# Patient Record
Sex: FEMALE | Race: WHITE | NOT HISPANIC OR LATINO | Employment: FULL TIME
[De-identification: names, ages, dates, MRNs, and addresses within clinical notes are randomized per-mention and may not be internally consistent; named-entity substitution may affect disease eponyms.]

---

## 2022-10-03 ENCOUNTER — HEALTH MAINTENANCE LETTER (OUTPATIENT)
Age: 47
End: 2022-10-03

## 2022-11-02 ENCOUNTER — OFFICE VISIT (OUTPATIENT)
Dept: FAMILY MEDICINE | Facility: CLINIC | Age: 47
End: 2022-11-02
Payer: COMMERCIAL

## 2022-11-02 VITALS
OXYGEN SATURATION: 94 % | DIASTOLIC BLOOD PRESSURE: 69 MMHG | RESPIRATION RATE: 18 BRPM | SYSTOLIC BLOOD PRESSURE: 101 MMHG | TEMPERATURE: 98.2 F | HEART RATE: 73 BPM | WEIGHT: 217 LBS

## 2022-11-02 DIAGNOSIS — G89.4 CHRONIC PAIN SYNDROME: ICD-10-CM

## 2022-11-02 DIAGNOSIS — Z76.89 ENCOUNTER TO ESTABLISH CARE WITH NEW DOCTOR: Primary | ICD-10-CM

## 2022-11-02 DIAGNOSIS — M25.571 PAIN IN JOINT INVOLVING ANKLE AND FOOT, RIGHT: ICD-10-CM

## 2022-11-02 PROCEDURE — 99203 OFFICE O/P NEW LOW 30 MIN: CPT | Performed by: NURSE PRACTITIONER

## 2022-11-02 RX ORDER — ONDANSETRON 4 MG/1
4 TABLET, FILM COATED ORAL 3 TIMES DAILY
COMMUNITY
Start: 2021-10-04 | End: 2022-11-28

## 2022-11-02 RX ORDER — BUPROPION HYDROCHLORIDE 300 MG/1
300 TABLET ORAL DAILY
COMMUNITY
Start: 2022-07-12 | End: 2022-11-28

## 2022-11-02 RX ORDER — PROCHLORPERAZINE MALEATE 10 MG
10 TABLET ORAL 4 TIMES DAILY PRN
COMMUNITY
Start: 2021-10-04 | End: 2022-11-28

## 2022-11-02 RX ORDER — INDOMETHACIN 50 MG/1
50 CAPSULE ORAL 3 TIMES DAILY
COMMUNITY
Start: 2022-08-12 | End: 2022-11-28

## 2022-11-02 RX ORDER — OXYCODONE HYDROCHLORIDE 5 MG/1
5 TABLET ORAL 4 TIMES DAILY
COMMUNITY
Start: 2022-09-28 | End: 2022-11-03

## 2022-11-02 RX ORDER — HYDROCHLOROTHIAZIDE 12.5 MG/1
12.5 CAPSULE ORAL DAILY
COMMUNITY
Start: 2021-10-04 | End: 2022-11-28

## 2022-11-02 RX ORDER — GABAPENTIN 300 MG/1
300 CAPSULE ORAL DAILY
COMMUNITY
Start: 2021-03-15 | End: 2022-11-28

## 2022-11-02 RX ORDER — LIDOCAINE HYDROCHLORIDE 20 MG/ML
5 SOLUTION OROPHARYNGEAL
COMMUNITY
Start: 2021-12-19 | End: 2022-11-28

## 2022-11-02 RX ORDER — BACLOFEN 10 MG/1
10 TABLET ORAL DAILY
COMMUNITY
Start: 2021-11-28 | End: 2022-12-01 | Stop reason: ALTCHOICE

## 2022-11-02 RX ORDER — LEVOTHYROXINE SODIUM 125 UG/1
125 TABLET ORAL DAILY
COMMUNITY
Start: 2021-10-04 | End: 2023-05-21

## 2022-11-02 RX ORDER — METFORMIN HYDROCHLORIDE 750 MG/1
2250 TABLET, EXTENDED RELEASE ORAL DAILY
COMMUNITY
Start: 2021-10-04 | End: 2022-11-28

## 2022-11-02 RX ORDER — RIZATRIPTAN BENZOATE 10 MG/1
10 TABLET ORAL 2 TIMES DAILY
COMMUNITY
Start: 2021-07-19 | End: 2022-11-28

## 2022-11-02 RX ORDER — LAMOTRIGINE 200 MG/1
400 TABLET ORAL AT BEDTIME
COMMUNITY
Start: 2022-07-12 | End: 2022-11-28

## 2022-11-02 RX ORDER — GLIPIZIDE 10 MG/1
20 TABLET, FILM COATED, EXTENDED RELEASE ORAL DAILY
COMMUNITY
Start: 2022-03-04 | End: 2022-11-28

## 2022-11-02 RX ORDER — OMEPRAZOLE 40 MG/1
40 CAPSULE, DELAYED RELEASE ORAL DAILY
COMMUNITY
Start: 2021-11-29 | End: 2022-11-28

## 2022-11-02 RX ORDER — TOPIRAMATE 25 MG/1
75 TABLET, FILM COATED ORAL DAILY
COMMUNITY
Start: 2021-12-30 | End: 2022-11-28

## 2022-11-02 RX ORDER — HYDROXYZINE PAMOATE 50 MG/1
50 CAPSULE ORAL DAILY
COMMUNITY
Start: 2022-08-09 | End: 2022-11-28

## 2022-11-02 RX ORDER — FOLIC ACID 1 MG/1
1 TABLET ORAL DAILY
COMMUNITY
Start: 2020-11-19 | End: 2022-11-28

## 2022-11-02 RX ORDER — PRAVASTATIN SODIUM 40 MG
40 TABLET ORAL DAILY
COMMUNITY
Start: 2021-10-04 | End: 2022-11-28

## 2022-11-02 RX ORDER — ACETAMINOPHEN AND CODEINE PHOSPHATE 120; 12 MG/5ML; MG/5ML
0.35 SOLUTION ORAL DAILY
COMMUNITY
Start: 2021-12-13 | End: 2022-11-28

## 2022-11-02 RX ORDER — NYSTATIN 100000 U/G
100000 CREAM TOPICAL 3 TIMES DAILY
COMMUNITY
Start: 2021-10-04

## 2022-11-02 RX ORDER — DOXEPIN HYDROCHLORIDE 25 MG/1
25 CAPSULE ORAL DAILY
COMMUNITY
Start: 2022-08-09 | End: 2022-11-28

## 2022-11-02 ASSESSMENT — ENCOUNTER SYMPTOMS
HEMATURIA: 0
NAUSEA: 0
FREQUENCY: 0
HEMATOCHEZIA: 0
NERVOUS/ANXIOUS: 0
BREAST MASS: 0
HEADACHES: 0
ABDOMINAL PAIN: 0
MYALGIAS: 0
DIZZINESS: 0
DYSURIA: 0
SHORTNESS OF BREATH: 0
PALPITATIONS: 0
JOINT SWELLING: 0
HEARTBURN: 0
EYE PAIN: 0
CONSTIPATION: 1
PARESTHESIAS: 0
ARTHRALGIAS: 0
FEVER: 0
CHILLS: 0
WEAKNESS: 0
COUGH: 0
DIARRHEA: 0
SORE THROAT: 0

## 2022-11-02 NOTE — PROGRESS NOTES
"   SUBJECTIVE:   CC: Elicia is an 47 year old who presents for preventive health visit.         {  Healthy Habits:     Getting at least 3 servings of Calcium per day:  Yes    Bi-annual eye exam:  Yes    Dental care twice a year:  NO    Sleep apnea or symptoms of sleep apnea:  None    Diet:  Regular (no restrictions)    Frequency of exercise:  None    Taking medications regularly:  No    Barriers to taking medications:  Problems remembering to take them    Medication side effects:  Lightheadedness and Other    PHQ-2 Total Score: 0    Additional concerns today:  Yes          Today's PHQ-2 Score:   PHQ-2 ( 1999 [employer]) 11/2/2022   Q1: Little interest or pleasure in doing things 0   Q2: Feeling down, depressed or hopeless 0   PHQ-2 Score 0   Q1: Little interest or pleasure in doing things Not at all   Q2: Feeling down, depressed or hopeless Not at all   PHQ-2 Score 0         Social History     Tobacco Use     Smoking status: Not on file     Smokeless tobacco: Not on file   Substance Use Topics     Alcohol use: Not on file     If you drink alcohol do you typically have >3 drinks per day or >7 drinks per week? No    Alcohol Use 11/2/2022   Prescreen: >3 drinks/day or >7 drinks/week? No   No flowsheet data found.    Reviewed orders with patient.  Reviewed health maintenance and updated orders accordingly - { :047338::\"Yes\"}  {Chronicprobdata (optional):361081}    Breast Cancer Screening:    Breast CA Risk Assessment (FHS-7) 11/2/2022   Do you have a family history of breast, colon, or ovarian cancer? No / Unknown       {If any of the questions to the BCRA (FHS-7) are answered yes, consider ordering referral for genetic counseling (Optional) :111487::\"click delete button to remove this line now\"}  {AMB Mammogram Decision Support (Optional) :523419}  Pertinent mammograms are reviewed under the imaging tab.    History of abnormal Pap smear: { :654505}     Reviewed and updated as needed this visit by clinical staff              " "    Reviewed and updated as needed this visit by Provider                 {HISTORY OPTIONS (Optional):068565}    Review of Systems   Constitutional: Negative for chills and fever.   HENT: Negative for congestion, ear pain, hearing loss and sore throat.    Eyes: Negative for pain and visual disturbance.   Respiratory: Negative for cough and shortness of breath.    Cardiovascular: Negative for chest pain, palpitations and peripheral edema.   Gastrointestinal: Positive for constipation. Negative for abdominal pain, diarrhea, heartburn, hematochezia and nausea.   Breasts:  Negative for tenderness, breast mass and discharge.   Genitourinary: Negative for dysuria, frequency, genital sores, hematuria, pelvic pain, urgency, vaginal bleeding and vaginal discharge.   Musculoskeletal: Negative for arthralgias, joint swelling and myalgias.   Skin: Negative for rash.   Neurological: Negative for dizziness, weakness, headaches and paresthesias.   Psychiatric/Behavioral: Negative for mood changes. The patient is not nervous/anxious.      {FEMALE ROS (Optional):130291}     OBJECTIVE:   There were no vitals taken for this visit.  Physical Exam  {Exam Choices (Optional):621094}    {Diagnostic Test Results (Optional):550799::\"Diagnostic Test Results:\",\"Labs reviewed in Epic\"}    ASSESSMENT/PLAN:   {Diag Picklist:498505}    {Patient advised of split billing (Optional):733160}      COUNSELING:  {FEMALE COUNSELING MESSAGES:796611::\"Reviewed preventive health counseling, as reflected in patient instructions\"}    There is no height or weight on file to calculate BMI.    {Weight Management Plan (ACO) Complete if BMI is abnormal-  Ages 18-64  BMI >24.9.  Age 65+ with BMI <23 or >30 (Optional):815233}    She has no history on file for tobacco use.      Counseling Resources:  ATP IV Guidelines  Pooled Cohorts Equation Calculator  Breast Cancer Risk Calculator  BRCA-Related Cancer Risk Assessment: FHS-7 Tool  FRAX Risk Assessment  ICSI " Preventive Guidelines  Dietary Guidelines for Americans, 2010  USDA's MyPlate  ASA Prophylaxis  Lung CA Screening    CANDACE Youngblood CNP  [facility]

## 2022-11-02 NOTE — PROGRESS NOTES
Assessment & Plan     Encounter to establish care with new doctor  Reviewed chronic health conditions; medications, labs and pertinent health concerns today    Chronic pain syndrome  - Pain Management  Referral    Pain in joint involving ankle and foot, right  - Orthopedic  Referral      Due to time and chronic medical problems; follow up in 3 weeks to review other   Health concerns and preventive care mammogram and pap.           Return in about 3 months (around 2/2/2023) for schedule , Follow up, with me 3weeks.    CANDACE Youngblood CNP  [facility]    47 year old  year old female with PMH There is no problem list on file for this patient.    in clinic for acute office visit related to/establish care/to discuss       Subjective   Elicia is a 47 year old presenting for the following health issues:  - establish care    Patient is relocating to [place] from [place]; last PCP visit 6/7/22     Past Medical History:       Depression     Diabetes mellitus uncontrolled     Fatty liver     Former smoker     Hypothyroidism     IBS (irritable bowel syndrome)     Migraine without aura, without mention of intractable migraine without mention of status migrainosus Migraine     Obesity (BMI 30-39.9)       Pain management contract agreement    Social:    Smoking Status Former Smoker     Packs/day: 1.00   Smokeless Tobacco Never Used     Family History   Problem Relation Name Age of Onset     Diabetes Gparents     Other Gparents   Macular degeneration     Cancer - Breast Gparents   Mat great GM, possibly MGM     Cataract Maternal Grandparent     Macular Degeneration Maternal Grandparent   great aunt     Strabismus Sibling   brother     None Known Mother   Status: Alive     None Known Father   Status: Alive     MI/CVA <49yo Neg         HPI     - chronic right ankle pain: s/p 2020 ORIF; hardware removed from her right ankle on 6/3/2021; last seen by Orthopedics 5/2022 potential arthroplasty or  arthrodesis; Achy pain with standing for long periods of times; radiating up leg; + weakness; + n/t; taking gabapentin 1200 mg TD; currently taking oxycodone 5 mg q6h prn pain     Previous imaging  FINDINGS:   There is no acute malalignment. There is moderate to severe joint space loss at the ankle joint. There is abnormal osteophytosis at the ankle joint.     - back : chronic; oxycodone prn   - Migraines: topirmate and maxalt prn;   - memory loss/word finding difficulty: patient reports having memory loss, word ffinding dificutly  - DM:   - Bipolar II: was following mental health team; medication stable on gabapentin, lamictal, bupropion, rexulti,               Review of Systems      Review of Systems   Constitutional: Negative for chills and fever.   HENT: Negative for congestion, ear pain, hearing loss and sore throat.    Eyes: Negative for pain and visual disturbance.   Respiratory: Negative for cough and shortness of breath.    Cardiovascular: Negative for chest pain, palpitations and peripheral edema.   Gastrointestinal: Positive for constipation. Negative for abdominal pain, diarrhea, heartburn, hematochezia and nausea.   Breasts:  Negative for tenderness, breast mass and discharge.   Genitourinary: Negative for dysuria, frequency, genital sores, hematuria, pelvic pain, urgency, vaginal bleeding and vaginal discharge.   Musculoskeletal: Negative for arthralgias, joint swelling and myalgias.   Skin: Negative for rash.   Neurological: Negative for dizziness, weakness, headaches and paresthesias.   Psychiatric/Behavioral: Negative for mood changes. The patient is not nervous/anxious}      Objective    /69 (BP Location: Right arm, Patient Position: Sitting, Cuff Size: Adult Large)   Pulse 73   Temp 98.2  F (36.8  C) (Temporal)   Resp 18   Wt 98.4 kg (217 lb)   LMP 10/10/2022   SpO2 94%   There is no height or weight on file to calculate BMI.  Physical Exam   GENERAL: healthy, alert and no  distress  NECK: no adenopathy, no asymmetry, masses, or scars and thyroid normal to palpation  RESP: lungs clear to auscultation - no rales, rhonchi or wheezes  CV: regular rate and rhythm, normal S1 S2, no S3 or S4, no murmur, click or rub, no peripheral edema and peripheral pulses strong  ABDOMEN: soft, nontender, no hepatosplenomegaly, no masses and bowel sounds normal  MS: no gross musculoskeletal defects noted, no edema    No results found for any previous visit.     No results found for any visits on 11/02/22.  No results found for this or any previous visit (from the past 24 hour(s)).                  Answers for HPI/ROS submitted by the patient on 11/2/2022  Frequency of exercise:: None  Getting at least 3 servings of Calcium per day:: Yes  Diet:: Regular (no restrictions)  Taking medications regularly:: No  Medication side effects:: Lightheadedness, Other  Bi-annual eye exam:: Yes  Dental care twice a year:: NO  Sleep apnea or symptoms of sleep apnea:: None  abdominal pain: No  Blood in stool: No  Blood in urine: No  chest pain: No  chills: No  congestion: No  constipation: Yes  cough: No  diarrhea: No  dizziness: No  ear pain: No  eye pain: No  nervous/anxious: No  fever: No  frequency: No  genital sores: No  headaches: No  hearing loss: No  heartburn: No  arthralgias: No  joint swelling: No  peripheral edema: No  mood changes: No  myalgias: No  nausea: No  dysuria: No  palpitations: No  Skin sensation changes: No  sore throat: No  urgency: No  rash: No  shortness of breath: No  visual disturbance: No  weakness: No  pelvic pain: No  vaginal bleeding: No  vaginal discharge: No  tenderness: No  breast mass: No  breast discharge: No  Additional concerns today:: Yes  Barriers to taking medications:: Problems remembering to take them

## 2022-11-03 ENCOUNTER — TELEPHONE (OUTPATIENT)
Dept: ANESTHESIOLOGY | Facility: CLINIC | Age: 47
End: 2022-11-03

## 2022-11-03 ENCOUNTER — MYC REFILL (OUTPATIENT)
Dept: FAMILY MEDICINE | Facility: CLINIC | Age: 47
End: 2022-11-03

## 2022-11-03 DIAGNOSIS — M25.571 PAIN IN JOINT INVOLVING ANKLE AND FOOT, RIGHT: Primary | ICD-10-CM

## 2022-11-03 RX ORDER — OXYCODONE HYDROCHLORIDE 5 MG/1
5 TABLET ORAL 4 TIMES DAILY
Qty: 112 TABLET | Refills: 0 | Status: SHIPPED | OUTPATIENT
Start: 2022-11-03 | End: 2022-12-30

## 2022-11-03 NOTE — TELEPHONE ENCOUNTER
RN spoke with patient and rescheduled appointment for an earlier opening on 12/9 at 3 pm with CANDACE Hightower CNP.     Marlen Britton RNCC

## 2022-11-03 NOTE — TELEPHONE ENCOUNTER
Routing refill request to provider for review/approval because:  Drug not on the [facility] refill protocol     Last Written Prescription Date:  9/28/22  Last office visit: 11/2/2022 with prescribing provider:  11/2/22   Future Office Visit:   Next 5 appointments (look out 90 days)    Nov 28, 2022  5:30 PM  (Arrive by 5:10 PM)  Provider Visit with CANDACE Youngblood CNP  [facility] [address] [place] [address]  [phone]   Dec 21, 2022 10:30 AM  (Arrive by 10:10 AM)  Adult Preventative Visit with CANDACE Youngblood CNP  [facility] ([facility] ) [address] [place] [address]  [phone]        Patient Comment: I was looking for the refill of this medication at the pharmacy but it wasn t there. I am going to be by mid day Friday. Can you please send it in by then?        CELENA Velazquez RN  [facility]

## 2022-11-03 NOTE — TELEPHONE ENCOUNTER
Health Call Center    Phone Message    May a detailed message be left on voicemail: yes     Reason for Call: Appointment Intake    Referring Provider Name: Gil Mann APRN CNP   Diagnosis and/or Symptoms: Chronic pain syndrome     Pt expressed concern that she needs to get her RX's refilled by the end of this month and first available appt is on 12/23/22.She states that she recently transferred care to [facility] and her new PCP, temporarily taking over her pain meds, is stating that her RXs will be redused by the end of the month.    Pt very anxious about this med change.    Please review and call Pt back at [phone] to advise what actions can be taken.    Action Taken: Message routed to:  [facility] ([facility] Pain    Travel Screening: Not Applicable

## 2022-11-10 ENCOUNTER — TRANSFERRED RECORDS (OUTPATIENT)
Dept: HEALTH INFORMATION MANAGEMENT | Facility: CLINIC | Age: 47
End: 2022-11-10

## 2022-11-13 ENCOUNTER — MYC MEDICAL ADVICE (OUTPATIENT)
Dept: FAMILY MEDICINE | Facility: CLINIC | Age: 47
End: 2022-11-13

## 2022-11-28 ENCOUNTER — VIRTUAL VISIT (OUTPATIENT)
Dept: FAMILY MEDICINE | Facility: CLINIC | Age: 47
End: 2022-11-28
Payer: COMMERCIAL

## 2022-11-28 DIAGNOSIS — Z12.31 ENCOUNTER FOR SCREENING MAMMOGRAM FOR BREAST CANCER: ICD-10-CM

## 2022-11-28 DIAGNOSIS — E66.812 CLASS 2 SEVERE OBESITY DUE TO EXCESS CALORIES WITH SERIOUS COMORBIDITY AND BODY MASS INDEX (BMI) OF 37.0 TO 37.9 IN ADULT (H): ICD-10-CM

## 2022-11-28 DIAGNOSIS — K21.00 GASTROESOPHAGEAL REFLUX DISEASE WITH ESOPHAGITIS WITHOUT HEMORRHAGE: ICD-10-CM

## 2022-11-28 DIAGNOSIS — E03.9 HYPOTHYROIDISM, UNSPECIFIED TYPE: ICD-10-CM

## 2022-11-28 DIAGNOSIS — G89.29 CHRONIC LOW BACK PAIN WITHOUT SCIATICA, UNSPECIFIED BACK PAIN LATERALITY: ICD-10-CM

## 2022-11-28 DIAGNOSIS — M54.50 CHRONIC LOW BACK PAIN WITHOUT SCIATICA, UNSPECIFIED BACK PAIN LATERALITY: ICD-10-CM

## 2022-11-28 DIAGNOSIS — M25.571 PAIN IN JOINT INVOLVING ANKLE AND FOOT, RIGHT: ICD-10-CM

## 2022-11-28 DIAGNOSIS — Z30.41 ENCOUNTER FOR SURVEILLANCE OF CONTRACEPTIVE PILLS: ICD-10-CM

## 2022-11-28 DIAGNOSIS — K76.0 NAFLD (NONALCOHOLIC FATTY LIVER DISEASE): ICD-10-CM

## 2022-11-28 DIAGNOSIS — K58.9 IRRITABLE BOWEL SYNDROME, UNSPECIFIED TYPE: ICD-10-CM

## 2022-11-28 DIAGNOSIS — Z12.11 SPECIAL SCREENING FOR MALIGNANT NEOPLASMS, COLON: ICD-10-CM

## 2022-11-28 DIAGNOSIS — D13.4 HEPATIC ADENOMA: ICD-10-CM

## 2022-11-28 DIAGNOSIS — R60.0 LOCALIZED EDEMA: ICD-10-CM

## 2022-11-28 DIAGNOSIS — E66.01 CLASS 2 SEVERE OBESITY DUE TO EXCESS CALORIES WITH SERIOUS COMORBIDITY AND BODY MASS INDEX (BMI) OF 37.0 TO 37.9 IN ADULT (H): ICD-10-CM

## 2022-11-28 DIAGNOSIS — G43.709 CHRONIC MIGRAINE WITHOUT AURA WITHOUT STATUS MIGRAINOSUS, NOT INTRACTABLE: ICD-10-CM

## 2022-11-28 DIAGNOSIS — F31.60 BIPOLAR AFFECTIVE DISORDER, CURRENT EPISODE MIXED, CURRENT EPISODE SEVERITY UNSPECIFIED (H): ICD-10-CM

## 2022-11-28 DIAGNOSIS — E11.9 TYPE 2 DIABETES MELLITUS WITHOUT COMPLICATION, WITHOUT LONG-TERM CURRENT USE OF INSULIN (H): ICD-10-CM

## 2022-11-28 DIAGNOSIS — Z76.89 ENCOUNTER TO ESTABLISH CARE WITH NEW DOCTOR: Primary | ICD-10-CM

## 2022-11-28 DIAGNOSIS — G47.00 PERSISTENT INSOMNIA: ICD-10-CM

## 2022-11-28 DIAGNOSIS — F33.0 MILD EPISODE OF RECURRENT MAJOR DEPRESSIVE DISORDER (H): ICD-10-CM

## 2022-11-28 PROCEDURE — 99215 OFFICE O/P EST HI 40 MIN: CPT | Mod: GT | Performed by: NURSE PRACTITIONER

## 2022-11-28 RX ORDER — INDOMETHACIN 50 MG/1
50 CAPSULE ORAL 3 TIMES DAILY
Qty: 90 CAPSULE | Refills: 1 | Status: SHIPPED | OUTPATIENT
Start: 2022-11-28 | End: 2023-03-08

## 2022-11-28 RX ORDER — METHOCARBAMOL 750 MG/1
750 TABLET, FILM COATED ORAL 4 TIMES DAILY PRN
Qty: 120 TABLET | Refills: 0 | Status: SHIPPED | OUTPATIENT
Start: 2022-11-28 | End: 2022-12-27

## 2022-11-28 RX ORDER — PRAVASTATIN SODIUM 40 MG
40 TABLET ORAL DAILY
Qty: 90 TABLET | Refills: 3 | Status: SHIPPED | OUTPATIENT
Start: 2022-11-28 | End: 2023-07-24

## 2022-11-28 RX ORDER — RIZATRIPTAN BENZOATE 10 MG/1
10 TABLET ORAL 2 TIMES DAILY
Qty: 60 TABLET | Refills: 3 | Status: SHIPPED | OUTPATIENT
Start: 2022-11-28 | End: 2024-04-29

## 2022-11-28 RX ORDER — PROCHLORPERAZINE MALEATE 10 MG
10 TABLET ORAL 4 TIMES DAILY PRN
Qty: 120 TABLET | Refills: 1 | Status: SHIPPED | OUTPATIENT
Start: 2022-11-28 | End: 2023-07-14

## 2022-11-28 RX ORDER — LAMOTRIGINE 200 MG/1
400 TABLET ORAL AT BEDTIME
Qty: 180 TABLET | Refills: 3 | Status: SHIPPED | OUTPATIENT
Start: 2022-11-28 | End: 2024-09-18

## 2022-11-28 RX ORDER — METFORMIN HYDROCHLORIDE 750 MG/1
2250 TABLET, EXTENDED RELEASE ORAL DAILY
Qty: 270 TABLET | Refills: 3 | Status: SHIPPED | OUTPATIENT
Start: 2022-11-28 | End: 2023-06-06

## 2022-11-28 RX ORDER — BUPROPION HYDROCHLORIDE 300 MG/1
300 TABLET ORAL DAILY
Qty: 90 TABLET | Refills: 0 | Status: SHIPPED | OUTPATIENT
Start: 2022-11-28 | End: 2023-06-06

## 2022-11-28 RX ORDER — FOLIC ACID 1 MG/1
1000 TABLET ORAL DAILY
Qty: 90 TABLET | Refills: 3 | Status: SHIPPED | OUTPATIENT
Start: 2022-11-28

## 2022-11-28 RX ORDER — GABAPENTIN 300 MG/1
900 CAPSULE ORAL 3 TIMES DAILY
Qty: 270 CAPSULE | Refills: 3 | Status: SHIPPED | OUTPATIENT
Start: 2022-11-28 | End: 2022-12-27

## 2022-11-28 RX ORDER — DOXEPIN HYDROCHLORIDE 25 MG/1
25 CAPSULE ORAL AT BEDTIME
Qty: 30 CAPSULE | Refills: 3 | Status: SHIPPED | OUTPATIENT
Start: 2022-11-28 | End: 2023-03-07

## 2022-11-28 RX ORDER — ACETAMINOPHEN AND CODEINE PHOSPHATE 120; 12 MG/5ML; MG/5ML
0.35 SOLUTION ORAL DAILY
Qty: 84 TABLET | Refills: 11 | Status: SHIPPED | OUTPATIENT
Start: 2022-11-28 | End: 2023-07-24

## 2022-11-28 RX ORDER — LIDOCAINE HYDROCHLORIDE 20 MG/ML
5 SOLUTION OROPHARYNGEAL
Qty: 100 ML | Refills: 1 | Status: SHIPPED | OUTPATIENT
Start: 2022-11-28 | End: 2024-04-23

## 2022-11-28 RX ORDER — HYDROCHLOROTHIAZIDE 12.5 MG/1
12.5 CAPSULE ORAL DAILY
Qty: 90 CAPSULE | Refills: 3 | Status: SHIPPED | OUTPATIENT
Start: 2022-11-28 | End: 2023-07-24

## 2022-11-28 RX ORDER — GLIPIZIDE 10 MG/1
20 TABLET, FILM COATED, EXTENDED RELEASE ORAL DAILY
Qty: 180 TABLET | Refills: 3 | Status: SHIPPED | OUTPATIENT
Start: 2022-11-28 | End: 2023-07-24

## 2022-11-28 RX ORDER — HYDROXYZINE PAMOATE 50 MG/1
50 CAPSULE ORAL DAILY
Qty: 30 CAPSULE | Refills: 0 | Status: SHIPPED | OUTPATIENT
Start: 2022-11-28 | End: 2023-03-08

## 2022-11-28 RX ORDER — BUPROPION HYDROCHLORIDE 150 MG/1
150 TABLET ORAL EVERY MORNING
Qty: 90 TABLET | Refills: 3 | Status: SHIPPED | OUTPATIENT
Start: 2022-11-28

## 2022-11-28 RX ORDER — ONDANSETRON 4 MG/1
4 TABLET, FILM COATED ORAL 3 TIMES DAILY
Qty: 90 TABLET | Refills: 1 | Status: SHIPPED | OUTPATIENT
Start: 2022-11-28 | End: 2024-09-13

## 2022-11-28 RX ORDER — OMEPRAZOLE 40 MG/1
40 CAPSULE, DELAYED RELEASE ORAL AT BEDTIME
Qty: 90 CAPSULE | Refills: 3 | Status: SHIPPED | OUTPATIENT
Start: 2022-11-28 | End: 2023-06-06

## 2022-11-28 NOTE — PROGRESS NOTES
Elicia is a 47 year old who is being evaluated via a billable video visit.      How would you like to obtain your AVS? MyChart  If the video visit is dropped, the invitation should be resent by: Text to cell phone: [phone]  Will anyone else be joining your video visit? No          Assessment & Plan     Encounter to establish care with new doctor  Reviewed chronic health conditions; medications, labs and pertinent health concerns today    Gastroesophageal reflux disease with esophagitis without hemorrhage  Omeprazole 20 mg BID if insurance agreeable; GI EGD  - Adult GI  Referral - Procedure Only  - ondansetron (ZOFRAN) 4 MG tablet  Dispense: 90 tablet; Refill: 1  - folic acid (FOLVITE) 1 MG tablet  Dispense: 90 tablet; Refill: 3  - lidocaine, viscous, (XYLOCAINE) 2 % solution  Dispense: 100 mL; Refill: 1  - omeprazole (PRILOSEC) 40 MG DR capsule  Dispense: 90 capsule; Refill: 3  - Vitamin B12    Hypothyroidism, unspecified type  Symptomatic fatigue, weight gain; hair loss TSH low nl 0.58; check TSH goal 2-3 range  - folic acid (FOLVITE) 1 MG tablet  Dispense: 90 tablet; Refill: 3  - Vitamin B12  - TSH with free T4 reflex  - Methylmalonic Acid  - Homocysteine  - Folate    Hepatic adenoma  - Adult GI  Referral - Consult Only  - Comprehensive metabolic panel    NAFLD (nonalcoholic fatty liver disease)  Weight loss management   - Adult GI  Referral - Consult Only  - Comprehensive metabolic panel    Type 2 diabetes mellitus without complication, without long-term current use of insulin (H)  Uncontrolled; continue recent increase semaglutide 14 mg repeat A1C  - glipiZIDE (GLUCOTROL XL) 10 MG 24 hr tablet  Dispense: 180 tablet; Refill: 3  - metFORMIN (GLUCOPHAGE-XR) 750 MG 24 hr tablet  Dispense: 270 tablet; Refill: 3  - Semaglutide 14 MG TABS  Dispense: 30 tablet; Refill: 3  - pravastatin (PRAVACHOL) 40 MG tablet  Dispense: 90 tablet; Refill: 3  - folic acid (FOLVITE) 1 MG tablet  Dispense: 90  tablet; Refill: 3  - Vitamin B12  - Hemoglobin A1c  - Folate  - Comprehensive metabolic panel    Irritable bowel syndrome, unspecified type  - Adult GI  Referral - Consult Only  - ondansetron (ZOFRAN) 4 MG tablet  Dispense: 90 tablet; Refill: 1    Mild episode of recurrent major depressive disorder (H)  Bipolar affective disorder, current episode mixed, current episode severity unspecified (H)  Pending referral to ; Stable; continue current regimen; renewed medication  - buPROPion (WELLBUTRIN XL) 150 MG 24 hr tablet  Dispense: 90 tablet; Refill: 3  - buPROPion (WELLBUTRIN XL) 300 MG 24 hr tablet  Dispense: 90 tablet; Refill: 0  - lamoTRIgine (LAMICTAL) 200 MG tablet  Dispense: 180 tablet; Refill: 3  - brexpiprazole (REXULTI) 1 MG tablet  Dispense: 90 tablet; Refill: 3  - hydrOXYzine (VISTARIL) 50 MG capsule  Dispense: 30 capsule; Refill: 0  - TSH with free T4 reflex    Chronic migraine without aura without status migrainosus, not intractable  Stable; continue current regimen; renewed medication  - gabapentin (NEURONTIN) 300 MG capsule  Dispense: 270 capsule; Refill: 3  - prochlorperazine (COMPAZINE) 10 MG tablet  Dispense: 120 tablet; Refill: 1  - rizatriptan (MAXALT) 10 MG tablet  Dispense: 60 tablet; Refill: 3    Chronic low back pain without sciatica, unspecified back pain laterality  Baclofen not effective; trial robacin  - methocarbamol (ROBAXIN) 750 MG tablet  Dispense: 120 tablet; Refill: 0    Pain in joint involving ankle and foot, right  Orthopedic surgery pending  - gabapentin (NEURONTIN) 300 MG capsule  Dispense: 270 capsule; Refill: 3  - methocarbamol (ROBAXIN) 750 MG tablet  Dispense: 120 tablet; Refill: 0  - indomethacin (INDOCIN) 50 MG capsule  Dispense: 90 capsule; Refill: 1    Encounter for screening mammogram for breast cancer  - MA Screening Digital Bilateral    Special screening for malignant neoplasms, colon  - GEOFF([employer])    Persistent insomnia  Stable; continue current  regimen; renewed medication    - doxepin (SINEQUAN) 25 MG capsule  Dispense: 30 capsule; Refill: 3    Encounter for surveillance of contraceptive pills  Stable; continue current regimen; renewed medication  Progestin only due to hepatic adenomas   - norethindrone (MICRONOR) 0.35 MG tablet  Dispense: 84 tablet; Refill: 11    Localized edema  ? R/t hepatic adenoma ; Stable; continue current regimen; renewed medication  - hydrochlorothiazide (MICROZIDE) 12.5 MG capsule  Dispense: 90 capsule; Refill: 3     Class 2 severe obesity due to excess calories with serious comorbidity and body mass index (BMI) of 37.0 to 37.9 in adult (H)  - phentermine (ADIPEX-P) 15 MG capsule  - follow up 3 months       No follow-ups on file.   Follow-up Visit   Expected date:  Feb 28, 2023 (Approximate)      Follow Up Appointment Details:     Follow-up with whom?: Me    Follow-Up for what?: Chronic Disease f/u    Chronic Disease f/u: General (Other)    Additional Details: obesity management    How?: In Person or Virtual    Is this an as-needed follow-up?: No                    CANDACE Youngblood [facility]    47 year old  year old female with PMH   Patient Active Problem List   Diagnosis Code     Gastroesophageal reflux disease with esophagitis without hemorrhage K21.00     Hypothyroidism, unspecified type E03.9     Hepatic adenoma D13.4     NAFLD (nonalcoholic fatty liver disease) K76.0     Type 2 diabetes mellitus without complication, without long-term current use of insulin (H) E11.9     Irritable bowel syndrome, unspecified type K58.9     Mild episode of recurrent major depressive disorder (H) F33.0     Chronic migraine without aura without status migrainosus, not intractable G43.709     Chronic low back pain without sciatica, unspecified back pain laterality M54.50, G89.29     Bipolar affective disorder, current episode mixed, current episode severity unspecified (H) F31.60     Pain in joint involving  ankle and foot, right M25.571     Persistent insomnia G47.00     Localized edema R60.0     in clinic for acute office visit related to/establish care/to discuss     - continue establish care due to multiple chronic medical conditions    Emily Rubi is a 47 year old, presenting for the following health issues:  Video Visit and Follow Up (Medication check, patient states she cancelled appointment for physical because she is expecting to have ankle surgery)      HPI     - chronic back pain:s/p ablations; stable     - migraines: stopped topiramate due to side effects; improved grogginess; word finding difficulty; migraines remain controlled w/gabapentin     -  Ankle pain:t right trimalleolar ankle fracture dislocation on 4/4/2020; s/p open reduction and internal fixation of her fractures on 4/7/2020; continued pain and neuropathy following surgery; completed Physical Therapy; recently seen by Orthopedics has pending surgery; treated with Oxycodone prn severe pain     - DM II: A1c 8.0%; currently on semaglutide 14 mg; metformin 2250 mg, glipizide xl 10 mg     - Hypothyroidism: fatigue, hair loss, weight steady; TSH 0.58 10/2021 levothyroxine 125 mcg    - Gerd: omeprazole 40 mg q am; increase reflux a night; waking with heartburn,  last EGD scope ~ 20yrs; insurance does not approved bid dosing     - Canker sore: lidocaine viscous     - Generalized edema: Swelling hands/ feet hydrochlorothiazide 12.5 mg dailu    - NAFLD/Hepatic Adenoma: followed by GI liver [place]   MRI Abd:   IMPRESSION:   1.  Liver lesions are unchanged from prior exam, including numerous (at least 10) hepatic adenomas and two foci of focal nodular hyperplasia.   2.  Mean hepatic stiffness either represents normal liver or mild inflammation.   3.  Hepatic steatosis with fat fraction of 12%.   4.  Normal hepatic iron content.     recommendation: repeat MRI 3/2023 if stable; will change to every 2 yr follow up       - Bipolar/Depression: MH referral  pending; stable on current therapy for several yrs;         Former smoker quit 3-4 yrs ago; vaping       IBS: abd cramping w/stress;  Has increased recently with recent move to [place] and new job ocurring once or twice a week       Obesity (BMI 30-39.9): no significant weight loss with starting semaglutide po      Review of Systems   Constitutional, HEENT, cardiovascular, pulmonary, gi and gu systems are negative, except as otherwise noted.      Objective           Vitals:  No vitals were obtained today due to virtual visit.    Physical Exam   GENERAL: Healthy, alert and no distress  EYES: Eyes grossly normal to inspection.  No discharge or erythema, or obvious scleral/conjunctival abnormalities.  RESP: No audible wheeze, cough, or visible cyanosis.  No visible retractions or increased work of breathing.    SKIN: Visible skin clear. No significant rash, abnormal pigmentation or lesions.  NEURO: Cranial nerves grossly intact.  Mentation and speech appropriate for age.  PSYCH: Mentation appears normal, affect normal/bright, judgement and insight intact, normal speech and appearance well-groomed.    No results found for any visits on 11/28/22.            Video-Visit Details    Joined the call at 11/28/2022, 5:16:12 pm.  Left the call at 11/28/2022, 5:59:13 pm.  You were on the call for 43 minutes 1 second .    Video Start Time:     Type of service:  Video Visit    Video End Time:5:59 PM    Originating Location (pt. Location): Home    Distant Location (provider location):  Off-site    Platform used for Video Visit: [facility]

## 2022-11-30 RX ORDER — PHENTERMINE HYDROCHLORIDE 15 MG/1
15 CAPSULE ORAL EVERY MORNING
Qty: 30 CAPSULE | Refills: 3 | Status: SHIPPED | OUTPATIENT
Start: 2022-11-30 | End: 2023-03-01

## 2022-12-01 ENCOUNTER — OFFICE VISIT (OUTPATIENT)
Dept: FAMILY MEDICINE | Facility: CLINIC | Age: 47
End: 2022-12-01
Payer: COMMERCIAL

## 2022-12-01 VITALS
HEART RATE: 74 BPM | WEIGHT: 215.8 LBS | BODY MASS INDEX: 32.71 KG/M2 | DIASTOLIC BLOOD PRESSURE: 64 MMHG | HEIGHT: 68 IN | SYSTOLIC BLOOD PRESSURE: 112 MMHG | TEMPERATURE: 98.4 F | RESPIRATION RATE: 20 BRPM | OXYGEN SATURATION: 97 %

## 2022-12-01 DIAGNOSIS — K76.0 NAFLD (NONALCOHOLIC FATTY LIVER DISEASE): ICD-10-CM

## 2022-12-01 DIAGNOSIS — D13.4 HEPATIC ADENOMA: ICD-10-CM

## 2022-12-01 DIAGNOSIS — K21.00 GASTROESOPHAGEAL REFLUX DISEASE WITH ESOPHAGITIS WITHOUT HEMORRHAGE: ICD-10-CM

## 2022-12-01 DIAGNOSIS — F33.0 MILD EPISODE OF RECURRENT MAJOR DEPRESSIVE DISORDER (H): ICD-10-CM

## 2022-12-01 DIAGNOSIS — M25.571 PAIN IN JOINT INVOLVING ANKLE AND FOOT, RIGHT: ICD-10-CM

## 2022-12-01 DIAGNOSIS — Z11.4 SCREENING FOR HIV (HUMAN IMMUNODEFICIENCY VIRUS): ICD-10-CM

## 2022-12-01 DIAGNOSIS — Z13.220 SCREENING FOR HYPERLIPIDEMIA: ICD-10-CM

## 2022-12-01 DIAGNOSIS — Z01.818 PREOP GENERAL PHYSICAL EXAM: Primary | ICD-10-CM

## 2022-12-01 DIAGNOSIS — E03.9 HYPOTHYROIDISM, UNSPECIFIED TYPE: ICD-10-CM

## 2022-12-01 DIAGNOSIS — Z79.899 ENCOUNTER FOR LONG-TERM (CURRENT) USE OF MEDICATIONS: ICD-10-CM

## 2022-12-01 DIAGNOSIS — Z11.59 NEED FOR HEPATITIS C SCREENING TEST: ICD-10-CM

## 2022-12-01 DIAGNOSIS — E11.9 TYPE 2 DIABETES MELLITUS WITHOUT COMPLICATION, WITHOUT LONG-TERM CURRENT USE OF INSULIN (H): ICD-10-CM

## 2022-12-01 LAB
BASOPHILS # BLD AUTO: 0 10E3/UL (ref 0–0.2)
BASOPHILS NFR BLD AUTO: 1 %
CREAT UR-MCNC: 90.5 MG/DL
CREAT UR-MCNC: 91 MG/DL
EOSINOPHIL # BLD AUTO: 0.4 10E3/UL (ref 0–0.7)
EOSINOPHIL NFR BLD AUTO: 7 %
ERYTHROCYTE [DISTWIDTH] IN BLOOD BY AUTOMATED COUNT: 13.7 % (ref 10–15)
FOLATE SERPL-MCNC: >40 NG/ML (ref 4.6–34.8)
HBA1C MFR BLD: 8.6 % (ref 0–5.6)
HCT VFR BLD AUTO: 41.8 % (ref 35–47)
HGB BLD-MCNC: 13.4 G/DL (ref 11.7–15.7)
LYMPHOCYTES # BLD AUTO: 1.6 10E3/UL (ref 0.8–5.3)
LYMPHOCYTES NFR BLD AUTO: 23 %
MCH RBC QN AUTO: 28.6 PG (ref 26.5–33)
MCHC RBC AUTO-ENTMCNC: 32.1 G/DL (ref 31.5–36.5)
MCV RBC AUTO: 89 FL (ref 78–100)
MICROALBUMIN UR-MCNC: <12 MG/L
MICROALBUMIN/CREAT UR: NORMAL MG/G{CREAT}
MONOCYTES # BLD AUTO: 0.3 10E3/UL (ref 0–1.3)
MONOCYTES NFR BLD AUTO: 5 %
NEUTROPHILS # BLD AUTO: 4.4 10E3/UL (ref 1.6–8.3)
NEUTROPHILS NFR BLD AUTO: 65 %
PLATELET # BLD AUTO: 326 10E3/UL (ref 150–450)
RBC # BLD AUTO: 4.69 10E6/UL (ref 3.8–5.2)
TSH SERPL DL<=0.005 MIU/L-ACNC: 0.4 UIU/ML (ref 0.3–4.2)
WBC # BLD AUTO: 6.8 10E3/UL (ref 4–11)

## 2022-12-01 PROCEDURE — 83921 ORGANIC ACID SINGLE QUANT: CPT

## 2022-12-01 PROCEDURE — 87389 HIV-1 AG W/HIV-1&-2 AB AG IA: CPT

## 2022-12-01 PROCEDURE — 80307 DRUG TEST PRSMV CHEM ANLYZR: CPT

## 2022-12-01 PROCEDURE — 80061 LIPID PANEL: CPT

## 2022-12-01 PROCEDURE — 86803 HEPATITIS C AB TEST: CPT

## 2022-12-01 PROCEDURE — 82607 VITAMIN B-12: CPT

## 2022-12-01 PROCEDURE — 83036 HEMOGLOBIN GLYCOSYLATED A1C: CPT

## 2022-12-01 PROCEDURE — 99214 OFFICE O/P EST MOD 30 MIN: CPT

## 2022-12-01 PROCEDURE — 82043 UR ALBUMIN QUANTITATIVE: CPT

## 2022-12-01 PROCEDURE — 83090 ASSAY OF HOMOCYSTEINE: CPT

## 2022-12-01 PROCEDURE — 36415 COLL VENOUS BLD VENIPUNCTURE: CPT

## 2022-12-01 PROCEDURE — 82746 ASSAY OF FOLIC ACID SERUM: CPT

## 2022-12-01 PROCEDURE — 80050 GENERAL HEALTH PANEL: CPT

## 2022-12-01 ASSESSMENT — PAIN SCALES - GENERAL: PAINLEVEL: SEVERE PAIN (6)

## 2022-12-01 ASSESSMENT — PATIENT HEALTH QUESTIONNAIRE - PHQ9: SUM OF ALL RESPONSES TO PHQ QUESTIONS 1-9: 0

## 2022-12-01 NOTE — PROGRESS NOTES
[address] [address]  Phone: [phone]  Primary Provider: Gil Mann  Pre-op Performing Provider: CHAY SHARP      PREOPERATIVE EVALUATION:  Today's date: 12/1/2022    Elicia Calero is a 47 year old female who presents for a preoperative evaluation.    Surgical Information:  Surgery/Procedure: Right ankle  Surgery Location: [facility]  Surgeon: Dr. Kunal Cesar   Surgery Date: 12/15/22  Time of Surgery:   Where patient plans to recover: At home with family Mom (Evelyn), Dad (Royce) will be staying with her.   Fax number for surgical facility: [phone]    Type of Anesthesia Anticipated: General    Assessment & Plan     The proposed surgical procedure is considered LOW risk.    Preop general physical exam  - CBC with platelets and differential    Pain in joint involving ankle and foot, right  Upcoming surgery. Working with pain mgmt.    Type 2 diabetes mellitus without complication, without long-term current use of insulin (H)   Chronic. Has PCP. A1c 8, recheck today. Med recs below.  - metformin: HOLD day of surgery.   - sulfonylurea (e.g. glyburide, glipizide): HOLD day of surgery   - GLP-1 Injectable (exenitide, liraglutide, semaglutide, dulaglutide, etc.): HOLD day of surgery   - Lipid panel reflex to direct LDL Non-fasting  - Albumin Random Urine Quantitative with Creat Ratio    Encounter for long-term (current) use of medications  opiods for pain with ankle injury.  - [id number] - Urine Drug Confirmation Panel (Comprehensive)    Screening for hyperlipidemia  Chronic DM2.   - Lipid panel reflex to direct LDL Non-fasting    Screening for HIV (human immunodeficiency virus)  - HIV Antigen Antibody Combo    Need for hepatitis C screening test  - Hepatitis C Screen Reflex to HCV RNA Quant and Genotype      Routine labs today ordered as future by PCP. Will add additional testing that is showing as Care gaps. Will advise discussing  with PCP at annual/wellness.        Risks and Recommendations:  The patient has the following additional risks and recommendations for perioperative complications:  Diabetes:  - Patient is not on insulin therapy: regular NPO guidelines can be followed.     Medication Instructions:   - Diuretics: HOLD on the day of surgery.   - Statins: Continue taking on the day of surgery.    - metformin: HOLD day of surgery.   - sulfonylurea (e.g. glyburide, glipizide): HOLD day of surgery   - GLP-1 Injectable (exenitide, liraglutide, semaglutide, dulaglutide, etc.): HOLD day of surgery    - Opioids: Continue without modification.   - pregabalin, gabapentin: Continue without modification.   - indomethacin (Indocin): HOLD 1 day before surgery.    - Benzodiazepines: Continue without modification.   - Psychostimulants: Hold the day of surgery   - SSRIs, SNRIs, TCAs, Antipsychotics: Continue without modification.     RECOMMENDATION:  APPROVAL GIVEN to proceed with proposed procedure, without further diagnostic evaluation.                      Subjective     HPI related to upcoming procedure: Broke falling down stairs 2 years ago and had hardware placed, had ongoing pain and had hardware removed in the past. Now planning fusion to improve mobility and pain.     Preop Questions 12/1/2022   1. Have you ever had a heart attack or stroke? No   2. Have you ever had surgery on your heart or blood vessels, such as a stent placement, a coronary artery bypass, or surgery on an artery in your head, neck, heart, or legs? No   3. Do you have chest pain with activity? No   4. Do you have a history of  heart failure? No   5. Do you currently have a cold, bronchitis or symptoms of other infection? No   6. Do you have a cough, shortness of breath, or wheezing? No   7. Do you or anyone in your family have previous history of blood clots? No   8. Do you or does anyone in your family have a serious bleeding problem such as prolonged bleeding following  surgeries or cuts? No   9. Have you ever had problems with anemia or been told to take iron pills? No   10. Have you had any abnormal blood loss such as black, tarry or bloody stools, or abnormal vaginal bleeding? No   11. Have you ever had a blood transfusion? No   12. Are you willing to have a blood transfusion if it is medically needed before, during, or after your surgery? Yes   13. Have you or any of your relatives ever had problems with anesthesia? No   14. Do you have sleep apnea, excessive snoring or daytime drowsiness? No   15. Do you have any artifical heart valves or other implanted medical devices like a pacemaker, defibrillator, or continuous glucose monitor? No   16. Do you have artificial joints? No   17. Are you allergic to latex? No   18. Is there any chance that you may be pregnant? No       Health Care Directive:  Patient does not have a Health Care Directive or Living Will: Discussed advance care planning with patient; information given to patient to review.    Preoperative Review of :  Has pain management appt on 12/9.   reviewed - controlled substances reflected in medication list.      Status of Chronic Conditions:  DEPRESSION - Patient has a long history of Depression of moderate severity requiring medication for control with recent symptoms being stable..Current symptoms of depression include none.     DIABETES - Patient has a longstanding history of DiabetesType Type II . Patient is being treated with oral agents and denies significant side effects. Control has been fair. Complicating factors include but are not limited to: none.     HYPERLIPIDEMIA - Patient has a long history of significant Hyperlipidemia requiring medication for treatment with recent good control. Patient reports no problems or side effects with the medication.     HYPOTHYROIDISM - Patient has a longstanding history of chronic Hypothyroidism. Patient has been doing well, noting no tremor, insomnia, hair loss or  changes in skin texture. Continues to take medications as directed, without adverse reactions or side effects. Last TSH No results found for: TSH.            Review of Systems  CONSTITUTIONAL: NEGATIVE for fever, chills, change in weight  INTEGUMENTARY/SKIN: NEGATIVE for worrisome rashes, moles or lesions  EYES: NEGATIVE for vision changes or irritation  ENT/MOUTH: NEGATIVE for ear, mouth and throat problems  RESP: NEGATIVE for significant cough or SOB  CV: NEGATIVE for chest pain, palpitations or peripheral edema  GI: NEGATIVE for nausea, abdominal pain, heartburn, or change in bowel habits  : NEGATIVE for frequency, dysuria, or hematuria  MUSCULOSKELETAL: NEGATIVE for significant arthralgias or myalgia  NEURO: NEGATIVE for weakness, dizziness or paresthesias  ENDOCRINE: NEGATIVE for temperature intolerance, skin/hair changes  HEME: NEGATIVE for bleeding problems  PSYCHIATRIC: NEGATIVE for changes in mood or affect    Patient Active Problem List    Diagnosis Date Noted     Gastroesophageal reflux disease with esophagitis without hemorrhage 11/28/2022     Priority: Medium     Hypothyroidism, unspecified type 11/28/2022     Priority: Medium     Hepatic adenoma 11/28/2022     Priority: Medium     NAFLD (nonalcoholic fatty liver disease) 11/28/2022     Priority: Medium     Type 2 diabetes mellitus without complication, without long-term current use of insulin (H) 11/28/2022     Priority: Medium     Irritable bowel syndrome, unspecified type 11/28/2022     Priority: Medium     Mild episode of recurrent major depressive disorder (H) 11/28/2022     Priority: Medium     Chronic migraine without aura without status migrainosus, not intractable 11/28/2022     Priority: Medium     Chronic low back pain without sciatica, unspecified back pain laterality 11/28/2022     Priority: Medium     Bipolar affective disorder, current episode mixed, current episode severity unspecified (H) 11/28/2022     Priority: Medium     Pain in  joint involving ankle and foot, right 11/28/2022     Priority: Medium     Persistent insomnia 11/28/2022     Priority: Medium     Localized edema 11/28/2022     Priority: Medium      No past medical history on file.  No past surgical history on file.  Current Outpatient Medications   Medication Sig Dispense Refill     brexpiprazole (REXULTI) 1 MG tablet Take 1 tablet (1 mg) by mouth daily 90 tablet 3     buPROPion (WELLBUTRIN XL) 150 MG 24 hr tablet Take 1 tablet (150 mg) by mouth every morning 90 tablet 3     buPROPion (WELLBUTRIN XL) 300 MG 24 hr tablet Take 1 tablet (300 mg) by mouth daily for 90 days 90 tablet 0     CONTOUR NEXT TEST test strip        doxepin (SINEQUAN) 25 MG capsule Take 1 capsule (25 mg) by mouth At Bedtime 30 capsule 3     folic acid (FOLVITE) 1 MG tablet Take 1 tablet (1,000 mcg) by mouth daily 90 tablet 3     gabapentin (NEURONTIN) 300 MG capsule Take 3 capsules (900 mg) by mouth 3 times daily for 120 days 270 capsule 3     glipiZIDE (GLUCOTROL XL) 10 MG 24 hr tablet Take 2 tablets (20 mg) by mouth daily 180 tablet 3     hydrochlorothiazide (MICROZIDE) 12.5 MG capsule Take 1 capsule (12.5 mg) by mouth daily 90 capsule 3     hydrOXYzine (VISTARIL) 50 MG capsule Take 1 capsule (50 mg) by mouth daily for 30 days 30 capsule 0     indomethacin (INDOCIN) 50 MG capsule Take 1 capsule (50 mg) by mouth 3 times daily 90 capsule 1     lamoTRIgine (LAMICTAL) 200 MG tablet Take 2 tablets (400 mg) by mouth At Bedtime for 360 days 180 tablet 3     levothyroxine (SYNTHROID/LEVOTHROID) 125 MCG tablet Take 125 mcg by mouth daily       lidocaine, viscous, (XYLOCAINE) 2 % solution Swish and spit 5 mLs in mouth 5 times daily 100 mL 1     metFORMIN (GLUCOPHAGE-XR) 750 MG 24 hr tablet Take 3 tablets (2,250 mg) by mouth daily 270 tablet 3     methocarbamol (ROBAXIN) 750 MG tablet Take 1 tablet (750 mg) by mouth 4 times daily as needed for muscle spasms 120 tablet 0     norethindrone (MICRONOR) 0.35 MG tablet Take  "1 tablet (0.35 mg) by mouth daily 84 tablet 11     nystatin (MYCOSTATIN) 746651 UNIT/GM external cream Apply 100,000 g topically 3 times daily       omeprazole (PRILOSEC) 40 MG DR capsule Take 1 capsule (40 mg) by mouth At Bedtime 90 capsule 3     ondansetron (ZOFRAN) 4 MG tablet Take 1 tablet (4 mg) by mouth 3 times daily 90 tablet 1     oxyCODONE (ROXICODONE) 5 MG tablet Take 1 tablet (5 mg) by mouth 4 times daily for 28 days 112 tablet 0     phentermine (ADIPEX-P) 15 MG capsule Take 1 capsule (15 mg) by mouth every morning 30 capsule 3     pravastatin (PRAVACHOL) 40 MG tablet Take 1 tablet (40 mg) by mouth daily 90 tablet 3     prochlorperazine (COMPAZINE) 10 MG tablet Take 1 tablet (10 mg) by mouth 4 times daily as needed for nausea (with migraine) 120 tablet 1     rizatriptan (MAXALT) 10 MG tablet Take 1 tablet (10 mg) by mouth 2 times daily 60 tablet 3     Semaglutide 14 MG TABS Take 1 tablet by mouth daily 30 tablet 3       Allergies   Allergen Reactions     Lithium Other (See Comments) and Dizziness     Other reaction(s): PSYCHOSIS     Sulfa Drugs Swelling     lips  Cold sores per pt       Adhesive Tape Rash     Albuterol Nausea and Vomiting     Clarithromycin Nausea and Vomiting     Erythromycin Nausea and Vomiting     Augmentin Nausea and Vomiting     Venlafaxine Other (See Comments)     Other reaction(s): PSYCHOSIS  Psychosis  Psychosis  Per transferred records          Social History     Tobacco Use     Smoking status: Former     Packs/day: 1.00     Years: 20.00     Pack years: 20.00     Types: Cigarettes     Quit date:      Years since quittin.9     Smokeless tobacco: Never   Substance Use Topics     Alcohol use: Yes     No family history on file.  History   Drug Use Unknown         Objective     /64 (BP Location: Right arm, Patient Position: Sitting, Cuff Size: Adult Large)   Pulse 74   Temp 98.4  F (36.9  C) (Tympanic)   Resp 20   Ht 1.727 m (5' 8\")   Wt 97.9 kg (215 lb 12.8 oz)   " LMP 11/23/2022 (Exact Date)   SpO2 97%   Breastfeeding No   BMI 32.81 kg/m      Physical Exam    GENERAL APPEARANCE: healthy, alert and no distress     EYES: EOMI, PERRL     HENT: ear canals and TM's normal and nose and mouth without ulcers or lesions     NECK: no adenopathy, no asymmetry, masses, or scars and thyroid normal to palpation     RESP: lungs clear to auscultation - no rales, rhonchi or wheezes     CV: regular rates and rhythm, normal S1 S2, no S3 or S4 and no murmur, click or rub     ABDOMEN:  soft, nontender, no HSM or masses and bowel sounds normal     MS: + edema in R > L ankle (chronic mild edema). Otherwise extremities normal- no gross deformities noted, no evidence of inflammation in joints, FROM in all extremities.     SKIN: no suspicious lesions or rashes     NEURO: Normal strength and tone, sensory exam grossly normal, mentation intact and speech normal     PSYCH: mentation appears normal. and affect normal/bright     LYMPHATICS: No cervical adenopathy    No results for input(s): HGB, PLT, INR, NA, POTASSIUM, CR, A1C in the last 86254 hours.     Diagnostics:  No results found for this or any previous visit (from the past 24 hour(s)).   No EKG required, no history of coronary heart disease, significant arrhythmia, peripheral arterial disease or other structural heart disease.    Revised Cardiac Risk Index (RCRI):  The patient has the following serious cardiovascular risks for perioperative complications:   - Diabetes Mellitus (on Insulin) = 1 point     RCRI Interpretation: 1 point: Class II (low risk - 0.9% complication rate)           Signed Electronically by: CANDACE Encarnacion CNP  Copy of this evaluation report is provided to requesting physician.

## 2022-12-01 NOTE — PATIENT INSTRUCTIONS
- hydrochlorothiazide: HOLD on the day of surgery.   - Statins: Continue taking on the day of surgery.    - metformin: HOLD day of surgery.   - glipizide: HOLD day of surgery   -  semaglutide: HOLD day of surgery    - Opioids: Continue without modification.   - gabapentin: Continue without modification.   - indomethacin (Indocin): HOLD 1 day before surgery.    - Phentermine: Hold the day of surgery   - Wellbutrin, lamotrigine, hydroxyzine: Continue without modification.   - Omeprazole: continue as normal  - synthroid: continue as normal   Preparing for Your Surgery  Getting started  A nurse will call you to review your health history and instructions. They will give you an arrival time based on your scheduled surgery time. Please be ready to share:  Your doctor's clinic name and phone number  Your medical, surgical, and anesthesia history  A list of allergies and sensitivities  A list of medicines, including herbal treatments and over-the-counter drugs  Whether the patient has a legal guardian (ask how to send us the papers in advance)  Please tell us if you're pregnant--or if there's any chance you might be pregnant. Some surgeries may injure a fetus (unborn baby), so they require a pregnancy test. Surgeries that are safe for a fetus don't always need a test, and you can choose whether to have one.   If you have a child who's having surgery, please ask for a copy of Preparing for Your Child's Surgery.    Preparing for surgery  Within 10 to 30 days of surgery: Have a pre-op exam (sometimes called an H&P, or History and Physical). This can be done at a clinic or pre-operative center.  If you're having a , you may not need this exam. Talk to your care team.  At your pre-op exam, talk to your care team about all medicines you take. If you need to stop any medicines before surgery, ask when to start taking them again.  We do this for your safety. Many medicines can make you bleed too much during surgery.  Some change how well surgery (anesthesia) drugs work.  Call your insurance company to let them know you're having surgery. (If you don't have insurance, call [phone].)  Call your clinic if there's any change in your health. This includes signs of a cold or flu (sore throat, runny nose, cough, rash, fever). It also includes a scrape or scratch near the surgery site.  If you have questions on the day of surgery, call your hospital or surgery center.  COVID testing  You may need to be tested for COVID-19 before having surgery. If so, we will give you instructions (or click here).  Eating and drinking guidelines  For your safety: Unless your surgeon tells you otherwise, follow the guidelines below.  Eat and drink as usual until 8 hours before you arrive for surgery. After that, no food or milk.  Drink clear liquids until 2 hours before you arrive. These are liquids you can see through, like water, Gatorade, and Propel Water. They also include plain black coffee and tea (no cream or milk), candy, and breath mints. You can spit out gum when you arrive.  If you drink alcohol: Stop drinking it the night before surgery.  If your care team tells you to take medicine on the morning of surgery, it's okay to take it with a sip of water.  Preventing infection  Shower or bathe the night before and morning of your surgery. Follow the instructions your clinic gave you. (If no instructions, use regular soap.)  Don't shave or clip hair near your surgery site. We'll remove the hair if needed.  Don't smoke or vape the morning of surgery. You may chew nicotine gum up to 2 hours before surgery. A nicotine patch is okay.  Note: Some surgeries require you to completely quit smoking and nicotine. Check with your surgeon.  Your care team will make every effort to keep you safe from infection. We will:  Clean our hands often with soap and water (or an alcohol-based hand rub).  Clean the skin at your surgery site with a special soap that  kills germs.  Give you a special gown to keep you warm. (Cold raises the risk of infection.)  Wear special hair covers, masks, gowns and gloves during surgery.  Give antibiotic medicine, if prescribed. Not all surgeries need antibiotics.  What to bring on the day of surgery  Photo ID and insurance card  Copy of your health care directive, if you have one  Glasses and hearing aids (bring cases)  You can't wear contacts during surgery  Inhaler and eye drops, if you use them (tell us about these when you arrive)  CPAP machine or breathing device, if you use them  A few personal items, if spending the night  If you have . . .  A pacemaker, ICD (cardiac defibrillator) or other implant: Bring the ID card.  An implanted stimulator: Bring the remote control.  A legal guardian: Bring a copy of the certified (court-stamped) guardianship papers.  Please remove any jewelry, including body piercings. Leave jewelry and other valuables at home.  If you're going home the day of surgery  You must have a responsible adult drive you home. They should stay with you overnight as well.  If you don't have someone to stay with you, and you aren't safe to go home alone, we may keep you overnight. Insurance often won't pay for this.  After surgery  If it's hard to control your pain or you need more pain medicine, please call your surgeon's office.  Questions?   If you have any questions for your care team, list them here: _________________________________________________________________________________________________________________________________________________________________________ ____________________________________ ____________________________________ ____________________________________  For informational purposes only. Not to replace the advice of your health care provider. Copyright   2003, 2019 [facility]. All rights reserved. Clinically reviewed by Klaudia Yeh MD. [employer] [id number] - REV 10/22.

## 2022-12-02 ENCOUNTER — VIRTUAL VISIT (OUTPATIENT)
Dept: BEHAVIORAL HEALTH | Facility: CLINIC | Age: 47
End: 2022-12-02
Payer: COMMERCIAL

## 2022-12-02 DIAGNOSIS — F31.60 BIPOLAR AFFECTIVE DISORDER, CURRENT EPISODE MIXED, CURRENT EPISODE SEVERITY UNSPECIFIED (H): Primary | ICD-10-CM

## 2022-12-02 LAB
ALBUMIN SERPL BCG-MCNC: 4.7 G/DL (ref 3.5–5.2)
ALP SERPL-CCNC: 108 U/L (ref 35–104)
ALT SERPL W P-5'-P-CCNC: 19 U/L (ref 10–35)
ANION GAP SERPL CALCULATED.3IONS-SCNC: 20 MMOL/L (ref 7–15)
AST SERPL W P-5'-P-CCNC: 20 U/L (ref 10–35)
BILIRUB SERPL-MCNC: 0.3 MG/DL
BUN SERPL-MCNC: 14.1 MG/DL (ref 6–20)
CALCIUM SERPL-MCNC: 9.6 MG/DL (ref 8.6–10)
CHLORIDE SERPL-SCNC: 103 MMOL/L (ref 98–107)
CHOLEST SERPL-MCNC: 129 MG/DL
CREAT SERPL-MCNC: 0.85 MG/DL (ref 0.51–0.95)
DEPRECATED HCO3 PLAS-SCNC: 19 MMOL/L (ref 22–29)
GFR SERPL CREATININE-BSD FRML MDRD: 85 ML/MIN/1.73M2
GLUCOSE SERPL-MCNC: 257 MG/DL (ref 70–99)
HCV AB SERPL QL IA: NONREACTIVE
HDLC SERPL-MCNC: 46 MG/DL
HIV 1+2 AB+HIV1 P24 AG SERPL QL IA: NONREACTIVE
LDLC SERPL CALC-MCNC: 61 MG/DL
NONHDLC SERPL-MCNC: 83 MG/DL
POTASSIUM SERPL-SCNC: 4.9 MMOL/L (ref 3.4–5.3)
PROT SERPL-MCNC: 6.9 G/DL (ref 6.4–8.3)
SODIUM SERPL-SCNC: 142 MMOL/L (ref 136–145)
TRIGL SERPL-MCNC: 109 MG/DL
VIT B12 SERPL-MCNC: 487 PG/ML (ref 232–1245)

## 2022-12-02 PROCEDURE — 90837 PSYTX W PT 60 MINUTES: CPT | Mod: GT | Performed by: SOCIAL WORKER

## 2022-12-02 NOTE — PROGRESS NOTES
[facility] Primary Care: Integrated Behavioral Health  December 2, 2022      Behavioral Health Clinician Progress Note    Patient Name: Elicia Calero           Service Type:  Individual      Service Location:   Face to Face in Clinic     Session Start Time: 930am  Session End Time: 1030am      Session Length: 53 - 60      Attendees: Client     Service Modality:  In-person    Visit Activities (Refresh list every visit): NEW    Diagnostic Assessment Date: v to be completed by community therapist  Treatment Plan Date: To be completed by community therapist  PROMIS (reviewed every 90 days):     Assessments:  The following assessments were completed by patient for this visit:  PHQ9:   PHQ-9 SCORE 12/1/2022   PHQ-9 Total Score 0     GAD7: No flowsheet data found.  CAGE-AID: No flowsheet data found.  PROMIS 10-Global Health (only subscores and total score): No flowsheet data found.  [place] Suicide Severity Rating Scale (Lifetime/Recent)No flowsheet data found.  Previous PHQ-9:   PHQ-9 SCORE 12/1/2022   PHQ-9 Total Score 0     Previous MARYLIN-7: No flowsheet data found.    KAREN LEVEL:  No flowsheet data found.    DATA  Extended Session (60+ minutes): PROLONGED SERVICE IN THE OUTPATIENT SETTING REQUIRING DIRECT (FACE-TO-FACE) PATIENT CONTACT BEYOND THE USUAL SERVICE:    - Patient's presenting concerns require more intensive intervention than could be completed within the usual service  Interactive Complexity: No  Crisis: No  [facility] Patient: No    Treatment Objective(s) Addressed in This Session:    Mood Instability: will develop better understanding of triggers and coping strategies to stabilize mood    Current Stressors / Issues:    Patient is a 47-year-old single woman who is establishing care at [facility].  Patient initial appointment with her provider on November 28, 2022 and reported she is looking to establish mental health supports in [place] since moving from  "[place].  Patient reportedly had a history of bipolar 2 disorder.    Patient reports she grew up in [place] but most of her family has moved to [place].  Patient reports her mother remarried 25 years ago and moved to [place].  Patient reports the majority of her family have all moved away from [place] and she was feeling lonely.  Patient reports 6 months ago she moved to [place] to be closer to her mother.  Patient is currently living with her mother until she can find an apartment on her own.  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    Patient reports she was initially diagnosed in 2009 with bipolar 2 disorder.  Patient reports she had a \"breakdown\" while working at [employer] and cannot get out of bed and cannot function.  Patient was on medical leave for 4 months.  Patient denies significant manic or depressive episodes other than what she experienced in 2009.  Patient describes more hypomanic with a primary symptoms being racing thoughts and insomnia.  Patient ports she always feels tired as her sleep is poor.  Patient reports she of episodes lasting a week of inability to fall asleep.  Reports she is then exhausted and tired at work the following day.  However, patient reports that the past 6 months her sleep has been more stable until this past week.    Patient reports in addition to the above bipolar 2 disorder, patient reports underlying anxiety.  Patient reports she is fear of being late and will begin to panic about upcoming appointments.  Patient reports his underlying fear that people will  her poorly if she is late.  Patient denies other symptoms consistent with a diagnosis of OCD.    Patient denies a history of chemical abuse, psychosis, or suicidal thoughts.    Explore with patient what the focus of therapy would be.  She reports a big part of her life she feels lonely.  Patient reports she is " "constantly looking for different supports.  Patient reports most of her school friends in [place] have moved on.  Patient reports 2 years ago, she joined [employer] as a way to feel connected but reports it was \"hard to get in\".  Patient reports she is no longer practicing.  Patient reports he finds it hard for people to accept her.  Patient reports she is never been  and has no children.  Patient reports she like to work with social connections with a new therapist.    Plan    Patient reports she preferred to work with a woman.    [facility] will place a referral for a community therapist.    Patient reports her primary care provider will manage her psychiatric medications.          Progress on Treatment Objective(s) / Homework:  Minimal progress - PREPARATION (Decided to change - considering how); Intervened by negotiating a change plan and determining options / strategies for behavior change, identifying triggers, exploring social supports, and working towards setting a date to begin behavior change    Motivational Interviewing    MI Intervention: Expressed Empathy/Understanding, Supported Autonomy, Collaboration, Evocation and Permission to raise concern or advise     Change Talk Expressed by the Patient: Reasons to change    Provider Response to Change Talk: E - Evoked more info from patient about behavior change, A - Affirmed patient's thoughts, decisions, or attempts at behavior change, R - Reflected patient's change talk and S - Summarized patient's change talk statements    PSYCHODYMANIC PSYCHOTHERAPY: Discussed patient's emotional dynamics and issues and how they impact behaviors,Explored patient's history of relationships and how they impact present behaviors, Explored how to work with and make changes in these schemas and patterns    Care Plan review completed: No    Medication Review:  No changes to current psychiatric medication(s)    Medication Compliance:  Yes    Changes in Health " Issues:   None reported    Chemical Use Review:   Substance Use: Chemical use reviewed, no active concerns identified      Tobacco Use: No current tobacco use.      Assessment: Current Emotional / Mental Status (status of significant symptoms):  Risk status (Self / Other harm or suicidal ideation)  Patient has had a history of suicidal ideation: 2009  Patient denies current fears or concerns for personal safety.  Patient denies current or recent suicidal ideation or behaviors.  Patient denies current or recent homicidal ideation or behaviors.  Patient denies current or recent self injurious behavior or ideation.  Patient denies other safety concerns.  A safety and risk management plan has not been developed at this time, however patient was encouraged to call [place] [phone] should there be a change in any of these risk factors.  [place] Suicide Severity Rating Scale (Lifetime/Recent)No flowsheet data found.      Appearance:   Appropriate   Eye Contact:   Fair   Psychomotor Behavior: Normal   Attitude:   Cooperative   Orientation:   All  Speech   Rate / Production: Monotone  Normal    Volume:  Soft   Mood:    Sad   Affect:    Flat   Thought Content:  Clear   Thought Form:  Coherent  Logical   Insight:    Fair     Diagnoses:  1. Bipolar affective disorder, current episode mixed, current episode severity unspecified (H)        Collateral Reports Completed:  Routed note to PCP    Plan: (Homework, other):  Patient was given information about behavioral services and encouraged to schedule a follow up appointment with the clinic [facility] as needed.  She was also given dgmresources2: information about mental health symptoms and treatment options  and information about mental health symptoms and a referral was made to [facility] and/or Community Psychiatry .  CD Recommendations: No indications of CD issues.    SANDOR Louis, [facility]      __________________________________

## 2022-12-06 ENCOUNTER — MYC MEDICAL ADVICE (OUTPATIENT)
Dept: FAMILY MEDICINE | Facility: CLINIC | Age: 47
End: 2022-12-06

## 2022-12-06 DIAGNOSIS — G43.709 CHRONIC MIGRAINE WITHOUT AURA WITHOUT STATUS MIGRAINOSUS, NOT INTRACTABLE: Primary | ICD-10-CM

## 2022-12-06 LAB
GABAPENTIN UR QL CFM: PRESENT
OXYCODONE UR CFM-MCNC: 530 NG/ML
OXYCODONE/CREAT UR: 582 NG/MG {CREAT}
OXYMORPHONE UR CFM-MCNC: 260 NG/ML
OXYMORPHONE/CREAT UR: 286 NG/MG {CREAT}

## 2022-12-07 LAB
HCYS SERPL-SCNC: 7.7 UMOL/L (ref 4–12)
METHYLMALONATE SERPL-SCNC: 0.17 UMOL/L (ref 0–0.4)

## 2022-12-07 NOTE — TELEPHONE ENCOUNTER
DIAGNOSIS: Hepatic adenoma [D13.4]  NAFLD (nonalcoholic fatty liver disease) [K76.0]  Irritable bowel syndrome, unspecified    Appt Date: 03.02.2023   NOTES STATUS DETAILS   OFFICE NOTE from referring provider Internal 11.28.2022 Gil Mann APRN CNP   OFFICE NOTES from other specialists Care Everywhere 02.07.2022 Radha Hart MD  [facility]   DISCHARGE SUMMARY from hospital     MEDICATION LIST Care Everywhere / Internal    LIVER BIOSPY (IF APPLICABLE)      PATHOLOGY REPORTS      IMAGING     ENDOSCOPY (IF AVAILABLE)     COLONOSCOPY (IF AVAILABLE)     ULTRASOUND LIVER     CT OF ABDOMEN     MRI OF LIVER Care Everywhere 03.21.2022 MRI ABDOMEN W & W/ O CONTRAST &    FIBROSCAN, US ELASTOGRAPHY, FIBROSIS SCAN, MR ELASTOGRAPHY     LABS     HEPATIC PANEL (LIVER PANEL)     BASIC METABOLIC PANEL Care Everywhere 05.20.2019   COMPLETE METABOLIC PANEL Internal 12.01.2022   COMPLETE BLOOD COUNT (CBC) Internal 12.01.2022   INTERNATIONAL NORMALIZED RATIO (INR) Care Everywhere 03.04.2022   HEPATITIS C ANTIBODY Internal 12.01.2022   HEPATITIS C VIRAL LOAD/PCR     HEPATITIS C GENOTYPE     HEPATITIS B SURFACE ANTIGEN Care Everywhere 10.07.2019   HEPATITIS B SURFACE ANTIBODY Care Everywhere 10.07.2019   HEPATITIS B DNA QUANT LEVEL     HEPATITIS B CORE ANTIBODY Care Everywhere 10.07.2019     Action 12.07.2022  RM   Action Taken Called [facility] at [phone]  was TOLD TO FAX REQUEST -[phone] pending     Action 01.04.2023 RM   Action Taken Called [facility] at [phone] to verify if fax was received was told to call [phone] [facility] was told to refax request to [phone]     Action 01.09.2023 RM 12:36P   Action Taken Image received from [facility] took to 4n to be uploaded to chart.

## 2022-12-07 NOTE — TELEPHONE ENCOUNTER
"Gil-Please review and advise:    \"Hey Shontel just question I have I am having increased headaches, especially at nighttime, and was wondering what we can do about that.  I know since stopping the topiramate this may happen I was hoping not.  You said something about propranolol but I wasn t sure.  They are becoming more frequent almost daily.  If there are any suggestions that would be great!  Thanks so much, Elicia.\"    Thank you!  MARCOS KateN, RN-BC  [facility]    "

## 2022-12-08 ENCOUNTER — TRANSFERRED RECORDS (OUTPATIENT)
Dept: HEALTH INFORMATION MANAGEMENT | Facility: CLINIC | Age: 47
End: 2022-12-08

## 2022-12-08 DIAGNOSIS — G43.709 CHRONIC MIGRAINE WITHOUT AURA WITHOUT STATUS MIGRAINOSUS, NOT INTRACTABLE: ICD-10-CM

## 2022-12-08 ASSESSMENT — PAIN SCALES - PAIN ENJOYMENT GENERAL ACTIVITY SCALE (PEG)
PEG_TOTALSCORE: 7.67
AVG_PAIN_PASTWEEK: 8
INTERFERED_ENJOYMENT_LIFE: 7
INTERFERED_GENERAL_ACTIVITY: 8
INTERFERED_ENJOYMENT_LIFE: 7
AVG_PAIN_PASTWEEK: 8
INTERFERED_GENERAL_ACTIVITY: 8
PEG_TOTALSCORE: 7.67

## 2022-12-08 ASSESSMENT — ANXIETY QUESTIONNAIRES
8. IF YOU CHECKED OFF ANY PROBLEMS, HOW DIFFICULT HAVE THESE MADE IT FOR YOU TO DO YOUR WORK, TAKE CARE OF THINGS AT HOME, OR GET ALONG WITH OTHER PEOPLE?: NOT DIFFICULT AT ALL
7. FEELING AFRAID AS IF SOMETHING AWFUL MIGHT HAPPEN: NOT AT ALL
GAD7 TOTAL SCORE: 6
2. NOT BEING ABLE TO STOP OR CONTROL WORRYING: SEVERAL DAYS
7. FEELING AFRAID AS IF SOMETHING AWFUL MIGHT HAPPEN: NOT AT ALL
4. TROUBLE RELAXING: SEVERAL DAYS
IF YOU CHECKED OFF ANY PROBLEMS ON THIS QUESTIONNAIRE, HOW DIFFICULT HAVE THESE PROBLEMS MADE IT FOR YOU TO DO YOUR WORK, TAKE CARE OF THINGS AT HOME, OR GET ALONG WITH OTHER PEOPLE: NOT DIFFICULT AT ALL
6. BECOMING EASILY ANNOYED OR IRRITABLE: NOT AT ALL
GAD7 TOTAL SCORE: 6
1. FEELING NERVOUS, ANXIOUS, OR ON EDGE: SEVERAL DAYS
3. WORRYING TOO MUCH ABOUT DIFFERENT THINGS: NEARLY EVERY DAY
5. BEING SO RESTLESS THAT IT IS HARD TO SIT STILL: NOT AT ALL

## 2022-12-08 NOTE — TELEPHONE ENCOUNTER
Recommend newer migraine therapy nurtec 75 mg every other day vs propranolol since failed topiramate due to side effects. Less side effects related of hypotension and bradycardia given on hydrochlorothiazide     Sincerely  CANDACE Youngblood CNP

## 2022-12-09 ENCOUNTER — OFFICE VISIT (OUTPATIENT)
Dept: ANESTHESIOLOGY | Facility: CLINIC | Age: 47
End: 2022-12-09
Payer: COMMERCIAL

## 2022-12-09 VITALS
SYSTOLIC BLOOD PRESSURE: 112 MMHG | DIASTOLIC BLOOD PRESSURE: 77 MMHG | OXYGEN SATURATION: 96 % | BODY MASS INDEX: 32.71 KG/M2 | HEART RATE: 77 BPM | WEIGHT: 215.83 LBS | HEIGHT: 68 IN

## 2022-12-09 DIAGNOSIS — G44.86 CERVICOGENIC HEADACHE: Primary | ICD-10-CM

## 2022-12-09 DIAGNOSIS — M47.816 LUMBAR FACET ARTHROPATHY: ICD-10-CM

## 2022-12-09 DIAGNOSIS — M25.571 RIGHT ANKLE PAIN, UNSPECIFIED CHRONICITY: ICD-10-CM

## 2022-12-09 DIAGNOSIS — M79.18 MYOFASCIAL PAIN: ICD-10-CM

## 2022-12-09 DIAGNOSIS — G89.4 CHRONIC PAIN SYNDROME: ICD-10-CM

## 2022-12-09 PROCEDURE — 99205 OFFICE O/P NEW HI 60 MIN: CPT

## 2022-12-09 ASSESSMENT — COLUMBIA-SUICIDE SEVERITY RATING SCALE - C-SSRS
6. HAVE YOU EVER DONE ANYTHING, STARTED TO DO ANYTHING, OR PREPARED TO DO ANYTHING TO END YOUR LIFE?: NO
2. HAVE YOU ACTUALLY HAD ANY THOUGHTS OF KILLING YOURSELF?: NO
ATTEMPT LIFETIME: NO
TOTAL  NUMBER OF ABORTED OR SELF INTERRUPTED ATTEMPTS LIFETIME: NO
REASONS FOR IDEATION PAST MONTH: DOES NOT APPLY
1. IN THE PAST MONTH, HAVE YOU WISHED YOU WERE DEAD OR WISHED YOU COULD GO TO SLEEP AND NOT WAKE UP?: NO
1. HAVE YOU WISHED YOU WERE DEAD OR WISHED YOU COULD GO TO SLEEP AND NOT WAKE UP?: YES
REASONS FOR IDEATION LIFETIME: MOSTLY TO END OR STOP THE PAIN (YOU COULDN'T GO ON LIVING WITH THE PAIN OR HOW YOU WERE FEELING)
TOTAL  NUMBER OF INTERRUPTED ATTEMPTS LIFETIME: NO

## 2022-12-09 ASSESSMENT — PAIN SCALES - GENERAL: PAINLEVEL: EXTREME PAIN (9)

## 2022-12-09 NOTE — PATIENT INSTRUCTIONS
Medications:    Continue oxycodone as prescribed by your PCP.     Referrals: TENs unit -- Obtain from [employer]:    [facility] [place]  [address], [address]  [place] [address] [phone]          [facility] [place]  [address], [place] [id number] [phone]        [facility] [place]  [address] [place] [address] [phone]        [facility] - [place], [place] [id number] [phone]        [facility] [place] [place] [address] [phone]        [facility] - [place], [place] [id number] [phone]     Procedures:    We will defer occipital nerve blocks and lumbar ablation for now and reevaluate after surgery.    Treatment planning:    Obtain Release of Information (procedures and imaging) from former pain provider in [place], [place].    Recommended Follow up:      Follow up in 8 weeks or sooner if needed.    To speak with a nurse, schedule/reschedule/cancel a clinic appointment, or request a medication refill call: [phone].    You can also reach us by [email or web address]: [email or web address]

## 2022-12-09 NOTE — LETTER
12/9/2022       RE: Elicia Calero  [address] [place] [address]     Dear Colleague,    Thank you for referring your patient, Elicia Calero, to the [facility] [place] at [facility]. Please see a copy of my visit note below.      [facility] Pain Management     Date of visit: 12/9/2022    Assessment:  Elicia Calero is a 47 year old female with a past medical history significant for migraines, right ankle joint pain, GERD, hepatic adenoma, DM 2, depression, bipolar, low back pain who presents with complaints of widespread pain - headache, neck, low back, and right ankle.     1. Chronic pain - She has pain affecting multiple areas, including headaches, neck, low back and right ankle. Etiology is likely mixed, including but certainly not limited to, underlying degenerative changes of spine/joints, with significant overlying myofascial component.     Visit diagnoses:   1. Cervicogenic headache    2. Right ankle pain, unspecified chronicity    3. Myofascial pain    4. Lumbar facet arthropathy    5. Chronic pain syndrome        Plan:  The following recommendations were given to the patient. Diagnosis, treatment options, risks, benefits, and alternatives were discussed, and all questions were answered. The patient expressed understanding of the plan for management.     I am recommending a multidisciplinary treatment plan to help this patient better manage her pain.  This includes:     Pain Physical Therapy:  NO   We discussed pain PT referral today but will defer until after ankle surgery.     Pain Psychologist to address relaxation, behavioral change, coping style, and other factors important to improvement.  NO    Diagnostic Studies:  None     Medication Management:     Elicia is currently taking oxycodone 5 mg QID PRN, currently prescribed by PCP. This dosage - 30 daily MME - is reasonable, though I do not recommend  further increase at this point. 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 Of note, she has upcoming surgery on right ankle, and pain needs should be re-evaluated once cleared postoperatively.     Potential procedures:     Deferred - we will consider occipital nerve blocks and repeat lumbar radiofrequency ablation once recovered from surgery.     Other Orders/Referrals:     TENS unit ordered today.    Follow up with CANDACE Hightower CNP in 8 weeks or sooner if needed       Review of Electronic Chart: Today I have also reviewed available medical information in the patient's medical record at [facility] ([facility]) and Care Everywhere (if available), including relevant provider notes, laboratory work, and imaging.     Roselia Capps DNP, CANDACE, AGNP-C  [facility] Pain Management         -------------------------------------------------------------------    Subjective     Reason for consultation:    Elicia Calero is a 47 year old female who is seen in consultation today at the request of PCP for evaluation of her pain issues and recommendations for management, with specific emphasis on  Diagnoses: Chronic pain syndrome    Please see the [facility] health questionnaire which the patient completed and reviewed with me in detail (if available).     Review of [place] Prescription Monitoring Program (): No concern for abuse or misuse of controlled medications based on this report. Reviewed - two prescribers in last 12 months, phentermine and oxycodone.     Review of Electronic Chart: Today I have also reviewed available medical information in the patient's medical record at [facility] ([facility]), including relevant provider notes, laboratory work, and imaging.     Pain medications are being prescribed by PCP.     Chief Complaint:    Chief Complaint   Patient presents  "with     Consult     Consult right Ankle Pain         HPI:     Elicia Calero is a 47 year old female presents with a chief complaint of right ankle pain, low back pain, headaches.     The pain has been present for many years.    The pain is Extreme Pain (9) in severity.    The pain is described as tingling, burning in ankle, back is aching.   The pain is alleviated by meds (oxycodone), distraction.    It is exacerbated by \"everything\".    Modalities that have been utilized in the past which were helpful include lumbar RFA, TPI.    Things that were not helpful, but tried ,include different neck pillows.    The patient has never tried TENS on neck.    The patient otherwise denies bowel or bladder incontinence, parasthesias, weakness, saddle anesthesia, unintentional weight loss, or fever/chills/sweats.     Elicia Calero has been seen at a pain clinic in the past.  She saw a provider at outside clinic in [place], [place].     -She had lumbar RFA done in the past, 85-90% pain relief for many years (8-9 years).   -She is aware that LBP may be exacerbated by right ankle, altered gait.   -She has h/o headaches, fluctuated throughout the years, thinks some of this is due to stress.   -When she has less stress, headaches improve.   -She was taking topiramate and propanolol     -She is pharmacy tech.      Current Pain Treatments:    1. Medications:    Oxycodone    Indomethicin   Methocarbamol    Gabapentin   2. Other therapies:    Surgery - right ankle fusion    Lumbar RFA      Current Outpatient Medications   Medication     brexpiprazole (REXULTI) 1 MG tablet     buPROPion (WELLBUTRIN XL) 150 MG 24 hr tablet     buPROPion (WELLBUTRIN XL) 300 MG 24 hr tablet     CONTOUR NEXT TEST test strip     doxepin (SINEQUAN) 25 MG capsule     folic acid (FOLVITE) 1 MG tablet     gabapentin (NEURONTIN) 300 MG capsule     glipiZIDE (GLUCOTROL XL) 10 MG 24 hr tablet     hydrochlorothiazide (MICROZIDE) 12.5 MG capsule     hydrOXYzine (VISTARIL) 50 " MG capsule     indomethacin (INDOCIN) 50 MG capsule     lamoTRIgine (LAMICTAL) 200 MG tablet     levothyroxine (SYNTHROID/LEVOTHROID) 125 MCG tablet     lidocaine, viscous, (XYLOCAINE) 2 % solution     metFORMIN (GLUCOPHAGE-XR) 750 MG 24 hr tablet     methocarbamol (ROBAXIN) 750 MG tablet     norethindrone (MICRONOR) 0.35 MG tablet     nystatin (MYCOSTATIN) 794168 UNIT/GM external cream     omeprazole (PRILOSEC) 40 MG DR capsule     ondansetron (ZOFRAN) 4 MG tablet     phentermine (ADIPEX-P) 15 MG capsule     pravastatin (PRAVACHOL) 40 MG tablet     prochlorperazine (COMPAZINE) 10 MG tablet     rimegepant (NURTEC) 75 MG ODT tablet     rizatriptan (MAXALT) 10 MG tablet     Semaglutide 14 MG TABS     No current facility-administered medications for this visit.     Allergies   Allergen Reactions     Lithium Other (See Comments) and Dizziness     Other reaction(s): PSYCHOSIS     Sulfa Drugs Swelling     lips  Cold sores per pt       Adhesive Tape Rash     Albuterol Nausea and Vomiting     Clarithromycin Nausea and Vomiting     Erythromycin Nausea and Vomiting     Augmentin Nausea and Vomiting     Venlafaxine Other (See Comments)     Other reaction(s): PSYCHOSIS  Psychosis  Psychosis  Per transferred records        No past medical history on file.  No past surgical history on file.  No family history on file.  Social History     Socioeconomic History     Marital status: Single   Tobacco Use     Smoking status: Former     Packs/day: 1.00     Years: 20.00     Pack years: 20.00     Types: Cigarettes     Quit date: 2018     Years since quittin.9     Smokeless tobacco: Never   Vaping Use     Vaping Use: Never used   Substance and Sexual Activity     Alcohol use: Yes     Drug use: Never      ROS: 10 point ROS neg other than the symptoms noted above in the HPI.      Objective      Diagnostic Testing - Imaging/Labs:    Labs:  Reviewed CMP from 22, renal function WNL      Physical Exam  HENT:      Head: Normocephalic.    Pulmonary:      Effort: Pulmonary effort is normal.   Musculoskeletal:      Comments: Tenderness over bilateral occipital protuberance/notch, cervical laurie, traps, rhomboids.     Neurological:      General: No focal deficit present.      Mental Status: She is alert.   Psychiatric:         Mood and Affect: Mood normal.         Behavior: Behavior normal.         Thought Content: Thought content normal.         Judgment: Judgment normal.           BILLING TIME DOCUMENTATION:   The total TIME spent on this patient on the date of the encounter/appointment was 60 minutes.      TOTAL TIME includes:   Time spent preparing to see the patient (reviewing records and tests)   Time spent face to face (or over the phone) with the patient   Time spent ordering tests, medications, procedures and referrals   Time spent Referring and communicating with other healthcare professionals   Time spent documenting clinical information in Epic         Sincerely,    CANDACE Hightower CNP

## 2022-12-09 NOTE — NURSING NOTE
EMT reviewed AVS with patient. Patient to contact clinic with any questions/concerns.  Patient verbalized understanding.    Howard Mora, EMT

## 2022-12-09 NOTE — NURSING NOTE
Patient presents with:  Consult: Consult right Ankle Pain      Extreme Pain (9)         What medications are you using for pain? Oxycodone, Methocarbomal,Gabapentin Idomethacin    (New patients only) Have you been seen by another pain clinic/ provider? yes    (Return Patients only) What refills are you needing today? no    Expectation Having surgery next week hope that relieves the pain and want to know is there something else she can do for the pain until than

## 2022-12-09 NOTE — PROGRESS NOTES
[facility] Pain Management     Date of visit: 12/9/2022    Assessment:  Elicia Calero is a 47 year old female with a past medical history significant for migraines, right ankle joint pain, GERD, hepatic adenoma, DM 2, depression, bipolar, low back pain who presents with complaints of widespread pain - headache, neck, low back, and right ankle.     1. Chronic pain - She has pain affecting multiple areas, including headaches, neck, low back and right ankle. Etiology is likely mixed, including but certainly not limited to, underlying degenerative changes of spine/joints, with significant overlying myofascial component.     Visit diagnoses:   1. Cervicogenic headache    2. Right ankle pain, unspecified chronicity    3. Myofascial pain    4. Lumbar facet arthropathy    5. Chronic pain syndrome        Plan:  The following recommendations were given to the patient. Diagnosis, treatment options, risks, benefits, and alternatives were discussed, and all questions were answered. The patient expressed understanding of the plan for management.     I am recommending a multidisciplinary treatment plan to help this patient better manage her pain.  This includes:     Pain Physical Therapy:  NO   We discussed pain PT referral today but will defer until after ankle surgery.     Pain Psychologist to address relaxation, behavioral change, coping style, and other factors important to improvement.  NO    Diagnostic Studies:  None     Medication Management:     Elicia is currently taking oxycodone 5 mg QID PRN, currently prescribed by PCP. This dosage - 30 daily MME - is reasonable, though I do not recommend further increase at this point. 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 Of note, she has upcoming surgery on right ankle, and pain needs should be re-evaluated once cleared postoperatively.      Potential procedures:     Deferred - we will consider occipital nerve blocks and repeat lumbar radiofrequency ablation once recovered from surgery.     Other Orders/Referrals:     TENS unit ordered today.    Follow up with CANDACE Hightower CNP in 8 weeks or sooner if needed       Review of Electronic Chart: Today I have also reviewed available medical information in the patient's medical record at [facility] ([facility] and Care Everywhere (if available), including relevant provider notes, laboratory work, and imaging.     Roselia Capps DNP, CANDACE, AGNP-C  [facility] Pain Management         -------------------------------------------------------------------    Subjective     Reason for consultation:    Elicia Calero is a 47 year old female who is seen in consultation today at the request of PCP for evaluation of her pain issues and recommendations for management, with specific emphasis on  Diagnoses: Chronic pain syndrome    Please see the [facility] Pain Management [place] health questionnaire which the patient completed and reviewed with me in detail (if available).     Review of [place] Prescription Monitoring Program (): No concern for abuse or misuse of controlled medications based on this report. Reviewed - two prescribers in last 12 months, phentermine and oxycodone.     Review of Electronic Chart: Today I have also reviewed available medical information in the patient's medical record at [facility] ([facility] including relevant provider notes, laboratory work, and imaging.     Pain medications are being prescribed by PCP.     Chief Complaint:    Chief Complaint   Patient presents with     Consult     Consult right Ankle Pain         HPI:     Elicia Calero is a 47 year old female presents with a chief complaint of right ankle pain, low back pain, headaches.     The pain has been present for many years.    The pain is Extreme Pain (9) in severity.    The pain is described as tingling, burning in  "ankle, back is aching.   The pain is alleviated by meds (oxycodone), distraction.    It is exacerbated by \"everything\".    Modalities that have been utilized in the past which were helpful include lumbar RFA, TPI.    Things that were not helpful, but tried ,include different neck pillows.    The patient has never tried TENS on neck.    The patient otherwise denies bowel or bladder incontinence, parasthesias, weakness, saddle anesthesia, unintentional weight loss, or fever/chills/sweats.     Elicia Calero has been seen at a pain clinic in the past.  She saw a provider at outside clinic in [place], [place].     -She had lumbar RFA done in the past, 85-90% pain relief for many years (8-9 years).   -She is aware that LBP may be exacerbated by right ankle, altered gait.   -She has h/o headaches, fluctuated throughout the years, thinks some of this is due to stress.   -When she has less stress, headaches improve.   -She was taking topiramate and propanolol     -She is pharmacy tech.      Current Pain Treatments:    1. Medications:    Oxycodone    Indomethicin   Methocarbamol    Gabapentin   2. Other therapies:    Surgery - right ankle fusion    Lumbar RFA      Current Outpatient Medications   Medication     brexpiprazole (REXULTI) 1 MG tablet     buPROPion (WELLBUTRIN XL) 150 MG 24 hr tablet     buPROPion (WELLBUTRIN XL) 300 MG 24 hr tablet     CONTOUR NEXT TEST test strip     doxepin (SINEQUAN) 25 MG capsule     folic acid (FOLVITE) 1 MG tablet     gabapentin (NEURONTIN) 300 MG capsule     glipiZIDE (GLUCOTROL XL) 10 MG 24 hr tablet     hydrochlorothiazide (MICROZIDE) 12.5 MG capsule     hydrOXYzine (VISTARIL) 50 MG capsule     indomethacin (INDOCIN) 50 MG capsule     lamoTRIgine (LAMICTAL) 200 MG tablet     levothyroxine (SYNTHROID/LEVOTHROID) 125 MCG tablet     lidocaine, viscous, (XYLOCAINE) 2 % solution     metFORMIN (GLUCOPHAGE-XR) 750 MG 24 hr tablet     methocarbamol (ROBAXIN) 750 MG tablet     norethindrone (MICRONOR) " 0.35 MG tablet     nystatin (MYCOSTATIN) 419187 UNIT/GM external cream     omeprazole (PRILOSEC) 40 MG DR capsule     ondansetron (ZOFRAN) 4 MG tablet     phentermine (ADIPEX-P) 15 MG capsule     pravastatin (PRAVACHOL) 40 MG tablet     prochlorperazine (COMPAZINE) 10 MG tablet     rimegepant (NURTEC) 75 MG ODT tablet     rizatriptan (MAXALT) 10 MG tablet     Semaglutide 14 MG TABS     No current facility-administered medications for this visit.     Allergies   Allergen Reactions     Lithium Other (See Comments) and Dizziness     Other reaction(s): PSYCHOSIS     Sulfa Drugs Swelling     lips  Cold sores per pt       Adhesive Tape Rash     Albuterol Nausea and Vomiting     Clarithromycin Nausea and Vomiting     Erythromycin Nausea and Vomiting     Augmentin Nausea and Vomiting     Venlafaxine Other (See Comments)     Other reaction(s): PSYCHOSIS  Psychosis  Psychosis  Per transferred records        No past medical history on file.  No past surgical history on file.  No family history on file.  Social History     Socioeconomic History     Marital status: Single   Tobacco Use     Smoking status: Former     Packs/day: 1.00     Years: 20.00     Pack years: 20.00     Types: Cigarettes     Quit date:      Years since quittin.9     Smokeless tobacco: Never   Vaping Use     Vaping Use: Never used   Substance and Sexual Activity     Alcohol use: Yes     Drug use: Never      ROS: 10 point ROS neg other than the symptoms noted above in the HPI.      Objective      Diagnostic Testing - Imaging/Labs:    Labs:  Reviewed CMP from 22, renal function WNL      Physical Exam  HENT:      Head: Normocephalic.   Pulmonary:      Effort: Pulmonary effort is normal.   Musculoskeletal:      Comments: Tenderness over bilateral occipital protuberance/notch, cervical laurie, traps, rhomboids.     Neurological:      General: No focal deficit present.      Mental Status: She is alert.   Psychiatric:         Mood and Affect: Mood  normal.         Behavior: Behavior normal.         Thought Content: Thought content normal.         Judgment: Judgment normal.           BILLING TIME DOCUMENTATION:   The total TIME spent on this patient on the date of the encounter/appointment was 60 minutes.      TOTAL TIME includes:   Time spent preparing to see the patient (reviewing records and tests)   Time spent face to face (or over the phone) with the patient   Time spent ordering tests, medications, procedures and referrals   Time spent Referring and communicating with other healthcare professionals   Time spent documenting clinical information in Epic

## 2022-12-13 ENCOUNTER — TELEPHONE (OUTPATIENT)
Dept: FAMILY MEDICINE | Facility: CLINIC | Age: 47
End: 2022-12-13

## 2022-12-13 NOTE — TELEPHONE ENCOUNTER
Prior Authorization Retail Medication Request    Medication/Dose: rimegepant (NURTEC) 75 MG ODT tablet  ICD code (if different than what is on RX):  Previously Tried and Failed:  Rationale:    Insurance Name:    Insurance ID: [id number]    Pharmacy Information (if different than what is on RX)  Name:  Phone:    Please include previous medications tried and failed.  Please ask insurance for medications on formulary.

## 2022-12-14 NOTE — TELEPHONE ENCOUNTER
Central Prior Authorization Team  Phone: [phone]    PA Initiation    Medication: rimegepant (NURTEC) 75 MG ODT tablet, rec 12-13-22  Insurance Company: [employer] ([facility]) - Phone [phone] Fax [phone]  Pharmacy Filling the Rx: [id number] - [place], [place] - [address]  Filling Pharmacy Phone: [phone]  Filling Pharmacy Fax:    Start Date: 12/14/2022

## 2022-12-15 ENCOUNTER — TRANSFERRED RECORDS (OUTPATIENT)
Dept: HEALTH INFORMATION MANAGEMENT | Facility: CLINIC | Age: 47
End: 2022-12-15

## 2022-12-16 NOTE — TELEPHONE ENCOUNTER
Central Prior Authorization Team  Phone: [phone]    PRIOR AUTHORIZATION DENIED    Medication: rimegepant (NURTEC) 75 MG ODT tablet, rec 12-13-22    Denial Date: 12/16/2022    Denial Rational:         Appeal Information:

## 2022-12-27 ENCOUNTER — MYC REFILL (OUTPATIENT)
Dept: FAMILY MEDICINE | Facility: CLINIC | Age: 47
End: 2022-12-27

## 2022-12-27 ENCOUNTER — MYC MEDICAL ADVICE (OUTPATIENT)
Dept: FAMILY MEDICINE | Facility: CLINIC | Age: 47
End: 2022-12-27

## 2022-12-27 DIAGNOSIS — M54.50 CHRONIC LOW BACK PAIN WITHOUT SCIATICA, UNSPECIFIED BACK PAIN LATERALITY: ICD-10-CM

## 2022-12-27 DIAGNOSIS — G43.709 CHRONIC MIGRAINE WITHOUT AURA WITHOUT STATUS MIGRAINOSUS, NOT INTRACTABLE: ICD-10-CM

## 2022-12-27 DIAGNOSIS — G89.29 CHRONIC LOW BACK PAIN WITHOUT SCIATICA, UNSPECIFIED BACK PAIN LATERALITY: ICD-10-CM

## 2022-12-27 DIAGNOSIS — G89.29 CHRONIC LOW BACK PAIN WITHOUT SCIATICA, UNSPECIFIED BACK PAIN LATERALITY: Primary | ICD-10-CM

## 2022-12-27 DIAGNOSIS — M54.50 CHRONIC LOW BACK PAIN WITHOUT SCIATICA, UNSPECIFIED BACK PAIN LATERALITY: Primary | ICD-10-CM

## 2022-12-27 DIAGNOSIS — M25.571 PAIN IN JOINT INVOLVING ANKLE AND FOOT, RIGHT: ICD-10-CM

## 2022-12-27 NOTE — TELEPHONE ENCOUNTER
"Shontel-Please review and sign if agree.  Medication pended per patient's report.  Oxycodone is not active on med list.    \"I need a new prescription for my oxycodone.  I also have a correction on my Gabapentin prescription, it should be 4 caps three times daily, not 3 caps three times daily.  If you have any questions please let me know.  I m home all the time now recovering from my ankle surgery.  Thanks so much!     Elicia\"        Oxycodone last prescribed on 11/3/22 for #112, 0 refills.    Patient's last visit was: 11/28/22 with BALBINA Mann CNP.    Thank you!  MARCOS KateN, RN-[facility]    "

## 2022-12-28 RX ORDER — METHOCARBAMOL 750 MG/1
750 TABLET, FILM COATED ORAL 4 TIMES DAILY PRN
Qty: 120 TABLET | Refills: 0 | Status: SHIPPED | OUTPATIENT
Start: 2022-12-28 | End: 2023-01-25

## 2022-12-28 NOTE — TELEPHONE ENCOUNTER
Routing refill request to provider for review/approval because:  Drug not on the [facility] refill protocol     Last Written Prescription Date:  11/28/22  Last Fill Quantity: 120,  # refills: 0   Last office visit: 11/28/2022 virtual with prescribing provider:  Johnathan Mcghee Office Visit:  3/1/23 with Johnathan Velazquez RN  [facility]

## 2022-12-30 ENCOUNTER — TELEPHONE (OUTPATIENT)
Dept: FAMILY MEDICINE | Facility: CLINIC | Age: 47
End: 2022-12-30

## 2022-12-30 ENCOUNTER — TRANSFERRED RECORDS (OUTPATIENT)
Dept: HEALTH INFORMATION MANAGEMENT | Facility: CLINIC | Age: 47
End: 2022-12-30

## 2022-12-30 NOTE — TELEPHONE ENCOUNTER
Prior Authorization Retail Medication Request    Medication/Dose: rimegepant (NURTEC) 75 MG ODT tablet  ICD code (if different than what is on RX):  Previously Tried and Failed:  Rationale:    Insurance Name:    Insurance ID: [id number]    Pharmacy Information (if different than what is on RX)  Name:  Phone:    Please include previous medications tried and failed.  Please ask insurance for medications on formulary.

## 2022-12-30 NOTE — TELEPHONE ENCOUNTER
Gil --    Please take a look at these prescriptions. Thank you.     MARCOS RoseN SYLVIA  [facility]

## 2023-01-02 RX ORDER — OXYCODONE HYDROCHLORIDE 5 MG/1
5 TABLET ORAL 4 TIMES DAILY
Qty: 112 TABLET | Refills: 0 | Status: SHIPPED | OUTPATIENT
Start: 2023-01-02 | End: 2023-02-07

## 2023-01-02 RX ORDER — GABAPENTIN 300 MG/1
1200 CAPSULE ORAL 3 TIMES DAILY
Qty: 1080 CAPSULE | Refills: 3 | Status: SHIPPED | OUTPATIENT
Start: 2023-01-02 | End: 2023-05-23

## 2023-01-02 NOTE — TELEPHONE ENCOUNTER
PA for this medication was submitted and denied in encounter dated 12/13/2022. If you would like to appeal please provide a letter of medical necessity with clinical reason and route the original encounter back to the PA team. Thank you.

## 2023-01-03 NOTE — TELEPHONE ENCOUNTER
Updated Neurontin Rx; renewed oxycodone  needs to follow up r/t to back pain; referral with [facility] placed; imaging ordered and Pain clinic for ongoing opioid  renewals.     SW

## 2023-01-03 NOTE — TELEPHONE ENCOUNTER
Writer responded via [employer].    MARCOS KateN, RN-BC  [facility]

## 2023-01-04 ENCOUNTER — MYC MEDICAL ADVICE (OUTPATIENT)
Dept: FAMILY MEDICINE | Facility: CLINIC | Age: 48
End: 2023-01-04

## 2023-01-04 ENCOUNTER — TELEPHONE (OUTPATIENT)
Dept: ANESTHESIOLOGY | Facility: CLINIC | Age: 48
End: 2023-01-04

## 2023-01-04 ENCOUNTER — TELEPHONE (OUTPATIENT)
Dept: FAMILY MEDICINE | Facility: CLINIC | Age: 48
End: 2023-01-04
Payer: COMMERCIAL

## 2023-01-04 NOTE — TELEPHONE ENCOUNTER
Received disc from  [employer] [facility] [address] [facility]   Phone [phone] mail code [phone]- 1st floor sent  To 2nd floor to scan into patient chart

## 2023-01-04 NOTE — TELEPHONE ENCOUNTER
Please start prior authorization for Semaglutide 14 MG TABS.    Thank you!  MARCOS KateN, RN-[facility]

## 2023-01-04 NOTE — TELEPHONE ENCOUNTER
Pls notify of PA denial continue current therapy until next visit. Pls notify with uncontrolled HA's  Thank you,  SW

## 2023-01-04 NOTE — TELEPHONE ENCOUNTER
[employer] message sent to patient.    MYRIAM Kate, RN-BC  [facility]

## 2023-01-04 NOTE — TELEPHONE ENCOUNTER
Prior authorization for Semaglutide: see 1/4/23 telephone encounter.    Writer responded via [employer].    MYRIAM Kate, RN-BC  [facility]

## 2023-01-05 NOTE — TELEPHONE ENCOUNTER
Oxycodone was renewed on 1/2/2023 for the month; patient has scheduled appointment with Pain clinic on 2/6 and with me on 3/1; I was recommending additional refills (after January) to be managed by pain clinic. PCP is will to manage if pain clinic does not. No earlier appointments needed for either.     - pls notify if pain clinic does not manage will renew until next PCP visit. Plan was to avoid confusion in management.    SW

## 2023-01-05 NOTE — TELEPHONE ENCOUNTER
Gil - DO you want/need to see patient as well as Pain Management?  Instead of Pain management? Or not at all right now?  Jaclyn Bean RN  [facility]

## 2023-01-06 NOTE — TELEPHONE ENCOUNTER
Central Prior Authorization Team - Phone: [phone]     PA Initiation    Medication: RYBELSUS (Semaglutide )14 MG TABS- PA INITIATED  Insurance Company:    Pharmacy Filling the Rx: [facility] #[id number] - [place], [place] - [address] AT [facility] [address]  Filling Pharmacy Phone: [phone]  Filling Pharmacy Fax:    Start Date: 1/6/2023

## 2023-01-09 NOTE — TELEPHONE ENCOUNTER
Central Prior Authorization Team - Phone: [phone]     Prior Authorization Approval    Authorization Effective Date: 1/6/2023  Authorization Expiration Date: 1/6/2024  Medication: RYBELSUS (Semaglutide )14 MG TABS- PA APPROVED  Approved Dose/Quantity: 30  Reference #:     Insurance Company:    Expected CoPay:       CoPay Card Available:      Foundation Assistance Needed:    Which Pharmacy is filling the prescription (Not needed for infusion/clinic administered): [id number] - [place], [place] - [address] AT [facility] [address]  Pharmacy Notified: Yes  Patient Notified: YesComment:  pharmacy will notify when ready

## 2023-01-11 ENCOUNTER — TELEPHONE (OUTPATIENT)
Dept: ANESTHESIOLOGY | Facility: CLINIC | Age: 48
End: 2023-01-11

## 2023-01-11 NOTE — TELEPHONE ENCOUNTER
Received records from [facility]  [address] [facility] [id number]    Records scan into pt chart

## 2023-01-18 NOTE — RESULT ENCOUNTER NOTE
Carlos Rubi    We will discuss the results of recent labs at your upcoming appointment on 3/1/2023        Sincerely  CANDACE Youngblood CNP

## 2023-01-27 ENCOUNTER — TRANSFERRED RECORDS (OUTPATIENT)
Dept: HEALTH INFORMATION MANAGEMENT | Facility: CLINIC | Age: 48
End: 2023-01-27

## 2023-02-07 ENCOUNTER — MYC REFILL (OUTPATIENT)
Dept: FAMILY MEDICINE | Facility: CLINIC | Age: 48
End: 2023-02-07
Payer: COMMERCIAL

## 2023-02-07 ENCOUNTER — TELEPHONE (OUTPATIENT)
Dept: FAMILY MEDICINE | Facility: CLINIC | Age: 48
End: 2023-02-07
Payer: COMMERCIAL

## 2023-02-07 DIAGNOSIS — M25.571 PAIN IN JOINT INVOLVING ANKLE AND FOOT, RIGHT: ICD-10-CM

## 2023-02-07 DIAGNOSIS — G43.709 CHRONIC MIGRAINE WITHOUT AURA WITHOUT STATUS MIGRAINOSUS, NOT INTRACTABLE: Primary | ICD-10-CM

## 2023-02-07 NOTE — TELEPHONE ENCOUNTER
Prior Authorization Retail Medication Request    Medication/Dose: rimegepant (NURTEC) 75 MG ODT tablet  ICD code (if different than what is on RX):  Previously Tried and Failed:  Rationale:    Insurance Name:    Insurance ID: [id number]    Pharmacy Information (if different than what is on RX)  Name:  Phone:    Please include previous medications tried and failed.  Please ask insurance for medications on formulary.

## 2023-02-08 RX ORDER — OXYCODONE HYDROCHLORIDE 5 MG/1
5 TABLET ORAL 4 TIMES DAILY
Qty: 120 TABLET | Refills: 0 | Status: SHIPPED | OUTPATIENT
Start: 2023-02-08 | End: 2023-03-17

## 2023-02-08 NOTE — TELEPHONE ENCOUNTER
Patient Comment: Per Roselia @ pain mgt you are to prescribe.  She thought she put a note on my file     Routing refill request to provider for review/approval because:  Drug not on the [facility] refill protocol     Last Written Prescription Date:  1/2/23  Last Fill Quantity: 112,  # refills: 0   Last office visit: 11/28/22 virtual with Johnathan  Future Office Visit:  3/1/23 with Johnathan Velazquez RN  [facility]

## 2023-02-10 NOTE — TELEPHONE ENCOUNTER
PRIOR AUTHORIZATION DENIED    Medication: rimegepant (NURTEC) 75 MG ODT tablet, rec 2-7-23    Denial Date: 2/10/2023    Denial Rational: Patient needs to meet all of the criteria listed below:    The denial was based on our criteria for Nurtec Tab 75mg Odt.  Per your health plan's criteria, this drug is covered if you meet the following:  (1) You have fewer than 15 headache days per month.  (2) If you have four or more headache days per month, you must meet one of the following:  (A) You are currently being treated with Elavil (amitriptyline) or Effexor (venlafaxine) unless you  cannot take these drugs.  (B) You are currently being treated with Depakote/Depakote ER (divalproex sodium) or Topamax  (topiramate) unless you cannot take these drugs.  (C) You are currently being treated with a beta blocker (that is, atenolol, propranolol, nadolol, timolol,  or metoprolol) unless you cannot take these drugs.  (D) You are currently being treated with candesartan unless you cannot take this drug.  (3) The drug is prescribed or recommended by a doctor who specializes in brain and nerve disorders  (neurologist), a pain specialist, or a headache specialist.  The information provided does not show that you meet the criteria listed above.  Please speak with your doctor about your choices.  Reviewed by: Jordyn dominguez.       Appeal Information:  If provider would like to appeal please provide a letter of medical necessity and route back to PA team.

## 2023-02-10 NOTE — TELEPHONE ENCOUNTER
Central Prior Authorization Team   Phone: [phone]    PA Initiation    Medication: rimegepant (NURTEC) 75 MG ODT tablet, rec 2-7-23  Insurance Company: [employer] ([facility]) - Phone [phone] Fax [phone]  Pharmacy Filling the Rx: [facility] #[id number] - [place], [place] - [address] AT [facility] [address]  Filling Pharmacy Phone: [phone]  Filling Pharmacy Fax:    Start Date: 2/10/2023

## 2023-02-12 ENCOUNTER — HEALTH MAINTENANCE LETTER (OUTPATIENT)
Age: 48
End: 2023-02-12

## 2023-02-14 ENCOUNTER — TRANSFERRED RECORDS (OUTPATIENT)
Dept: HEALTH INFORMATION MANAGEMENT | Facility: CLINIC | Age: 48
End: 2023-02-14

## 2023-02-15 ENCOUNTER — MYC MEDICAL ADVICE (OUTPATIENT)
Dept: FAMILY MEDICINE | Facility: CLINIC | Age: 48
End: 2023-02-15
Payer: COMMERCIAL

## 2023-02-17 ENCOUNTER — OFFICE VISIT (OUTPATIENT)
Dept: ANESTHESIOLOGY | Facility: CLINIC | Age: 48
End: 2023-02-17
Payer: COMMERCIAL

## 2023-02-17 VITALS
OXYGEN SATURATION: 98 % | HEART RATE: 80 BPM | WEIGHT: 215 LBS | SYSTOLIC BLOOD PRESSURE: 103 MMHG | BODY MASS INDEX: 32.58 KG/M2 | DIASTOLIC BLOOD PRESSURE: 68 MMHG | HEIGHT: 68 IN

## 2023-02-17 DIAGNOSIS — M79.18 MYOFASCIAL PAIN: ICD-10-CM

## 2023-02-17 DIAGNOSIS — G44.86 CERVICOGENIC HEADACHE: ICD-10-CM

## 2023-02-17 DIAGNOSIS — M47.816 LUMBAR FACET ARTHROPATHY: Primary | ICD-10-CM

## 2023-02-17 DIAGNOSIS — G89.4 CHRONIC PAIN SYNDROME: ICD-10-CM

## 2023-02-17 DIAGNOSIS — G43.709 CHRONIC MIGRAINE WITHOUT AURA WITHOUT STATUS MIGRAINOSUS, NOT INTRACTABLE: ICD-10-CM

## 2023-02-17 PROCEDURE — 99215 OFFICE O/P EST HI 40 MIN: CPT

## 2023-02-17 RX ORDER — PROPRANOLOL HYDROCHLORIDE 20 MG/1
20 TABLET ORAL DAILY
Qty: 30 TABLET | Refills: 0 | Status: SHIPPED | OUTPATIENT
Start: 2023-02-17 | End: 2023-03-01

## 2023-02-17 ASSESSMENT — PAIN SCALES - GENERAL: PAINLEVEL: MODERATE PAIN (5)

## 2023-02-17 NOTE — LETTER
2/17/2023       RE: Elicia Calero  [address] [place] [address]     Dear Colleague,    Thank you for referring your patient, Elicia Calero, to the [facility]NA[facility] [place] at [facility]. Please see a copy of my visit note below.      [facility]     Date of visit: 2/17/2023      Assessment:   Elicia Calero is a 48 year old female with a past medical history significant for migraines, right ankle joint pain, GERD, hepatic adenoma, DM 2, depression, bipolar, low back pain who presents with complaints of widespread pain - headache, neck, low back, and right ankle.      1. Chronic pain - She has pain affecting multiple areas, including headaches, neck, low back and right ankle. Etiology is likely mixed, including but certainly not limited to, underlying degenerative changes of spine/joints, with significant overlying myofascial component.     Visit Diagnoses:  1. Lumbar facet arthropathy    2. Cervicogenic headache    3. Myofascial pain    4. Chronic pain syndrome        Plan:  Diagnosis reviewed, treatment option addressed, and risk/benifits discussed.  Self-care instructions given.  I am recommending a multidisciplinary treatment plan to help this patient better manage their pain.      Pain Physical Therapy:  YES - we are referring for stretching, strengthening, and HEP to target neck and low back. She is interested in pursuing repeat lumbar RFA, advised she will need to complete 4 sessions of PT prior to RFA for the PA.      Pain Psychologist to address relaxation, behavioral change, coping style, and other factors important to improvement.  NO     Diagnostic Studies:  None      Medication Management:     Elicia is currently taking oxycodone 5 mg QID PRN, currently prescribed by PCP. This dosage - 30 daily MME - is reasonable, though I do not recommend further increase at this point. 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      Potential procedures:     She had lumbar RFA in the past through clinic in [place], [place]. We were able to obtain some records since last visit, though I am not able to find details on this procedure. Also, she may have to repeat MBBs. I have asked [facility] to look into the insurance policy for medical necessity components.      Other Orders/Referrals:     TENS unit - continue use, discussed placement today in clinic.      Follow up with CANDACE Hightower CNP in 4-6 weeks after RFA, or sooner if needed.        Review of Electronic Chart: Today I have also reviewed available medical information in the patient's medical record at [facility] ([facility]) and Care Everywhere (if available), including relevant provider notes, laboratory work, and imaging.     Roselia Capps DNP, CANDACE, AGNP-C  [facility]     -------------------------------------------------    Subjective:    Chief complaint:   Chief Complaint   Patient presents with     Follow Up     Follow-up Headaches to discuss back,Ankle pain       Interval history:  Elicia Calero is a 48 year old female last seen on 12/9/22.  They are a patient of mine seen in follow up.     Recommendations/plan at the last visit included:  Pain Physical Therapy:  NO   We discussed pain PT referral today but will defer until after ankle surgery.      Pain Psychologist to address relaxation, behavioral change, coping style, and other factors important to improvement.  NO     Diagnostic Studies:  None      Medication Management:     Elicia is currently taking oxycodone 5 mg QID PRN, currently prescribed by PCP. This dosage - 30 daily MME - is reasonable, though I do not recommend further increase at this point. 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 Of note, she has upcoming surgery on right ankle, and pain needs should be re-evaluated once cleared postoperatively.      Potential procedures:     Deferred - we will consider occipital nerve blocks and repeat lumbar radiofrequency ablation once recovered from surgery.      Other Orders/Referrals:     TENS unit ordered today.     Follow up with CANDACE Hightower CNP in 8 weeks or sooner if needed     Since her last visit, Elicia Calero reports:  -her chronic pain is about the as it was at last visit.   -Ankle surgery went well, she just started PT>   -She has noticed some increased pain with PT.   -She does not return to work until mid March.   -She has a dull headache sometimes when she wakes up in the morning.   -She finds nurtec very helpful for pain.   -She was on triptans in the past but developed adverse effects.     HPI from initial visit with me on 12/9/22:  Elicia Calero is a 47 year old female presents with a chief complaint of right ankle pain, low back pain, headaches.      The pain has been present for many years.    The pain is Extreme Pain (9) in severity.    The pain is described as tingling, burning in ankle, back is aching.   The pain is alleviated by meds (oxycodone), distraction.    It is exacerbated by \"everything\".    Modalities that have been utilized in the past which were helpful include lumbar RFA, TPI.    Things that were not helpful, but tried ,include different neck pillows.    The patient has never tried TENS on neck.     The patient otherwise denies bowel or bladder incontinence, parasthesias, weakness, saddle anesthesia, unintentional weight loss, or fever/chills/sweats.      Elicia Calero has been seen at a pain clinic in the past.  She saw a provider at outside clinic in [place], [place].      -She had lumbar RFA done in the past, 85-90% pain relief for many years (8-9 years).   -She is aware that LBP may be exacerbated by right " ankle, altered gait.   -She has h/o headaches, fluctuated throughout the years, thinks some of this is due to stress.   -When she has less stress, headaches improve.   -She was taking topiramate and propanolol      -She is pharmacy tech.    Pain Information:   Pain rating: averages 5/10 on a 0-10 scale.      Current Pain Treatments:       1. Medications:               Oxycodone               Indomethicin              Methocarbamol               Gabapentin   2. Other therapies:               Lumbar RFA - may have to repeat MBBs, [facility] will look into policy.       Current MME: 30 (PCP)    Review of [place] Prescription Monitoring Program (): No concern for abuse or misuse of controlled medications based on this report.     Past pain treatments:  Surgery - right ankle fusion       Medications:  Current Outpatient Medications   Medication Sig Dispense Refill     brexpiprazole (REXULTI) 1 MG tablet Take 1 tablet (1 mg) by mouth daily 90 tablet 3     buPROPion (WELLBUTRIN XL) 150 MG 24 hr tablet Take 1 tablet (150 mg) by mouth every morning 90 tablet 3     buPROPion (WELLBUTRIN XL) 300 MG 24 hr tablet Take 1 tablet (300 mg) by mouth daily for 90 days 90 tablet 0     CONTOUR NEXT TEST test strip        doxepin (SINEQUAN) 25 MG capsule Take 1 capsule (25 mg) by mouth At Bedtime 30 capsule 3     folic acid (FOLVITE) 1 MG tablet Take 1 tablet (1,000 mcg) by mouth daily 90 tablet 3     gabapentin (NEURONTIN) 300 MG capsule Take 4 capsules (1,200 mg) by mouth 3 times daily 1080 capsule 3     glipiZIDE (GLUCOTROL XL) 10 MG 24 hr tablet Take 2 tablets (20 mg) by mouth daily 180 tablet 3     hydrochlorothiazide (MICROZIDE) 12.5 MG capsule Take 1 capsule (12.5 mg) by mouth daily 90 capsule 3     indomethacin (INDOCIN) 50 MG capsule Take 1 capsule (50 mg) by mouth 3 times daily 90 capsule 1     lamoTRIgine (LAMICTAL) 200 MG tablet Take 2 tablets (400 mg) by mouth At Bedtime for 360 days 180 tablet 3     levothyroxine  "(SYNTHROID/LEVOTHROID) 125 MCG tablet Take 125 mcg by mouth daily       lidocaine, viscous, (XYLOCAINE) 2 % solution Swish and spit 5 mLs in mouth 5 times daily 100 mL 1     metFORMIN (GLUCOPHAGE-XR) 750 MG 24 hr tablet Take 3 tablets (2,250 mg) by mouth daily 270 tablet 3     methocarbamol (ROBAXIN) 750 MG tablet TAKE 1 TABLET(750 MG) BY MOUTH FOUR TIMES DAILY AS NEEDED FOR MUSCLE SPASMS 120 tablet 3     norethindrone (MICRONOR) 0.35 MG tablet Take 1 tablet (0.35 mg) by mouth daily 84 tablet 11     nystatin (MYCOSTATIN) 291974 UNIT/GM external cream Apply 100,000 g topically 3 times daily       omeprazole (PRILOSEC) 40 MG DR capsule Take 1 capsule (40 mg) by mouth At Bedtime 90 capsule 3     ondansetron (ZOFRAN) 4 MG tablet Take 1 tablet (4 mg) by mouth 3 times daily 90 tablet 1     oxyCODONE (ROXICODONE) 5 MG tablet Take 1 tablet (5 mg) by mouth 4 times daily for 30 days 120 tablet 0     phentermine (ADIPEX-P) 15 MG capsule Take 1 capsule (15 mg) by mouth every morning 30 capsule 3     pravastatin (PRAVACHOL) 40 MG tablet Take 1 tablet (40 mg) by mouth daily 90 tablet 3     prochlorperazine (COMPAZINE) 10 MG tablet Take 1 tablet (10 mg) by mouth 4 times daily as needed for nausea (with migraine) 120 tablet 1     rimegepant (NURTEC) 75 MG ODT tablet Place 1 tablet (75 mg) under the tongue every other day Maximum of 1 tablet every 24 hours. 8 tablet 1     rizatriptan (MAXALT) 10 MG tablet Take 1 tablet (10 mg) by mouth 2 times daily 60 tablet 3     Semaglutide 14 MG TABS Take 1 tablet by mouth daily 30 tablet 3       Medical History: any changes in medical history since they were last seen? Yes - Right ankle fusion       Objective:    Physical Exam:  Blood pressure 103/68, pulse 80, height 1.727 m (5' 8\"), weight 97.5 kg (215 lb), SpO2 98 %, not currently breastfeeding.  Constitutional: Well developed, well nourished, appears stated age.  Gait: Up with cane  HEENT: Head atraumatic, normocephalic. Eyes without " conjunctival injection or jaundice. Oropharynx clear. Neck supple. No obvious neck masses.  Skin: No rash, lesions, or petechiae of exposed skin.   Psychiatric/mental status: Alert, without lethargy or stupor. Speech fluent. Appropriate affect. Mood normal. Able to follow commands without difficulty.     Imaging:  None - may need updated lumbar imaging prior to RFA.     BILLING TIME DOCUMENTATION:   The total TIME spent on this patient on the date of the encounter/appointment was 42 minutes.      TOTAL TIME includes:   Time spent preparing to see the patient (reviewing records and tests)   Time spent face to face (or over the phone) with the patient   Time spent ordering tests, medications, procedures and referrals   Time spent Referring and communicating with other healthcare professionals   Time spent documenting clinical information in Epic         Sincerely,    CANDACE Hightower CNP

## 2023-02-17 NOTE — PATIENT INSTRUCTIONS
Referrals:    Physical Therapy Referral placed-   If you have not heard from the scheduling office within 2 business days, please call [phone] for all locations       Procedures:    Call to schedule your procedure: [phone] option #2  Repeat radiofrequency ablation    Your pre-procedure instructions are below, please call our clinic if you have any questions.        Recommended Follow up:      Follow up in 4-6 weeks after radiofrequency ablation      To speak with a nurse, schedule/reschedule/cancel a clinic appointment, or request a medication refill call: [phone].    You can also reach us by [email or web address]: [email or web address]        Procedure Information related to COVID-19     Please call [phone] option #2 to schedule, reschedule, or cancel your procedure appointment.   Phones are answered Monday - Friday from 08:00 - 4:30pm.  Leave a voicemail with your name, birth date, and phone number if no one is available to take your call.        You no longer need to test for COVID- 19 prior to your procedure/surgery, unless your physician specifically requests that you test. If you experience COVID symptoms or have tested positive for COVID-19 within 14 days of your scheduled surgery or procedure, please update our office right away and your procedure may have to be postponed.       The procedure center staff will call you several days before the procedure to review important information that you will need to know for the day of the procedure.     Please contact the clinic if you have further questions about this information [phone].        Information related to Scheduling and Pre-Procedure Instructions:    If you must reschedule your procedure more than two times, you must follow up in clinic before rescheduling again.      Preparing for your procedure    CAUTION - FAILURE TO FOLLOW THESE PRE-PROCEDURE INSTRUCTIONS WILL RESULT IN YOUR PROCEDURE BEING RESCHEDULED.    Your Procedure:  radiofrequency ablation      Pregnancy  If you are pregnant, or think you may be pregnant, please notify our staff. This may or may not affect the ability to perform the procedure.       You must have a  take you home after your procedure. Transportation by taxi or para-transit is okay as long as you have a responsible adult accompany you. You must provide your 's full name and contact number at time of check in.     Fasting Protocol Please have nothing to eat or drink 1 hour prior to arrival.     Medications If you take any medications, DO NOT STOP. Take your medications as usual the day of your procedure with a sip of water AT LEAST 2 HOURS PRIOR TO ARRIVAL.    Antibiotics If you are currently taking antibiotics, you must complete the entire dose 7 days prior to your scheduled procedure. You must be clear of any signs or symptoms of infection. If you begin antibiotics, please contact our clinic for instructions.     Fever, Chills, or Rash If you experience a fever of higher than 100 degrees, chills, rash, or open wounds during the one week before your procedure, please call the clinic to see if you may proceed with your procedure.      Medication Hold List  **Patients under Cardiology/Neurology care should consult their provider prior to the pain procedure to verify pre-procedure medication instructions. The information below contains general guidelines.**        Blood Thinners If you are taking daily ASPIRIN, PLAVIX, OR OTHER BLOOD THINNERS SUCH AS COUMADIN/WARFARIN, we will need your prescribing doctor to sign a release permitting you to stop these medications. Once approved by your prescribing doctor - STOP ALL BLOOD THINNERS BASED ON THE TIME TABLE BELOW PRIOR TO YOUR PROCEDURE. If you have been instructed to stop WARFARIN(COUMADIN), you must have an INR lab drawn the day before your procedure. . Your INR must be within normal limits before we can perform your injection. MEDICATIONS CAN BE  RESTARTED AFTER YOUR PROCEDURE.    14 DAY HOLD  Ticlid (ticlopidine)    10 DAY HOLD  Effient (Prasugel)    3 DAY HOLD  Xarelto (rivaroxaban) 7 DAY HOLD  Anacin, Bufferin, Ecotrin, Excedrin, Aggrenox (Aspirin)  Brilinta (ticagrelor)  Coumadin (Warfarin)  Pradexa (Dabigatran)  Elmiron (Pentosan)  Plavix (Clopidogrel Bisulfate)  Pletal (Cilostazol)    24 HOUR HOLD  Lovenox (enoxaparin)  Agrylin (Anagrelide)        Non-steroidal Anti-inflammatories (NSAIDs) DO NOT TAKE any non-steroidal anti-inflammatory medications (NSAIDs) listed on the table below. MEDICATIONS CAN BE RESTARTED AFTER YOUR PROCEDURE. Celebrex is OK to take and does not need to be discontinued.     Medications to stop:  3 DAY HOLD  Advil, Motrin (Ibuprofen)  Arthrotec (diciofenac sodium/misoprostol)  Clinoril (Sulindac)  Indocin (Indomethacin)  Lodine (Etodolac)  Toradol (Ketorolac)  Vicoprofen (Hydrocodone and Ibuprofen)  Voltaren (Diclofenac)    14 DAY HOLD  Daypro (Oxaprozin)  Feldene (Piroxicam)   7 DAY HOLD  Aleve (Naproxen sodium)  Darvon compound (contains aspirin)  Naprosyn (Naproxen)  Norgesic Forte (contains aspirin)  Mobic (Meloxicam)  Oruvall (Ketoprofen)  Percodan (contains aspirin)  Relafen (Nabumetone)  Salsalate  Trilisate  Vitamin E (more than 400 mg per day)  Any medication containing aspirin                To speak with a nurse, schedule/reschedule/cancel a clinic appointment, or request a medication refill call: [phone]    You can also reach us by [employer]: [email or web address]

## 2023-02-17 NOTE — PROGRESS NOTES
[facility]     Date of visit: 2/17/2023      Assessment:   Elicia Calero is a 48 year old female with a past medical history significant for migraines, right ankle joint pain, GERD, hepatic adenoma, DM 2, depression, bipolar, low back pain who presents with complaints of widespread pain - headache, neck, low back, and right ankle.      1. Chronic pain - She has pain affecting multiple areas, including headaches, neck, low back and right ankle. Etiology is likely mixed, including but certainly not limited to, underlying degenerative changes of spine/joints, with significant overlying myofascial component.     Visit Diagnoses:  1. Lumbar facet arthropathy    2. Cervicogenic headache    3. Myofascial pain    4. Chronic pain syndrome        Plan:  Diagnosis reviewed, treatment option addressed, and risk/benifits discussed.  Self-care instructions given.  I am recommending a multidisciplinary treatment plan to help this patient better manage their pain.      Pain Physical Therapy:  YES - we are referring for stretching, strengthening, and HEP to target neck and low back. She is interested in pursuing repeat lumbar RFA, advised she will need to complete 4 sessions of PT prior to RFA for the PA.      Pain Psychologist to address relaxation, behavioral change, coping style, and other factors important to improvement.  NO     Diagnostic Studies:  None      Medication Management:     Elicia is currently taking oxycodone 5 mg QID PRN, currently prescribed by PCP. This dosage - 30 daily MME - is reasonable, though I do not recommend further increase at this point. 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      Potential procedures:     She had lumbar RFA in the past through clinic in [place], [place]. We were able to obtain some records since last visit, though I am not able to find  details on this procedure. Also, she may have to repeat MBBs. I have asked RNCC to look into the insurance policy for medical necessity components.      Other Orders/Referrals:     TENS unit - continue use, discussed placement today in clinic.      Follow up with CANDACE Hightower CNP in 4-6 weeks after RFA, or sooner if needed.        Review of Electronic Chart: Today I have also reviewed available medical information in the patient's medical record at [facility] ([facility]) and Care Everywhere (if available), including relevant provider notes, laboratory work, and imaging.     Roselia Capps, PANCHO, CANDACE, AGNP-C  [facility]     -------------------------------------------------    Subjective:    Chief complaint:   Chief Complaint   Patient presents with     Follow Up     Follow-up Headaches to discuss back,Ankle pain       Interval history:  Elicia Calero is a 48 year old female last seen on 12/9/22.  They are a patient of mine seen in follow up.     Recommendations/plan at the last visit included:  Pain Physical Therapy:  NO   We discussed pain PT referral today but will defer until after ankle surgery.      Pain Psychologist to address relaxation, behavioral change, coping style, and other factors important to improvement.  NO     Diagnostic Studies:  None      Medication Management:     Elicia is currently taking oxycodone 5 mg QID PRN, currently prescribed by PCP. This dosage - 30 daily MME - is reasonable, though I do not recommend further increase at this point. 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 Of note, she has upcoming surgery on right ankle, and pain needs should be re-evaluated once cleared postoperatively.      Potential procedures:     Deferred - we will consider occipital nerve blocks and repeat lumbar radiofrequency ablation once recovered from  "surgery.      Other Orders/Referrals:     TENS unit ordered today.     Follow up with CANDACE Hightower CNP in 8 weeks or sooner if needed     Since her last visit, Elicia Calero reports:  -her chronic pain is about the as it was at last visit.   -Ankle surgery went well, she just started PT>   -She has noticed some increased pain with PT.   -She does not return to work until mid March.   -She has a dull headache sometimes when she wakes up in the morning.   -She finds nurtec very helpful for pain.   -She was on triptans in the past but developed adverse effects.     HPI from initial visit with me on 12/9/22:  Elicia Calero is a 47 year old female presents with a chief complaint of right ankle pain, low back pain, headaches.      The pain has been present for many years.    The pain is Extreme Pain (9) in severity.    The pain is described as tingling, burning in ankle, back is aching.   The pain is alleviated by meds (oxycodone), distraction.    It is exacerbated by \"everything\".    Modalities that have been utilized in the past which were helpful include lumbar RFA, TPI.    Things that were not helpful, but tried ,include different neck pillows.    The patient has never tried TENS on neck.     The patient otherwise denies bowel or bladder incontinence, parasthesias, weakness, saddle anesthesia, unintentional weight loss, or fever/chills/sweats.      Elicia Calero has been seen at a pain clinic in the past.  She saw a provider at outside clinic in [place], [place].      -She had lumbar RFA done in the past, 85-90% pain relief for many years (8-9 years).   -She is aware that LBP may be exacerbated by right ankle, altered gait.   -She has h/o headaches, fluctuated throughout the years, thinks some of this is due to stress.   -When she has less stress, headaches improve.   -She was taking topiramate and propanolol      -She is pharmacy tech.    Pain Information:   Pain rating: averages 5/10 on a 0-10 scale.      Current Pain " Treatments:       1. Medications:               Oxycodone               Indomethicin              Methocarbamol               Gabapentin   2. Other therapies:               Lumbar RFA - may have to repeat MBBs, RNCC will look into policy.       Current MME: 30 (PCP)    Review of [place] Prescription Monitoring Program (): No concern for abuse or misuse of controlled medications based on this report.     Past pain treatments:  Surgery - right ankle fusion       Medications:  Current Outpatient Medications   Medication Sig Dispense Refill     brexpiprazole (REXULTI) 1 MG tablet Take 1 tablet (1 mg) by mouth daily 90 tablet 3     buPROPion (WELLBUTRIN XL) 150 MG 24 hr tablet Take 1 tablet (150 mg) by mouth every morning 90 tablet 3     buPROPion (WELLBUTRIN XL) 300 MG 24 hr tablet Take 1 tablet (300 mg) by mouth daily for 90 days 90 tablet 0     CONTOUR NEXT TEST test strip        doxepin (SINEQUAN) 25 MG capsule Take 1 capsule (25 mg) by mouth At Bedtime 30 capsule 3     folic acid (FOLVITE) 1 MG tablet Take 1 tablet (1,000 mcg) by mouth daily 90 tablet 3     gabapentin (NEURONTIN) 300 MG capsule Take 4 capsules (1,200 mg) by mouth 3 times daily 1080 capsule 3     glipiZIDE (GLUCOTROL XL) 10 MG 24 hr tablet Take 2 tablets (20 mg) by mouth daily 180 tablet 3     hydrochlorothiazide (MICROZIDE) 12.5 MG capsule Take 1 capsule (12.5 mg) by mouth daily 90 capsule 3     indomethacin (INDOCIN) 50 MG capsule Take 1 capsule (50 mg) by mouth 3 times daily 90 capsule 1     lamoTRIgine (LAMICTAL) 200 MG tablet Take 2 tablets (400 mg) by mouth At Bedtime for 360 days 180 tablet 3     levothyroxine (SYNTHROID/LEVOTHROID) 125 MCG tablet Take 125 mcg by mouth daily       lidocaine, viscous, (XYLOCAINE) 2 % solution Swish and spit 5 mLs in mouth 5 times daily 100 mL 1     metFORMIN (GLUCOPHAGE-XR) 750 MG 24 hr tablet Take 3 tablets (2,250 mg) by mouth daily 270 tablet 3     methocarbamol (ROBAXIN) 750 MG tablet TAKE 1 TABLET(750  "MG) BY MOUTH FOUR TIMES DAILY AS NEEDED FOR MUSCLE SPASMS 120 tablet 3     norethindrone (MICRONOR) 0.35 MG tablet Take 1 tablet (0.35 mg) by mouth daily 84 tablet 11     nystatin (MYCOSTATIN) 411303 UNIT/GM external cream Apply 100,000 g topically 3 times daily       omeprazole (PRILOSEC) 40 MG DR capsule Take 1 capsule (40 mg) by mouth At Bedtime 90 capsule 3     ondansetron (ZOFRAN) 4 MG tablet Take 1 tablet (4 mg) by mouth 3 times daily 90 tablet 1     oxyCODONE (ROXICODONE) 5 MG tablet Take 1 tablet (5 mg) by mouth 4 times daily for 30 days 120 tablet 0     phentermine (ADIPEX-P) 15 MG capsule Take 1 capsule (15 mg) by mouth every morning 30 capsule 3     pravastatin (PRAVACHOL) 40 MG tablet Take 1 tablet (40 mg) by mouth daily 90 tablet 3     prochlorperazine (COMPAZINE) 10 MG tablet Take 1 tablet (10 mg) by mouth 4 times daily as needed for nausea (with migraine) 120 tablet 1     rimegepant (NURTEC) 75 MG ODT tablet Place 1 tablet (75 mg) under the tongue every other day Maximum of 1 tablet every 24 hours. 8 tablet 1     rizatriptan (MAXALT) 10 MG tablet Take 1 tablet (10 mg) by mouth 2 times daily 60 tablet 3     Semaglutide 14 MG TABS Take 1 tablet by mouth daily 30 tablet 3       Medical History: any changes in medical history since they were last seen? Yes - Right ankle fusion       Objective:    Physical Exam:  Blood pressure 103/68, pulse 80, height 1.727 m (5' 8\"), weight 97.5 kg (215 lb), SpO2 98 %, not currently breastfeeding.  Constitutional: Well developed, well nourished, appears stated age.  Gait: Up with cane  HEENT: Head atraumatic, normocephalic. Eyes without conjunctival injection or jaundice. Oropharynx clear. Neck supple. No obvious neck masses.  Skin: No rash, lesions, or petechiae of exposed skin.   Psychiatric/mental status: Alert, without lethargy or stupor. Speech fluent. Appropriate affect. Mood normal. Able to follow commands without difficulty.     Imaging:  None - may need updated " lumbar imaging prior to RFA.     BILLING TIME DOCUMENTATION:   The total TIME spent on this patient on the date of the encounter/appointment was 42 minutes.      TOTAL TIME includes:   Time spent preparing to see the patient (reviewing records and tests)   Time spent face to face (or over the phone) with the patient   Time spent ordering tests, medications, procedures and referrals   Time spent Referring and communicating with other healthcare professionals   Time spent documenting clinical information in Epic

## 2023-02-17 NOTE — NURSING NOTE
Patient presents with:  Follow Up: Follow-up Headaches to discuss back,Ankle pain      Moderate Pain (5)     Pain Medications     Opioid Agonists Refills Start End     oxyCODONE (ROXICODONE) 5 MG tablet    0 2/8/2023 3/10/2023    Sig - Route: Take 1 tablet (5 mg) by mouth 4 times daily for 30 days - Oral    Class: E-Prescribe    Earliest Fill Date: 2/8/2023          What medications are you using for pain? Methocarbamol,Gabapentin, Oxycodone, Tylenol, Nurtec    (New patients only) Have you been seen by another pain clinic/ provider? no    (Return Patients only) What refills are you needing today? no

## 2023-02-17 NOTE — Clinical Note
Annel Gee,  Please connect with Zo about the RFA policy for this patient. Also, if we could continue working on records (Howard was helping with this), that would be great. If we have to do MBBs based on policy, I suppose the records are a moot point.   Thanks, A

## 2023-02-17 NOTE — NURSING NOTE
RN read through the instructions with the patient for the recommended procedure: radiofrequency ablation  Patient verbalized understanding to holding appropriate medication per protocol and was agreeable to NPO policy and needing a .    Anticoagulant: None reported    Recommended Follow Up: Follow up in 4-6 weeks after ablation    Marlen Britton RNCC

## 2023-02-18 NOTE — TELEPHONE ENCOUNTER
[facility]; recommend starting propranolol 20 mg daily and follow up effectiveness at upcoming appointment on 3/1/2023. RX sent to pharmacy.  Thank you,  SW

## 2023-02-20 NOTE — TELEPHONE ENCOUNTER
"This med was discontinued on 2/17/23.  Will send to Gil Mann.  SYLVIA Corbin        \"My insurance is requiring a prior authorization on my Nyrtec.  Could you please do one for that?  I know one was done last year but my insurance restarted in January.  Any questions please let me know.     Thanks,     Elicia\"        "

## 2023-02-20 NOTE — PROGRESS NOTES
[facility] Hepatology    New Patient Visit    Referring provider:  Gil Mann  Chief complaint:  Hepatic adenoma; NAFLD    Assessment  48 year old female with PMHx of metabolic syndrome (type II diabetes, obesity, HTN and hyperlipidemia), non-alcoholic fatty liver disease (NAFLD), hypothyroidism, chronic pain on opioids and hepatic adenomas who presents to establish care.     #. NAFLD  #. Hepatic adenomas  #. Focal Nodular Hyperplasia  #. Metabolic Syndrome: type II diabetes, obesity, HTN and hyperlipidemia    Patient without evidence of advanced fibrosis based on the following - MR elastrography 3/2022 with hepatic steatosis and only mild inflammation. FIB-4: 0.44 - not suggestive of advanced fibrosis. Patient without thrombocytopenia (plt > 150) and no evidence of splenomegaly to suggest portal HTN. INR wnl - suggestive of intact hepatic synthetic function. Patient with significant metabolic syndrome - type II diabetes, obesity, HTN and hyperlipidemia.     In terms of the patient's hepatic adenomas, her lesions were stable on imaging 3/2022 - she numerous (at least 10) hepatic adenomas and two foci of focal nodular hyperplasia. Given this, she is on a progesterone only OCP.    Plan:   -- MRI abdomen to evaluate hepatic adenomas  -- Plan for lifestyle and dietary changes with the goal of 7-10% weight loss   * Continue phentermine and topiramate for weight loss    * Continue semaglutide for weight loss    * Recommend limiting portion sizes, excluding NAFLD promoting components - processed food, foods & beverages high in added fructose   * Recommend aerobic exercise and resistance training as able give ankle  -- If patient's weight does not improve, consider bariatric surgery if BMI > 35 (and obesity related condition)   -- Continue statin   -- Optimization of diabetes per primary care physician  -- Continue to avoid estrogen containing contraceptives  -- Recommend reducing alcohol given alcohol can  contribute to weight gain as well and may promote hepatic inflammation  -- Given previously iron deficiency, plan to repeat iron studies    Other:  - CRC Screening: Plan for cologuard; no family history of colon cancer    RTC: 1 year    Discussed with attending hepatologist, Dr. Boone Monroy MD  Transplant Hepatology Fellow  [phone]    HPI:    The patient reports doing well without any acute concerns or complaints.  The patient recently moved to [facility] in August to be closer to her family.      She recently underwent an ankle surgery in December 2022 which has limited her mobility.  She is engaged in limited exercise since she broke her ankle about 2 years ago.    In terms of her diabetes she is currently on semaglutide + glipizide.  She does not check her blood sugars regularly so her physician yesterday prescribed a cat.     In terms of her weight her highest weight has been about 230 pounds which she was at about a year ago.  She reports that she is about 220 pounds today.  She reports nighttime cravings for so she was started on topiramate yesterday for cravings.  She was previously started on phentermine per day and was recently adjusted to 37.5 given ongoing obesity.  She reports seeing a nutritionist in the past and finding it helpful but she has not interested in doing so at this time.    She has some chronic issues with constipation in the setting of her opiate denies any melena or hematochezia.    Denies any signs or symptoms of such as jaundice, abdominal distention, lethargy or confusion.    She remains on the minipill for birth control which is progesterone only.    Medical hx Surgical hx   Past Medical History:   Diagnosis Date     Diabetes mellitus (H)      Hepatic adenoma      HTN (hypertension)      Hyperlipidemia      Hypothyroidism      Migraine      NAFLD (nonalcoholic fatty liver disease)       Past Surgical History:   Procedure Laterality Date     CHOLECYSTECTOMY       teen years          Medications  Current Outpatient Medications   Medication Sig Dispense Refill     brexpiprazole (REXULTI) 1 MG tablet Take 1 tablet (1 mg) by mouth daily 90 tablet 3     buPROPion (WELLBUTRIN XL) 150 MG 24 hr tablet Take 1 tablet (150 mg) by mouth every morning 90 tablet 3     Continuous Blood Gluc Sensor (FREESTYLE RANI 2 SENSOR) [facility] 1 each every 14 days Use 1 sensor every 14 days. Use to read blood sugars per 's instructions. 2 each 5     CONTOUR NEXT TEST test strip        doxepin (SINEQUAN) 25 MG capsule Take 1 capsule (25 mg) by mouth At Bedtime 30 capsule 3     folic acid (FOLVITE) 1 MG tablet Take 1 tablet (1,000 mcg) by mouth daily 90 tablet 3     gabapentin (NEURONTIN) 300 MG capsule Take 4 capsules (1,200 mg) by mouth 3 times daily 1080 capsule 3     glipiZIDE (GLUCOTROL XL) 10 MG 24 hr tablet Take 2 tablets (20 mg) by mouth daily 180 tablet 3     hydrochlorothiazide (MICROZIDE) 12.5 MG capsule Take 1 capsule (12.5 mg) by mouth daily 90 capsule 3     indomethacin (INDOCIN) 50 MG capsule Take 1 capsule (50 mg) by mouth 3 times daily 90 capsule 1     lamoTRIgine (LAMICTAL) 200 MG tablet Take 2 tablets (400 mg) by mouth At Bedtime for 360 days 180 tablet 3     levothyroxine (SYNTHROID/LEVOTHROID) 125 MCG tablet Take 125 mcg by mouth daily       lidocaine, viscous, (XYLOCAINE) 2 % solution Swish and spit 5 mLs in mouth 5 times daily 100 mL 1     metFORMIN (GLUCOPHAGE-XR) 750 MG 24 hr tablet Take 3 tablets (2,250 mg) by mouth daily 270 tablet 3     methocarbamol (ROBAXIN) 750 MG tablet TAKE 1 TABLET(750 MG) BY MOUTH FOUR TIMES DAILY AS NEEDED FOR MUSCLE SPASMS 120 tablet 3     norethindrone (MICRONOR) 0.35 MG tablet Take 1 tablet (0.35 mg) by mouth daily 84 tablet 11     nystatin (MYCOSTATIN) 768107 UNIT/GM external cream Apply 100,000 g topically 3 times daily       omeprazole (PRILOSEC) 40 MG DR capsule Take 1 capsule (40 mg) by mouth At Bedtime 90 capsule 3     ondansetron  (ZOFRAN) 4 MG tablet Take 1 tablet (4 mg) by mouth 3 times daily 90 tablet 1     oxyCODONE (ROXICODONE) 5 MG tablet Take 1 tablet (5 mg) by mouth 4 times daily for 30 days 120 tablet 0     phentermine (ADIPEX-P) 37.5 MG capsule Take 1 capsule (37.5 mg) by mouth every morning 30 capsule 3     pravastatin (PRAVACHOL) 40 MG tablet Take 1 tablet (40 mg) by mouth daily 90 tablet 3     prochlorperazine (COMPAZINE) 10 MG tablet Take 1 tablet (10 mg) by mouth 4 times daily as needed for nausea (with migraine) 120 tablet 1     propranolol (INDERAL) 20 MG tablet Take 1 tablet (20 mg) by mouth 2 times daily 180 tablet 0     rizatriptan (MAXALT) 10 MG tablet Take 1 tablet (10 mg) by mouth 2 times daily 60 tablet 3     Semaglutide 14 MG TABS Take 1 tablet by mouth daily 30 tablet 3     topiramate (TOPAMAX) 50 MG tablet Take 1 tablet (50 mg) by mouth daily (before supper) 90 tablet 0     buPROPion (WELLBUTRIN XL) 300 MG 24 hr tablet Take 1 tablet (300 mg) by mouth daily for 90 days 90 tablet 0       Allergies  Allergies   Allergen Reactions     Lithium Other (See Comments) and Dizziness     Other reaction(s): PSYCHOSIS     Sulfa Drugs Swelling     lips  Cold sores per pt       Adhesive Tape Rash     Albuterol Nausea and Vomiting     Clarithromycin Nausea and Vomiting     Erythromycin Nausea and Vomiting     Augmentin Nausea and Vomiting     Venlafaxine Other (See Comments)     Other reaction(s): PSYCHOSIS  Psychosis  Psychosis  Per transferred records         Family hx Social hx   Family History   Problem Relation Age of Onset     Hypertension Mother      Diabetes Maternal Grandmother      Heart Disease Paternal Grandmother      Liver Disease No family hx of       - Employment: [employer]  - Lives alone in [place]. Mom also lives in [place]  - Alcohol: 1 drink about 3 times per week  - Tobacco: former use, quit in 2018  - Drugs: previous: MDMA +, amphetamine +     Review of systems  A 10-point review of systems  was negative.    Examination  /72   Pulse 74   Temp 98  F (36.7  C) (Oral)   Wt 100.1 kg (220 lb 9.6 oz)   SpO2 99%   BMI 33.54 kg/m    Body mass index is 33.54 kg/m .    Gen-NAD  Eye- EOMI  ENT- MMM, normal oropharynx  CVS- RRR, no murmurs  RS- CTA bilaterally  Abd- obese, soft, non-tender, non-distended  Extr- 2+ radial pulses bilaterally, no lower extremity edema bilaterally  MS- hands without clubbing  Neuro- A+Ox3, no asterixis  Skin- no rash or jaundice  Psych- normal mood    Laboratory  BMP  Recent Labs   Lab Test 03/02/23  0857 12/01/22  1013   * 142   POTASSIUM 5.3 4.9   CHLORIDE 98 103   CON 9.3 9.6   CO2 28 19*   BUN 15.9 14.1   CR 0.79 0.85   * 257*     CBC  Recent Labs   Lab Test 03/02/23  0857 12/01/22  1013   WBC 6.8 6.8   RBC 4.60 4.69   HGB 12.7 13.4   HCT 39.3 41.8   MCV 85 89   MCH 27.6 28.6   MCHC 32.3 32.1   RDW 13.1 13.7    326     Liver Enzymes   Recent Labs   Lab Test 03/02/23  0857   PROTTOTAL 6.6   ALBUMIN 4.2   BILITOTAL 0.2   ALKPHOS 132*   AST 14   ALT 15      INR   INR   Date Value Ref Range Status   03/02/2023 0.94 0.85 - 1.15 Final      A1AT: 228 (H)  TIGIST < 1:40 (2016)  TIGIST negative (2019)  Ceruloplasmin 55 (H)  Hep A IgG non-reactive  Hep B s Ag non-reactive  Hep B s Ab <8  Hep B c Ab non-reactive  Hep C Ab non-reactive    Radiology  MRI Abdomen w/Contrast + Elastography 3/2022  1.  Liver lesions are unchanged from prior exam, including numerous (at least 10) hepatic adenomas and two foci of focal nodular hyperplasia.   2.  Mean hepatic stiffness either represents normal liver or mild inflammation.   3.  Hepatic steatosis with fat fraction of 12%.   4.  Normal hepatic iron content.    Liver Elastography 9/2020  3.3kPa;     MRI Abdomen 12/2019  1. Multiple (30-40) small (less than 2 cm) hepatic adenomas.   2. Hepatic steatosis.

## 2023-02-21 DIAGNOSIS — D13.4 HEPATIC ADENOMA: Primary | ICD-10-CM

## 2023-02-21 DIAGNOSIS — K76.0 NAFLD (NONALCOHOLIC FATTY LIVER DISEASE): ICD-10-CM

## 2023-02-22 RX ORDER — PROPRANOLOL HYDROCHLORIDE 20 MG/1
TABLET ORAL
Qty: 90 TABLET | OUTPATIENT
Start: 2023-02-22

## 2023-03-01 ENCOUNTER — VIRTUAL VISIT (OUTPATIENT)
Dept: FAMILY MEDICINE | Facility: CLINIC | Age: 48
End: 2023-03-01
Attending: NURSE PRACTITIONER
Payer: COMMERCIAL

## 2023-03-01 DIAGNOSIS — E66.812 CLASS 2 SEVERE OBESITY DUE TO EXCESS CALORIES WITH SERIOUS COMORBIDITY AND BODY MASS INDEX (BMI) OF 37.0 TO 37.9 IN ADULT (H): Primary | ICD-10-CM

## 2023-03-01 DIAGNOSIS — G43.709 CHRONIC MIGRAINE WITHOUT AURA WITHOUT STATUS MIGRAINOSUS, NOT INTRACTABLE: ICD-10-CM

## 2023-03-01 DIAGNOSIS — Z79.899 ENCOUNTER FOR LONG-TERM (CURRENT) USE OF MEDICATIONS: ICD-10-CM

## 2023-03-01 DIAGNOSIS — E66.01 CLASS 2 SEVERE OBESITY DUE TO EXCESS CALORIES WITH SERIOUS COMORBIDITY AND BODY MASS INDEX (BMI) OF 37.0 TO 37.9 IN ADULT (H): Primary | ICD-10-CM

## 2023-03-01 DIAGNOSIS — E11.9 TYPE 2 DIABETES MELLITUS WITHOUT COMPLICATION, WITHOUT LONG-TERM CURRENT USE OF INSULIN (H): ICD-10-CM

## 2023-03-01 PROCEDURE — 99214 OFFICE O/P EST MOD 30 MIN: CPT | Mod: VID | Performed by: NURSE PRACTITIONER

## 2023-03-01 RX ORDER — PROPRANOLOL HYDROCHLORIDE 20 MG/1
20 TABLET ORAL 2 TIMES DAILY
Qty: 180 TABLET | Refills: 0 | Status: SHIPPED | OUTPATIENT
Start: 2023-03-01 | End: 2023-06-06

## 2023-03-01 RX ORDER — PHENTERMINE HYDROCHLORIDE 37.5 MG/1
37.5 CAPSULE ORAL EVERY MORNING
Qty: 30 CAPSULE | Refills: 3 | Status: SHIPPED | OUTPATIENT
Start: 2023-03-01 | End: 2023-06-06

## 2023-03-01 RX ORDER — TOPIRAMATE 50 MG/1
50 TABLET, FILM COATED ORAL
Qty: 90 TABLET | Refills: 0 | Status: SHIPPED | OUTPATIENT
Start: 2023-03-01 | End: 2023-06-06

## 2023-03-01 NOTE — TELEPHONE ENCOUNTER
"Please reference TE 2/7/23:    \"Please notify patient insurance denied [facility]; recommend starting propranolol 20 mg daily and follow up effectiveness at upcoming appointment on 3/1/2023. RX sent to pharmacy.\"    Thank you,  SW  "

## 2023-03-01 NOTE — PROGRESS NOTES
"Elicia is a 48 year old who is being evaluated via a billable video visit.      How would you like to obtain your AVS? MyChart  If the video visit is dropped, the invitation should be resent by: Send to e-mail at: [email or web address]  Will anyone else be joining your video visit? No        Assessment & Plan      Type 2 diabetes mellitus without complication, without long-term current use of insulin (H)  Recommend home blood sugar testing at least 2-3 times weekly readings we will place PA cat system currently on metformin daily, semaglutide 14 mg po Glucotrol 10 mg daily; discussed injectable GLP-1 versus oral; pending A1c add Jardiance   Hemoglobin A1c  - Continuous Blood Gluc  (FREESTYLE CAT 2 READER) RALEIGH  Dispense: 1 each; Refill: 0  - Continuous Blood Gluc Sensor (FREESTYLE CAT 2 SENSOR) MISC  Dispense: 2 each; Refill: 5    Chronic migraine without aura without status migrainosus, not intractable  Failed topiramate; insurance denied Nurtec; increase propranolol 20 mg to 20 mg twice daily  - propranolol (INDERAL) 20 MG tablet  Dispense: 180 tablet; Refill: 0    Class 2 severe obesity due to excess calories with serious comorbidity and body mass index (BMI) of 37.0 to 37.9 in adult (H)  Evening cravings; will add topiramate 50 mg at dinner  -Caloric, carbohydrate, protein monitoring  - phentermine (ADIPEX-P) 37.5 MG capsule  Dispense: 30 capsule; Refill: 3  - topiramate (TOPAMAX) 50 MG tablet  Dispense: 90 tablet; Refill: 0    Encounter for long-term (current) use of medications  CSA -- Encounter Level:    CSA: None found at the encounter level.     CSA -- Patient Level:    CSA: None found at the patient level.     - csa placed for signature             BMI:   Estimated body mass index is 32.69 kg/m  as calculated from the following:    Height as of 2/17/23: 1.727 m (5' 8\").    Weight as of 2/17/23: 97.5 kg (215 lb).   Weight management plan: Discussed healthy diet and exercise guidelines        No " "follow-ups on file.    CANDACE Youngblood CNP  [facility] NARA Rubi is a 48 year old presenting for the following health issues:  Diabetes (medication)    Patient in clinic for follow-up chronic disease management diabetes, obesity, migraines    Migraines: Patient currently on Maxalt abortive therapy; failed topiramate for preventative patient recently started propanolol 20 mg daily tolerating well states headaches continue  3 times per week with migrainous type headache once weekly    Chronic back pain: Currently on oxycodone 5 mg 4 times daily with appropriate use; following pain clinic last visit 12/9/2022 per provider note \"potential procedures:   Deferred - we will consider occipital nerve blocks and repeat lumbar radiofrequency ablation once recovered from surgery\".     BGs: Not checking home blood sugars at this time; A1c 8.6 (12/20/2022)    Obesity: Starting weight 211 pounds (11/2022); patient started phentermine 15 mg daily doing well with reduced appetite reports having increased cravings in the evenings;  Eating 2 small meals during the day and larger at night; not monitoring caloric carbohydrate intake    History of Present Illness       Reason for visit:  Follow up    She eats 2-3 servings of fruits and vegetables daily.She consumes 1 sweetened beverage(s) daily.She exercises with enough effort to increase her heart rate 9 or less minutes per day.  She exercises with enough effort to increase her heart rate 3 or less days per week. She is missing 1 dose(s) of medications per week.  She is not taking prescribed medications regularly due to remembering to take.             Review of Systems   Constitutional, HEENT, cardiovascular, pulmonary, gi and gu systems are negative, except as otherwise noted.      Objective    Vitals - Patient Reported  Weight (Patient Reported): 95.7 kg (211 lb)  Height (Patient Reported): 170.2 cm (5' 7\")  BMI (Based on Pt Reported " Ht/Wt): 33.05  Pain Score: Moderate Pain (5)  Pain Loc: Ankle        Physical Exam   GENERAL: Healthy, alert and no distress  EYES: Eyes grossly normal to inspection.  No discharge or erythema, or obvious scleral/conjunctival abnormalities.  RESP: No audible wheeze, cough, or visible cyanosis.  No visible retractions or increased work of breathing.    SKIN: Visible skin clear. No significant rash, abnormal pigmentation or lesions.  NEURO: Cranial nerves grossly intact.  Mentation and speech appropriate for age.  PSYCH: Mentation appears normal, affect normal/bright, judgement and insight intact, normal speech and appearance well-groomed.                Video-Visit Details    Type of service:  Video Visit   Joined the call at 3/1/2023, 9:49:55 am.  Left the call at 3/1/2023, 10:10:21 am.  You were on the call for 20 minutes 26 seconds .    Originating Location (pt. Location): Home  Distant Location (provider location):  On-site  Platform used for Video Visit: Diana

## 2023-03-01 NOTE — LETTER
[facility]  03/01/23  Patient: Elicia Calero  YOB: 1975  Medical Record Number: [id number]                                                                                  Non-Opioid Controlled Substance Agreement    This is an agreement between you and your provider regarding safe and appropriate use of controlled substances prescribed by your care team. Controlled substances are?medicines that can cause physical and mental dependence (abuse).     There are strict laws about having and using these medicines. We here at [facility] are  committed to working with you in your efforts to get better. To support you in this work, we'll help you schedule regular office appointments for medicine refills. If we must cancel or change your appointment for any reason, we'll make sure you have enough medicine to last until your next appointment.     As a Provider, I will:     Listen carefully to your concerns while treating you with respect.     Recommend a treatment plan that I believe is in your best interest and may involve therapies other than medicine.      Talk with you often about the possible benefits and the risk of harm of any medicine that we prescribe for you.    Assess the safety of this medicine and check how well it works.      Provide a plan on how to taper (discontinue or go off) using this medicine if the decision is made to stop its use.      ::  As a Patient, I understand controlled substances:       Are prescribed by my care provider to help me function or work and manage my condition(s).?    Are strong medicines and can cause serious side effects.       Need to be taken exactly as prescribed.?Combining controlled substances with certain medicines or chemicals (such as illegal drugs, alcohol, sedatives, sleeping pills, and benzodiazepines) can be dangerous or even fatal.? If I stop taking my medicines suddenly, I may have severe withdrawal symptoms.     The  risks, benefits, and side effects of these medicine(s) were explained to me. I agree that:    1. I will take part in other treatments as advised by my care team. This may be psychiatry or counseling, physical therapy, behavioral therapy, group treatment or a referral to specialist.    2. I will keep all my appointments and understand this is part of the monitoring of controlled substances.?My care team may require an office visit for EVERY controlled substance refill. If I miss appointments or don t follow instructions, my care team may stop my medicine    3. I will take my medicines as prescribed. I will not change the dose or schedule unless my care team tells me to. There will be no refills if I run out early.      4. I may be asked to come to the clinic and complete a urine drug test or complete a pill count. If I don t give a urine sample or participate in a pill count, the care team may stop my medicine.    5. I will only receive controlled substance prescriptions from this clinic. If I am treated by another provider, I will tell them that I am taking controlled substances and that I have a treatment agreement with this provider. I will inform my [facility] care team within one business day if I am given a prescription for any controlled substance by another healthcare provider. My [facility] care team can contact other providers and pharmacists about my use of any medicines.    6. It is up to me to make sure that I don't run out of my medicines on weekends or holidays.?If my care team is willing to refill my prescription without a visit, I must request refills only during office hours. Refills may take up to 3 business days to process. I will use one pharmacy to fill all my controlled substance prescriptions. I will notify the clinic about any changes to my insurance or medicine availability.    7. I am responsible for my prescriptions. If the medicine/prescription is lost, stolen or destroyed,  it will not be replaced.?I also agree not to share controlled substance medicines with anyone.     8. I am aware I should not use any illegal or recreational drugs. I agree not to drink alcohol unless my care team says I can.     9. If I enroll in the [place] Medical Cannabis program, I will tell my care team before my next refill.    10. I will tell my care team right away if I become pregnant, have a new medical problem treated outside of my regular clinic, or have a change in my medicines.     11. I understand that this medicine can affect my thinking, judgment and reaction time.? Alcohol and drugs affect the brain and body, which can affect the safety of my driving. Being under the influence of alcohol or drugs can affect my decision-making, behaviors, personal safety and the safety of others. Driving while impaired (DWI) can occur if a person is driving, operating or in physical control of a car, motorcycle, boat, snowmobile, ATV, motorbike, off-road vehicle or any other motor vehicle ([place] Statute 169A.20). I understand the risk if I choose to drive or operate any vehicle or machinery.    I understand that if I do not follow any of the conditions above, my prescriptions or treatment may be stopped or changed.   I agree that my provider, clinic care team and pharmacy may work with any city, state or federal law enforcement agency that investigates the misuse, sale or other diversion of my controlled medicine. I will allow my provider to discuss my care with, or share a copy of, this agreement with any other treating provider, pharmacy or emergency room where I receive care.     I have read this agreement and have asked questions about anything I did not understand.    ________________________________________________________  Patient Signature - Elicia Calero     ___________________                   Date     ________________________________________________________  Provider Signature - CANDACE Youngblood CNP        ___________________                   Date     ________________________________________________________  Witness Signature (required if provider not present while patient signing)          ___________________                   Date

## 2023-03-01 NOTE — TELEPHONE ENCOUNTER
Writer responded via [employer].    MARCOS KateN, RN-BC  [facility]

## 2023-03-02 ENCOUNTER — DOCUMENTATION ONLY (OUTPATIENT)
Dept: LAB | Facility: CLINIC | Age: 48
End: 2023-03-02

## 2023-03-02 ENCOUNTER — LAB (OUTPATIENT)
Dept: LAB | Facility: CLINIC | Age: 48
End: 2023-03-02
Payer: COMMERCIAL

## 2023-03-02 ENCOUNTER — OFFICE VISIT (OUTPATIENT)
Dept: GASTROENTEROLOGY | Facility: CLINIC | Age: 48
End: 2023-03-02
Attending: INTERNAL MEDICINE
Payer: COMMERCIAL

## 2023-03-02 ENCOUNTER — PRE VISIT (OUTPATIENT)
Dept: GASTROENTEROLOGY | Facility: CLINIC | Age: 48
End: 2023-03-02

## 2023-03-02 ENCOUNTER — MYC MEDICAL ADVICE (OUTPATIENT)
Dept: FAMILY MEDICINE | Facility: CLINIC | Age: 48
End: 2023-03-02

## 2023-03-02 VITALS
TEMPERATURE: 98 F | WEIGHT: 220.6 LBS | SYSTOLIC BLOOD PRESSURE: 113 MMHG | BODY MASS INDEX: 33.54 KG/M2 | HEART RATE: 74 BPM | DIASTOLIC BLOOD PRESSURE: 72 MMHG | OXYGEN SATURATION: 99 %

## 2023-03-02 DIAGNOSIS — M25.571 PAIN IN JOINT INVOLVING ANKLE AND FOOT, RIGHT: ICD-10-CM

## 2023-03-02 DIAGNOSIS — R74.8 ALKALINE PHOSPHATASE ELEVATION: ICD-10-CM

## 2023-03-02 DIAGNOSIS — D13.4 HEPATIC ADENOMA: ICD-10-CM

## 2023-03-02 DIAGNOSIS — G47.00 PERSISTENT INSOMNIA: ICD-10-CM

## 2023-03-02 DIAGNOSIS — E66.09 CLASS 1 OBESITY DUE TO EXCESS CALORIES WITH BODY MASS INDEX (BMI) OF 33.0 TO 33.9 IN ADULT, UNSPECIFIED WHETHER SERIOUS COMORBIDITY PRESENT: ICD-10-CM

## 2023-03-02 DIAGNOSIS — E11.9 TYPE 2 DIABETES MELLITUS WITHOUT COMPLICATION, WITHOUT LONG-TERM CURRENT USE OF INSULIN (H): ICD-10-CM

## 2023-03-02 DIAGNOSIS — K76.0 NAFLD (NONALCOHOLIC FATTY LIVER DISEASE): ICD-10-CM

## 2023-03-02 DIAGNOSIS — E61.1 IRON DEFICIENCY: ICD-10-CM

## 2023-03-02 DIAGNOSIS — E66.811 CLASS 1 OBESITY DUE TO EXCESS CALORIES WITH BODY MASS INDEX (BMI) OF 33.0 TO 33.9 IN ADULT, UNSPECIFIED WHETHER SERIOUS COMORBIDITY PRESENT: ICD-10-CM

## 2023-03-02 DIAGNOSIS — K76.0 NAFLD (NONALCOHOLIC FATTY LIVER DISEASE): Primary | ICD-10-CM

## 2023-03-02 LAB
AFP SERPL-MCNC: <1.8 NG/ML
ALBUMIN SERPL BCG-MCNC: 4.2 G/DL (ref 3.5–5.2)
ALP SERPL-CCNC: 132 U/L (ref 35–104)
ALT SERPL W P-5'-P-CCNC: 15 U/L (ref 10–35)
ANION GAP SERPL CALCULATED.3IONS-SCNC: 9 MMOL/L (ref 7–15)
AST SERPL W P-5'-P-CCNC: 14 U/L (ref 10–35)
BILIRUB DIRECT SERPL-MCNC: <0.2 MG/DL (ref 0–0.3)
BILIRUB SERPL-MCNC: 0.2 MG/DL
BUN SERPL-MCNC: 15.9 MG/DL (ref 6–20)
CALCIUM SERPL-MCNC: 9.3 MG/DL (ref 8.6–10)
CHLORIDE SERPL-SCNC: 98 MMOL/L (ref 98–107)
CREAT SERPL-MCNC: 0.79 MG/DL (ref 0.51–0.95)
DEPRECATED HCO3 PLAS-SCNC: 28 MMOL/L (ref 22–29)
ERYTHROCYTE [DISTWIDTH] IN BLOOD BY AUTOMATED COUNT: 13.1 % (ref 10–15)
FERRITIN SERPL-MCNC: 18 NG/ML (ref 6–175)
GFR SERPL CREATININE-BSD FRML MDRD: >90 ML/MIN/1.73M2
GLUCOSE SERPL-MCNC: 398 MG/DL (ref 70–99)
HBA1C MFR BLD: 7.9 %
HCT VFR BLD AUTO: 39.3 % (ref 35–47)
HGB BLD-MCNC: 12.7 G/DL (ref 11.7–15.7)
INR PPP: 0.94 (ref 0.85–1.15)
IRON BINDING CAPACITY (ROCHE): 331 UG/DL (ref 240–430)
IRON SATN MFR SERPL: 16 % (ref 15–46)
IRON SERPL-MCNC: 53 UG/DL (ref 37–145)
MCH RBC QN AUTO: 27.6 PG (ref 26.5–33)
MCHC RBC AUTO-ENTMCNC: 32.3 G/DL (ref 31.5–36.5)
MCV RBC AUTO: 85 FL (ref 78–100)
PLATELET # BLD AUTO: 392 10E3/UL (ref 150–450)
POTASSIUM SERPL-SCNC: 5.3 MMOL/L (ref 3.4–5.3)
PROT SERPL-MCNC: 6.6 G/DL (ref 6.4–8.3)
RBC # BLD AUTO: 4.6 10E6/UL (ref 3.8–5.2)
SODIUM SERPL-SCNC: 135 MMOL/L (ref 136–145)
WBC # BLD AUTO: 6.8 10E3/UL (ref 4–11)

## 2023-03-02 PROCEDURE — 82784 ASSAY IGA/IGD/IGG/IGM EACH: CPT | Performed by: INTERNAL MEDICINE

## 2023-03-02 PROCEDURE — 82248 BILIRUBIN DIRECT: CPT | Performed by: PATHOLOGY

## 2023-03-02 PROCEDURE — 85610 PROTHROMBIN TIME: CPT | Performed by: PATHOLOGY

## 2023-03-02 PROCEDURE — 83550 IRON BINDING TEST: CPT | Performed by: PATHOLOGY

## 2023-03-02 PROCEDURE — 85027 COMPLETE CBC AUTOMATED: CPT | Performed by: PATHOLOGY

## 2023-03-02 PROCEDURE — 82105 ALPHA-FETOPROTEIN SERUM: CPT | Performed by: INTERNAL MEDICINE

## 2023-03-02 PROCEDURE — 83540 ASSAY OF IRON: CPT | Performed by: PATHOLOGY

## 2023-03-02 PROCEDURE — 36415 COLL VENOUS BLD VENIPUNCTURE: CPT | Performed by: PATHOLOGY

## 2023-03-02 PROCEDURE — 80053 COMPREHEN METABOLIC PANEL: CPT | Performed by: PATHOLOGY

## 2023-03-02 PROCEDURE — 83036 HEMOGLOBIN GLYCOSYLATED A1C: CPT | Performed by: NURSE PRACTITIONER

## 2023-03-02 PROCEDURE — 82728 ASSAY OF FERRITIN: CPT | Performed by: INTERNAL MEDICINE

## 2023-03-02 PROCEDURE — G0463 HOSPITAL OUTPT CLINIC VISIT: HCPCS | Performed by: INTERNAL MEDICINE

## 2023-03-02 PROCEDURE — 99204 OFFICE O/P NEW MOD 45 MIN: CPT | Mod: GC | Performed by: INTERNAL MEDICINE

## 2023-03-02 ASSESSMENT — PAIN SCALES - GENERAL: PAINLEVEL: NO PAIN (0)

## 2023-03-02 NOTE — LETTER
3/2/2023         RE: Elicia Calero  [address]  [place] [address]        Dear Colleague,    Thank you for referring your patient, Elicia Calero, to the [facility] [place]. Please see a copy of my visit note below.    [facility]    New Patient Visit    Referring provider:  Gil Mann  Chief complaint:  Hepatic adenoma; NAFLD    Assessment  48 year old female with PMHx of metabolic syndrome (type II diabetes, obesity, HTN and hyperlipidemia), non-alcoholic fatty liver disease (NAFLD), hypothyroidism, chronic pain on opioids and hepatic adenomas who presents to establish care.     #. NAFLD  #. Hepatic adenomas  #. Focal Nodular Hyperplasia  #. Metabolic Syndrome: type II diabetes, obesity, HTN and hyperlipidemia    Patient without evidence of advanced fibrosis based on the following - MR elastrography 3/2022 with hepatic steatosis and only mild inflammation. FIB-4: 0.44 - not suggestive of advanced fibrosis. Patient without thrombocytopenia (plt > 150) and no evidence of splenomegaly to suggest portal HTN. INR wnl - suggestive of intact hepatic synthetic function. Patient with significant metabolic syndrome - type II diabetes, obesity, HTN and hyperlipidemia.     In terms of the patient's hepatic adenomas, her lesions were stable on imaging 3/2022 - she numerous (at least 10) hepatic adenomas and two foci of focal nodular hyperplasia. Given this, she is on a progesterone only OCP.    Plan:   -- MRI abdomen to evaluate hepatic adenomas  -- Plan for lifestyle and dietary changes with the goal of 7-10% weight loss   * Continue phentermine and topiramate for weight loss    * Continue semaglutide for weight loss    * Recommend limiting portion sizes, excluding NAFLD promoting components - processed food, foods & beverages high in added fructose   * Recommend aerobic exercise and resistance training as able give ankle  -- If patient's weight does not  improve, consider bariatric surgery if BMI > 35 (and obesity related condition)   -- Continue statin   -- Optimization of diabetes per primary care physician  -- Continue to avoid estrogen containing contraceptives  -- Recommend reducing alcohol given alcohol can contribute to weight gain as well and may promote hepatic inflammation  -- Given previously iron deficiency, plan to repeat iron studies    Other:  - CRC Screening: Plan for cologuard; no family history of colon cancer    RTC: 1 year    Discussed with attending hepatologist, Dr. Boone Monroy MD  Transplant Hepatology Fellow  [phone]    HPI:    The patient reports doing well without any acute concerns or complaints.  The patient recently moved to [facility] in August to be closer to her family.      She recently underwent an ankle surgery in December 2022 which has limited her mobility.  She is engaged in limited exercise since she broke her ankle about 2 years ago.    In terms of her diabetes she is currently on semaglutide + glipizide.  She does not check her blood sugars regularly so her physician yesterday prescribed a cat.     In terms of her weight her highest weight has been about 230 pounds which she was at about a year ago.  She reports that she is about 220 pounds today.  She reports nighttime cravings for so she was started on topiramate yesterday for cravings.  She was previously started on phentermine per day and was recently adjusted to 37.5 given ongoing obesity.  She reports seeing a nutritionist in the past and finding it helpful but she has not interested in doing so at this time.    She has some chronic issues with constipation in the setting of her opiate denies any melena or hematochezia.    Denies any signs or symptoms of such as jaundice, abdominal distention, lethargy or confusion.    She remains on the minipill for birth control which is progesterone only.    Medical hx Surgical hx   Past Medical History:    Diagnosis Date     Diabetes mellitus (H)      Hepatic adenoma      HTN (hypertension)      Hyperlipidemia      Hypothyroidism      Migraine      NAFLD (nonalcoholic fatty liver disease)       Past Surgical History:   Procedure Laterality Date     CHOLECYSTECTOMY      teen years          Medications  Current Outpatient Medications   Medication Sig Dispense Refill     brexpiprazole (REXULTI) 1 MG tablet Take 1 tablet (1 mg) by mouth daily 90 tablet 3     buPROPion (WELLBUTRIN XL) 150 MG 24 hr tablet Take 1 tablet (150 mg) by mouth every morning 90 tablet 3     Continuous Blood Gluc Sensor (FREESTYLE RANI 2 SENSOR) [facility] 1 each every 14 days Use 1 sensor every 14 days. Use to read blood sugars per 's instructions. 2 each 5     CONTOUR NEXT TEST test strip        doxepin (SINEQUAN) 25 MG capsule Take 1 capsule (25 mg) by mouth At Bedtime 30 capsule 3     folic acid (FOLVITE) 1 MG tablet Take 1 tablet (1,000 mcg) by mouth daily 90 tablet 3     gabapentin (NEURONTIN) 300 MG capsule Take 4 capsules (1,200 mg) by mouth 3 times daily 1080 capsule 3     glipiZIDE (GLUCOTROL XL) 10 MG 24 hr tablet Take 2 tablets (20 mg) by mouth daily 180 tablet 3     hydrochlorothiazide (MICROZIDE) 12.5 MG capsule Take 1 capsule (12.5 mg) by mouth daily 90 capsule 3     indomethacin (INDOCIN) 50 MG capsule Take 1 capsule (50 mg) by mouth 3 times daily 90 capsule 1     lamoTRIgine (LAMICTAL) 200 MG tablet Take 2 tablets (400 mg) by mouth At Bedtime for 360 days 180 tablet 3     levothyroxine (SYNTHROID/LEVOTHROID) 125 MCG tablet Take 125 mcg by mouth daily       lidocaine, viscous, (XYLOCAINE) 2 % solution Swish and spit 5 mLs in mouth 5 times daily 100 mL 1     metFORMIN (GLUCOPHAGE-XR) 750 MG 24 hr tablet Take 3 tablets (2,250 mg) by mouth daily 270 tablet 3     methocarbamol (ROBAXIN) 750 MG tablet TAKE 1 TABLET(750 MG) BY MOUTH FOUR TIMES DAILY AS NEEDED FOR MUSCLE SPASMS 120 tablet 3     norethindrone (MICRONOR) 0.35 MG  tablet Take 1 tablet (0.35 mg) by mouth daily 84 tablet 11     nystatin (MYCOSTATIN) 101945 UNIT/GM external cream Apply 100,000 g topically 3 times daily       omeprazole (PRILOSEC) 40 MG DR capsule Take 1 capsule (40 mg) by mouth At Bedtime 90 capsule 3     ondansetron (ZOFRAN) 4 MG tablet Take 1 tablet (4 mg) by mouth 3 times daily 90 tablet 1     oxyCODONE (ROXICODONE) 5 MG tablet Take 1 tablet (5 mg) by mouth 4 times daily for 30 days 120 tablet 0     phentermine (ADIPEX-P) 37.5 MG capsule Take 1 capsule (37.5 mg) by mouth every morning 30 capsule 3     pravastatin (PRAVACHOL) 40 MG tablet Take 1 tablet (40 mg) by mouth daily 90 tablet 3     prochlorperazine (COMPAZINE) 10 MG tablet Take 1 tablet (10 mg) by mouth 4 times daily as needed for nausea (with migraine) 120 tablet 1     propranolol (INDERAL) 20 MG tablet Take 1 tablet (20 mg) by mouth 2 times daily 180 tablet 0     rizatriptan (MAXALT) 10 MG tablet Take 1 tablet (10 mg) by mouth 2 times daily 60 tablet 3     Semaglutide 14 MG TABS Take 1 tablet by mouth daily 30 tablet 3     topiramate (TOPAMAX) 50 MG tablet Take 1 tablet (50 mg) by mouth daily (before supper) 90 tablet 0     buPROPion (WELLBUTRIN XL) 300 MG 24 hr tablet Take 1 tablet (300 mg) by mouth daily for 90 days 90 tablet 0       Allergies  Allergies   Allergen Reactions     Lithium Other (See Comments) and Dizziness     Other reaction(s): PSYCHOSIS     Sulfa Drugs Swelling     lips  Cold sores per pt       Adhesive Tape Rash     Albuterol Nausea and Vomiting     Clarithromycin Nausea and Vomiting     Erythromycin Nausea and Vomiting     Augmentin Nausea and Vomiting     Venlafaxine Other (See Comments)     Other reaction(s): PSYCHOSIS  Psychosis  Psychosis  Per transferred records         Family hx Social hx   Family History   Problem Relation Age of Onset     Hypertension Mother      Diabetes Maternal Grandmother      Heart Disease Paternal Grandmother      Liver Disease No family hx of        - Employment: [employer]  - Lives alone in [place]. Mom also lives in [place]  - Alcohol: 1 drink about 3 times per week  - Tobacco: former use, quit in 2018  - Drugs: previous: MDMA +, amphetamine +     Review of systems  A 10-point review of systems was negative.    Examination  /72   Pulse 74   Temp 98  F (36.7  C) (Oral)   Wt 100.1 kg (220 lb 9.6 oz)   SpO2 99%   BMI 33.54 kg/m    Body mass index is 33.54 kg/m .    Gen-NAD  Eye- EOMI  ENT- MMM, normal oropharynx  CVS- RRR, no murmurs  RS- CTA bilaterally  Abd- obese, soft, non-tender, non-distended  Extr- 2+ radial pulses bilaterally, no lower extremity edema bilaterally  MS- hands without clubbing  Neuro- A+Ox3, no asterixis  Skin- no rash or jaundice  Psych- normal mood    Laboratory  BMP  Recent Labs   Lab Test 03/02/23  0857 12/01/22  1013   * 142   POTASSIUM 5.3 4.9   CHLORIDE 98 103   CON 9.3 9.6   CO2 28 19*   BUN 15.9 14.1   CR 0.79 0.85   * 257*     CBC  Recent Labs   Lab Test 03/02/23  0857 12/01/22  1013   WBC 6.8 6.8   RBC 4.60 4.69   HGB 12.7 13.4   HCT 39.3 41.8   MCV 85 89   MCH 27.6 28.6   MCHC 32.3 32.1   RDW 13.1 13.7    326     Liver Enzymes   Recent Labs   Lab Test 03/02/23  0857   PROTTOTAL 6.6   ALBUMIN 4.2   BILITOTAL 0.2   ALKPHOS 132*   AST 14   ALT 15      INR   INR   Date Value Ref Range Status   03/02/2023 0.94 0.85 - 1.15 Final      A1AT: 228 (H)  TIGIST < 1:40 (2016)  TIGIST negative (2019)  Ceruloplasmin 55 (H)  Hep A IgG non-reactive  Hep B s Ag non-reactive  Hep B s Ab <8  Hep B c Ab non-reactive  Hep C Ab non-reactive    Radiology  MRI Abdomen w/Contrast + Elastography 3/2022  1.  Liver lesions are unchanged from prior exam, including numerous (at least 10) hepatic adenomas and two foci of focal nodular hyperplasia.   2.  Mean hepatic stiffness either represents normal liver or mild inflammation.   3.  Hepatic steatosis with fat fraction of 12%.   4.  Normal hepatic iron  content.    Liver Elastography 9/2020  3.3kPa;     MRI Abdomen 12/2019  1. Multiple (30-40) small (less than 2 cm) hepatic adenomas.   2. Hepatic steatosis.      Again, thank you for allowing me to participate in the care of your patient.        Sincerely,        Karina Monroy MD

## 2023-03-02 NOTE — NURSING NOTE
Chief Complaint   Patient presents with     New Patient       /72   Pulse 74   Temp 98  F (36.7  C) (Oral)   Wt 100.1 kg (220 lb 9.6 oz)   SpO2 99%   BMI 33.54 kg/m      Haider Bernal on 3/2/2023 at 9:45 AM

## 2023-03-03 DIAGNOSIS — E61.1 IRON DEFICIENCY: Primary | ICD-10-CM

## 2023-03-03 LAB — IGA SERPL-MCNC: 70 MG/DL (ref 84–499)

## 2023-03-03 NOTE — PROGRESS NOTES
Patient with iron deficiency.  Patient with normal hemoglobin but 16% iron saturation with low ferritin.    The patient has menses every 32 days and uses a liner for 1 to 2 days.  She reports that her menses is not heavy.  She denies any hematuria, gum bleeding or epistaxis.  She has mild intermittent hematochezia with straining.     She has had a colonoscopy and upper endoscopy in her late teens early 20s.  Nothing since then.    Plan:  -- Follow up TTg IgA + TTg IgG testing  -- Iron supplementation every other day  -- Ordered upper endoscopy and colonoscopy.

## 2023-03-07 RX ORDER — DOXEPIN HYDROCHLORIDE 25 MG/1
25 CAPSULE ORAL AT BEDTIME
Qty: 30 CAPSULE | Refills: 3 | Status: SHIPPED | OUTPATIENT
Start: 2023-03-07 | End: 2023-07-24

## 2023-03-07 RX ORDER — INDOMETHACIN 50 MG/1
50 CAPSULE ORAL 3 TIMES DAILY
Qty: 90 CAPSULE | Refills: 1 | OUTPATIENT
Start: 2023-03-07

## 2023-03-07 NOTE — TELEPHONE ENCOUNTER
Routing refill request to provider for review/approval because:  Drug not on the [facility] refill protocol   Drug not active on patient's medication list  Labs out of range: uric acid.    indomethacin  Last Written Prescription Date:  11/28/22  Last Fill Quantity: 90,  # refills: 1   Last office visit:    Future Office Visit: Virtual Visit with Johnathan on 3/1/23.    hydroxyzine  Last Written Prescription Date:  11/28/22  Last Fill Quantity: 30,  # refills: 0   Last office visit: Virtual Visit with Johnathan on 3/1/23.  Future Office Visit:      oxycodone  Last Written Prescription Date:  2/8/23  Last Fill Quantity: 120,  # refills: 0   Last office visit: Virtual Visit with Johnathan on 3/1/23.  Future Office Visit:      Doxepin authorized per nursing refill protocol.    Kelly Aponte RN  [facility]

## 2023-03-08 ENCOUNTER — DOCUMENTATION ONLY (OUTPATIENT)
Dept: ANESTHESIOLOGY | Facility: CLINIC | Age: 48
End: 2023-03-08
Payer: COMMERCIAL

## 2023-03-08 DIAGNOSIS — F31.60 BIPOLAR AFFECTIVE DISORDER, CURRENT EPISODE MIXED, CURRENT EPISODE SEVERITY UNSPECIFIED (H): ICD-10-CM

## 2023-03-08 DIAGNOSIS — M25.571 PAIN IN JOINT INVOLVING ANKLE AND FOOT, RIGHT: ICD-10-CM

## 2023-03-08 RX ORDER — INDOMETHACIN 50 MG/1
50 CAPSULE ORAL 3 TIMES DAILY
Qty: 90 CAPSULE | Refills: 1 | Status: SHIPPED | OUTPATIENT
Start: 2023-03-08 | End: 2023-07-24

## 2023-03-08 RX ORDER — HYDROXYZINE PAMOATE 50 MG/1
50 CAPSULE ORAL DAILY
Qty: 30 CAPSULE | Refills: 0 | Status: SHIPPED | OUTPATIENT
Start: 2023-03-08 | End: 2023-04-04

## 2023-03-08 NOTE — PROGRESS NOTES
Procedures completed by Dr. Dianna Valdez at [facility].    12/10/14 : Diagnostic L5, S1-3 lateral branch block with pain reduction from 8/10 to 0/10. Patient reported 100% relief.    3/9/15 : Diagnostic L5, S1-3 lateral branch block with pain reduction from 8/10 to 1/10. Patient reported 90% relief.    6/17/15 : L5, S1-3 lateral branch RFA patient reported 85% pain relief. Patient reports overall pain and functionality improved for years.      Marlen Britton, SYLVIACC

## 2023-03-08 NOTE — TELEPHONE ENCOUNTER
[employer] message sent to patient.  CELENA Velazquez RN  [facility]

## 2023-03-09 ENCOUNTER — TRANSFERRED RECORDS (OUTPATIENT)
Dept: HEALTH INFORMATION MANAGEMENT | Facility: CLINIC | Age: 48
End: 2023-03-09

## 2023-03-13 ENCOUNTER — TELEPHONE (OUTPATIENT)
Dept: ANESTHESIOLOGY | Facility: CLINIC | Age: 48
End: 2023-03-13
Payer: COMMERCIAL

## 2023-03-13 NOTE — TELEPHONE ENCOUNTER
RN spoke with patient to update regarding insurance policy for repeat lateral RFA. Patient informed she is switching jobs and will have new insurance. Patient wants to wait to do anything until she gets her new insurance in place. Patient appreciated the follow up call. Writer will route to provider to update.     Marlen Britton RNCC

## 2023-03-15 DIAGNOSIS — M25.571 PAIN IN JOINT INVOLVING ANKLE AND FOOT, RIGHT: ICD-10-CM

## 2023-03-15 NOTE — TELEPHONE ENCOUNTER
General Call    Contacts       Type Contact Phone/Fax    03/15/2023 07:32 AM CDT Phone (Incoming) Elicia Calero (Self) [phone] (H)        Reason for Call:  Elicia was calling to see where the pain contract was so she could sign it. Getting very low and will need a refill soon    What are your questions or concerns:  Not being able to refill medication once she is completely out    Date of last appointment with provider: 3/1/2023    Could we send this information to you in [place] or would you prefer to receive a phone call?:   Patient would prefer a phone call   Okay to leave a detailed message?: Yes at Home number on file [phone] (home)

## 2023-03-15 NOTE — TELEPHONE ENCOUNTER
Patient and Gil signed contract. Routing med to refills.  Kelly Aponte RN  [facility]

## 2023-03-15 NOTE — TELEPHONE ENCOUNTER
Gil -- This is the patient who came to clinic this afternoon to sign their medication agreement form. Refill pended below.     Routing refill request to provider for review/approval because:  Drug not on the [facility] refill protocol     oxycodone  Last Written Prescription Date:  2/8/23  Last Fill Quantity: 120,  # refills: 0   Last office visit: Virtual Visit with Johnathan on 3/1/23.  Future Office Visit:    Kelly Aponte RN  [facility]

## 2023-03-15 NOTE — TELEPHONE ENCOUNTER
Writer called patient to inform her that the Non-Opioid Controlled Substance Agreement will be at the .   Patient stated that she will be in this afternoon to sign it.     MARCOS RoseN SYLVIA  [facility]

## 2023-03-16 ENCOUNTER — TELEPHONE (OUTPATIENT)
Dept: GASTROENTEROLOGY | Facility: CLINIC | Age: 48
End: 2023-03-16
Payer: COMMERCIAL

## 2023-03-16 NOTE — TELEPHONE ENCOUNTER
Screening Questions  BLUE  KIND OF PREP RED  LOCATION [review exclusion criteria] GREEN  SEDATION TYPE        Y  Are you active on mychart?       Karina Monroy MD  Ordering/Referring Provider?        [facility]  What type of coverage do you have?      N Have you had a positive covid test in the last 14 days?     33.4 1. BMI  [BMI 40+ - review exclusion criteria]    SELF   2. Are you able to give consent for your medical care? [IF NO,RN REVIEW]          Y  3. Are you taking any prescription pain medications on a routine schedule   (ex narcotics: oxycodone, roxicodone, oxycontin,  and percocet)? [RN Review]        OXYCODONE  3a. EXTENDED PREP What kind of prescription?     N 4. Do you have any chemical dependencies such as alcohol, street drugs, or methadone?        **If yes 3- 5 , please schedule with MAC sedation.**          IF YES TO ANY 6 - 10 - HOSPITAL SETTING ONLY.     N 6.   Do you need assistance transferring?     N 7.   Have you had a heart or lung transplant?    N 8.   Are you currently on dialysis?   N 9.   Do you use daily home oxygen?   N 10. Do you take nitroglycerin?   10a. N If yes, how often?     11. [FEMALES]   Are you currently pregnant?    11a.  If yes, how many weeks? [ Greater than 12 weeks, OR NEEDED]    N 12. Do you have Pulmonary Hypertension? *NEED PAC APPT AT [facility] w/ MAC*     N 13. [review exclusion criteria]  Do you have any implantable devices in your body (pacemaker, defib, LVAD)?    N 14. In the past 6 months, have you had any heart related issues including cardiomyopathy or heart attack?     14a. N If yes, did it require cardiac stenting if so when?     N 15. Have you had a stroke or Transient ischemic attack (TIA - aka  mini stroke ) within 6 months?      N 16. Do you have mod to severe Obstructive Sleep Apnea?  [Hospital only]    N 17. Do you have SEVERE AND UNCONTROLLED asthma? *NEED PAC APPT AT [facility] w/MAC*     18. Are you currently taking any blood thinners?     18a.  "No. Continue to 19.   18b. Yes/no Blood Thinner: No [CONTINUE TO #19]    Y 19. Do you take the medication Phentermine?    19a. If yes, \"Hold for 7 days before procedure.  Please consult your prescribing provider if you have questions about holding this medication.\"     N  20. Do you have chronic kidney disease?      Y- DIABETIC  21. Do you have a diagnosis of diabetes?     Y - CONSTIPATED    22. On a regular basis do you go 3-5 days between bowel movements?      23. Preferred LOCAL Pharmacy for Pre Prescription    [ LIST ONLY ONE PHARMACY]     [id number] - [place], [place] - [address] AT [facility] [address]        - CLOSING REMINDERS -    Informed patient they will need an adult    Cannot take any type of public or medical transportation alone    Conscious Sedation- Needs  for 6 hours after the procedure       MAC/General-Needs  for 24 hours after procedure    Pre-Procedure Covid test to be completed [facility]]    Confirmed Nurse will call to complete assessment       - SCHEDULING DETAILS -  N Hospital Setting Required? If yes, what is the exclusion?: N   NADJA   Surgeon    05/18/2023  Date of Procedure  UPPER/LOWER ENDOSCOPY  Type of Procedure Scheduled  [facility] [facility]   [facility] - If you answer yes to questions #1, #3, #22 (Josse Molina and TOD pts)Which Colonoscopy Prep was Sent?     MAC PER ORDER  Sedation Type     N  PAC / Pre-op Required                 "

## 2023-03-17 RX ORDER — OXYCODONE HYDROCHLORIDE 5 MG/1
5 TABLET ORAL 4 TIMES DAILY
Qty: 120 TABLET | Refills: 0 | Status: SHIPPED | OUTPATIENT
Start: 2023-03-17 | End: 2023-04-04

## 2023-04-04 ENCOUNTER — MYC REFILL (OUTPATIENT)
Dept: FAMILY MEDICINE | Facility: CLINIC | Age: 48
End: 2023-04-04
Payer: COMMERCIAL

## 2023-04-04 DIAGNOSIS — M25.571 PAIN IN JOINT INVOLVING ANKLE AND FOOT, RIGHT: ICD-10-CM

## 2023-04-04 DIAGNOSIS — F31.60 BIPOLAR AFFECTIVE DISORDER, CURRENT EPISODE MIXED, CURRENT EPISODE SEVERITY UNSPECIFIED (H): ICD-10-CM

## 2023-04-05 RX ORDER — OXYCODONE HYDROCHLORIDE 5 MG/1
5 TABLET ORAL 4 TIMES DAILY
Qty: 120 TABLET | Refills: 0 | Status: SHIPPED | OUTPATIENT
Start: 2023-04-17 | End: 2023-05-21

## 2023-04-05 RX ORDER — HYDROXYZINE PAMOATE 50 MG/1
50 CAPSULE ORAL DAILY
Qty: 90 CAPSULE | Refills: 3 | Status: SHIPPED | OUTPATIENT
Start: 2023-04-05 | End: 2024-01-03

## 2023-04-28 DIAGNOSIS — M47.817 FACET ARTHROPATHY, LUMBOSACRAL: Primary | ICD-10-CM

## 2023-05-01 ENCOUNTER — TELEPHONE (OUTPATIENT)
Dept: ANESTHESIOLOGY | Facility: CLINIC | Age: 48
End: 2023-05-01
Payer: COMMERCIAL

## 2023-05-01 NOTE — TELEPHONE ENCOUNTER
RN spoke with patient regarding orders being placed for a repeat RFA. Patient agreed to call procedure schedulers back to schedule. Patient stated she has not completed PT due to no insurance. She understood that the procedure may come back denied due to no PT complete. She agreed to complete PT if it is required for the procedure. Writer gave patient the procedure scheduling number, [phone] option 2.    Marlen Britton RNCC

## 2023-05-03 ENCOUNTER — TELEPHONE (OUTPATIENT)
Dept: ANESTHESIOLOGY | Facility: CLINIC | Age: 48
End: 2023-05-03
Payer: COMMERCIAL

## 2023-05-03 ENCOUNTER — TELEPHONE (OUTPATIENT)
Dept: FAMILY MEDICINE | Facility: CLINIC | Age: 48
End: 2023-05-03
Payer: COMMERCIAL

## 2023-05-03 PROBLEM — M47.817 FACET ARTHROPATHY, LUMBOSACRAL: Status: ACTIVE | Noted: 2023-05-03

## 2023-05-03 NOTE — TELEPHONE ENCOUNTER
[facility] Call Center    Phone Message    May a detailed message be left on voicemail: yes     Reason for Call: Other: Patient is calling regarding her procedure and insurance. Please call back when available.      Action Taken: Other: Pain    Travel Screening: Not Applicable

## 2023-05-03 NOTE — TELEPHONE ENCOUNTER
[facility] Prior Authorization Team Request    Medication: Nurtec needs a prior authorization, this is a new insurance for the patient  Dosing:  Dissolve one tablet under the tongue every other day as needed. Use as directed. Maximum of 1 tablet per 24 hours.  NDC (required for Medicaid members): [address]-39    Insurance [employer]  BIN: [id number]  PCN: [id number]  Grp:   ID: [id number]    CoverMyMeds Key (if applicable):     Additional documentation: This medication needs a prior authorization. Patient states she would prefer this over rizatriptan.    Filling Pharmacy: [facility] discharge pharmacy  Phone Number: [phone]  Contact: P [place]ARM [place] (P [id number]) please send all responses to this pool.  Pharmacy NPI (required for Medicaid members): [id number]

## 2023-05-03 NOTE — TELEPHONE ENCOUNTER
[facility] Prior Authorization Team Request    Medication: Rybelsus 14mg tablets needs a prior authorization from the insurance company. This is a new insurance for the patient  Dosing: take 1 tablet by mouth daily  NDC (required for Medicaid members): [address]-55    Insurance CLEARSCRIPT  BIN: [id number]  PCN: [id number]  Grp:   ID: [id number]    CoverMyMeds Key (if applicable):     Additional documentation: REQUIRES PRIOR PRESCRIPTION FOR METFORMIN, PIOGLITAZONE, SULFONYLUREA, OR COMBIN  ATION PRODUCT CONTAINING ANY AFOREMENTIO  [place] AGENTS WITHIN THE PAST 365 DAYS. FOR   THE INDICATION OF TYPE 2 DIABETES      Filling Pharmacy: [facility]  Phone Number: [phone]  Contact: [facility] [employer] [place] (P 39798) please send all responses to this pool.  Pharmacy NPI (required for Medicaid members):  [id number]

## 2023-05-03 NOTE — TELEPHONE ENCOUNTER
Patient is scheduled for procedure with Dr. Weber     Spoke with: Patient    Date of Procedure: 6/15     Location: [facility]     Informed patient they will need an adult  Yes     Additional comments: Spoke with patient, they are aware of above date, will reach out with any questions        Patient is aware pre-op RN will call 2-3 days prior to procedure with arrival time and instructions    Anna C. Schoenecker on 5/3/2023 at 9:19 AM

## 2023-05-03 NOTE — TELEPHONE ENCOUNTER
RN left voicemail informing patient pre procedure instructions will be sent over via [email or web address]. Writer also informed our prior authorization department will run her procedure through insurance now that it is scheduled to see if it comes back approved or denied. Writer informed someone would reach out if it is denied and needing PT to be completed prior. Left call back number for any further questions or concerns [phone].    Marlen Britton RNCC

## 2023-05-04 ENCOUNTER — TELEPHONE (OUTPATIENT)
Dept: GASTROENTEROLOGY | Facility: CLINIC | Age: 48
End: 2023-05-04
Payer: COMMERCIAL

## 2023-05-04 DIAGNOSIS — E61.1 IRON DEFICIENCY: Primary | ICD-10-CM

## 2023-05-04 RX ORDER — BISACODYL 5 MG/1
TABLET, DELAYED RELEASE ORAL
Qty: 4 TABLET | Refills: 0 | Status: SHIPPED | OUTPATIENT
Start: 2023-05-04 | End: 2023-06-06

## 2023-05-04 NOTE — TELEPHONE ENCOUNTER
Attempted to contact patient regarding upcoming Colonoscopy/Upper endoscopy (EGD) procedure on 5/18/23 for pre assessment questions. No answer.     Left message to return call to [phone] #4    Discuss Covid policy and designated  policy.    Pre op exam? N/A    Arrival time: 0715. Procedure time: 0815    [facility]; [address] [place] [address], 5th Floor, [place] [address]    Sedation type: MAC    NSAIDs? Yes.  Indomethacin (Indocin, Indocin SR).  Holding interval of 1 day before procedure., Meloxicam (Mobic).  Holding interval of 10 days. and Nabumetone (Relafen).  Holding interval of 10 days    Anticoagulants: No    Electronic implanted devices? No    Diabetic? Yes - Patient to hold oral diabetic medications day of procedure - glipizide, semaglutide, metformin    Pt takes Phentermine - hold x 7 days    Indication for procedure: [place]    Bowel prep recommendation: Extended prep Golytely  d/t oxycodone and constipation    Prep instructions sent via 2d2c. Bowel prep script sent to     [id number] - [place], [place] - [address] AT [facility] OF IN[address]JIT Moreira RN  Endoscopy Procedure Pre Assessment RN

## 2023-05-05 ENCOUNTER — TELEPHONE (OUTPATIENT)
Dept: FAMILY MEDICINE | Facility: CLINIC | Age: 48
End: 2023-05-05
Payer: COMMERCIAL

## 2023-05-05 NOTE — TELEPHONE ENCOUNTER
[facility] Prior Authorization Team Request    Medication: PA for Nurtec  DosinQD PRN  NDC (required for Medicaid members):     Insurance   BIN: [id number]  PCN: [id number]  Grp:   ID: [id number]    CoverMyMeds Key (if applicable):     Additional documentation:     Filling Pharmacy: [facility]  Phone Number: [phone]  Contact: P PHARM [place] (P 64654) please send all responses to this pool.  Pharmacy NPI (required for Medicaid members):

## 2023-05-09 ENCOUNTER — MYC MEDICAL ADVICE (OUTPATIENT)
Dept: FAMILY MEDICINE | Facility: CLINIC | Age: 48
End: 2023-05-09
Payer: COMMERCIAL

## 2023-05-09 DIAGNOSIS — E11.9 TYPE 2 DIABETES MELLITUS WITHOUT COMPLICATION, WITHOUT LONG-TERM CURRENT USE OF INSULIN (H): ICD-10-CM

## 2023-05-09 DIAGNOSIS — G43.709 CHRONIC MIGRAINE WITHOUT AURA WITHOUT STATUS MIGRAINOSUS, NOT INTRACTABLE: ICD-10-CM

## 2023-05-09 NOTE — TELEPHONE ENCOUNTER
Central Prior Authorization Team   Phone: [phone]    PA Initiation    Medication: Rybelsus 14mg tablets   Insurance Company: [employer] - Phone [phone] Fax [phone]  Pharmacy Filling the Rx: [facility] [place], [place] - [address] [facility]  Filling Pharmacy Phone: [phone]  Filling Pharmacy Fax:    Start Date: 5/9/2023

## 2023-05-09 NOTE — TELEPHONE ENCOUNTER
Central Prior Authorization Team   Phone: [phone]    PA Initiation    Medication: Nurtec  Insurance Company: [employer] - Phone [phone] Fax [phone]  Pharmacy Filling the Rx: [facility] - [place], [place] - [address] [facility]  Filling Pharmacy Phone: [phone]  Filling Pharmacy Fax:    Start Date: 5/9/2023

## 2023-05-10 NOTE — TELEPHONE ENCOUNTER
Prior Authorization Approval    Authorization Effective Date: 5/9/2023  Authorization Expiration Date: 11/8/2023  Medication: Nurtec  Approved Dose/Quantity:    Reference #:     Insurance Company: [employer] - Phone [phone] Fax [phone]  Expected CoPay:       CoPay Card Available:      Foundation Assistance Needed:    Which Pharmacy is filling the prescription (Not needed for infusion/clinic administered): [facility] - [place], [place] - [address] [facility]  Pharmacy Notified: Yes  Patient Notified: Yes

## 2023-05-10 NOTE — TELEPHONE ENCOUNTER
Prior Authorization Approval    Authorization Effective Date: 5/9/2023  Authorization Expiration Date: 5/8/2024  Medication: Rybelsus 14mg tablets   Approved Dose/Quantity:    Reference #:     Insurance Company: [employer] - Phone [phone] Fax [phone]  Expected CoPay:       CoPay Card Available:      Foundation Assistance Needed:    Which Pharmacy is filling the prescription (Not needed for infusion/clinic administered): [facility] - [place], [place] - [address] [facility]  Pharmacy Notified: Yes  Patient Notified: Yes

## 2023-05-11 NOTE — TELEPHONE ENCOUNTER
Second attempt for pre-assessment prior to upcoming colonoscopy/upper endoscopy on 5.18.23     No answer.  Left message to return call [phone] #4    MyChart message sent    Salena García RN  Endoscopy Procedure Pre Assessment RN

## 2023-05-11 NOTE — TELEPHONE ENCOUNTER
Routing to prior authorization.   Patient is stating that he needs a prior auth for these two medications.     Thank you,   MARCOS RoseN SYLVIA  [facility]

## 2023-05-11 NOTE — TELEPHONE ENCOUNTER
Patient returned call.     Pre assessment questions completed for upcoming Colonoscopy/Upper endoscopy (EGD) procedure scheduled on 5/18/23    COVID policy reviewed.     Reviewed procedural arrival time 0715, procedure time 0815 and facility location [facility]; [address] [place] [address], 5th Floor, [place] [address]    Designated  policy reviewed. Instructed to have someone stay 24 hours post procedure.     NSAIDs? Yes.  Indomethacin (Indocin, Indocin SR).  Holding interval of 1 day before procedure.   Patient stated they are no longer taking Meloxicam or Nabumetone.    Diabetic? Yes. Oral medications.  Patient to hold oral diabetic medications day of procedure    Instructed patient hold Phentermine for 7 days prior to procedure. Patient stated they have already stopped Phentermine and will continue to hold.    Patient stated they have already reviewed bowel prep instructions. NPO instructions reviewed.    Patient asked if they are having clear yellow liquid stools after consuming the first jug of Golytely, if they may skip the second jug of Golytely altogether. Informed patient that deviation from prep instructions may result in less than desired outcomes including inadequate bowel prep and procedure may ultimately need to be rescheduled. Suggested patient complete the bowel prep as instructions indicate.    Patient verbalized understanding and had no questions at this time. Patient was instructed to call if any questions or concerns arise.       Shawna Canela RN  Endoscopy Procedure Pre Assessment RN

## 2023-05-12 ENCOUNTER — TELEPHONE (OUTPATIENT)
Dept: FAMILY MEDICINE | Facility: CLINIC | Age: 48
End: 2023-05-12
Payer: COMMERCIAL

## 2023-05-12 ENCOUNTER — TRANSFERRED RECORDS (OUTPATIENT)
Dept: HEALTH INFORMATION MANAGEMENT | Facility: CLINIC | Age: 48
End: 2023-05-12
Payer: COMMERCIAL

## 2023-05-12 DIAGNOSIS — E11.9 TYPE 2 DIABETES MELLITUS WITHOUT COMPLICATION, WITHOUT LONG-TERM CURRENT USE OF INSULIN (H): ICD-10-CM

## 2023-05-12 NOTE — TELEPHONE ENCOUNTER
PA Initiation    Medication: Rybelsus 14 MG - PA Pending   Insurance Company: [employer] ([facility]) - Phone [phone] Fax [phone]  Pharmacy Filling the Rx: [id number] - [place], [place] - [address] AT [facility] OF [address]  Filling Pharmacy Phone: [phone]  Filling Pharmacy Fax: [phone]  Start Date: 5/12/2023    PA was not needed thru [employer] (PA on file already) so started a PA thru the [employer] ([facility]) plan.         Thank you,     Manjit Pham [facility]  Pharmacy Clinic [email or web address] [email or web address]   Phone: [phone]  Fax: [phone]

## 2023-05-13 NOTE — TELEPHONE ENCOUNTER
Prior Authorization Not Needed per Insurance    Medication: Rybelsus 14 MG - PA Not Needed  Insurance Company: [employer] ([facility]) - Phone [phone] Fax [phone]  Expected CoPay:      Pharmacy Filling the Rx: [id number] - [place], [place] - [address] AT [facility] [address]  Pharmacy Notified:  Yes  Patient Notified:      No prior authorization required thru either pharmacy insurance.          Thank you,     Manjit Pham [facility]  Pharmacy Clinic [facility]   [email or web address]   Phone: [phone]  Fax: [phone]

## 2023-05-18 ENCOUNTER — HOSPITAL ENCOUNTER (OUTPATIENT)
Facility: AMBULATORY SURGERY CENTER | Age: 48
Discharge: HOME | End: 2023-05-18
Attending: INTERNAL MEDICINE
Payer: COMMERCIAL

## 2023-05-18 ENCOUNTER — ANESTHESIA EVENT (OUTPATIENT)
Dept: SURGERY | Facility: AMBULATORY SURGERY CENTER | Age: 48
End: 2023-05-18
Payer: COMMERCIAL

## 2023-05-18 ENCOUNTER — ANESTHESIA (OUTPATIENT)
Dept: SURGERY | Facility: AMBULATORY SURGERY CENTER | Age: 48
End: 2023-05-18
Payer: COMMERCIAL

## 2023-05-18 VITALS
TEMPERATURE: 98.8 F | SYSTOLIC BLOOD PRESSURE: 106 MMHG | DIASTOLIC BLOOD PRESSURE: 45 MMHG | HEIGHT: 68 IN | HEART RATE: 73 BPM | BODY MASS INDEX: 33.65 KG/M2 | WEIGHT: 222 LBS | OXYGEN SATURATION: 100 % | RESPIRATION RATE: 16 BRPM

## 2023-05-18 VITALS — HEART RATE: 76 BPM

## 2023-05-18 DIAGNOSIS — K21.00 GASTROESOPHAGEAL REFLUX DISEASE WITH ESOPHAGITIS WITHOUT HEMORRHAGE: Primary | ICD-10-CM

## 2023-05-18 LAB
COLONOSCOPY: NORMAL
GLUCOSE BLDC GLUCOMTR-MCNC: 261 MG/DL (ref 70–99)
HCG UR QL: NEGATIVE
INTERNAL QC OK POCT: NORMAL
POCT KIT EXPIRATION DATE: NORMAL
POCT KIT LOT NUMBER: NORMAL
UPPER GI ENDOSCOPY: NORMAL

## 2023-05-18 PROCEDURE — 88305 TISSUE EXAM BY PATHOLOGIST: CPT | Mod: 26 | Performed by: PATHOLOGY

## 2023-05-18 PROCEDURE — 81025 URINE PREGNANCY TEST: CPT | Performed by: PATHOLOGY

## 2023-05-18 PROCEDURE — 88305 TISSUE EXAM BY PATHOLOGIST: CPT | Mod: TC,XU | Performed by: INTERNAL MEDICINE

## 2023-05-18 PROCEDURE — 88342 IMHCHEM/IMCYTCHM 1ST ANTB: CPT | Mod: 26 | Performed by: PATHOLOGY

## 2023-05-18 PROCEDURE — 45378 DIAGNOSTIC COLONOSCOPY: CPT

## 2023-05-18 PROCEDURE — 82962 GLUCOSE BLOOD TEST: CPT | Performed by: PATHOLOGY

## 2023-05-18 PROCEDURE — 43239 EGD BIOPSY SINGLE/MULTIPLE: CPT

## 2023-05-18 RX ORDER — LIDOCAINE 40 MG/G
CREAM TOPICAL
Status: DISCONTINUED | OUTPATIENT
Start: 2023-05-18 | End: 2023-05-19 | Stop reason: HOSPADM

## 2023-05-18 RX ORDER — SODIUM CHLORIDE, SODIUM LACTATE, POTASSIUM CHLORIDE, CALCIUM CHLORIDE 600; 310; 30; 20 MG/100ML; MG/100ML; MG/100ML; MG/100ML
INJECTION, SOLUTION INTRAVENOUS CONTINUOUS
Status: DISCONTINUED | OUTPATIENT
Start: 2023-05-18 | End: 2023-05-19 | Stop reason: HOSPADM

## 2023-05-18 RX ORDER — ONDANSETRON 2 MG/ML
4 INJECTION INTRAMUSCULAR; INTRAVENOUS
Status: DISCONTINUED | OUTPATIENT
Start: 2023-05-18 | End: 2023-05-19 | Stop reason: HOSPADM

## 2023-05-18 RX ORDER — LIDOCAINE HYDROCHLORIDE 20 MG/ML
INJECTION, SOLUTION INFILTRATION; PERINEURAL PRN
Status: DISCONTINUED | OUTPATIENT
Start: 2023-05-18 | End: 2023-05-18

## 2023-05-18 RX ORDER — PROPOFOL 10 MG/ML
INJECTION, EMULSION INTRAVENOUS CONTINUOUS PRN
Status: DISCONTINUED | OUTPATIENT
Start: 2023-05-18 | End: 2023-05-18

## 2023-05-18 RX ORDER — GLYCOPYRROLATE 0.2 MG/ML
INJECTION, SOLUTION INTRAMUSCULAR; INTRAVENOUS PRN
Status: DISCONTINUED | OUTPATIENT
Start: 2023-05-18 | End: 2023-05-18

## 2023-05-18 RX ORDER — PROPOFOL 10 MG/ML
INJECTION, EMULSION INTRAVENOUS PRN
Status: DISCONTINUED | OUTPATIENT
Start: 2023-05-18 | End: 2023-05-18

## 2023-05-18 RX ADMIN — PROPOFOL 80 MG: 10 INJECTION, EMULSION INTRAVENOUS at 08:41

## 2023-05-18 RX ADMIN — SODIUM CHLORIDE, SODIUM LACTATE, POTASSIUM CHLORIDE, CALCIUM CHLORIDE: 600; 310; 30; 20 INJECTION, SOLUTION INTRAVENOUS at 08:36

## 2023-05-18 RX ADMIN — LIDOCAINE HYDROCHLORIDE 80 MG: 20 INJECTION, SOLUTION INFILTRATION; PERINEURAL at 08:41

## 2023-05-18 RX ADMIN — PROPOFOL 200 MCG/KG/MIN: 10 INJECTION, EMULSION INTRAVENOUS at 09:04

## 2023-05-18 RX ADMIN — GLYCOPYRROLATE 0.1 MG: 0.2 INJECTION, SOLUTION INTRAMUSCULAR; INTRAVENOUS at 08:41

## 2023-05-18 RX ADMIN — PROPOFOL 200 MCG/KG/MIN: 10 INJECTION, EMULSION INTRAVENOUS at 08:41

## 2023-05-18 NOTE — ANESTHESIA CARE TRANSFER NOTE
Patient: Elicia Calero    Procedure: Procedure(s):  Colonoscopy  ESOPHAGOGASTRODUODENOSCOPY, WITH BIOPSY       Diagnosis: Iron deficiency [E61.1]  Diagnosis Additional Information: No value filed.    Anesthesia Type:   MAC     Note:    Oropharynx: oropharynx clear of all foreign objects and spontaneously breathing  Level of Consciousness: awake  Oxygen Supplementation: room air    Independent Airway: airway patency satisfactory and stable  Dentition: dentition unchanged  Vital Signs Stable: post-procedure vital signs reviewed and stable  Report to RN Given: handoff report given  Patient transferred to: Phase II    Handoff Report: Identifed the Patient, Identified the Reponsible Provider, Reviewed the pertinent medical history, Discussed the surgical course, Reviewed Intra-OP anesthesia mangement and issues during anesthesia, Set expectations for post-procedure period and Allowed opportunity for questions and acknowledgement of understanding      Vitals:  Vitals Value Taken Time   BP 94/55    Temp     Pulse 76    Resp 14    SpO2 99        Electronically Signed By: CANDACE Bustamante CRNA  May 18, 2023  9:22 AM

## 2023-05-18 NOTE — ANESTHESIA PREPROCEDURE EVALUATION
Anesthesia Pre-Procedure Evaluation    Patient: Elicia Calero   MRN: [id number] : 1975        Procedure : Procedure(s):  Colonoscopy  Esophagoscopy, gastroscopy, duodenoscopy (EGD), combined          Past Medical History:   Diagnosis Date     Diabetes mellitus (H)      Hepatic adenoma      HTN (hypertension)      Hyperlipidemia      Hypothyroidism      Migraine      NAFLD (nonalcoholic fatty liver disease)       Past Surgical History:   Procedure Laterality Date     CHOLECYSTECTOMY      teen years      Allergies   Allergen Reactions     Lithium Other (See Comments) and Dizziness     Other reaction(s): PSYCHOSIS     Sulfa Antibiotics Swelling     lips  Cold sores per pt       Adhesive Tape Rash     Albuterol Nausea and Vomiting     Clarithromycin Nausea and Vomiting     Erythromycin Nausea and Vomiting     Amoxicillin-Pot Clavulanate Nausea and Vomiting     Venlafaxine Other (See Comments)     Other reaction(s): PSYCHOSIS  Psychosis  Psychosis  Per transferred records        Social History     Tobacco Use     Smoking status: Former     Packs/day: 1.00     Years: 20.00     Pack years: 20.00     Types: Cigarettes     Quit date:      Years since quittin.3     Smokeless tobacco: Never   Vaping Use     Vaping status: Never Used     Passive vaping exposure: Yes   Substance Use Topics     Alcohol use: Yes      Wt Readings from Last 1 Encounters:   23 100.7 kg (222 lb)        Anesthesia Evaluation            ROS/MED HX  ENT/Pulmonary:  - neg pulmonary ROS     Neurologic:  - neg neurologic ROS     Cardiovascular:     (+) hypertension-----    METS/Exercise Tolerance:     Hematologic:  - neg hematologic  ROS     Musculoskeletal:  - neg musculoskeletal ROS     GI/Hepatic:     (+) liver disease,     Renal/Genitourinary:       Endo:     (+) type II DM, Using insulin, - not using insulin pump. not previously admitted for DM/DKA. Diabetic complications: nephropathy neuropathy retinopathy. thyroid problem,  hypothyroidism,     Psychiatric/Substance Use:  - neg psychiatric ROS     Infectious Disease:       Malignancy:       Other:               OUTSIDE LABS:  CBC:   Lab Results   Component Value Date    WBC 6.8 03/02/2023    WBC 6.8 12/01/2022    HGB 12.7 03/02/2023    HGB 13.4 12/01/2022    HCT 39.3 03/02/2023    HCT 41.8 12/01/2022     03/02/2023     12/01/2022     BMP:   Lab Results   Component Value Date     (L) 03/02/2023     12/01/2022    POTASSIUM 5.3 03/02/2023    POTASSIUM 4.9 12/01/2022    CHLORIDE 98 03/02/2023    CHLORIDE 103 12/01/2022    CO2 28 03/02/2023    CO2 19 (L) 12/01/2022    BUN 15.9 03/02/2023    BUN 14.1 12/01/2022    CR 0.79 03/02/2023    CR 0.85 12/01/2022     (H) 03/02/2023     (H) 12/01/2022     COAGS:   Lab Results   Component Value Date    INR 0.94 03/02/2023     POC:   Lab Results   Component Value Date    HCG Negative 05/18/2023     HEPATIC:   Lab Results   Component Value Date    ALBUMIN 4.2 03/02/2023    PROTTOTAL 6.6 03/02/2023    ALT 15 03/02/2023    AST 14 03/02/2023    ALKPHOS 132 (H) 03/02/2023    BILITOTAL 0.2 03/02/2023     OTHER:   Lab Results   Component Value Date    A1C 7.9 (H) 03/02/2023    CON 9.3 03/02/2023    TSH 0.40 12/01/2022       Anesthesia Plan    ASA Status:  3      Anesthesia Type: MAC.              Consents    Anesthesia Plan(s) and associated risks, benefits, and realistic alternatives discussed. Questions answered and patient/representative(s) expressed understanding.     - Discussed: Risks, Benefits and Alternatives for BOTH SEDATION and the PROCEDURE were discussed     - Discussed with:  Patient      - Extended Intubation/Ventilatory Support Discussed: No.      - Patient is DNR/DNI Status: No    Use of blood products discussed: No .     Postoperative Care    Pain management: IV analgesics, Oral pain medications.        Comments:           H&P reviewed: Unable to attach H&P to encounter due to EHR limitations. H&P  Update: appropriate H&P reviewed, patient examined. No interval changes since H&P (within 30 days).         Lino Turner MD, MD

## 2023-05-18 NOTE — ANESTHESIA POSTPROCEDURE EVALUATION
Patient: Elicia Calero    Procedure: Procedure(s):  Colonoscopy  ESOPHAGOGASTRODUODENOSCOPY, WITH BIOPSY       Anesthesia Type:  MAC    Note:  Disposition: Outpatient   Postop Pain Control: Uneventful            Sign Out: Well controlled pain   PONV: No   Neuro/Psych: Uneventful            Sign Out: Acceptable/Baseline neuro status   Airway/Respiratory: Uneventful            Sign Out: Acceptable/Baseline resp. status   CV/Hemodynamics: Uneventful            Sign Out: Acceptable CV status; No obvious hypovolemia; No obvious fluid overload   Other NRE: NONE   DID A NON-ROUTINE EVENT OCCUR? No           Last vitals:  Vitals Value Taken Time   /45 05/18/23 0922   Temp 37.1  C (98.8  F) 05/18/23 0922   Pulse 73 05/18/23 0922   Resp 16 05/18/23 0922   SpO2 100 % 05/18/23 0922       Electronically Signed By: Lino Turner MD, MD  May 18, 2023  2:31 PM

## 2023-05-18 NOTE — H&P
Elicia Calero  [id number]  female  48 year old      Reason for procedure/surgery: Iron deficiency/Screening colonoscopy    Patient Active Problem List   Diagnosis     Gastroesophageal reflux disease with esophagitis without hemorrhage     Hypothyroidism, unspecified type     Hepatic adenoma     NAFLD (nonalcoholic fatty liver disease)     Type 2 diabetes mellitus without complication, without long-term current use of insulin (H)     Irritable bowel syndrome, unspecified type     Mild episode of recurrent major depressive disorder (H)     Chronic migraine without aura without status migrainosus, not intractable     Chronic low back pain without sciatica, unspecified back pain laterality     Bipolar affective disorder, current episode mixed, current episode severity unspecified (H)     Pain in joint involving ankle and foot, right     Persistent insomnia     Localized edema     Facet arthropathy, lumbosacral       Past Surgical History:    Past Surgical History:   Procedure Laterality Date     CHOLECYSTECTOMY      teen years       Past Medical History:   Past Medical History:   Diagnosis Date     Diabetes mellitus (H)      Hepatic adenoma      HTN (hypertension)      Hyperlipidemia      Hypothyroidism      Migraine      NAFLD (nonalcoholic fatty liver disease)        Social History:   Social History     Tobacco Use     Smoking status: Former     Packs/day: 1.00     Years: 20.00     Pack years: 20.00     Types: Cigarettes     Quit date:      Years since quittin.3     Smokeless tobacco: Never   Vaping Use     Vaping status: Never Used     Passive vaping exposure: Yes   Substance Use Topics     Alcohol use: Yes       Family History:   Family History   Problem Relation Age of Onset     Hypertension Mother      Diabetes Maternal Grandmother      Heart Disease Paternal Grandmother      Liver Disease No family hx of        Allergies:   Allergies   Allergen Reactions     Lithium Other (See Comments) and Dizziness      Other reaction(s): PSYCHOSIS     Sulfa Antibiotics Swelling     lips  Cold sores per pt       Adhesive Tape Rash     Albuterol Nausea and Vomiting     Clarithromycin Nausea and Vomiting     Erythromycin Nausea and Vomiting     Amoxicillin-Pot Clavulanate Nausea and Vomiting     Venlafaxine Other (See Comments)     Other reaction(s): PSYCHOSIS  Psychosis  Psychosis  Per transferred records         Active Medications:   Current Outpatient Medications   Medication Sig Dispense Refill     bisacodyl (DULCOLAX) 5 MG EC tablet 2 days prior to procedure, take 2 tablets at 4 pm. 1 day prior to procedure, take 2 tablets at 4 pm. For additional instructions refer to your colonoscopy prep instructions. 4 tablet 0     brexpiprazole (REXULTI) 1 MG tablet Take 1 tablet (1 mg) by mouth daily 90 tablet 3     buPROPion (WELLBUTRIN XL) 150 MG 24 hr tablet Take 1 tablet (150 mg) by mouth every morning 90 tablet 3     buPROPion (WELLBUTRIN XL) 300 MG 24 hr tablet Take 1 tablet (300 mg) by mouth daily for 90 days 90 tablet 0     doxepin (SINEQUAN) 25 MG capsule Take 1 capsule (25 mg) by mouth At Bedtime 30 capsule 3     folic acid (FOLVITE) 1 MG tablet Take 1 tablet (1,000 mcg) by mouth daily 90 tablet 3     gabapentin (NEURONTIN) 300 MG capsule Take 4 capsules (1,200 mg) by mouth 3 times daily 1080 capsule 3     glipiZIDE (GLUCOTROL XL) 10 MG 24 hr tablet Take 2 tablets (20 mg) by mouth daily 180 tablet 3     hydrochlorothiazide (MICROZIDE) 12.5 MG capsule Take 1 capsule (12.5 mg) by mouth daily 90 capsule 3     hydrOXYzine (VISTARIL) 50 MG capsule Take 1 capsule (50 mg) by mouth daily 90 capsule 3     indomethacin (INDOCIN) 50 MG capsule Take 1 capsule (50 mg) by mouth 3 times daily 90 capsule 1     lamoTRIgine (LAMICTAL) 200 MG tablet Take 2 tablets (400 mg) by mouth At Bedtime for 360 days 180 tablet 3     levothyroxine (SYNTHROID/LEVOTHROID) 125 MCG tablet Take 125 mcg by mouth daily       lidocaine, viscous, (XYLOCAINE) 2 %  solution Swish and spit 5 mLs in mouth 5 times daily 100 mL 1     metFORMIN (GLUCOPHAGE-XR) 750 MG 24 hr tablet Take 3 tablets (2,250 mg) by mouth daily 270 tablet 3     methocarbamol (ROBAXIN) 750 MG tablet TAKE 1 TABLET(750 MG) BY MOUTH FOUR TIMES DAILY AS NEEDED FOR MUSCLE SPASMS 120 tablet 3     nystatin (MYCOSTATIN) 409613 UNIT/GM external cream Apply 100,000 g topically 3 times daily       omeprazole (PRILOSEC) 40 MG DR capsule Take 1 capsule (40 mg) by mouth At Bedtime 90 capsule 3     ondansetron (ZOFRAN) 4 MG tablet Take 1 tablet (4 mg) by mouth 3 times daily 90 tablet 1     oxyCODONE (ROXICODONE) 5 MG tablet Take 1 tablet (5 mg) by mouth 4 times daily 120 tablet 0     phentermine (ADIPEX-P) 37.5 MG capsule Take 1 capsule (37.5 mg) by mouth every morning 30 capsule 3     polyethylene glycol (GOLYTELY) 236 g suspension 2 days prior at 5pm, mix and drink half of a jug of Golytely. Drink an 8 oz. glass of Golytely every 15 minutes until half of the jug is gone. Place remainder of Golytely in the refrigerator. 1 day prior at 5 pm, drink the 2nd half of a jug of Golytely bowel prep. 6 hours before your check-in time, drink an 8 oz. glass of Golytely every 15 minutes until half of the 2nd jug of Golytely is gone. Discard remainder of second jug. 8000 mL 0     pravastatin (PRAVACHOL) 40 MG tablet Take 1 tablet (40 mg) by mouth daily 90 tablet 3     prochlorperazine (COMPAZINE) 10 MG tablet Take 1 tablet (10 mg) by mouth 4 times daily as needed for nausea (with migraine) 120 tablet 1     propranolol (INDERAL) 20 MG tablet Take 1 tablet (20 mg) by mouth 2 times daily 180 tablet 0     rimegepant (NURTEC) 75 MG ODT tablet Place 1 tablet (75 mg) under the tongue every other day Maximum of 1 tablet every 24 hours. 8 tablet 1     rizatriptan (MAXALT) 10 MG tablet Take 1 tablet (10 mg) by mouth 2 times daily 60 tablet 3     topiramate (TOPAMAX) 50 MG tablet Take 1 tablet (50 mg) by mouth daily (before supper) 90 tablet 0  "    Continuous Blood Gluc Sensor (FREESTYLE RANI 2 SENSOR) [facility] 1 each every 14 days Use 1 sensor every 14 days. Use to read blood sugars per 's instructions. 2 each 5     CONTOUR NEXT TEST test strip        norethindrone (MICRONOR) 0.35 MG tablet Take 1 tablet (0.35 mg) by mouth daily 84 tablet 11     Semaglutide 14 MG TABS Take 1 tablet by mouth daily 30 tablet 3       Systemic Review:   CONSTITUTIONAL: NEGATIVE for fever, chills, change in weight  ENT/MOUTH: NEGATIVE for ear, mouth and throat problems  RESP: NEGATIVE for significant cough or SOB  CV: NEGATIVE for chest pain, palpitations or peripheral edema    Physical Examination:   Vital Signs: /65 (BP Location: Right arm)   Pulse 77   Temp 97  F (36.1  C) (Temporal)   Resp 18   Ht 1.715 m (5' 7.5\")   Wt 100.7 kg (222 lb)   SpO2 96%   BMI 34.26 kg/m    GENERAL: healthy, alert and no distress  NECK: no adenopathy, no asymmetry, masses, or scars  RESP: lungs clear to auscultation - no rales, rhonchi or wheezes  CV: regular rate and rhythm, normal S1 S2, no S3 or S4, no murmur, click or rub, no peripheral edema and peripheral pulses strong  ABDOMEN: soft, nontender, no hepatosplenomegaly, no masses and bowel sounds normal  MS: no gross musculoskeletal defects noted, no edema    Plan: Appropriate to proceed as scheduled.      Chance Chan MD  5/18/2023    PCP:  Gil Mann    "

## 2023-05-19 NOTE — TELEPHONE ENCOUNTER
Prior auth not needed per insurance from 5/12/2023 note.     Reshma Alvarez, BSN RN  [facility]

## 2023-05-21 ENCOUNTER — MYC REFILL (OUTPATIENT)
Dept: FAMILY MEDICINE | Facility: CLINIC | Age: 48
End: 2023-05-21
Payer: COMMERCIAL

## 2023-05-21 ENCOUNTER — MYC MEDICAL ADVICE (OUTPATIENT)
Dept: FAMILY MEDICINE | Facility: CLINIC | Age: 48
End: 2023-05-21
Payer: COMMERCIAL

## 2023-05-21 DIAGNOSIS — E03.9 HYPOTHYROIDISM, UNSPECIFIED TYPE: Primary | ICD-10-CM

## 2023-05-21 DIAGNOSIS — G43.709 CHRONIC MIGRAINE WITHOUT AURA WITHOUT STATUS MIGRAINOSUS, NOT INTRACTABLE: ICD-10-CM

## 2023-05-21 DIAGNOSIS — M25.571 PAIN IN JOINT INVOLVING ANKLE AND FOOT, RIGHT: ICD-10-CM

## 2023-05-22 ENCOUNTER — TELEPHONE (OUTPATIENT)
Dept: FAMILY MEDICINE | Facility: CLINIC | Age: 48
End: 2023-05-22
Payer: COMMERCIAL

## 2023-05-22 LAB
PATH REPORT.ADDENDUM SPEC: NORMAL
PATH REPORT.COMMENTS IMP SPEC: NORMAL
PATH REPORT.COMMENTS IMP SPEC: NORMAL
PATH REPORT.FINAL DX SPEC: NORMAL
PATH REPORT.GROSS SPEC: NORMAL
PATH REPORT.MICROSCOPIC SPEC OTHER STN: NORMAL
PATH REPORT.RELEVANT HX SPEC: NORMAL
PHOTO IMAGE: NORMAL

## 2023-05-22 RX ORDER — LEVOTHYROXINE SODIUM 125 UG/1
125 TABLET ORAL DAILY
Qty: 90 TABLET | Refills: 1 | Status: SHIPPED | OUTPATIENT
Start: 2023-05-22 | End: 2023-07-24

## 2023-05-22 RX ORDER — OXYCODONE HYDROCHLORIDE 5 MG/1
5 TABLET ORAL 4 TIMES DAILY
Qty: 120 TABLET | Refills: 0 | Status: SHIPPED | OUTPATIENT
Start: 2023-05-22 | End: 2023-06-06

## 2023-05-22 NOTE — TELEPHONE ENCOUNTER
PA started for rimegepant (NURTEC) 75 MG ODT tablet.  Log into [email or web address] and enter info from below:    Key: [id number]  Patient last name: Abdias  : 75

## 2023-05-22 NOTE — TELEPHONE ENCOUNTER
Routing refill request to provider for review/approval because:  --Levothyroxine has not been ordered in this chart.  --Only entry is historical by MA.  --Patient is requesting refill.              --Last visit:  3/1/2023 virtual with Johnathan.    --Future Visit: 6/6/23 Johnathan.    --Last Written Prescription:     Disp Refills Start End RAEANN   levothyroxine (SYNTHROID/LEVOTHROID) 125 MCG tablet   10/4/2021  --   Sig - Route: Take 125 mcg by mouth daily - Oral   Class: Historical         TSH   Date Value Ref Range Status   12/01/2022 0.40 0.30 - 4.20 uIU/mL Final

## 2023-05-22 NOTE — TELEPHONE ENCOUNTER
Routing refill request to provider for review/approval because:  --Drug not on the [facility] refill protocol.    --Last visit:  3/1/2023 luis Mann.    --Future Visit: 6/6/23 Johnathan.      --Last Written Prescription:     Disp Refills Start End RAEANN   oxyCODONE (ROXICODONE) 5 MG tablet 120 tablet 0 4/17/2023  No   Sig - Route: Take 1 tablet (5 mg) by mouth 4 times daily - Oral

## 2023-05-23 RX ORDER — OXYCODONE HYDROCHLORIDE 5 MG/1
5 TABLET ORAL 4 TIMES DAILY
Qty: 48 TABLET | Refills: 0 | Status: CANCELLED | OUTPATIENT
Start: 2023-05-25

## 2023-05-23 NOTE — TELEPHONE ENCOUNTER
"Fauziantel-Please review and sign if agree.  Patient requesting bridge refills of Oxycodone (pended with start date of 5/25/23) and Gabapentin.  Patient is changing pharmacies.    \"I need a refill on my oxycodone I am not going to have enough medication to get me through the 6th I have 12 tablets left. I also need gabapentin I m almost out of that too.     Thanks,  Elicia\"    Thank you!  MARCOS KateN, RN-BC  [facility]    "

## 2023-05-24 ENCOUNTER — MYC REFILL (OUTPATIENT)
Dept: FAMILY MEDICINE | Facility: CLINIC | Age: 48
End: 2023-05-24
Payer: COMMERCIAL

## 2023-05-24 DIAGNOSIS — F31.60 BIPOLAR AFFECTIVE DISORDER, CURRENT EPISODE MIXED, CURRENT EPISODE SEVERITY UNSPECIFIED (H): ICD-10-CM

## 2023-05-24 DIAGNOSIS — F33.0 MILD EPISODE OF RECURRENT MAJOR DEPRESSIVE DISORDER (H): ICD-10-CM

## 2023-05-24 RX ORDER — BUPROPION HYDROCHLORIDE 300 MG/1
300 TABLET ORAL DAILY
Qty: 90 TABLET | Refills: 0 | Status: CANCELLED | OUTPATIENT
Start: 2023-05-24

## 2023-05-25 RX ORDER — GABAPENTIN 300 MG/1
1200 CAPSULE ORAL 3 TIMES DAILY
Qty: 1080 CAPSULE | Refills: 3 | Status: SHIPPED | OUTPATIENT
Start: 2023-05-25 | End: 2023-11-21

## 2023-05-25 ASSESSMENT — PATIENT HEALTH QUESTIONNAIRE - PHQ9
SUM OF ALL RESPONSES TO PHQ QUESTIONS 1-9: 5
SUM OF ALL RESPONSES TO PHQ QUESTIONS 1-9: 5
10. IF YOU CHECKED OFF ANY PROBLEMS, HOW DIFFICULT HAVE THESE PROBLEMS MADE IT FOR YOU TO DO YOUR WORK, TAKE CARE OF THINGS AT HOME, OR GET ALONG WITH OTHER PEOPLE: SOMEWHAT DIFFICULT

## 2023-05-25 NOTE — TELEPHONE ENCOUNTER
Central Prior Authorization Team   Phone: [phone]    PA Initiation    Medication: rimegepant (NURTEC) 75 MG ODT tablet, rec 5-20-23  Insurance Company: [employer] ([facility]) - Phone [phone] Fax [phone]  Pharmacy Filling the Rx: [id number] - [place], [place] - [address] AT [facility] [address]  Filling Pharmacy Phone: [phone]  Filling Pharmacy Fax:    Start Date: 5/25/2023

## 2023-05-25 NOTE — TELEPHONE ENCOUNTER
Prior Authorization Approval    Authorization Effective Date: 5/25/2023  Authorization Expiration Date: 8/25/2023  Medication: rimegepant (NURTEC) 75 MG ODT tablet, rec 5-20-23  Approved Dose/Quantity:    Reference #:     Insurance Company: [employer] ([facility]) - Phone [phone] Fax [phone]  Expected CoPay:       CoPay Card Available:      Foundation Assistance Needed:    Which Pharmacy is filling the prescription (Not needed for infusion/clinic administered): [id number] - [place], [place] - [address] AT [facility] [address]  Pharmacy Notified: Yes  Patient Notified: Yes  **Instructed pharmacy to notify patient when script is ready to /ship.**

## 2023-05-25 NOTE — TELEPHONE ENCOUNTER
Maria Doloreshart to patient for PHQ-9 and for clarification.          12/1/2022     8:40 AM   PHQ   PHQ-9 Total Score 0   Q9: Thoughts of better off dead/self-harm past 2 weeks Not at all

## 2023-05-26 NOTE — TELEPHONE ENCOUNTER
Writer called and left message on patient's voicemail to call back and speak with a triage nurse.    Clarification needed for filling of Rx or if waiting to fill with psychiatrist on 5/30.     MARCOS RoseN RN  [facility]

## 2023-06-06 ENCOUNTER — TELEPHONE (OUTPATIENT)
Dept: FAMILY MEDICINE | Facility: CLINIC | Age: 48
End: 2023-06-06

## 2023-06-06 ENCOUNTER — VIRTUAL VISIT (OUTPATIENT)
Dept: FAMILY MEDICINE | Facility: CLINIC | Age: 48
End: 2023-06-06
Payer: COMMERCIAL

## 2023-06-06 DIAGNOSIS — Z12.31 ENCOUNTER FOR SCREENING MAMMOGRAM FOR BREAST CANCER: ICD-10-CM

## 2023-06-06 DIAGNOSIS — G89.29 CHRONIC LOW BACK PAIN WITHOUT SCIATICA, UNSPECIFIED BACK PAIN LATERALITY: ICD-10-CM

## 2023-06-06 DIAGNOSIS — E66.01 CLASS 2 SEVERE OBESITY DUE TO EXCESS CALORIES WITH SERIOUS COMORBIDITY AND BODY MASS INDEX (BMI) OF 37.0 TO 37.9 IN ADULT (H): ICD-10-CM

## 2023-06-06 DIAGNOSIS — Z13.220 SCREENING FOR HYPERLIPIDEMIA: ICD-10-CM

## 2023-06-06 DIAGNOSIS — K21.00 GASTROESOPHAGEAL REFLUX DISEASE WITH ESOPHAGITIS WITHOUT HEMORRHAGE: ICD-10-CM

## 2023-06-06 DIAGNOSIS — E66.812 CLASS 2 SEVERE OBESITY DUE TO EXCESS CALORIES WITH SERIOUS COMORBIDITY AND BODY MASS INDEX (BMI) OF 37.0 TO 37.9 IN ADULT (H): ICD-10-CM

## 2023-06-06 DIAGNOSIS — E11.9 TYPE 2 DIABETES MELLITUS WITHOUT COMPLICATION, WITHOUT LONG-TERM CURRENT USE OF INSULIN (H): Primary | ICD-10-CM

## 2023-06-06 DIAGNOSIS — M54.50 CHRONIC LOW BACK PAIN WITHOUT SCIATICA, UNSPECIFIED BACK PAIN LATERALITY: ICD-10-CM

## 2023-06-06 DIAGNOSIS — F33.0 MILD EPISODE OF RECURRENT MAJOR DEPRESSIVE DISORDER (H): ICD-10-CM

## 2023-06-06 DIAGNOSIS — M25.571 PAIN IN JOINT INVOLVING ANKLE AND FOOT, RIGHT: ICD-10-CM

## 2023-06-06 DIAGNOSIS — F31.60 BIPOLAR AFFECTIVE DISORDER, CURRENT EPISODE MIXED, CURRENT EPISODE SEVERITY UNSPECIFIED (H): ICD-10-CM

## 2023-06-06 DIAGNOSIS — Z79.891 LONG TERM PRESCRIPTION OPIATE USE: ICD-10-CM

## 2023-06-06 DIAGNOSIS — G43.709 CHRONIC MIGRAINE WITHOUT AURA WITHOUT STATUS MIGRAINOSUS, NOT INTRACTABLE: ICD-10-CM

## 2023-06-06 DIAGNOSIS — E03.9 HYPOTHYROIDISM, UNSPECIFIED TYPE: ICD-10-CM

## 2023-06-06 PROCEDURE — 99214 OFFICE O/P EST MOD 30 MIN: CPT | Mod: 95 | Performed by: NURSE PRACTITIONER

## 2023-06-06 RX ORDER — OXYCODONE HYDROCHLORIDE 5 MG/1
5 TABLET ORAL 4 TIMES DAILY
Qty: 120 TABLET | Refills: 0 | Status: SHIPPED | OUTPATIENT
Start: 2023-08-22 | End: 2023-08-27

## 2023-06-06 RX ORDER — ACYCLOVIR 400 MG/1
TABLET ORAL
Qty: 1 EACH | Refills: 0 | Status: SHIPPED | OUTPATIENT
Start: 2023-06-06 | End: 2023-08-14

## 2023-06-06 RX ORDER — OXYCODONE HYDROCHLORIDE 5 MG/1
5 TABLET ORAL 4 TIMES DAILY
Qty: 120 TABLET | Refills: 0 | Status: SHIPPED | OUTPATIENT
Start: 2023-07-22 | End: 2023-07-06

## 2023-06-06 RX ORDER — METFORMIN HYDROCHLORIDE EXTENDED-RELEASE TABLETS 1000 MG/1
2000 TABLET, FILM COATED, EXTENDED RELEASE ORAL DAILY
Qty: 90 TABLET | Refills: 3 | Status: SHIPPED | OUTPATIENT
Start: 2023-06-06 | End: 2023-06-08

## 2023-06-06 RX ORDER — OXYCODONE HYDROCHLORIDE 5 MG/1
5 TABLET ORAL 4 TIMES DAILY
Qty: 120 TABLET | Refills: 0 | Status: SHIPPED | OUTPATIENT
Start: 2023-06-22 | End: 2023-07-06

## 2023-06-06 RX ORDER — METFORMIN HCL 500 MG
2000 TABLET, EXTENDED RELEASE 24 HR ORAL
Qty: 360 TABLET | Status: CANCELLED | OUTPATIENT
Start: 2023-06-06

## 2023-06-06 RX ORDER — TOPIRAMATE 50 MG/1
50 TABLET, FILM COATED ORAL EVERY EVENING
Qty: 90 TABLET | Refills: 0 | Status: SHIPPED | OUTPATIENT
Start: 2023-06-06 | End: 2023-07-24

## 2023-06-06 RX ORDER — BUPROPION HYDROCHLORIDE 300 MG/1
300 TABLET ORAL DAILY
Qty: 90 TABLET | Refills: 3 | Status: SHIPPED | OUTPATIENT
Start: 2023-06-06

## 2023-06-06 RX ORDER — ACYCLOVIR 400 MG/1
TABLET ORAL
Qty: 2 EACH | Refills: 5 | Status: SHIPPED | OUTPATIENT
Start: 2023-06-06 | End: 2023-08-14

## 2023-06-06 NOTE — PROGRESS NOTES
"Elicia is a 48 year old who is being evaluated via a billable video visit.      How would you like to obtain your AVS? MyChart  If the video visit is dropped, the invitation should be resent by: Send to e-mail at: [email or web address]  Will anyone else be joining your video visit? No          Assessment & Plan     Type 2 diabetes mellitus without complication, without long-term current use of insulin (H)  Improved;  a1c  Remains slightly above goal 7.9 goal 6; not taking metformin consistently twice a day; check a1c; change metformin to daily dosing; order for dexcom glucose for better monitoring  - Hemoglobin A1c  - Semaglutide (RYBELSUS) 14 MG tablet  Dispense: 90 tablet; Refill: 3  - Continuous Blood Gluc  (DEXCOM G7 ) RALEIGH  Dispense: 1 each; Refill: 0  - Continuous Blood Gluc Sensor (DEXCOM G7 SENSOR) MISC  Dispense: 2 each; Refill: 5  - metFORMIN (FORTAMET) 1000 MG 24 hr tablet  Dispense: 90 tablet; Refill: 3  - Basic metabolic panel    Class 2 severe obesity due to excess calories with serious comorbidity and body mass index (BMI) of 37.0 to 37.9 in adult (H)  intirtal weight 217 lbs 11/2022  Estimated body mass index is 34.26 kg/m  as calculated from the following:    Height as of 5/18/23: 1.715 m (5' 7.5\").    Weight as of 5/18/23: 100.7 kg (222 lb).  Continue lifestyle changes including starting an exercise/activity program 30 minutes daily, daily caloric/cho/protein; meal tracking;    - reduce caloric intake 1800-2K per day; cho 100-150 gms per meal; protein 25-30 gm per meal  - topiramate (TOPAMAX) 50 MG tablet  Dispense: 90 tablet; Refill: 0  -  team started Vyvase for binge eating; stopped Phentermine    Chronic migraine without aura without status migrainosus, not intractable  Stable; stopped propranolol d/t side effects; continue current regimen; renewed medication  - rimegepant (NURTEC) 75 MG ODT tablet  Dispense: 15 tablet; Refill: 6    Mild episode of recurrent major depressive " disorder (H)  Stable; continue current regimen; renewed medication  - buPROPion (WELLBUTRIN XL) 300 MG 24 hr tablet  Dispense: 90 tablet; Refill: 3  - Vitamin D Deficiency    Bipolar affective disorder, current episode mixed, current episode severity unspecified (H)  Stable; continue current regimen; renewed medication  - buPROPion (WELLBUTRIN XL) 300 MG 24 hr tablet  Dispense: 90 tablet; Refill: 3  - Vitamin D Deficiency    Gastroesophageal reflux disease with esophagitis without hemorrhage  Stable; recent EGD->The biopsy tissue taken from your stomach showed chemical irritation.  This kind of pattern is often seen with aspirin and related medications.  It can also be caused by alcohol and natural irritants like bile.  - omeprazole (PRILOSEC) 20 MG DR capsule  Dispense: 90 capsule; Refill: 3    Pain in joint involving ankle and foot, right  Chronic low back pain without sciatica, unspecified back pain laterality  Long term prescription opiate use  Stable at current treatment; PDMP viewed; UDS ordered; CSA completed  - Urine Drugs of Abuse Screen  - oxyCODONE (ROXICODONE) 5 MG tablet  Dispense: 120 tablet; Refill: 0  - oxyCODONE (ROXICODONE) 5 MG tablet  Dispense: 120 tablet; Refill: 0  - oxyCODONE (ROXICODONE) 5 MG tablet  Dispense: 120 tablet; Refill: 0    Encounter for screening mammogram for breast cancer  - *MA Screening Digital Bilateral    Screening for hyperlipidemia  Assess ASCVD risk for statin therapy to reduce CAD/CVA ristk  Plan: fasting  The ASCVD Risk score (Carmita CORREIA, et al., 2019) failed to calculate for the following reasons:    The valid total cholesterol range is 130 to 320 mg/dL  - Lipid Profile    Hypothyroidism, unspecified type  Stable; continue current regimen; renewed medication  - TSH with free T4 reflex        CANDACE [facility]    Emily Rubi is a 48 year old, presenting for the following health issues:  Medication Refill (Requesting  "90-day med refills and oxycodone refill ) and Medication Request (Wants something different then metformin, PT sated that she gets up early in the morning and is on able to fall back asleep. PT don't want an injection medication because of past experiences.)        6/6/2023     8:08 AM   Additional Questions   Roomed by Amada Samano   Accompanied by Self         6/6/2023     8:08 AM   Patient Reported Additional Medications   Patient reports taking the following new medications Yes     Weight management: stopped phentermine; vyvanse 1 week ago for binge eating  HA: stopped propranolol due to dizziness;   Mental Health: [facility]; started with psychiatrist for medication management;   DM: missing metformin doses   Chronic pain:   CSA -- Encounter Level:    CSA: None found at the encounter level.     CSA -- Patient Level:    CSA: None found at the patient level.        .    Medication Refill    History of Present Illness       Reason for visit:  Change medication    She eats 2-3 servings of fruits and vegetables daily.She consumes 0 sweetened beverage(s) daily. She exercises with enough effort to increase her heart rate 3 or less days per week. She is missing 5 dose(s) of medications per week.  She is not taking prescribed medications regularly due to remembering to take.               Review of Systems         Objective    Vitals - Patient Reported  Weight (Patient Reported): 99.8 kg (220 lb)  Height (Patient Reported): 171.5 cm (5' 7.5\")  BMI (Based on Pt Reported Ht/Wt): 33.95  Pain Score: Severe Pain (7) (7 for head and 5 for back)  Pain Loc: Head (bad headache, lower back, and ankle)        Physical Exam   GENERAL: Healthy, alert and no distress  EYES: Eyes grossly normal to inspection.  No discharge or erythema, or obvious scleral/conjunctival abnormalities.  RESP: No audible wheeze, cough, or visible cyanosis.  No visible retractions or increased work of breathing.    SKIN: Visible skin clear. No " significant rash, abnormal pigmentation or lesions.  NEURO: Cranial nerves grossly intact.  Mentation and speech appropriate for age.  PSYCH: Mentation appears normal, affect normal/bright, judgement and insight intact, normal speech and appearance well-groomed.                Video-Visit Details    Type of service:  Video Visit   40 minutes    Joined the call at 6/6/2023, 8:45:55?am.  Left the call at 6/6/2023, 9:24:11?am.  You were on the call for 38 minutes 16 seconds .      Originating Location (pt. Location): Home    Distant Location (provider location):  On-site  Platform used for Video Visit: Diana

## 2023-06-06 NOTE — TELEPHONE ENCOUNTER
[facility] calling    They received an order metformin ER osmotic Fortemet     This is not covered by insurance    Can you send generic glucophage XR - he will have to do four of the 500 mg tabs for 2000 daily    Rx queued up    Linda Hernandez RN, BSN  [facility]

## 2023-06-06 NOTE — NURSING NOTE
Re-review medications with PT on which one she is not taking again. I was on able to hear her clearly. Thank You

## 2023-06-08 DIAGNOSIS — E11.9 TYPE 2 DIABETES MELLITUS WITHOUT COMPLICATION, WITHOUT LONG-TERM CURRENT USE OF INSULIN (H): ICD-10-CM

## 2023-06-08 DIAGNOSIS — E11.9 TYPE 2 DIABETES MELLITUS WITHOUT COMPLICATION, WITHOUT LONG-TERM CURRENT USE OF INSULIN (H): Primary | ICD-10-CM

## 2023-06-08 RX ORDER — METFORMIN HCL 500 MG
2000 TABLET, EXTENDED RELEASE 24 HR ORAL DAILY
Qty: 360 TABLET | Refills: 3 | Status: SHIPPED | OUTPATIENT
Start: 2023-06-08 | End: 2023-07-24

## 2023-06-08 RX ORDER — METFORMIN HCL 500 MG
500 TABLET, EXTENDED RELEASE 24 HR ORAL
Status: CANCELLED | OUTPATIENT
Start: 2023-06-08

## 2023-06-08 RX ORDER — METFORMIN HYDROCHLORIDE 750 MG/1
1500 TABLET, EXTENDED RELEASE ORAL DAILY
Qty: 180 TABLET | Refills: 3 | Status: SHIPPED | OUTPATIENT
Start: 2023-06-08 | End: 2023-06-08

## 2023-06-08 NOTE — TELEPHONE ENCOUNTER
Gil --    Apparently, the patient is requesting Metformin 500 MG tablets. 4 tablets daily.     Please resend to pharmacy with this dosing.     Thank you,   Reshma Alvarez, MARCOSN RN  [facility]

## 2023-06-08 NOTE — TELEPHONE ENCOUNTER
Please notify patient the ER 1000 mg was not approved; she would need to take 4 tablets daily which I know is difficult recommend to continue 750 mg tablets and reduce to 2 tablets daily.  We continue A1c recheck in 3 months.   VI

## 2023-06-09 ENCOUNTER — TELEPHONE (OUTPATIENT)
Dept: FAMILY MEDICINE | Facility: CLINIC | Age: 48
End: 2023-06-09
Payer: COMMERCIAL

## 2023-06-09 NOTE — TELEPHONE ENCOUNTER
Pt is requesting new rxs for    Dexcom g6 reciever    dexcom g6 transmitter    dexcom g6 sensor    Did not see on active med list please verify and send new rx    [place] [facility] pharmacy  [phone]

## 2023-06-09 NOTE — TELEPHONE ENCOUNTER
Writer called patient to relay message from Gil Mann. Patient stated that the pharmacy already contacted her that insurance will not pay for the Dexcom 7.   Patient stated that pharmacy will be contacting us to order the Dexcom 6.     MYRIAM Rose RN  [facility]

## 2023-06-09 NOTE — TELEPHONE ENCOUNTER
Please notify patient we discussed Dexcom 7 which was ordered. As we discussed if insurance does not cover will change to 6. Order was placed during visit.  Thank you,  SW

## 2023-06-12 DIAGNOSIS — K76.0 NAFLD (NONALCOHOLIC FATTY LIVER DISEASE): Primary | ICD-10-CM

## 2023-06-12 DIAGNOSIS — F41.9 ANXIETY: Primary | ICD-10-CM

## 2023-06-12 RX ORDER — LORAZEPAM 0.5 MG/1
0.5 TABLET ORAL EVERY 6 HOURS PRN
Qty: 1 TABLET | Refills: 0 | Status: CANCELLED | OUTPATIENT
Start: 2023-06-12

## 2023-06-12 RX ORDER — LORAZEPAM 0.5 MG/1
0.5 TABLET ORAL ONCE
Qty: 1 TABLET | Refills: 0 | Status: SHIPPED | OUTPATIENT
Start: 2023-06-12 | End: 2023-06-12

## 2023-06-13 DIAGNOSIS — E11.9 TYPE 2 DIABETES MELLITUS WITHOUT COMPLICATION, WITHOUT LONG-TERM CURRENT USE OF INSULIN (H): Primary | ICD-10-CM

## 2023-06-13 NOTE — TELEPHONE ENCOUNTER
Pt is requesting new rxs for     Dexcom g6 reciever     dexcom g6 transmitter     dexcom g6 sensor     The ins will not cover the dexcom g7 items. They will cover the G6 items please send these ones instead. Did not see on active med list please verify and send new rx     [place] [facility] pharmacy  [phone]

## 2023-06-14 ENCOUNTER — TRANSFERRED RECORDS (OUTPATIENT)
Dept: HEALTH INFORMATION MANAGEMENT | Facility: CLINIC | Age: 48
End: 2023-06-14
Payer: COMMERCIAL

## 2023-06-15 ENCOUNTER — MYC MEDICAL ADVICE (OUTPATIENT)
Dept: FAMILY MEDICINE | Facility: CLINIC | Age: 48
End: 2023-06-15

## 2023-06-15 ENCOUNTER — ANCILLARY PROCEDURE (OUTPATIENT)
Dept: RADIOLOGY | Facility: AMBULATORY SURGERY CENTER | Age: 48
End: 2023-06-15
Attending: ANESTHESIOLOGY
Payer: COMMERCIAL

## 2023-06-15 ENCOUNTER — HOSPITAL ENCOUNTER (OUTPATIENT)
Facility: AMBULATORY SURGERY CENTER | Age: 48
Discharge: HOME | End: 2023-06-15
Attending: ANESTHESIOLOGY | Admitting: ANESTHESIOLOGY
Payer: COMMERCIAL

## 2023-06-15 VITALS
TEMPERATURE: 98.1 F | BODY MASS INDEX: 34.84 KG/M2 | HEIGHT: 67 IN | WEIGHT: 222 LBS | HEART RATE: 84 BPM | OXYGEN SATURATION: 96 % | DIASTOLIC BLOOD PRESSURE: 73 MMHG | RESPIRATION RATE: 20 BRPM | SYSTOLIC BLOOD PRESSURE: 115 MMHG

## 2023-06-15 DIAGNOSIS — R52 PAIN: ICD-10-CM

## 2023-06-15 DIAGNOSIS — M47.817 FACET ARTHROPATHY, LUMBOSACRAL: ICD-10-CM

## 2023-06-15 LAB
GLUCOSE BLDC GLUCOMTR-MCNC: 331 MG/DL (ref 70–99)
HCG UR QL: NEGATIVE
INTERNAL QC OK POCT: NORMAL
POCT KIT EXPIRATION DATE: NORMAL
POCT KIT LOT NUMBER: NORMAL

## 2023-06-15 PROCEDURE — 81025 URINE PREGNANCY TEST: CPT | Performed by: PATHOLOGY

## 2023-06-15 PROCEDURE — 82962 GLUCOSE BLOOD TEST: CPT | Performed by: PATHOLOGY

## 2023-06-15 PROCEDURE — 64451 NJX AA&/STRD NRV NRVTG SI JT: CPT | Mod: LT | Performed by: ANESTHESIOLOGY

## 2023-06-15 PROCEDURE — 64451 NJX AA&/STRD NRV NRVTG SI JT: CPT | Mod: LT

## 2023-06-15 RX ORDER — IOPAMIDOL 408 MG/ML
INJECTION, SOLUTION INTRAVASCULAR DAILY PRN
Status: DISCONTINUED | OUTPATIENT
Start: 2023-06-15 | End: 2023-06-15 | Stop reason: HOSPADM

## 2023-06-15 RX ORDER — PROCHLORPERAZINE 25 MG/1
SUPPOSITORY RECTAL
Qty: 1 EACH | Refills: 1 | Status: SHIPPED | OUTPATIENT
Start: 2023-06-15 | End: 2023-11-21

## 2023-06-15 RX ORDER — LIDOCAINE HYDROCHLORIDE 10 MG/ML
INJECTION, SOLUTION EPIDURAL; INFILTRATION; INTRACAUDAL; PERINEURAL DAILY PRN
Status: DISCONTINUED | OUTPATIENT
Start: 2023-06-15 | End: 2023-06-15 | Stop reason: HOSPADM

## 2023-06-15 RX ORDER — LISDEXAMFETAMINE DIMESYLATE 30 MG/1
30 CAPSULE ORAL EVERY MORNING
COMMUNITY
End: 2023-07-24

## 2023-06-15 RX ORDER — PROCHLORPERAZINE 25 MG/1
SUPPOSITORY RECTAL
Qty: 1 EACH | Refills: 0 | Status: SHIPPED | OUTPATIENT
Start: 2023-06-15 | End: 2024-04-10 | Stop reason: DRUGHIGH

## 2023-06-15 RX ORDER — BUPIVACAINE HYDROCHLORIDE 2.5 MG/ML
INJECTION, SOLUTION EPIDURAL; INFILTRATION; INTRACAUDAL DAILY PRN
Status: DISCONTINUED | OUTPATIENT
Start: 2023-06-15 | End: 2023-06-15 | Stop reason: HOSPADM

## 2023-06-15 RX ORDER — PROCHLORPERAZINE 25 MG/1
SUPPOSITORY RECTAL
Qty: 3 EACH | Refills: 5 | Status: SHIPPED | OUTPATIENT
Start: 2023-06-15 | End: 2023-11-21

## 2023-06-15 NOTE — OP NOTE
Patient: Elicia Calero Age: 48 year old   MRN: [id number] Attending: Dr. Weber     Date of Visit: Cristina 15, 2023      PAIN MEDICINE CLINIC PROCEDURE NOTE    ATTENDING CLINICIAN:    Layne Weber MD      PREPROCEDURE DIAGNOSES:  1.  Sacroilliac joint arthopathy  2.  Chronic low back pain      PROCEDURE(S) PERFORMED:  1. Bilateral S1, 2, 3 Lateral Branchs block and L5 dorsal ramus nerve block   2.  Fluoroscopic guidance for the above procedures    ANESTHESIA:  Local.    BLOOD LOSS:  Minimal.    DRAINS AND SPECIMENS:  None.    COMPLICATIONS:  None.    INDICATIONS:  Elicia Calero is a 48 year old old female with a history of low back pain secondary to sacrolitis.  The patient stated that the patient was in their usual state of health and denied recent anticoagulant use or recent infections.  Therefore, the plan is for above mentioned procedures.     Procedure Details:  The patient was met in the procedure room, where the patient was identified by name, medical record number and date of birth.  All of the patient s last minute questions were answered. Written informed consent was obtained and saved in the electronic medical record, after the risks, benefits, and alternatives were discussed with the patient.      A formal time-out procedure was performed as per protocol, including patient name, title of procedure, and site of procedure, and all in the room concurred.  Routine monitors were applied.      The patient was placed in the prone position on the procedure room table.  All pressure points were checked and comfortably padded.  Routine monitors were placed.  Vital signs were stable.    A chlorhexidine prep was completed followed by sterile draping per standard procedure.    We at first identified each lumbar vertebral body and sacrum.  Fluoroscope was then obliqued towards the patient's right in order to identify the  sacral ala on the patient's right side.  The first target was recognized at the junction of the  superior articular process and sacral ala for L5 medial branch. Skin and subcutaneous tissues overlying this area were anesthetized with a 1% lidocaine.  Under fluoroscopic guidance, we then directed the 3.5 inch, 22-gauge spinal needle through the skin and subcutaneous tissues  until osseous contact was achieved.  At this point, the needle was walked off laterally over the sacral ala. Lateral fluoroscopic image was obtained to check the correct needle depth.    Then we directed similar spinal needles at a location just lateral to each of the S1, S2 and S3 neural foramen, starting at the 2 o'clock position lateral to the S1 foramen and spaced 1cm apart inferiorly to cover a line from the aforementioned position down to the 5  o'clock position lateral to the S3 foramen .Lateral fluoroscopic image was obtained to check the correct needle depth.   We then injected 1 ml 0.25% bupivacaine into each needle. All needles were removed. The exact procedure performed in the contralateral side.     Light pressure was held at the puncture site(s) to prevent ecchymosis and oozing.  The patient's skin was cleansed, and hemostasis was confirmed.  Band-aids were applied to the needle injection site(s).      Condition:    The patient tolerated the procedure well and was monitored for approximately 15 minutes afterward in the post procedure area.  There were no immediate post procedure complications noted.  The patient was then discharged to home as per protocol.      Pre-procedure pain score: 7/10  Post-procedure pain score: 0/10

## 2023-06-15 NOTE — DISCHARGE INSTRUCTIONS
Home Care Instructions after a Medial Branch Block      In a medial branch block, a local anesthetic (numbing medicine) is injected near the medial branch nerve. This stops the transmission of pain signals from the facet joint. If this reduces your pain and improves your mobility, it may tell the doctor which facet joint is causing the pain. This procedure is a diagnostic procedure and is typically short lasting. With this injection, a steroid to increase the longevity of the blocks effect may or may not be used.    Activity  -You may resume most normal activity levels with the exception of strenuous activity. It is important for us to know if your pain with normal activity is relieved after this injection.  -DO NOT shower for 24 hours  -DO NOT remove bandaid for 24 hours    Pain  -You may experience soreness at the injection site for one or two days  -You may use an ice pack for 20 minutes every 2 hours for the first 24 hours  -You may use a heating pad after the first 24 hours  -You may use Tylenol (acetaminophen) every 4 hours or other pain medicines as     directed by your physician    You may experience numbness radiating into your legs or arms (depending on the procedure location). This numbness may last several hours. Until sensation returns to normal; please use caution in walking, climbing stairs, and stepping out of your vehicle, etc.      PLEASE KEEP TRACK OF YOUR SYMPTOMS AND NOTE YOUR IMPROVEMENT FOR YOUR DOCTOR.     Fill out the pain diary and fax it back to us. You do not need to call us if the pain diary was faxed. [phone] is the FAX number  If you do not have access to a fax machine, attach it to Dialog[employer].  You do not need to call us if the pain diary was attached to Dialog[employer].   If you cannot fax or send via Dialog[employer], then call our call center and request to speak with a nurse for follow up after a procedure       Please contact us if you have:  -Severe pain  -Fever more than 101.5 degrees  Fahrenheit  -Signs of infection at the injection site (redness, swelling, or drainage)    FOR PAIN CENTER PATIENTS:  If you have questions, please contact the Pain Clinic at [phone] Option #1 between the hours of 7:00 am and 3:00 pm Monday through Friday. After office hours you can contact the on call provider by dialing [phone]. If you need immediate attention, we recommend that you go to a hospital emergency room or dial [phone]. Fax number is [phone]

## 2023-06-16 ENCOUNTER — TELEPHONE (OUTPATIENT)
Dept: FAMILY MEDICINE | Facility: CLINIC | Age: 48
End: 2023-06-16
Payer: COMMERCIAL

## 2023-06-16 ENCOUNTER — TELEPHONE (OUTPATIENT)
Dept: PALLIATIVE MEDICINE | Facility: CLINIC | Age: 48
End: 2023-06-16
Payer: COMMERCIAL

## 2023-06-16 NOTE — TELEPHONE ENCOUNTER
{Screenshot of Product, Insurance, and Cardholder ID)      Please route determinations to the Pharm Diabetes pool ([id number]).      Thank you!    Diabetes Care Services Team   [facility]  [address] [place] [address]

## 2023-06-16 NOTE — TELEPHONE ENCOUNTER
{Screenshot of Product, Insurance, and Cardholder ID)      Please route determinations to the Pharm Diabetes pool ([id number]).      Thank you!    Diabetes Care Services Team   [facility]  [address] [place] [address]

## 2023-06-16 NOTE — TELEPHONE ENCOUNTER
{Screenshot of Product, Insurance, and Cardholder ID)      Please route determinations to the Pharm Diabetes pool ([id number]).      Thank you!    Diabetes Care Services Team   [facility]  [address] [place] [address]

## 2023-06-20 NOTE — TELEPHONE ENCOUNTER
"Shontel-Please advise if you received anything from the \"pumps\" team?    \"Hey, I don t know if the Pumps team has gotten ahold of you yet but the Dexcom G7 wasn t covered at all.  So if you could send the 3 prescriptions for that system to the Pumps team that would be great. Any questions please feel free to contact me.     Elicia\"    Writer responded via [employer].      Thank you!  MARCOS KateN, RN-BC  [facility]          "

## 2023-06-21 NOTE — TELEPHONE ENCOUNTER
Central Prior Authorization Team - Phone: [phone]     PA Initiation    Medication: DEXCOM G6 SENSOR MISC  Insurance Company: [employer] - Phone [phone] Fax [phone]  Pharmacy Filling the Rx: [facility] - [place], [place] - [address]  Filling Pharmacy Phone: [phone]  Filling Pharmacy Fax:    Start Date: 6/21/2023

## 2023-06-21 NOTE — TELEPHONE ENCOUNTER
Central Prior Authorization Team - Phone: [phone]     PA Initiation    Medication: DEXCOM G6  RALEIGH  Insurance Company: [employer] - Phone [phone] Fax [phone]  Pharmacy Filling the Rx: [facility] [place] [facility] - [place], [place] - [address]  Filling Pharmacy Phone: [phone]  Filling Pharmacy Fax:    Start Date: 6/21/2023

## 2023-06-21 NOTE — TELEPHONE ENCOUNTER
Central Prior Authorization Team - Phone: [phone]     PA Initiation    Medication: DEXCOM G6 TRANSMITTER MISC  Insurance Company: [employer] - Phone [phone] Fax [phone]  Pharmacy Filling the Rx: [facility] - [place], [place] - [address]  Filling Pharmacy Phone: [phone]  Filling Pharmacy Fax:    Start Date: 6/21/2023

## 2023-06-22 ENCOUNTER — ANCILLARY PROCEDURE (OUTPATIENT)
Dept: MRI IMAGING | Facility: CLINIC | Age: 48
End: 2023-06-22
Attending: INTERNAL MEDICINE
Payer: COMMERCIAL

## 2023-06-22 DIAGNOSIS — D13.4 HEPATIC ADENOMA: ICD-10-CM

## 2023-06-22 DIAGNOSIS — K76.0 NAFLD (NONALCOHOLIC FATTY LIVER DISEASE): ICD-10-CM

## 2023-06-22 PROCEDURE — A9585 GADOBUTROL INJECTION: HCPCS | Mod: JZ | Performed by: RADIOLOGY

## 2023-06-22 PROCEDURE — 74183 MRI ABD W/O CNTR FLWD CNTR: CPT | Performed by: RADIOLOGY

## 2023-06-22 RX ORDER — GADOBUTROL 604.72 MG/ML
10 INJECTION INTRAVENOUS ONCE
Status: COMPLETED | OUTPATIENT
Start: 2023-06-22 | End: 2023-06-22

## 2023-06-22 RX ADMIN — GADOBUTROL 10 ML: 604.72 INJECTION INTRAVENOUS at 16:49

## 2023-06-22 NOTE — TELEPHONE ENCOUNTER
Central Prior Authorization Team - Phone: [phone]     Prior Authorization Approval    Medication: DEXCOM G6 SENSOR MISC  Authorization Effective Date: 6/22/2023  Authorization Expiration Date: 6/21/2024  Approved Dose/Quantity: 3  Reference #:     Insurance Company: [employer] - Phone [phone] Fax [phone]  Expected CoPay:       CoPay Card Available:      Financial Assistance Needed:   Which Pharmacy is filling the prescription: [facility] - [place], [place] - [address] [place] [address]  Pharmacy Notified: Yes  Patient Notified: Yes pharmacy will notify when ready

## 2023-06-22 NOTE — TELEPHONE ENCOUNTER
Central Prior Authorization Team - Phone: [phone]     Prior Authorization Approval    Medication: DEXCOM G6 TRANSMITTER MISC  Authorization Effective Date: 6/22/2023  Authorization Expiration Date: 6/21/2024  Approved Dose/Quantity: 1  Reference #:     Insurance Company: [employer] - Phone [phone] Fax [phone]  Expected CoPay:       CoPay Card Available:      Financial Assistance Needed:   Which Pharmacy is filling the prescription: [facility] - [place], [place] - [address] [place] [address]  Pharmacy Notified: Yes  Patient Notified: Yes pharmacy will notify when ready

## 2023-06-22 NOTE — TELEPHONE ENCOUNTER
Central Prior Authorization Team - Phone: [phone]      Prior Authorization Approval    Medication: DEXCOM G6  [place]  Authorization Effective Date: 6/22/2023  Authorization Expiration Date: 6/21/2024  Approved Dose/Quantity: 1  Reference #:     Insurance Company: [employer] - Phone [phone] Fax [phone]  Expected CoPay:       CoPay Card Available:      Financial Assistance Needed:   Which Pharmacy is filling the prescription: [facility] - [place], [place] - [address] [place] [address]  Pharmacy Notified: Yes  Patient Notified: Yes pharmacy will notify when ready

## 2023-06-23 ENCOUNTER — TRANSFERRED RECORDS (OUTPATIENT)
Dept: HEALTH INFORMATION MANAGEMENT | Facility: CLINIC | Age: 48
End: 2023-06-23
Payer: COMMERCIAL

## 2023-06-28 ENCOUNTER — OFFICE VISIT (OUTPATIENT)
Dept: FAMILY MEDICINE | Facility: CLINIC | Age: 48
End: 2023-06-28
Payer: COMMERCIAL

## 2023-06-28 VITALS
WEIGHT: 230 LBS | DIASTOLIC BLOOD PRESSURE: 78 MMHG | SYSTOLIC BLOOD PRESSURE: 115 MMHG | OXYGEN SATURATION: 97 % | BODY MASS INDEX: 34.86 KG/M2 | TEMPERATURE: 97.8 F | HEART RATE: 85 BPM | RESPIRATION RATE: 20 BRPM | HEIGHT: 68 IN

## 2023-06-28 DIAGNOSIS — E03.9 HYPOTHYROIDISM, UNSPECIFIED TYPE: ICD-10-CM

## 2023-06-28 DIAGNOSIS — Z01.818 PREOP GENERAL PHYSICAL EXAM: Primary | ICD-10-CM

## 2023-06-28 DIAGNOSIS — Z79.891 LONG TERM PRESCRIPTION OPIATE USE: ICD-10-CM

## 2023-06-28 DIAGNOSIS — F31.60 BIPOLAR AFFECTIVE DISORDER, CURRENT EPISODE MIXED, CURRENT EPISODE SEVERITY UNSPECIFIED (H): ICD-10-CM

## 2023-06-28 DIAGNOSIS — M25.571 PAIN IN JOINT INVOLVING ANKLE AND FOOT, RIGHT: ICD-10-CM

## 2023-06-28 DIAGNOSIS — F33.0 MILD EPISODE OF RECURRENT MAJOR DEPRESSIVE DISORDER (H): ICD-10-CM

## 2023-06-28 DIAGNOSIS — E11.9 TYPE 2 DIABETES MELLITUS WITHOUT COMPLICATION, WITHOUT LONG-TERM CURRENT USE OF INSULIN (H): ICD-10-CM

## 2023-06-28 DIAGNOSIS — Z13.220 SCREENING FOR HYPERLIPIDEMIA: ICD-10-CM

## 2023-06-28 LAB
AMPHETAMINES UR QL: DETECTED
ANION GAP SERPL CALCULATED.3IONS-SCNC: 11 MMOL/L (ref 7–15)
BARBITURATES UR QL SCN: NOT DETECTED
BENZODIAZ UR QL SCN: NOT DETECTED
BUN SERPL-MCNC: 10.7 MG/DL (ref 6–20)
BUPRENORPHINE UR QL: NOT DETECTED
CALCIUM SERPL-MCNC: 9.5 MG/DL (ref 8.6–10)
CANNABINOIDS UR QL: NOT DETECTED
CHLORIDE SERPL-SCNC: 102 MMOL/L (ref 98–107)
CHOLEST SERPL-MCNC: 133 MG/DL
COCAINE UR QL SCN: NOT DETECTED
CREAT SERPL-MCNC: 0.68 MG/DL (ref 0.51–0.95)
D-METHAMPHET UR QL: NOT DETECTED
DEPRECATED HCO3 PLAS-SCNC: 26 MMOL/L (ref 22–29)
ERYTHROCYTE [DISTWIDTH] IN BLOOD BY AUTOMATED COUNT: 13.5 % (ref 10–15)
GFR SERPL CREATININE-BSD FRML MDRD: >90 ML/MIN/1.73M2
GLUCOSE SERPL-MCNC: 249 MG/DL (ref 70–99)
HBA1C MFR BLD: 10.8 % (ref 0–5.6)
HCT VFR BLD AUTO: 38.8 % (ref 35–47)
HDLC SERPL-MCNC: 52 MG/DL
HGB BLD-MCNC: 12.3 G/DL (ref 11.7–15.7)
LDLC SERPL CALC-MCNC: 60 MG/DL
MCH RBC QN AUTO: 26.5 PG (ref 26.5–33)
MCHC RBC AUTO-ENTMCNC: 31.7 G/DL (ref 31.5–36.5)
MCV RBC AUTO: 83 FL (ref 78–100)
METHADONE UR QL SCN: NOT DETECTED
NONHDLC SERPL-MCNC: 81 MG/DL
OPIATES UR QL SCN: NOT DETECTED
OXYCODONE UR QL SCN: DETECTED
PCP UR QL SCN: NOT DETECTED
PLATELET # BLD AUTO: 390 10E3/UL (ref 150–450)
POTASSIUM SERPL-SCNC: 4.8 MMOL/L (ref 3.4–5.3)
PROPOXYPH UR QL: NOT DETECTED
RBC # BLD AUTO: 4.65 10E6/UL (ref 3.8–5.2)
SODIUM SERPL-SCNC: 139 MMOL/L (ref 136–145)
TRICYCLICS UR QL SCN: DETECTED
TRIGL SERPL-MCNC: 105 MG/DL
TSH SERPL DL<=0.005 MIU/L-ACNC: 0.94 UIU/ML (ref 0.3–4.2)
WBC # BLD AUTO: 7.4 10E3/UL (ref 4–11)

## 2023-06-28 PROCEDURE — 83036 HEMOGLOBIN GLYCOSYLATED A1C: CPT | Performed by: NURSE PRACTITIONER

## 2023-06-28 PROCEDURE — 82306 VITAMIN D 25 HYDROXY: CPT | Performed by: NURSE PRACTITIONER

## 2023-06-28 PROCEDURE — 36415 COLL VENOUS BLD VENIPUNCTURE: CPT | Performed by: NURSE PRACTITIONER

## 2023-06-28 PROCEDURE — 99214 OFFICE O/P EST MOD 30 MIN: CPT | Performed by: NURSE PRACTITIONER

## 2023-06-28 PROCEDURE — 84443 ASSAY THYROID STIM HORMONE: CPT | Performed by: NURSE PRACTITIONER

## 2023-06-28 PROCEDURE — 80306 DRUG TEST PRSMV INSTRMNT: CPT | Performed by: NURSE PRACTITIONER

## 2023-06-28 PROCEDURE — 80061 LIPID PANEL: CPT | Performed by: NURSE PRACTITIONER

## 2023-06-28 PROCEDURE — 85027 COMPLETE CBC AUTOMATED: CPT | Performed by: NURSE PRACTITIONER

## 2023-06-28 PROCEDURE — 80048 BASIC METABOLIC PNL TOTAL CA: CPT | Performed by: NURSE PRACTITIONER

## 2023-06-28 NOTE — LETTER
6/28/2023        RE: Elicia Calero  [address]  [place] [address]        [address]  [address]  [place] [address]  Phone: [phone]  Fax: [phone]  Primary Provider: Luda Mann  Pre-op Performing Provider: LUDA MANN      PREOPERATIVE EVALUATION:  Today's date: 6/28/2023    Elicia Calero is a 48 year old female who presents for a preoperative evaluation.      6/28/2023     8:47 AM   Additional Questions   Roomed by ronna   Accompanied by self     Surgical Information:  Surgery/Procedure:Right ankle tendon repair  Surgery Location: [facility]  Surgeon: DR miri rosa  Surgery Date: 06/30/2023  Time of Surgery: 12:00 pm  Where patient plans to recover: At home with family  Fax number for surgical facility: Note does not need to be faxed, will be available electronically in Epic.    Assessment & Plan     The proposed surgical procedure is considered INTERMEDIATE risk.    Preop general physical exam  preoperative examination wnl; no concerning health issues identified prior to surgery; reviewed cardiac risk, chronic health history, medications and discussed presurgical medication management. Patient deemed medically stable for planned surgery.  Plan: will check the following:    - CBC with platelets  - CBC with platelets    Pain in joint involving ankle and foot, right  S/p right ankle arthrodesis ~6-7 months ago w/ongoing pain recent imaging with [facility] suggestive of tendon rupture; scheduled for surgical intervention    Type 2 diabetes mellitus without complication, without long-term current use of insulin (H)  Controlled; with current management metformin, oral glp-1; glipizide; 7.9%  - Hemoglobin A1c  - Basic metabolic panel    Long term prescription opiate use  CSA -- Encounter Level:    CSA: None found at the encounter level.     CSA -- Patient Level:    CSA: None found at the patient level.        Completed CSA for oxycodone use; will hold the day before and of surgery  - Urine Drugs of Abuse Screen    Screening for hyperlipidemia  - Lipid Profile    Mild episode of recurrent major depressive disorder (H)  Stable; continue current regimen; renewed medication  - Vitamin D Deficiency    Bipolar affective disorder, current episode mixed, current episode severity unspecified (H)  Stable; continue current regimen; renewed medication  - Vitamin D Deficiency    Hypothyroidism, unspecified type  Stable; continue current regimen;   - TSH with free T4 reflex              - No identified additional risk factors other than previously addressed    Antiplatelet or Anticoagulation Medication Instructions:   - Patient is on no antiplatelet or anticoagulation medications.    Additional Medication Instructions:   - Diuretics: HOLD on the day of surgery.   - metformin: HOLD day of surgery.   - sulfonylurea (e.g. glyburide, glipizide): HOLD day of surgery   - GLP-1 Injectable (exenitide, liraglutide, semaglutide, dulaglutide, etc.): HOLD day of surgery  ENDOCRINE hold levothyroxine day of surgery   - fiber, laxatives: HOLD day of surgery   - pregabalin, gabapentin: Continue without modification.   - triptans, migraine abortives: HOLD on day of surgery   - indomethacin (Indocin): HOLD 1 day before surgery.    - SSRIs, SNRIs, TCAs, Antipsychotics: Continue without modification.    - phentermine: HOLD 7 days prior to surgery.    RECOMMENDATION:  APPROVAL GIVEN to proceed with proposed procedure, without further diagnostic evaluation.            Subjective       HPI related to upcoming procedure:   48 year old  year old female with PMH   Patient Active Problem List   Diagnosis Code     Gastroesophageal reflux disease with esophagitis without hemorrhage K21.00     Hypothyroidism, unspecified type E03.9     Hepatic adenoma D13.4     NAFLD (nonalcoholic fatty liver disease) K76.0     Type 2 diabetes mellitus without complication,  without long-term current use of insulin (H) E11.9     Irritable bowel syndrome, unspecified type K58.9     Mild episode of recurrent major depressive disorder (H) F33.0     Chronic migraine without aura without status migrainosus, not intractable G43.709     Chronic low back pain without sciatica, unspecified back pain laterality M54.50, G89.29     Bipolar affective disorder, current episode mixed, current episode severity unspecified (H) F31.60     Pain in joint involving ankle and foot, right M25.571     Persistent insomnia G47.00     Localized edema R60.0     Facet arthropathy, lumbosacral M47.817     in clinic for preoperative examination for upcoming procedure right ankle tendon repair. Patient suffered a fall in 3/2020 with multiple surgical interventions since initial fall most recently ~12/2022. Followed by [facility], states recent imaging suggestive of right ankle tendon rupture. Scheduled for surgical repair.     Review of all chronic health conditions, medications, smoking history and COVID status completed to ensure patient medically stable for surgery.         6/28/2023     8:40 AM   Preop Questions   1. Have you ever had a heart attack or stroke? No   2. Have you ever had surgery on your heart or blood vessels, such as a stent placement, a coronary artery bypass, or surgery on an artery in your head, neck, heart, or legs? No   3. Do you have chest pain with activity? No   4. Do you have a history of  heart failure? No   5. Do you currently have a cold, bronchitis or symptoms of other infection? No   6. Do you have a cough, shortness of breath, or wheezing? No   7. Do you or anyone in your family have previous history of blood clots? No   8. Do you or does anyone in your family have a serious bleeding problem such as prolonged bleeding following surgeries or cuts? No   9. Have you ever had problems with anemia or been told to take iron pills? YES -    10. Have you had any abnormal blood loss  such as black, tarry or bloody stools, or abnormal vaginal bleeding? No   11. Have you ever had a blood transfusion? No   12. Are you willing to have a blood transfusion if it is medically needed before, during, or after your surgery? Yes   13. Have you or any of your relatives ever had problems with anesthesia? YES -    14. Do you have sleep apnea, excessive snoring or daytime drowsiness? No   15. Do you have any artifical heart valves or other implanted medical devices like a pacemaker, defibrillator, or continuous glucose monitor? No   16. Do you have artificial joints? No   17. Are you allergic to latex? No   18. Is there any chance that you may be pregnant? No       Health Care Directive:  Patient does not have a Health Care Directive or Living Will: Discussed advance care planning with patient; information given to patient to review.    Preoperative Review of :   reviewed - controlled substances reflected in medication list.      Status of Chronic Conditions:  ANEMIA - Patient has a recent history of moderate-severe anemia, which has been symptomatic. Work up to date has revealed iron deficiency anemia. Treatment has been iron supplement.     DEPRESSION - Patient has a long history of Depression of moderate severity requiring medication for control with recent symptoms being stable..Current symptoms of depression include none.     DIABETES - Patient has a longstanding history of DiabetesType Type II . Patient is being treated with oral agents and denies significant side effects. Control has been good. Complicating factors include but are not limited to: obesity.     HYPOTHYROIDISM - Patient has a longstanding history of chronic Hypothyroidism. Patient has been doing well, noting no tremor, insomnia, hair loss or changes in skin texture. Continues to take medications as directed, without adverse reactions or side effects. Last TSH   Lab Results   Component Value Date    TSH 0.94 06/28/2023   .         Review of Systems  CONSTITUTIONAL: NEGATIVE for fever, chills, change in weight  INTEGUMENTARY/SKIN: NEGATIVE for worrisome rashes, moles or lesions  EYES: NEGATIVE for vision changes or irritation  ENT/MOUTH: NEGATIVE for ear, mouth and throat problems  RESP: NEGATIVE for significant cough or SOB  CV: NEGATIVE for chest pain, palpitations or peripheral edema  GI: NEGATIVE for nausea, abdominal pain, heartburn, or change in bowel habits  : NEGATIVE for frequency, dysuria, or hematuria  MUSCULOSKELETAL: NEGATIVE for significant arthralgias or myalgia  NEURO: NEGATIVE for weakness, dizziness or paresthesias  ENDOCRINE: NEGATIVE for temperature intolerance, skin/hair changes  HEME: NEGATIVE for bleeding problems  PSYCHIATRIC: NEGATIVE for changes in mood or affect    Patient Active Problem List    Diagnosis Date Noted     Facet arthropathy, lumbosacral 05/03/2023     Priority: Medium     Added automatically from request for surgery [id number]       Gastroesophageal reflux disease with esophagitis without hemorrhage 11/28/2022     Priority: Medium     Hypothyroidism, unspecified type 11/28/2022     Priority: Medium     Hepatic adenoma 11/28/2022     Priority: Medium     NAFLD (nonalcoholic fatty liver disease) 11/28/2022     Priority: Medium     Type 2 diabetes mellitus without complication, without long-term current use of insulin (H) 11/28/2022     Priority: Medium     Irritable bowel syndrome, unspecified type 11/28/2022     Priority: Medium     Mild episode of recurrent major depressive disorder (H) 11/28/2022     Priority: Medium     Chronic migraine without aura without status migrainosus, not intractable 11/28/2022     Priority: Medium     Chronic low back pain without sciatica, unspecified back pain laterality 11/28/2022     Priority: Medium     Bipolar affective disorder, current episode mixed, current episode severity unspecified (H) 11/28/2022     Priority: Medium     Pain in joint involving ankle and  foot, right 11/28/2022     Priority: Medium     Persistent insomnia 11/28/2022     Priority: Medium     Localized edema 11/28/2022     Priority: Medium      Past Medical History:   Diagnosis Date     Diabetes mellitus (H)      Hepatic adenoma      HTN (hypertension)      Hyperlipidemia      Hypothyroidism      Migraine      NAFLD (nonalcoholic fatty liver disease)      Past Surgical History:   Procedure Laterality Date     CHOLECYSTECTOMY      teen years     COLONOSCOPY N/A 5/18/2023    Procedure: Colonoscopy;  Surgeon: Chance Chan MD;  [place] [facility]     ESOPHAGOSCOPY, GASTROSCOPY, DUODENOSCOPY (EGD), COMBINED N/A 5/18/2023    Procedure: ESOPHAGOGASTRODUODENOSCOPY, WITH BIOPSY;  Surgeon: Chance Chan MD;  [place] [facility] [place]     RADIO FREQUENCY ABLATION / DESTRUCTION OF SACROILOAC JOINT LATERAL BRANCHES (S1/S2/S3) Bilateral 6/15/2023    Procedure: Bilateral Lumbar 5 medial branch block and Sacral 1,2,3 lateral branch block;  Surgeon: Layne Weber MD;  [place] [facility]     Current Outpatient Medications   Medication Sig Dispense Refill     brexpiprazole (REXULTI) 1 MG tablet Take 1 tablet (1 mg) by mouth daily 90 tablet 3     buPROPion (WELLBUTRIN XL) 150 MG 24 hr tablet Take 1 tablet (150 mg) by mouth every morning 90 tablet 3     buPROPion (WELLBUTRIN XL) 300 MG 24 hr tablet Take 1 tablet (300 mg) by mouth daily 90 tablet 3     Continuous Blood Gluc  (DEXCOM G6 ) [place] Use to read blood sugars as per 's instructions. 1 each 0     Continuous Blood Gluc  (DEXCOM G7 ) [place] Use to read blood sugars as per 's instructions. 1 each 0     Continuous Blood Gluc Sensor (DEXCOM G6 SENSOR) MISC Change every 10 days. 3 each 5     Continuous Blood Gluc Sensor (DEXCOM G7 SENSOR) MISC Change every 10 days. 2 each 5     Continuous Blood Gluc Transmit (DEXCOM G6 TRANSMITTER) MISC Change every 3 months. 1 each 1     doxepin (SINEQUAN) 25  MG capsule Take 1 capsule (25 mg) by mouth At Bedtime 30 capsule 3     folic acid (FOLVITE) 1 MG tablet Take 1 tablet (1,000 mcg) by mouth daily 90 tablet 3     gabapentin (NEURONTIN) 300 MG capsule Take 4 capsules (1,200 mg) by mouth 3 times daily 1080 capsule 3     glipiZIDE (GLUCOTROL XL) 10 MG 24 hr tablet Take 2 tablets (20 mg) by mouth daily 180 tablet 3     hydrochlorothiazide (MICROZIDE) 12.5 MG capsule Take 1 capsule (12.5 mg) by mouth daily 90 capsule 3     hydrOXYzine (VISTARIL) 50 MG capsule Take 1 capsule (50 mg) by mouth daily 90 capsule 3     indomethacin (INDOCIN) 50 MG capsule Take 1 capsule (50 mg) by mouth 3 times daily 90 capsule 1     lamoTRIgine (LAMICTAL) 200 MG tablet Take 2 tablets (400 mg) by mouth At Bedtime for 360 days 180 tablet 3     levothyroxine (SYNTHROID/LEVOTHROID) 125 MCG tablet Take 1 tablet (125 mcg) by mouth daily 90 tablet 1     lidocaine, viscous, (XYLOCAINE) 2 % solution Swish and spit 5 mLs in mouth 5 times daily 100 mL 1     lisdexamfetamine (VYVANSE) 30 MG capsule Take 30 mg by mouth every morning       metFORMIN (GLUCOPHAGE XR) 500 MG 24 hr tablet Take 4 tablets (2,000 mg) by mouth daily 360 tablet 3     methocarbamol (ROBAXIN) 750 MG tablet TAKE 1 TABLET(750 MG) BY MOUTH FOUR TIMES DAILY AS NEEDED FOR MUSCLE SPASMS 120 tablet 3     norethindrone (MICRONOR) 0.35 MG tablet Take 1 tablet (0.35 mg) by mouth daily 84 tablet 11     nystatin (MYCOSTATIN) 908424 UNIT/GM external cream Apply 100,000 g topically 3 times daily       omeprazole (PRILOSEC) 20 MG DR capsule Take 1 capsule (20 mg) by mouth 2 times daily 90 capsule 3     ondansetron (ZOFRAN) 4 MG tablet Take 1 tablet (4 mg) by mouth 3 times daily 90 tablet 1     oxyCODONE (ROXICODONE) 5 MG tablet Take 1 tablet (5 mg) by mouth 4 times daily 120 tablet 0     [START ON 7/22/2023] oxyCODONE (ROXICODONE) 5 MG tablet Take 1 tablet (5 mg) by mouth 4 times daily 120 tablet 0     [START ON 8/22/2023] oxyCODONE (ROXICODONE) 5  "MG tablet Take 1 tablet (5 mg) by mouth 4 times daily 120 tablet 0     pravastatin (PRAVACHOL) 40 MG tablet Take 1 tablet (40 mg) by mouth daily 90 tablet 3     prochlorperazine (COMPAZINE) 10 MG tablet Take 1 tablet (10 mg) by mouth 4 times daily as needed for nausea (with migraine) 120 tablet 1     rimegepant (NURTEC) 75 MG ODT tablet Place 1 tablet (75 mg) under the tongue every other day Maximum of 1 tablet every 24 hours. 15 tablet 6     rizatriptan (MAXALT) 10 MG tablet Take 1 tablet (10 mg) by mouth 2 times daily 60 tablet 3     Semaglutide (RYBELSUS) 14 MG tablet Take 14 mg by mouth daily 90 tablet 3     topiramate (TOPAMAX) 50 MG tablet Take 1 tablet (50 mg) by mouth every evening 90 tablet 0       Allergies   Allergen Reactions     Lithium Other (See Comments) and Dizziness     Other reaction(s): PSYCHOSIS     Sulfa Antibiotics Swelling     lips  Cold sores per pt       Adhesive Tape Rash     Albuterol Nausea and Vomiting     Clarithromycin Nausea and Vomiting     Erythromycin Nausea and Vomiting     Amoxicillin-Pot Clavulanate Nausea and Vomiting     Venlafaxine Other (See Comments)     Other reaction(s): PSYCHOSIS  Psychosis  Psychosis  Per transferred records          Social History     Tobacco Use     Smoking status: Former     Packs/day: 1.00     Years: 20.00     Pack years: 20.00     Types: Cigarettes     Quit date:      Years since quittin.4     Smokeless tobacco: Never   Substance Use Topics     Alcohol use: Yes     Comment: occasional     Family History   Problem Relation Age of Onset     Hypertension Mother      Diabetes Maternal Grandmother      Heart Disease Paternal Grandmother      Liver Disease No family hx of      History   Drug Use Unknown        Objective     /78 (BP Location: Right arm, Patient Position: Sitting, Cuff Size: Adult Regular)   Pulse 85   Temp 97.8  F (36.6  C) (Temporal)   Resp 20   Ht 1.737 m (5' 8.4\")   Wt 104.3 kg (230 lb)   LMP 2023 " (Approximate)   SpO2 97%   BMI 34.56 kg/m      Physical Exam    GENERAL APPEARANCE: healthy, alert and no distress     EYES: EOMI, PERRL     HENT: ear canals and TM's normal and nose and mouth without ulcers or lesions     NECK: no adenopathy, no asymmetry, masses, or scars and thyroid normal to palpation     RESP: lungs clear to auscultation - no rales, rhonchi or wheezes     CV: regular rates and rhythm, normal S1 S2, no S3 or S4 and no murmur, click or rub     ABDOMEN:  soft, nontender, no HSM or masses and bowel sounds normal     MS: extremities normal- no gross deformities noted, no evidence of inflammation in joints, FROM in all extremities.     SKIN: no suspicious lesions or rashes     NEURO: Normal strength and tone, sensory exam grossly normal, mentation intact and speech normal     PSYCH: mentation appears normal. and affect normal/bright     LYMPHATICS: No cervical adenopathy    Recent Labs   Lab Test 03/02/23  0857 12/01/22  1013   HGB 12.7 13.4    326   INR 0.94  --    * 142   POTASSIUM 5.3 4.9   CR 0.79 0.85   A1C 7.9* 8.6*        Diagnostics:  Labs pending at this time.  Results will be reviewed when available.   No EKG required, no history of coronary heart disease, significant arrhythmia, peripheral arterial disease or other structural heart disease.    Revised Cardiac Risk Index (RCRI):  The patient has the following serious cardiovascular risks for perioperative complications:   - No serious cardiac risks = 0 points     RCRI Interpretation: 0 points: Class I (very low risk - 0.4% complication rate)          Signed Electronically by: CANDACE Youngblood CNP  Copy of this evaluation report is provided to requesting physician.          Sincerely,        CANDACE Youngblood CNP

## 2023-06-28 NOTE — LETTER
6/28/2023        RE: Elicia Calero  [address]  [place] [address]        [address]  [address]  [place] [address]  Phone: [phone]  Fax: [phone]  Primary Provider: Luda Mann  Pre-op Performing Provider: LUDA MANN      PREOPERATIVE EVALUATION:  Today's date: 6/28/2023    Elicia Calero is a 48 year old female who presents for a preoperative evaluation.      6/28/2023     8:47 AM   Additional Questions   Roomed by ronna   Accompanied by self     Surgical Information:  Surgery/Procedure:Right ankle tendon repair  Surgery Location: [facility]  Surgeon: DR miri rosa  Surgery Date: 06/30/2023  Time of Surgery: 12:00 pm  Where patient plans to recover: At home with family  Fax number for surgical facility: Note does not need to be faxed, will be available electronically in Epic.    Assessment & Plan     The proposed surgical procedure is considered INTERMEDIATE risk.    Preop general physical exam  preoperative examination wnl; no concerning health issues identified prior to surgery; reviewed cardiac risk, chronic health history, medications and discussed presurgical medication management. Patient deemed medically stable for planned surgery.  Plan: will check the following:    - CBC with platelets  - CBC with platelets    Pain in joint involving ankle and foot, right  S/p right ankle arthrodesis ~6-7 months ago w/ongoing pain recent imaging with [facility] suggestive of tendon rupture; scheduled for surgical intervention    Type 2 diabetes mellitus without complication, without long-term current use of insulin (H)  Controlled; with current management metformin, oral glp-1; glipizide; 7.9%  - Hemoglobin A1c  - Basic metabolic panel    Long term prescription opiate use  CSA -- Encounter Level:    CSA: None found at the encounter level.     CSA -- Patient Level:    CSA: None found at the patient level.        Completed CSA for oxycodone use; will hold the day before and of surgery  - Urine Drugs of Abuse Screen    Screening for hyperlipidemia  - Lipid Profile    Mild episode of recurrent major depressive disorder (H)  Stable; continue current regimen; renewed medication  - Vitamin D Deficiency    Bipolar affective disorder, current episode mixed, current episode severity unspecified (H)  Stable; continue current regimen; renewed medication  - Vitamin D Deficiency    Hypothyroidism, unspecified type  Stable; continue current regimen;   - TSH with free T4 reflex              - No identified additional risk factors other than previously addressed    Antiplatelet or Anticoagulation Medication Instructions:   - Patient is on no antiplatelet or anticoagulation medications.    Additional Medication Instructions:   - Diuretics: HOLD on the day of surgery.   - metformin: HOLD day of surgery.   - sulfonylurea (e.g. glyburide, glipizide): HOLD day of surgery   - GLP-1 Injectable (exenitide, liraglutide, semaglutide, dulaglutide, etc.): HOLD day of surgery  ENDOCRINE hold levothyroxine day of surgery   - fiber, laxatives: HOLD day of surgery   - pregabalin, gabapentin: Continue without modification.   - triptans, migraine abortives: HOLD on day of surgery   - indomethacin (Indocin): HOLD 1 day before surgery.    - SSRIs, SNRIs, TCAs, Antipsychotics: Continue without modification.    - phentermine: HOLD 7 days prior to surgery.    RECOMMENDATION:  APPROVAL GIVEN to proceed with proposed procedure, without further diagnostic evaluation.            Subjective       HPI related to upcoming procedure:   48 year old  year old female with PMH   Patient Active Problem List   Diagnosis Code     Gastroesophageal reflux disease with esophagitis without hemorrhage K21.00     Hypothyroidism, unspecified type E03.9     Hepatic adenoma D13.4     NAFLD (nonalcoholic fatty liver disease) K76.0     Type 2 diabetes mellitus without complication,  without long-term current use of insulin (H) E11.9     Irritable bowel syndrome, unspecified type K58.9     Mild episode of recurrent major depressive disorder (H) F33.0     Chronic migraine without aura without status migrainosus, not intractable G43.709     Chronic low back pain without sciatica, unspecified back pain laterality M54.50, G89.29     Bipolar affective disorder, current episode mixed, current episode severity unspecified (H) F31.60     Pain in joint involving ankle and foot, right M25.571     Persistent insomnia G47.00     Localized edema R60.0     Facet arthropathy, lumbosacral M47.817     in clinic for preoperative examination for upcoming procedure right ankle tendon repair. Patient suffered a fall in 3/2020 with multiple surgical interventions since initial fall most recently ~12/2022. Followed by [facility], states recent imaging suggestive of right ankle tendon rupture. Scheduled for surgical repair.     Review of all chronic health conditions, medications, smoking history and COVID status completed to ensure patient medically stable for surgery.         6/28/2023     8:40 AM   Preop Questions   1. Have you ever had a heart attack or stroke? No   2. Have you ever had surgery on your heart or blood vessels, such as a stent placement, a coronary artery bypass, or surgery on an artery in your head, neck, heart, or legs? No   3. Do you have chest pain with activity? No   4. Do you have a history of  heart failure? No   5. Do you currently have a cold, bronchitis or symptoms of other infection? No   6. Do you have a cough, shortness of breath, or wheezing? No   7. Do you or anyone in your family have previous history of blood clots? No   8. Do you or does anyone in your family have a serious bleeding problem such as prolonged bleeding following surgeries or cuts? No   9. Have you ever had problems with anemia or been told to take iron pills? YES -    10. Have you had any abnormal blood loss  such as black, tarry or bloody stools, or abnormal vaginal bleeding? No   11. Have you ever had a blood transfusion? No   12. Are you willing to have a blood transfusion if it is medically needed before, during, or after your surgery? Yes   13. Have you or any of your relatives ever had problems with anesthesia? YES -    14. Do you have sleep apnea, excessive snoring or daytime drowsiness? No   15. Do you have any artifical heart valves or other implanted medical devices like a pacemaker, defibrillator, or continuous glucose monitor? No   16. Do you have artificial joints? No   17. Are you allergic to latex? No   18. Is there any chance that you may be pregnant? No       Health Care Directive:  Patient does not have a Health Care Directive or Living Will: Discussed advance care planning with patient; information given to patient to review.    Preoperative Review of :   reviewed - controlled substances reflected in medication list.      Status of Chronic Conditions:  ANEMIA - Patient has a recent history of moderate-severe anemia, which has been symptomatic. Work up to date has revealed iron deficiency anemia. Treatment has been iron supplement.     DEPRESSION - Patient has a long history of Depression of moderate severity requiring medication for control with recent symptoms being stable..Current symptoms of depression include none.     DIABETES - Patient has a longstanding history of DiabetesType Type II . Patient is being treated with oral agents and denies significant side effects. Control has been good. Complicating factors include but are not limited to: obesity.     HYPOTHYROIDISM - Patient has a longstanding history of chronic Hypothyroidism. Patient has been doing well, noting no tremor, insomnia, hair loss or changes in skin texture. Continues to take medications as directed, without adverse reactions or side effects. Last TSH   Lab Results   Component Value Date    TSH 0.94 06/28/2023   .         Review of Systems  CONSTITUTIONAL: NEGATIVE for fever, chills, change in weight  INTEGUMENTARY/SKIN: NEGATIVE for worrisome rashes, moles or lesions  EYES: NEGATIVE for vision changes or irritation  ENT/MOUTH: NEGATIVE for ear, mouth and throat problems  RESP: NEGATIVE for significant cough or SOB  CV: NEGATIVE for chest pain, palpitations or peripheral edema  GI: NEGATIVE for nausea, abdominal pain, heartburn, or change in bowel habits  : NEGATIVE for frequency, dysuria, or hematuria  MUSCULOSKELETAL: NEGATIVE for significant arthralgias or myalgia  NEURO: NEGATIVE for weakness, dizziness or paresthesias  ENDOCRINE: NEGATIVE for temperature intolerance, skin/hair changes  HEME: NEGATIVE for bleeding problems  PSYCHIATRIC: NEGATIVE for changes in mood or affect    Patient Active Problem List    Diagnosis Date Noted     Facet arthropathy, lumbosacral 05/03/2023     Priority: Medium     Added automatically from request for surgery [id number]       Gastroesophageal reflux disease with esophagitis without hemorrhage 11/28/2022     Priority: Medium     Hypothyroidism, unspecified type 11/28/2022     Priority: Medium     Hepatic adenoma 11/28/2022     Priority: Medium     NAFLD (nonalcoholic fatty liver disease) 11/28/2022     Priority: Medium     Type 2 diabetes mellitus without complication, without long-term current use of insulin (H) 11/28/2022     Priority: Medium     Irritable bowel syndrome, unspecified type 11/28/2022     Priority: Medium     Mild episode of recurrent major depressive disorder (H) 11/28/2022     Priority: Medium     Chronic migraine without aura without status migrainosus, not intractable 11/28/2022     Priority: Medium     Chronic low back pain without sciatica, unspecified back pain laterality 11/28/2022     Priority: Medium     Bipolar affective disorder, current episode mixed, current episode severity unspecified (H) 11/28/2022     Priority: Medium     Pain in joint involving ankle and  foot, right 11/28/2022     Priority: Medium     Persistent insomnia 11/28/2022     Priority: Medium     Localized edema 11/28/2022     Priority: Medium      Past Medical History:   Diagnosis Date     Diabetes mellitus (H)      Hepatic adenoma      HTN (hypertension)      Hyperlipidemia      Hypothyroidism      Migraine      NAFLD (nonalcoholic fatty liver disease)      Past Surgical History:   Procedure Laterality Date     CHOLECYSTECTOMY      teen years     COLONOSCOPY N/A 5/18/2023    Procedure: Colonoscopy;  Surgeon: Chance Chan MD;  [place] [facility] [place]     ESOPHAGOSCOPY, GASTROSCOPY, DUODENOSCOPY (EGD), COMBINED N/A 5/18/2023    Procedure: ESOPHAGOGASTRODUODENOSCOPY, WITH BIOPSY;  Surgeon: Chance Chan MD;  [place] [facility] [place]     RADIO FREQUENCY ABLATION / DESTRUCTION OF SACROILOAC JOINT LATERAL BRANCHES (S1/S2/S3) Bilateral 6/15/2023    Procedure: Bilateral Lumbar 5 medial branch block and Sacral 1,2,3 lateral branch block;  Surgeon: Layne Weber MD;  [place] [facility] [place]     Current Outpatient Medications   Medication Sig Dispense Refill     brexpiprazole (REXULTI) 1 MG tablet Take 1 tablet (1 mg) by mouth daily 90 tablet 3     buPROPion (WELLBUTRIN XL) 150 MG 24 hr tablet Take 1 tablet (150 mg) by mouth every morning 90 tablet 3     buPROPion (WELLBUTRIN XL) 300 MG 24 hr tablet Take 1 tablet (300 mg) by mouth daily 90 tablet 3     Continuous Blood Gluc  (DEXCOM G6 ) RALEIGH Use to read blood sugars as per 's instructions. 1 each 0     Continuous Blood Gluc  (DEXCOM G7 ) R[place]IGH Use to read blood sugars as per 's instructions. 1 each 0     Continuous Blood Gluc Sensor (DEXCOM G6 SENSOR) MISC Change every 10 days. 3 each 5     Continuous Blood Gluc Sensor (DEXCOM G7 SENSOR) MISC Change every 10 days. 2 each 5     Continuous Blood Gluc Transmit (DEXCOM G6 TRANSMITTER) MISC Change every 3 months. 1 each 1     doxepin (SINEQUAN) 25  MG capsule Take 1 capsule (25 mg) by mouth At Bedtime 30 capsule 3     folic acid (FOLVITE) 1 MG tablet Take 1 tablet (1,000 mcg) by mouth daily 90 tablet 3     gabapentin (NEURONTIN) 300 MG capsule Take 4 capsules (1,200 mg) by mouth 3 times daily 1080 capsule 3     glipiZIDE (GLUCOTROL XL) 10 MG 24 hr tablet Take 2 tablets (20 mg) by mouth daily 180 tablet 3     hydrochlorothiazide (MICROZIDE) 12.5 MG capsule Take 1 capsule (12.5 mg) by mouth daily 90 capsule 3     hydrOXYzine (VISTARIL) 50 MG capsule Take 1 capsule (50 mg) by mouth daily 90 capsule 3     indomethacin (INDOCIN) 50 MG capsule Take 1 capsule (50 mg) by mouth 3 times daily 90 capsule 1     lamoTRIgine (LAMICTAL) 200 MG tablet Take 2 tablets (400 mg) by mouth At Bedtime for 360 days 180 tablet 3     levothyroxine (SYNTHROID/LEVOTHROID) 125 MCG tablet Take 1 tablet (125 mcg) by mouth daily 90 tablet 1     lidocaine, viscous, (XYLOCAINE) 2 % solution Swish and spit 5 mLs in mouth 5 times daily 100 mL 1     lisdexamfetamine (VYVANSE) 30 MG capsule Take 30 mg by mouth every morning       metFORMIN (GLUCOPHAGE XR) 500 MG 24 hr tablet Take 4 tablets (2,000 mg) by mouth daily 360 tablet 3     methocarbamol (ROBAXIN) 750 MG tablet TAKE 1 TABLET(750 MG) BY MOUTH FOUR TIMES DAILY AS NEEDED FOR MUSCLE SPASMS 120 tablet 3     norethindrone (MICRONOR) 0.35 MG tablet Take 1 tablet (0.35 mg) by mouth daily 84 tablet 11     nystatin (MYCOSTATIN) 857151 UNIT/GM external cream Apply 100,000 g topically 3 times daily       omeprazole (PRILOSEC) 20 MG DR capsule Take 1 capsule (20 mg) by mouth 2 times daily 90 capsule 3     ondansetron (ZOFRAN) 4 MG tablet Take 1 tablet (4 mg) by mouth 3 times daily 90 tablet 1     oxyCODONE (ROXICODONE) 5 MG tablet Take 1 tablet (5 mg) by mouth 4 times daily 120 tablet 0     [START ON 7/22/2023] oxyCODONE (ROXICODONE) 5 MG tablet Take 1 tablet (5 mg) by mouth 4 times daily 120 tablet 0     [START ON 8/22/2023] oxyCODONE (ROXICODONE) 5  "MG tablet Take 1 tablet (5 mg) by mouth 4 times daily 120 tablet 0     pravastatin (PRAVACHOL) 40 MG tablet Take 1 tablet (40 mg) by mouth daily 90 tablet 3     prochlorperazine (COMPAZINE) 10 MG tablet Take 1 tablet (10 mg) by mouth 4 times daily as needed for nausea (with migraine) 120 tablet 1     rimegepant (NURTEC) 75 MG ODT tablet Place 1 tablet (75 mg) under the tongue every other day Maximum of 1 tablet every 24 hours. 15 tablet 6     rizatriptan (MAXALT) 10 MG tablet Take 1 tablet (10 mg) by mouth 2 times daily 60 tablet 3     Semaglutide (RYBELSUS) 14 MG tablet Take 14 mg by mouth daily 90 tablet 3     topiramate (TOPAMAX) 50 MG tablet Take 1 tablet (50 mg) by mouth every evening 90 tablet 0       Allergies   Allergen Reactions     Lithium Other (See Comments) and Dizziness     Other reaction(s): PSYCHOSIS     Sulfa Antibiotics Swelling     lips  Cold sores per pt       Adhesive Tape Rash     Albuterol Nausea and Vomiting     Clarithromycin Nausea and Vomiting     Erythromycin Nausea and Vomiting     Amoxicillin-Pot Clavulanate Nausea and Vomiting     Venlafaxine Other (See Comments)     Other reaction(s): PSYCHOSIS  Psychosis  Psychosis  Per transferred records          Social History     Tobacco Use     Smoking status: Former     Packs/day: 1.00     Years: 20.00     Pack years: 20.00     Types: Cigarettes     Quit date:      Years since quittin.4     Smokeless tobacco: Never   Substance Use Topics     Alcohol use: Yes     Comment: occasional     Family History   Problem Relation Age of Onset     Hypertension Mother      Diabetes Maternal Grandmother      Heart Disease Paternal Grandmother      Liver Disease No family hx of      History   Drug Use Unknown        Objective     /78 (BP Location: Right arm, Patient Position: Sitting, Cuff Size: Adult Regular)   Pulse 85   Temp 97.8  F (36.6  C) (Temporal)   Resp 20   Ht 1.737 m (5' 8.4\")   Wt 104.3 kg (230 lb)   LMP 2023 " (Approximate)   SpO2 97%   BMI 34.56 kg/m      Physical Exam    GENERAL APPEARANCE: healthy, alert and no distress     EYES: EOMI, PERRL     HENT: ear canals and TM's normal and nose and mouth without ulcers or lesions     NECK: no adenopathy, no asymmetry, masses, or scars and thyroid normal to palpation     RESP: lungs clear to auscultation - no rales, rhonchi or wheezes     CV: regular rates and rhythm, normal S1 S2, no S3 or S4 and no murmur, click or rub     ABDOMEN:  soft, nontender, no HSM or masses and bowel sounds normal     MS: extremities normal- no gross deformities noted, no evidence of inflammation in joints, FROM in all extremities.     SKIN: no suspicious lesions or rashes     NEURO: Normal strength and tone, sensory exam grossly normal, mentation intact and speech normal     PSYCH: mentation appears normal. and affect normal/bright     LYMPHATICS: No cervical adenopathy    Recent Labs   Lab Test 03/02/23  0857 12/01/22  1013   HGB 12.7 13.4    326   INR 0.94  --    * 142   POTASSIUM 5.3 4.9   CR 0.79 0.85   A1C 7.9* 8.6*        Diagnostics:  Labs pending at this time.  Results will be reviewed when available.   No EKG required, no history of coronary heart disease, significant arrhythmia, peripheral arterial disease or other structural heart disease.    Revised Cardiac Risk Index (RCRI):  The patient has the following serious cardiovascular risks for perioperative complications:   - No serious cardiac risks = 0 points     RCRI Interpretation: 0 points: Class I (very low risk - 0.4% complication rate)          Signed Electronically by: CANDACE Youngblood CNP  Copy of this evaluation report is provided to requesting physician.          Sincerely,        CANDACE Youngblood CNP

## 2023-06-28 NOTE — PROGRESS NOTES
[address]  [address]  [place] [address]  Phone: [phone]  Fax: [phone]  Primary Provider: Luda Yeboah  Pre-op Performing Provider: LUDA YEBOAH      PREOPERATIVE EVALUATION:  Today's date: 6/28/2023    Elicia Calero is a 48 year old female who presents for a preoperative evaluation.      6/28/2023     8:47 AM   Additional Questions   Roomed by ronna   Accompanied by self     Surgical Information:  Surgery/Procedure:Right ankle tendon repair  Surgery Location: [facility]  Surgeon: DR miri rosa  Surgery Date: 06/30/2023  Time of Surgery: 12:00 pm  Where patient plans to recover: At home with family  Fax number for surgical facility: Note does not need to be faxed, will be available electronically in Epic.    Assessment & Plan     The proposed surgical procedure is considered INTERMEDIATE risk.    Preop general physical exam  preoperative examination wnl; no concerning health issues identified prior to surgery; reviewed cardiac risk, chronic health history, medications and discussed presurgical medication management. Patient deemed medically stable for planned surgery.  Plan: will check the following:    - CBC with platelets  - CBC with platelets    Pain in joint involving ankle and foot, right  S/p right ankle arthrodesis ~6-7 months ago w/ongoing pain recent imaging with [facility] suggestive of tendon rupture; scheduled for surgical intervention    Type 2 diabetes mellitus without complication, without long-term current use of insulin (H)  Controlled; with current management metformin, oral glp-1; glipizide; 7.9%  - Hemoglobin A1c  - Basic metabolic panel    Long term prescription opiate use  CSA -- Encounter Level:    CSA: None found at the encounter level.     CSA -- Patient Level:    CSA: None found at the patient level.       Completed CSA for oxycodone use; will hold the day before and of surgery  - Urine Drugs of Abuse  Screen    Screening for hyperlipidemia  - Lipid Profile    Mild episode of recurrent major depressive disorder (H)  Stable; continue current regimen; renewed medication  - Vitamin D Deficiency    Bipolar affective disorder, current episode mixed, current episode severity unspecified (H)  Stable; continue current regimen; renewed medication  - Vitamin D Deficiency    Hypothyroidism, unspecified type  Stable; continue current regimen;   - TSH with free T4 reflex              - No identified additional risk factors other than previously addressed    Antiplatelet or Anticoagulation Medication Instructions:   - Patient is on no antiplatelet or anticoagulation medications.    Additional Medication Instructions:   - Diuretics: HOLD on the day of surgery.   - metformin: HOLD day of surgery.   - sulfonylurea (e.g. glyburide, glipizide): HOLD day of surgery   - GLP-1 Injectable (exenitide, liraglutide, semaglutide, dulaglutide, etc.): HOLD day of surgery  ENDOCRINE hold levothyroxine day of surgery   - fiber, laxatives: HOLD day of surgery   - pregabalin, gabapentin: Continue without modification.   - triptans, migraine abortives: HOLD on day of surgery   - indomethacin (Indocin): HOLD 1 day before surgery.    - SSRIs, SNRIs, TCAs, Antipsychotics: Continue without modification.    - phentermine: HOLD 7 days prior to surgery.    RECOMMENDATION:  APPROVAL GIVEN to proceed with proposed procedure, without further diagnostic evaluation.            Subjective       HPI related to upcoming procedure:   48 year old  year old female with PMH   Patient Active Problem List   Diagnosis Code     Gastroesophageal reflux disease with esophagitis without hemorrhage K21.00     Hypothyroidism, unspecified type E03.9     Hepatic adenoma D13.4     NAFLD (nonalcoholic fatty liver disease) K76.0     Type 2 diabetes mellitus without complication, without long-term current use of insulin (H) E11.9     Irritable bowel syndrome, unspecified type  K58.9     Mild episode of recurrent major depressive disorder (H) F33.0     Chronic migraine without aura without status migrainosus, not intractable G43.709     Chronic low back pain without sciatica, unspecified back pain laterality M54.50, G89.29     Bipolar affective disorder, current episode mixed, current episode severity unspecified (H) F31.60     Pain in joint involving ankle and foot, right M25.571     Persistent insomnia G47.00     Localized edema R60.0     Facet arthropathy, lumbosacral M47.817     in clinic for preoperative examination for upcoming procedure right ankle tendon repair. Patient suffered a fall in 3/2020 with multiple surgical interventions since initial fall most recently ~12/2022. Followed by [facility], states recent imaging suggestive of right ankle tendon rupture. Scheduled for surgical repair.     Review of all chronic health conditions, medications, smoking history and COVID status completed to ensure patient medically stable for surgery.         6/28/2023     8:40 AM   Preop Questions   1. Have you ever had a heart attack or stroke? No   2. Have you ever had surgery on your heart or blood vessels, such as a stent placement, a coronary artery bypass, or surgery on an artery in your head, neck, heart, or legs? No   3. Do you have chest pain with activity? No   4. Do you have a history of  heart failure? No   5. Do you currently have a cold, bronchitis or symptoms of other infection? No   6. Do you have a cough, shortness of breath, or wheezing? No   7. Do you or anyone in your family have previous history of blood clots? No   8. Do you or does anyone in your family have a serious bleeding problem such as prolonged bleeding following surgeries or cuts? No   9. Have you ever had problems with anemia or been told to take iron pills? YES -    10. Have you had any abnormal blood loss such as black, tarry or bloody stools, or abnormal vaginal bleeding? No   11. Have you ever had a  blood transfusion? No   12. Are you willing to have a blood transfusion if it is medically needed before, during, or after your surgery? Yes   13. Have you or any of your relatives ever had problems with anesthesia? YES -    14. Do you have sleep apnea, excessive snoring or daytime drowsiness? No   15. Do you have any artifical heart valves or other implanted medical devices like a pacemaker, defibrillator, or continuous glucose monitor? No   16. Do you have artificial joints? No   17. Are you allergic to latex? No   18. Is there any chance that you may be pregnant? No       Health Care Directive:  Patient does not have a Health Care Directive or Living Will: Discussed advance care planning with patient; information given to patient to review.    Preoperative Review of :   reviewed - controlled substances reflected in medication list.      Status of Chronic Conditions:  ANEMIA - Patient has a recent history of moderate-severe anemia, which has been symptomatic. Work up to date has revealed iron deficiency anemia. Treatment has been iron supplement.     DEPRESSION - Patient has a long history of Depression of moderate severity requiring medication for control with recent symptoms being stable..Current symptoms of depression include none.     DIABETES - Patient has a longstanding history of DiabetesType Type II . Patient is being treated with oral agents and denies significant side effects. Control has been good. Complicating factors include but are not limited to: obesity.     HYPOTHYROIDISM - Patient has a longstanding history of chronic Hypothyroidism. Patient has been doing well, noting no tremor, insomnia, hair loss or changes in skin texture. Continues to take medications as directed, without adverse reactions or side effects. Last TSH   Lab Results   Component Value Date    TSH 0.94 06/28/2023   .        Review of Systems  CONSTITUTIONAL: NEGATIVE for fever, chills, change in weight  INTEGUMENTARY/SKIN:  NEGATIVE for worrisome rashes, moles or lesions  EYES: NEGATIVE for vision changes or irritation  ENT/MOUTH: NEGATIVE for ear, mouth and throat problems  RESP: NEGATIVE for significant cough or SOB  CV: NEGATIVE for chest pain, palpitations or peripheral edema  GI: NEGATIVE for nausea, abdominal pain, heartburn, or change in bowel habits  : NEGATIVE for frequency, dysuria, or hematuria  MUSCULOSKELETAL: NEGATIVE for significant arthralgias or myalgia  NEURO: NEGATIVE for weakness, dizziness or paresthesias  ENDOCRINE: NEGATIVE for temperature intolerance, skin/hair changes  HEME: NEGATIVE for bleeding problems  PSYCHIATRIC: NEGATIVE for changes in mood or affect    Patient Active Problem List    Diagnosis Date Noted     Facet arthropathy, lumbosacral 05/03/2023     Priority: Medium     Added automatically from request for surgery [id number]       Gastroesophageal reflux disease with esophagitis without hemorrhage 11/28/2022     Priority: Medium     Hypothyroidism, unspecified type 11/28/2022     Priority: Medium     Hepatic adenoma 11/28/2022     Priority: Medium     NAFLD (nonalcoholic fatty liver disease) 11/28/2022     Priority: Medium     Type 2 diabetes mellitus without complication, without long-term current use of insulin (H) 11/28/2022     Priority: Medium     Irritable bowel syndrome, unspecified type 11/28/2022     Priority: Medium     Mild episode of recurrent major depressive disorder (H) 11/28/2022     Priority: Medium     Chronic migraine without aura without status migrainosus, not intractable 11/28/2022     Priority: Medium     Chronic low back pain without sciatica, unspecified back pain laterality 11/28/2022     Priority: Medium     Bipolar affective disorder, current episode mixed, current episode severity unspecified (H) 11/28/2022     Priority: Medium     Pain in joint involving ankle and foot, right 11/28/2022     Priority: Medium     Persistent insomnia 11/28/2022     Priority: Medium      Localized edema 11/28/2022     Priority: Medium      Past Medical History:   Diagnosis Date     Diabetes mellitus (H)      Hepatic adenoma      HTN (hypertension)      Hyperlipidemia      Hypothyroidism      Migraine      NAFLD (nonalcoholic fatty liver disease)      Past Surgical History:   Procedure Laterality Date     CHOLECYSTECTOMY      teen years     COLONOSCOPY N/A 5/18/2023    Procedure: Colonoscopy;  Surgeon: Chance Chan MD;  Location: [facility] OR     ESOPHAGOSCOPY, GASTROSCOPY, DUODENOSCOPY (EGD), COMBINED N/A 5/18/2023    Procedure: ESOPHAGOGASTRODUODENOSCOPY, WITH BIOPSY;  Surgeon: Chance Chan MD;  Location: [facility] OR     RADIO FREQUENCY ABLATION / DESTRUCTION OF SACROILOAC JOINT LATERAL BRANCHES (S1/S2/S3) Bilateral 6/15/2023    Procedure: Bilateral Lumbar 5 medial branch block and Sacral 1,2,3 lateral branch block;  Surgeon: Layne Weber MD;  Location: [facility] OR     Current Outpatient Medications   Medication Sig Dispense Refill     brexpiprazole (REXULTI) 1 MG tablet Take 1 tablet (1 mg) by mouth daily 90 tablet 3     buPROPion (WELLBUTRIN XL) 150 MG 24 hr tablet Take 1 tablet (150 mg) by mouth every morning 90 tablet 3     buPROPion (WELLBUTRIN XL) 300 MG 24 hr tablet Take 1 tablet (300 mg) by mouth daily 90 tablet 3     Continuous Blood Gluc  (DEXCOM G6 ) RALEIGH Use to read blood sugars as per 's instructions. 1 each 0     Continuous Blood Gluc  (DEXCOM G7 ) RALEIGH Use to read blood sugars as per 's instructions. 1 each 0     Continuous Blood Gluc Sensor (DEXCOM G6 SENSOR) MISC Change every 10 days. 3 each 5     Continuous Blood Gluc Sensor (DEXCOM G7 SENSOR) MISC Change every 10 days. 2 each 5     Continuous Blood Gluc Transmit (DEXCOM G6 TRANSMITTER) MISC Change every 3 months. 1 each 1     doxepin (SINEQUAN) 25 MG capsule Take 1 capsule (25 mg) by mouth At Bedtime 30 capsule 3     folic acid (FOLVITE) 1 MG tablet  Take 1 tablet (1,000 mcg) by mouth daily 90 tablet 3     gabapentin (NEURONTIN) 300 MG capsule Take 4 capsules (1,200 mg) by mouth 3 times daily 1080 capsule 3     glipiZIDE (GLUCOTROL XL) 10 MG 24 hr tablet Take 2 tablets (20 mg) by mouth daily 180 tablet 3     hydrochlorothiazide (MICROZIDE) 12.5 MG capsule Take 1 capsule (12.5 mg) by mouth daily 90 capsule 3     hydrOXYzine (VISTARIL) 50 MG capsule Take 1 capsule (50 mg) by mouth daily 90 capsule 3     indomethacin (INDOCIN) 50 MG capsule Take 1 capsule (50 mg) by mouth 3 times daily 90 capsule 1     lamoTRIgine (LAMICTAL) 200 MG tablet Take 2 tablets (400 mg) by mouth At Bedtime for 360 days 180 tablet 3     levothyroxine (SYNTHROID/LEVOTHROID) 125 MCG tablet Take 1 tablet (125 mcg) by mouth daily 90 tablet 1     lidocaine, viscous, (XYLOCAINE) 2 % solution Swish and spit 5 mLs in mouth 5 times daily 100 mL 1     lisdexamfetamine (VYVANSE) 30 MG capsule Take 30 mg by mouth every morning       metFORMIN (GLUCOPHAGE XR) 500 MG 24 hr tablet Take 4 tablets (2,000 mg) by mouth daily 360 tablet 3     methocarbamol (ROBAXIN) 750 MG tablet TAKE 1 TABLET(750 MG) BY MOUTH FOUR TIMES DAILY AS NEEDED FOR MUSCLE SPASMS 120 tablet 3     norethindrone (MICRONOR) 0.35 MG tablet Take 1 tablet (0.35 mg) by mouth daily 84 tablet 11     nystatin (MYCOSTATIN) 693738 UNIT/GM external cream Apply 100,000 g topically 3 times daily       omeprazole (PRILOSEC) 20 MG DR capsule Take 1 capsule (20 mg) by mouth 2 times daily 90 capsule 3     ondansetron (ZOFRAN) 4 MG tablet Take 1 tablet (4 mg) by mouth 3 times daily 90 tablet 1     oxyCODONE (ROXICODONE) 5 MG tablet Take 1 tablet (5 mg) by mouth 4 times daily 120 tablet 0     [START ON 7/22/2023] oxyCODONE (ROXICODONE) 5 MG tablet Take 1 tablet (5 mg) by mouth 4 times daily 120 tablet 0     [START ON 8/22/2023] oxyCODONE (ROXICODONE) 5 MG tablet Take 1 tablet (5 mg) by mouth 4 times daily 120 tablet 0     pravastatin (PRAVACHOL) 40 MG  "tablet Take 1 tablet (40 mg) by mouth daily 90 tablet 3     prochlorperazine (COMPAZINE) 10 MG tablet Take 1 tablet (10 mg) by mouth 4 times daily as needed for nausea (with migraine) 120 tablet 1     rimegepant (NURTEC) 75 MG ODT tablet Place 1 tablet (75 mg) under the tongue every other day Maximum of 1 tablet every 24 hours. 15 tablet 6     rizatriptan (MAXALT) 10 MG tablet Take 1 tablet (10 mg) by mouth 2 times daily 60 tablet 3     Semaglutide (RYBELSUS) 14 MG tablet Take 14 mg by mouth daily 90 tablet 3     topiramate (TOPAMAX) 50 MG tablet Take 1 tablet (50 mg) by mouth every evening 90 tablet 0       Allergies   Allergen Reactions     Lithium Other (See Comments) and Dizziness     Other reaction(s): PSYCHOSIS     Sulfa Antibiotics Swelling     lips  Cold sores per pt       Adhesive Tape Rash     Albuterol Nausea and Vomiting     Clarithromycin Nausea and Vomiting     Erythromycin Nausea and Vomiting     Amoxicillin-Pot Clavulanate Nausea and Vomiting     Venlafaxine Other (See Comments)     Other reaction(s): PSYCHOSIS  Psychosis  Psychosis  Per transferred records          Social History     Tobacco Use     Smoking status: Former     Packs/day: 1.00     Years: 20.00     Pack years: 20.00     Types: Cigarettes     Quit date:      Years since quittin.4     Smokeless tobacco: Never   Substance Use Topics     Alcohol use: Yes     Comment: occasional     Family History   Problem Relation Age of Onset     Hypertension Mother      Diabetes Maternal Grandmother      Heart Disease Paternal Grandmother      Liver Disease No family hx of      History   Drug Use Unknown         Objective     /78 (BP Location: Right arm, Patient Position: Sitting, Cuff Size: Adult Regular)   Pulse 85   Temp 97.8  F (36.6  C) (Temporal)   Resp 20   Ht 1.737 m (5' 8.4\")   Wt 104.3 kg (230 lb)   LMP 2023 (Approximate)   SpO2 97%   BMI 34.56 kg/m      Physical Exam    GENERAL APPEARANCE: healthy, alert and no " distress     EYES: EOMI, PERRL     HENT: ear canals and TM's normal and nose and mouth without ulcers or lesions     NECK: no adenopathy, no asymmetry, masses, or scars and thyroid normal to palpation     RESP: lungs clear to auscultation - no rales, rhonchi or wheezes     CV: regular rates and rhythm, normal S1 S2, no S3 or S4 and no murmur, click or rub     ABDOMEN:  soft, nontender, no HSM or masses and bowel sounds normal     MS: extremities normal- no gross deformities noted, no evidence of inflammation in joints, FROM in all extremities.     SKIN: no suspicious lesions or rashes     NEURO: Normal strength and tone, sensory exam grossly normal, mentation intact and speech normal     PSYCH: mentation appears normal. and affect normal/bright     LYMPHATICS: No cervical adenopathy    Recent Labs   Lab Test 03/02/23  0857 12/01/22  1013   HGB 12.7 13.4    326   INR 0.94  --    * 142   POTASSIUM 5.3 4.9   CR 0.79 0.85   A1C 7.9* 8.6*        Diagnostics:  Labs pending at this time.  Results will be reviewed when available.   No EKG required, no history of coronary heart disease, significant arrhythmia, peripheral arterial disease or other structural heart disease.    Revised Cardiac Risk Index (RCRI):  The patient has the following serious cardiovascular risks for perioperative complications:   - No serious cardiac risks = 0 points     RCRI Interpretation: 0 points: Class I (very low risk - 0.4% complication rate)           Signed Electronically by: CANDACE Youngblood CNP  Copy of this evaluation report is provided to requesting physician.

## 2023-06-29 LAB — DEPRECATED CALCIDIOL+CALCIFEROL SERPL-MC: 33 UG/L (ref 20–75)

## 2023-06-30 ENCOUNTER — MYC MEDICAL ADVICE (OUTPATIENT)
Dept: FAMILY MEDICINE | Facility: CLINIC | Age: 48
End: 2023-06-30
Payer: COMMERCIAL

## 2023-06-30 DIAGNOSIS — E11.9 TYPE 2 DIABETES MELLITUS WITHOUT COMPLICATION, WITHOUT LONG-TERM CURRENT USE OF INSULIN (H): ICD-10-CM

## 2023-06-30 DIAGNOSIS — Z79.891 LONG TERM PRESCRIPTION OPIATE USE: ICD-10-CM

## 2023-06-30 DIAGNOSIS — M47.817 FACET ARTHROPATHY, LUMBOSACRAL: Primary | ICD-10-CM

## 2023-06-30 DIAGNOSIS — M25.571 PAIN IN JOINT INVOLVING ANKLE AND FOOT, RIGHT: ICD-10-CM

## 2023-06-30 NOTE — TELEPHONE ENCOUNTER
Ysabel, who is working with provider at [facility] and patient reached out in regards to below message. Would like an answer on who should own pain medication. Please advise       Ysabel [phone] secure

## 2023-07-05 NOTE — TELEPHONE ENCOUNTER
Spoke with patient regarding the following concern:     DM: uncontolled a1c 10%; discussed changing oral semaglutide to injectable; discussed dpp4 therapy  Plan: add jardiance 25 mg daily; rept a1c in 3 months     Pain management: agree with increase in oxycodone 4 times daily to 5 times daily   CANDACE Youngblood CNP

## 2023-07-06 RX ORDER — OXYCODONE HYDROCHLORIDE 5 MG/1
5 TABLET ORAL
Qty: 150 TABLET | Refills: 0 | Status: SHIPPED | OUTPATIENT
Start: 2023-07-17 | End: 2023-08-16

## 2023-07-11 ENCOUNTER — MYC MEDICAL ADVICE (OUTPATIENT)
Dept: FAMILY MEDICINE | Facility: CLINIC | Age: 48
End: 2023-07-11
Payer: COMMERCIAL

## 2023-07-11 DIAGNOSIS — E11.9 TYPE 2 DIABETES MELLITUS WITHOUT COMPLICATION, WITHOUT LONG-TERM CURRENT USE OF INSULIN (H): ICD-10-CM

## 2023-07-11 DIAGNOSIS — G43.709 CHRONIC MIGRAINE WITHOUT AURA WITHOUT STATUS MIGRAINOSUS, NOT INTRACTABLE: ICD-10-CM

## 2023-07-11 DIAGNOSIS — G43.709 CHRONIC MIGRAINE WITHOUT AURA WITHOUT STATUS MIGRAINOSUS, NOT INTRACTABLE: Primary | ICD-10-CM

## 2023-07-14 RX ORDER — PROCHLORPERAZINE MALEATE 10 MG
10 TABLET ORAL 4 TIMES DAILY PRN
Qty: 120 TABLET | Refills: 5 | Status: SHIPPED | OUTPATIENT
Start: 2023-07-14

## 2023-07-14 NOTE — TELEPHONE ENCOUNTER
Gil: update on migraines and A1C request        I m wondering if my A1C can be checked every 4 weeks to see if it s going down and to determine when I can have surgery.  Maybe we have to do some authorization for it because it s not every 3 months.   Also, my headaches aren t any better with the Nurtec they almost seem worse.  I ve had 3 full blown migraines within the last week and I know exactly where they are starting.  The back of my neck on the left side is really tight and no matter what I do I can t get it loosened up even with the bigger amount of Robaxin.  Is there a different muscle relaxer I can do seeing as I m home now?  I do remember tizanidine worked for my headaches before but I stopped it because it made me dizzy/ high feeling / lightheaded but I m wondering if I could try that again maybe or if there s something else.  Please let me know, as soon as possible I would appreciate it because I m miserable :(    Juany VILLATORO RN  [facility]

## 2023-07-14 NOTE — TELEPHONE ENCOUNTER
9/8/23 was last OV with PCP.  Prescription approved per [facility] Refill Protocol.  SYLVIA Corbin

## 2023-07-18 DIAGNOSIS — K76.0 NAFLD (NONALCOHOLIC FATTY LIVER DISEASE): Primary | ICD-10-CM

## 2023-07-20 RX ORDER — TIZANIDINE 2 MG/1
2-4 TABLET ORAL 3 TIMES DAILY
Qty: 180 TABLET | Refills: 0 | Status: SHIPPED | OUTPATIENT
Start: 2023-07-20 | End: 2023-07-24

## 2023-07-20 NOTE — TELEPHONE ENCOUNTER
Gil: do you wish to change timing of oral antidiabetic agents?  I have pended nutrition or diabetic educator referrals, did consider endo as well.     I have the Dexcom G6 and have just started using it. But I have noticed that my bg readings increase throughout the night and I m near the 200 matthias and I go to bed less than that. For example last night it was 139 and I think it was 198 when I woke up and I didn t eat anything before bed either.  I had this problem before.  Then I ll take my metformin, jardiance, and glipizide and my sugars keep rising. It got up to 300 right after I ate breakfast of strawberries.  Is there anything I can do to drop that number down, like how you can raise it by eating?        Juany VILLATORO RN  [facility]

## 2023-07-23 RX ORDER — LORAZEPAM 0.5 MG/1
TABLET ORAL
COMMUNITY
Start: 2023-06-12 | End: 2023-07-24

## 2023-07-23 RX ORDER — BREXPIPRAZOLE 2 MG/1
2 TABLET ORAL DAILY
COMMUNITY
Start: 2023-06-27

## 2023-07-23 RX ORDER — MELOXICAM 15 MG/1
1 TABLET ORAL DAILY
COMMUNITY
Start: 2021-08-13 | End: 2023-07-24

## 2023-07-23 RX ORDER — NEOMYCIN SULFATE, POLYMYXIN B SULFATE AND HYDROCORTISONE 10; 3.5; 1 MG/ML; MG/ML; [USP'U]/ML
SUSPENSION/ DROPS AURICULAR (OTIC)
COMMUNITY
Start: 2022-03-11 | End: 2023-08-14

## 2023-07-23 RX ORDER — LISDEXAMFETAMINE DIMESYLATE 60 MG/1
CAPSULE ORAL
COMMUNITY
Start: 2023-06-27 | End: 2023-10-05

## 2023-07-24 ENCOUNTER — OFFICE VISIT (OUTPATIENT)
Dept: FAMILY MEDICINE | Facility: CLINIC | Age: 48
End: 2023-07-24
Payer: COMMERCIAL

## 2023-07-24 VITALS
SYSTOLIC BLOOD PRESSURE: 113 MMHG | HEIGHT: 68 IN | BODY MASS INDEX: 34.86 KG/M2 | TEMPERATURE: 97.7 F | OXYGEN SATURATION: 95 % | WEIGHT: 230 LBS | DIASTOLIC BLOOD PRESSURE: 77 MMHG | HEART RATE: 74 BPM | RESPIRATION RATE: 15 BRPM

## 2023-07-24 DIAGNOSIS — Z30.41 ENCOUNTER FOR SURVEILLANCE OF CONTRACEPTIVE PILLS: ICD-10-CM

## 2023-07-24 DIAGNOSIS — R21 RASH: ICD-10-CM

## 2023-07-24 DIAGNOSIS — Z76.89 ENCOUNTER TO ESTABLISH CARE: ICD-10-CM

## 2023-07-24 DIAGNOSIS — G43.709 CHRONIC MIGRAINE WITHOUT AURA WITHOUT STATUS MIGRAINOSUS, NOT INTRACTABLE: ICD-10-CM

## 2023-07-24 DIAGNOSIS — E66.812 CLASS 2 SEVERE OBESITY DUE TO EXCESS CALORIES WITH SERIOUS COMORBIDITY AND BODY MASS INDEX (BMI) OF 37.0 TO 37.9 IN ADULT (H): ICD-10-CM

## 2023-07-24 DIAGNOSIS — E03.9 HYPOTHYROIDISM, UNSPECIFIED TYPE: ICD-10-CM

## 2023-07-24 DIAGNOSIS — Z00.00 ROUTINE GENERAL MEDICAL EXAMINATION AT A HEALTH CARE FACILITY: Primary | ICD-10-CM

## 2023-07-24 DIAGNOSIS — R60.0 LOCALIZED EDEMA: ICD-10-CM

## 2023-07-24 DIAGNOSIS — F11.90 CHRONIC, CONTINUOUS USE OF OPIOIDS: ICD-10-CM

## 2023-07-24 DIAGNOSIS — G89.4 CHRONIC PAIN DISORDER: ICD-10-CM

## 2023-07-24 DIAGNOSIS — E66.01 CLASS 2 SEVERE OBESITY DUE TO EXCESS CALORIES WITH SERIOUS COMORBIDITY AND BODY MASS INDEX (BMI) OF 37.0 TO 37.9 IN ADULT (H): ICD-10-CM

## 2023-07-24 DIAGNOSIS — M25.571 PAIN IN JOINT INVOLVING ANKLE AND FOOT, RIGHT: ICD-10-CM

## 2023-07-24 DIAGNOSIS — E11.9 TYPE 2 DIABETES MELLITUS WITHOUT COMPLICATION, WITHOUT LONG-TERM CURRENT USE OF INSULIN (H): ICD-10-CM

## 2023-07-24 DIAGNOSIS — G47.00 PERSISTENT INSOMNIA: ICD-10-CM

## 2023-07-24 DIAGNOSIS — K21.00 GASTROESOPHAGEAL REFLUX DISEASE WITH ESOPHAGITIS WITHOUT HEMORRHAGE: ICD-10-CM

## 2023-07-24 PROCEDURE — 90471 IMMUNIZATION ADMIN: CPT | Performed by: STUDENT IN AN ORGANIZED HEALTH CARE EDUCATION/TRAINING PROGRAM

## 2023-07-24 PROCEDURE — 99396 PREV VISIT EST AGE 40-64: CPT | Mod: 25 | Performed by: STUDENT IN AN ORGANIZED HEALTH CARE EDUCATION/TRAINING PROGRAM

## 2023-07-24 PROCEDURE — 90677 PCV20 VACCINE IM: CPT | Performed by: STUDENT IN AN ORGANIZED HEALTH CARE EDUCATION/TRAINING PROGRAM

## 2023-07-24 PROCEDURE — 99214 OFFICE O/P EST MOD 30 MIN: CPT | Mod: 25 | Performed by: STUDENT IN AN ORGANIZED HEALTH CARE EDUCATION/TRAINING PROGRAM

## 2023-07-24 RX ORDER — DOXEPIN HYDROCHLORIDE 25 MG/1
50 CAPSULE ORAL AT BEDTIME
Qty: 30 CAPSULE | Refills: 3 | COMMUNITY
Start: 2023-07-24

## 2023-07-24 RX ORDER — TIZANIDINE 2 MG/1
2 TABLET ORAL 3 TIMES DAILY
Qty: 270 TABLET | Refills: 3 | Status: SHIPPED | OUTPATIENT
Start: 2023-07-24 | End: 2023-10-12

## 2023-07-24 RX ORDER — INDOMETHACIN 50 MG/1
50 CAPSULE ORAL 3 TIMES DAILY
Qty: 270 CAPSULE | Refills: 3 | Status: SHIPPED | OUTPATIENT
Start: 2023-07-24 | End: 2024-04-10 | Stop reason: SINTOL

## 2023-07-24 RX ORDER — ACETAMINOPHEN AND CODEINE PHOSPHATE 120; 12 MG/5ML; MG/5ML
0.35 SOLUTION ORAL DAILY
Qty: 84 TABLET | Refills: 4 | Status: SHIPPED | OUTPATIENT
Start: 2023-07-24 | End: 2024-01-03

## 2023-07-24 RX ORDER — PRAVASTATIN SODIUM 40 MG
40 TABLET ORAL DAILY
Qty: 90 TABLET | Refills: 3 | Status: SHIPPED | OUTPATIENT
Start: 2023-07-24 | End: 2024-04-29

## 2023-07-24 RX ORDER — TRIAMCINOLONE ACETONIDE 1 MG/G
OINTMENT TOPICAL
Qty: 15 G | Refills: 1 | Status: SHIPPED | OUTPATIENT
Start: 2023-07-24

## 2023-07-24 RX ORDER — HYDROCHLOROTHIAZIDE 12.5 MG/1
12.5 CAPSULE ORAL DAILY
Qty: 90 CAPSULE | Refills: 3 | Status: SHIPPED | OUTPATIENT
Start: 2023-07-24 | End: 2024-01-03

## 2023-07-24 RX ORDER — LEVOTHYROXINE SODIUM 125 UG/1
125 TABLET ORAL DAILY
Qty: 90 TABLET | Refills: 3 | Status: SHIPPED | OUTPATIENT
Start: 2023-07-24 | End: 2024-01-03

## 2023-07-24 RX ORDER — METFORMIN HCL 500 MG
2000 TABLET, EXTENDED RELEASE 24 HR ORAL DAILY
Qty: 360 TABLET | Refills: 3 | Status: SHIPPED | OUTPATIENT
Start: 2023-07-24 | End: 2024-01-03

## 2023-07-24 RX ORDER — GLIPIZIDE 10 MG/1
20 TABLET, FILM COATED, EXTENDED RELEASE ORAL DAILY
Qty: 180 TABLET | Refills: 3 | Status: SHIPPED | OUTPATIENT
Start: 2023-07-24 | End: 2023-08-28

## 2023-07-24 RX ORDER — TOPIRAMATE 50 MG/1
50 TABLET, FILM COATED ORAL EVERY EVENING
Qty: 90 TABLET | Refills: 3 | Status: SHIPPED | OUTPATIENT
Start: 2023-07-24 | End: 2023-10-12

## 2023-07-24 ASSESSMENT — ENCOUNTER SYMPTOMS
EYE PAIN: 0
MYALGIAS: 1
ABDOMINAL PAIN: 0
ARTHRALGIAS: 1
HEADACHES: 1
JOINT SWELLING: 1
BREAST MASS: 0
DIARRHEA: 0
CHILLS: 0
FREQUENCY: 0
SHORTNESS OF BREATH: 0
WEAKNESS: 0
DYSURIA: 0
DIZZINESS: 0
NAUSEA: 0
SORE THROAT: 0
PALPITATIONS: 0
HEMATURIA: 0
FEVER: 0
COUGH: 0
HEMATOCHEZIA: 0
PARESTHESIAS: 0
HEARTBURN: 0
CONSTIPATION: 0
NERVOUS/ANXIOUS: 1

## 2023-07-24 ASSESSMENT — PAIN SCALES - GENERAL: PAINLEVEL: NO PAIN (0)

## 2023-07-24 NOTE — NURSING NOTE
Prior to immunization administration, verified patients identity using patient s name and date of birth. Please see Immunization Activity for additional information.     Screening Questionnaire for Adult Immunization    Are you sick today?   No   Do you have allergies to medications, food, a vaccine component or latex?   No   Have you ever had a serious reaction after receiving a vaccination?   No   Do you have a long-term health problem with heart, lung, kidney, or metabolic disease (e.g., diabetes), asthma, a blood disorder, no spleen, complement component deficiency, a cochlear implant, or a spinal fluid leak?  Are you on long-term aspirin therapy?   Yes   Do you have cancer, leukemia, HIV/AIDS, or any other immune system problem?   No   Do you have a parent, brother, or sister with an immune system problem?   No   In the past 3 months, have you taken medications that affect  your immune system, such as prednisone, other steroids, or anticancer drugs; drugs for the treatment of rheumatoid arthritis, Crohn s disease, or psoriasis; or have you had radiation treatments?   No   Have you had a seizure, or a brain or other nervous system problem?   No   During the past year, have you received a transfusion of blood or blood    products, or been given immune (gamma) globulin or antiviral drug?   No   For women: Are you pregnant or is there a chance you could become       pregnant during the next month?   No   Have you received any vaccinations in the past 4 weeks?   No     Immunization questionnaire was positive for at least one answer.  Notified Dr Edilson Tinoco 20 given today. JMI .      Patient instructed to remain in clinic for 15 minutes afterwards, and to report any adverse reactions.     Screening performed by An Robles MA on 7/24/2023 at 8:37 AM.

## 2023-07-24 NOTE — PATIENT INSTRUCTIONS
Diabetes:  -Schedule visit with our pharmacy team for diabetes (end of August/early September)  -Get yearly eye exam.  -Increase jardiance to 25mg (new prescription sent)    Opioid:  -evisit every 3 months for refills  -in person visits at least 2x/year  -yearly controlled substance agreement  -yearly urine drug screen    Dr. Waggoner      Preventive Health Recommendations  Female Ages 40 to 49    Yearly exam:   See your health care provider every year in order to  Review health changes.   Discuss preventive care.    Review your medicines if your doctor prescribed any.    Get a Pap test every three years (unless you have an abnormal result and your provider advises testing more often).    If you get Pap tests with HPV test, you only need to test every 5 years, unless you have an abnormal result. You do not need a Pap test if your uterus was removed (hysterectomy) and you have not had cancer.    You should be tested each year for STDs (sexually transmitted diseases), if you're at risk.   Ask your doctor if you should have a mammogram.    Have a colonoscopy (test for colon cancer) if someone in your family has had colon cancer or polyps before age 50.     Have a cholesterol test every 5 years.     Have a diabetes test (fasting glucose) after age 45. If you are at risk for diabetes, you should have this test every 3 years.    Shots: Get a flu shot each year. Get a tetanus shot every 10 years.     Nutrition:   Eat at least 5 servings of fruits and vegetables each day.  Eat whole-grain bread, whole-wheat pasta and brown rice instead of white grains and rice.  Get adequate Calcium and Vitamin D.      Lifestyle  Exercise at least 150 minutes a week (an average of 30 minutes a day, 5 days a week). This will help you control your weight and prevent disease.  Limit alcohol to one drink per day.  No smoking.   Wear sunscreen to prevent skin cancer.  See your dentist every six months for an exam and cleaning.

## 2023-07-24 NOTE — LETTER
Opioid / Opioid Plus Controlled Substance Agreement    This is an agreement between you and your provider about the safe and appropriate use of controlled substance/opioids prescribed by your care team. Controlled substances are medicines that can cause physical and mental dependence (abuse).    There are strict laws about having and using these medicines. We here at [facility] are committing to working with you in your efforts to get better. To support you in this work, we ll help you schedule regular office appointments for medicine refills. If we must cancel or change your appointment for any reason, we ll make sure you have enough medicine to last until your next appointment.     As a Provider, I will:  Listen carefully to your concerns and treat you with respect.   Recommend a treatment plan that I believe is in your best interest. This plan may involve therapies other than opioid pain medication.   Talk with you often about the possible benefits, and the risk of harm of any medicine that we prescribe for you.   Provide a plan on how to taper (discontinue or go off) using this medicine if the decision is made to stop its use.    As a Patient, I understand that opioid(s):   Are a controlled substance prescribed by my care team to help me function or work and manage my condition(s).   Are strong medicines and can cause serious side effects such as:  Drowsiness, which can seriously affect my driving ability  A lower breathing rate, enough to cause death  Harm to my thinking ability   Depression   Abuse of and addiction to this medicine  Need to be taken exactly as prescribed. Combining opioids with certain medicines or chemicals (such as illegal drugs, sedatives, sleeping pills, and benzodiazepines) can be dangerous or even fatal. If I stop opioids suddenly, I may have severe withdrawal symptoms.  Do not work for all types of pain nor for all patients. If they re not helpful, I may be asked to stop  them.        The risks, benefits and side effects of these medicine(s) were explained to me. I agree that:  I will take part in other treatments as advised by my care team. This may be psychiatry or counseling, physical therapy, behavioral therapy, group treatment or a referral to a specialist.     I will keep all my appointments. I understand that this is part of the monitoring of opioids. My care team may require an office visit for EVERY opioid/controlled substance refill. If I miss appointments or don t follow instructions, my care team may stop my medicine.    I will take my medicines as prescribed. I will not change the dose or schedule unless my care team tells me to. There will be no refills if I run out early.     I may be asked to come to the clinic and complete a urine drug test or complete a pill count at any time. If I don t give a urine sample or participate in a pill count, the care team may stop my medicine.    I will only receive prescriptions from this clinic for chronic pain. If I am treated by another provider for acute pain issues, I will tell them that I am taking opioid pain medication for chronic pain and that I have a treatment agreement with this provider. I will inform my [facility] care team within one business day if I am given a prescription for any pain medication by another healthcare provider. My [facility] care team can contact other providers and pharmacists about my use of any medicines.    It is up to me to make sure that I don t run out of my medicines on weekends or holidays. If my care team is willing to refill my opioid prescription without a visit, I must request refills only during office hours. Refills may take up to 3 business days to process. I will use one pharmacy to fill all my opioid and other controlled substance prescriptions. I will notify the clinic about any changes to my insurance or medication availability.    I am responsible for my  prescriptions. If the medicine/prescription is lost, stolen or destroyed, it will not be replaced. I also agree not to share controlled substance medicines with anyone.    I am aware I should not use any illegal or recreational drugs. I agree not to drink alcohol unless my care team says I can.       If I enroll in the [place] [facility] Cann[facility] program, I will tell my care team prior to my next refill.     I will tell my care team right away if I become pregnant, have a new medical problem treated outside of my regular clinic, or have a change in my medications.    I understand that this medicine can affect my thinking, judgment and reaction time. Alcohol and drugs affect the brain and body, which can affect the safety of my driving. Being under the influence of alcohol or drugs can affect my decision-making, behaviors, personal safety, and the safety of others. Driving while impaired (DWI) can occur if a person is driving, operating, or in physical control of a car, motorcycle, boat, snowmobile, ATV, motorbike, off-road vehicle, or any other motor vehicle ([place] Statute 169A.20). I understand the risk if I choose to drive or operate any vehicle or machinery.    I understand that if I do not follow any of the conditions above, my prescriptions or treatment may be stopped or changed.          Opioids  What You Need to Know    What are opioids?   Opioids are pain medicines that must be prescribed by a doctor. They are also known as narcotics.     Examples are:   morphine (MS Contin, Vibha)  oxycodone (Oxycontin)  oxycodone and acetaminophen (Percocet)  hydrocodone and acetaminophen (Vicodin, Norco)   fentanyl patch (Duragesic)   hydromorphone (Dilaudid)   methadone  codeine (Tylenol #3)     What do opioids do well?   Opioids are best for severe short-term pain such as after a surgery or injury. They may work well for cancer pain. They may help some people with long-lasting (chronic) pain.     What do opioids NOT do  well?   Opioids never get rid of pain entirely, and they don t work well for most patients with chronic pain. Opioids don t reduce swelling, one of the causes of pain.                                    Other ways to manage chronic pain and improve function include:     Treat the health problem that may be causing pain  Anti-inflammation medicines, which reduce swelling and tenderness, such as ibuprofen (Advil, Motrin) or naproxen (Aleve)  Acetaminophen (Tylenol)  Antidepressants and anti-seizure medicines, especially for nerve pain  Topical treatments such as patches or creams  Injections or nerve blocks  Chiropractic or osteopathic treatment  Acupuncture, massage, deep breathing, meditation, visual imagery, aromatherapy  Use heat or ice at the pain site  Physical therapy   Exercise  Stop smoking  Take part in therapy       Risks and side effects     Talk to your doctor before you start or decide to keep taking opioids. Possible side effects include:    Lowering your breathing rate enough to cause death  Overdose, including death, especially if taking higher than prescribed doses  Worse depression symptoms; less pleasure in things you usually enjoy  Feeling tired or sluggish  Slower thoughts or cloudy thinking  Being more sensitive to pain over time; pain is harder to control  Trouble sleeping or restless sleep  Changes in hormone levels (for example, less testosterone)  Changes in sex drive or ability to have sex  Constipation  Unsafe driving  Itching and sweating  Dizziness  Nausea, throwing up and dry mouth    What else should I know about opioids?    Opioids may lead to dependence, tolerance, or addiction.    Dependence means that if you stop or reduce the medicine too quickly, you will have withdrawal symptoms. These include loose poop (diarrhea), jitters, flu-like symptoms, nervousness and tremors. Dependence is not the same as addiction.                     Tolerance means needing higher doses over time to  get the same effect. This may increase the chance of serious side effects.    Addiction is when people improperly use a substance that harms their body, their mind or their relations with others. Use of opiates can cause a relapse of addiction if you have a history of drug or alcohol abuse.    People who have used opioids for a long time may have a lower quality of life, worse depression, higher levels of pain and more visits to doctors.    You can overdose on opioids. Take these steps to lower your risk of overdose:    Recognize the signs:  Signs of overdose include decrease or loss of consciousness (blackout), slowed breathing, trouble waking up and blue lips. If someone is worried about overdose, they should call 911.    Talk to your doctor about Narcan (naloxone).   If you are at risk for overdose, you may be given a prescription for Narcan. This medicine very quickly reverses the effects of opioids.   If you overdose, a friend or family member can give you Narcan while waiting for the ambulance. They need to know the signs of overdose and how to give Narcan.     Don't use alcohol or street drugs.   Taking them with opioids can cause death.    Do not take any of these medicines unless your doctor says it s OK. Taking these with opioids can cause death:  Benzodiazepines, such as lorazepam (Ativan), alprazolam (Xanax) or diazepam (Valium)  Muscle relaxers, such as cyclobenzaprine (Flexeril)  Sleeping pills like zolpidem (Ambien)   Other opioids      How to keep you and other people safe while taking opioids:    Never share your opioids with others.  Opioid medicines are regulated by the Drug Enforcement Agency (NONA). Selling or sharing medications is a criminal act.    2. Be sure to store opioids in a secure place, locked up if possible. Young children can easily swallow them and overdose.    3. When you are traveling with your medicines, keep them in the original bottles. If you use a pill box, be sure you also  carry a copy of your medicine list from your clinic or pharmacy.    4. Safe disposal of opioids    Most pharmacies have places to get rid of medicine, called disposal kiosks. Medicine disposal options are also available in every [facility]. Search your county and  medication disposal  to find more options. You can find more details at:  https://www.pca.[email or web address]-green/managing-unwanted-medications     I agree that my provider, clinic care team, and pharmacy may work with any city, state or federal law enforcement agency that investigates the misuse, sale, or other diversion of my controlled medicine. I will allow my provider to discuss my care with, or share a copy of, this agreement with any other treating provider, pharmacy or emergency room where I receive care.    I have read this agreement and have asked questions about anything I did not understand.    _______________________________________________________  Patient Signature - Elicia Calero _____________________                   Date     _______________________________________________________  Provider Signature - Loc Waggoner DO   _____________________                   Date     _______________________________________________________  Witness Signature (required if provider not present while patient signing)   _____________________                   Date

## 2023-07-24 NOTE — PROGRESS NOTES
SUBJECTIVE:   CC: Elicia is an 48 year old who presents for preventive health visit.       7/24/2023     7:47 AM   Additional Questions   Roomed by Ilda Wadsworth     Healthy Habits:     Getting at least 3 servings of Calcium per day:  NO    Bi-annual eye exam:  Yes    Dental care twice a year:  NO    Sleep apnea or symptoms of sleep apnea:  None    Diet:  Diabetic and Carbohydrate counting    Frequency of exercise:  None    Taking medications regularly:  Yes    Medication side effects:  None    Additional concerns today:  Yes    Work-pharmacy tech  Diet-carb counting  Exercise-  Fam hx ovarian, breast, colon ca-    Contraception-  Pap done 10/21/2020, NIL HPV neg (due 2025)  Mammo-DUE (scheduled)  Colon cancer screening-colonoscopy done 5/18/23, repeat in 10 years (2033)    ________________    DM2  -eye exam-DUE  -dexcom    -notices sugars are high   -higher at night   -high 100 to low 200s fasting  -up to 300 during the day    -jardiance 10mg  -glipizide 20mg daily  -metformin 2000mg daily  -semaglutide 14mg daily (GLP1)  -due for A1C ~august (seeing PCP in September)    Edema  -hydrochlorothiazide 12.5mg  -working well    Mental health  -fady seeing psychiatrist  -vraylar  -nabumetone  -belsomra  -rexulti  -wellbutrin  -doxepin (insomnia)  -hydroxyzine prn  -lamictal    Obesity  Fatty liver disease  -topiramate 50mg daily  -vyvanse for binge eating  -seeing GI 8/2/23    Migraine  -gabapentin  -nurtec  -maxalt  -switched from robaxin to tizanidine (recent switch)  -working with chiro on neck    Joint pain, back pain  S/p R ankle arthodesis  Chronic continuous opioids  -gabapentin 1200mg TID  -indomethacin 50mg TID  -oxycodone 5mg, five times/day  -PDMP: last filled 30 day supply oxycodone (150 tablets) on 7/19/23    Hypothyroidism  -synthroid 125mcg  -TSH is up to date    HLD  -pravastatin 40mg daily      Have you ever done Advance Care Planning? (For example, a Health Directive, POLST, or a discussion with a  medical provider or your loved ones about your wishes): No, advance care planning information given to patient to review.  Patient declined advance care planning discussion at this time.    Social History     Tobacco Use     Smoking status: Former     Packs/day: 1.00     Years: 20.00     Pack years: 20.00     Types: Cigarettes     Quit date:      Years since quittin.5     Smokeless tobacco: Never   Substance Use Topics     Alcohol use: Yes     Comment: occasional           2023     7:42 AM   Alcohol Use   Prescreen: >3 drinks/day or >7 drinks/week? No          No data to display              Reviewed orders with patient.  Reviewed health maintenance and updated orders accordingly - Yes      Breast Cancer Screenin/2/2022     9:38 AM   Breast CA Risk Assessment (FHS-7)   Do you have a family history of breast, colon, or ovarian cancer? No / Unknown       click delete button to remove this line now  Mammogram Screening: Recommended annual mammography  Pertinent mammograms are reviewed under the imaging tab.    History of abnormal Pap smear: NO - age 30-65 PAP every 5 years with negative HPV co-testing recommended     Reviewed and updated as needed this visit by clinical staff   Tobacco  Allergies  Meds              Reviewed and updated as needed this visit by Provider     Meds                 Review of Systems   Constitutional:  Negative for chills and fever.   HENT:  Negative for congestion, ear pain, hearing loss and sore throat.    Eyes:  Negative for pain and visual disturbance.   Respiratory:  Negative for cough and shortness of breath.    Cardiovascular:  Negative for chest pain, palpitations and peripheral edema.   Gastrointestinal:  Negative for abdominal pain, constipation, diarrhea, heartburn, hematochezia and nausea.   Breasts:  Negative for tenderness, breast mass and discharge.   Genitourinary:  Negative for dysuria, frequency, genital sores, hematuria, pelvic pain, urgency,  "vaginal bleeding and vaginal discharge.   Musculoskeletal:  Positive for arthralgias, joint swelling and myalgias.   Neurological:  Positive for headaches. Negative for dizziness, weakness and paresthesias.   Psychiatric/Behavioral:  Positive for mood changes. The patient is nervous/anxious.      OBJECTIVE:   /77 (BP Location: Right arm, Patient Position: Sitting, Cuff Size: Adult Regular)   Pulse 74   Temp 97.7  F (36.5  C) (Temporal)   Resp 15   Ht 1.727 m (5' 8\")   Wt 104.3 kg (230 lb)   LMP 07/10/2023 (Approximate)   SpO2 95%   BMI 34.97 kg/m      Physical Exam  Constitutional:       General: She is not in acute distress.     Appearance: She is well-developed. She is not ill-appearing.   HENT:      Head: Normocephalic and atraumatic.      Right Ear: Tympanic membrane, ear canal and external ear normal.      Left Ear: Tympanic membrane, ear canal and external ear normal.      Nose: Nose normal.      Mouth/Throat:      Pharynx: No oropharyngeal exudate.   Eyes:      Conjunctiva/sclera: Conjunctivae normal.      Pupils: Pupils are equal, round, and reactive to light.   Cardiovascular:      Rate and Rhythm: Normal rate and regular rhythm.      Heart sounds: Normal heart sounds. No murmur heard.  Pulmonary:      Effort: Pulmonary effort is normal.      Breath sounds: No wheezing or rales.   Abdominal:      General: Bowel sounds are normal.      Palpations: Abdomen is soft.      Tenderness: There is no abdominal tenderness.   Musculoskeletal:      Cervical back: Normal range of motion and neck supple. No rigidity.   Lymphadenopathy:      Cervical: No cervical adenopathy.   Skin:     General: Skin is warm and dry.      Findings: No rash.   Neurological:      General: No focal deficit present.      Mental Status: She is alert and oriented to person, place, and time.           ASSESSMENT/PLAN:   Elicia was seen today for physical.    Routine general medical examination at a health care facility  Establish " care  -Vitals: WNL  -Mammo: DUE (scheduled)  -Pap: due 2025  -Immunizations: pneumonia  -Labs: UTD  -Colon Cancer screening: colonoscopy due 2033    Type 2 diabetes mellitus without complication, without long-term current use of insulin (H)  Diabetes not at goal. Using CGM. Fasting sugars 150-200s, up to 300 during the day. Needs A1C <8% to get foot surgery. Should follow up with me or MTM in 1-2 months.    Plan:  -increase jardiance to 25mg daily, tolerating well.   -continue metformin 2000mg daily, glipizide 20mg daily, semaglutide 14mg daily  -MTM referral placed  -could consider switching to injectable GLP1    -     Med Therapy Management Referral  -     empagliflozin (JARDIANCE) 25 MG TABS tablet; Take 1 tablet (25 mg) by mouth daily  -     glipiZIDE (GLUCOTROL XL) 10 MG 24 hr tablet; Take 2 tablets (20 mg) by mouth daily  -     metFORMIN (GLUCOPHAGE XR) 500 MG 24 hr tablet; Take 4 tablets (2,000 mg) by mouth daily  -     pravastatin (PRAVACHOL) 40 MG tablet; Take 1 tablet (40 mg) by mouth daily  -     Semaglutide (RYBELSUS) 14 MG tablet; Take 14 mg by mouth daily    F11.9 - Chronic, continuous use of opioids  Chronic pain disorder  Pain in joint involving ankle and foot, right  Chronic oxycodone use. Using 5mg, five times/day. PDMP checked, appropriate. Transferring care to me. Has refill available 8/22/23 from prior provider. Recently increased oxycodone from 4 to 5x/day due to ankle pain. Once she has surgery, anticipate decreasing frequency of oxycodone.    Plan:  -yearly CSA (signed today)  -yearly UDS (UTD, due 6/2024)  -evisits every 3 months for refills  -in person visits every 6 months  -continue indomethacin TID and gabapentin TID    -     indomethacin (INDOCIN) 50 MG capsule; Take 1 capsule (50 mg) by mouth 3 times daily    Chronic migraine without aura without status migrainosus, not intractable  Stable. Tizanidine helping. Working with chiropractor. Stopped nurtec-made headaches worse.  -      tiZANidine (ZANAFLEX) 2 MG tablet; Take 1 tablet (2 mg) by mouth 3 times daily    Class 2 severe obesity due to excess calories with serious comorbidity and body mass index (BMI) of 37.0 to 37.9 in adult (H)  -     topiramate (TOPAMAX) 50 MG tablet; Take 1 tablet (50 mg) by mouth every evening    Encounter for surveillance of contraceptive pills  -     norethindrone (MICRONOR) 0.35 MG tablet; Take 1 tablet (0.35 mg) by mouth daily    Gastroesophageal reflux disease with esophagitis without hemorrhage  -     omeprazole (PRILOSEC) 20 MG DR capsule; Take 1 capsule (20 mg) by mouth 2 times daily    Hypothyroidism, unspecified type  Stable, TSH is UTD.  -     levothyroxine (SYNTHROID/LEVOTHROID) 125 MCG tablet; Take 1 tablet (125 mcg) by mouth daily    Localized edema  Stable, BMP is UTD and normal.  -     hydrochlorothiazide (MICROZIDE) 12.5 MG capsule; Take 1 capsule (12.5 mg) by mouth daily    Rash  -     triamcinolone (KENALOG) 0.1 % external ointment; Apply twice daily on hand for 2-4 weeks.    Other orders  -     REVIEW OF HEALTH MAINTENANCE PROTOCOL ORDERS  -     Pneumococcal 20 Valent Conjugate (Prevnar 20)      COUNSELING:  Reviewed preventive health counseling, as reflected in patient instructions        She reports that she quit smoking about 5 years ago. Her smoking use included cigarettes. She has a 20.00 pack-year smoking history. She has never used smokeless tobacco.        Loc Waggoner [facility]

## 2023-07-28 ENCOUNTER — ANCILLARY PROCEDURE (OUTPATIENT)
Dept: MAMMOGRAPHY | Facility: CLINIC | Age: 48
End: 2023-07-28
Payer: COMMERCIAL

## 2023-07-28 DIAGNOSIS — Z12.31 ENCOUNTER FOR SCREENING MAMMOGRAM FOR BREAST CANCER: ICD-10-CM

## 2023-07-28 PROCEDURE — 77063 BREAST TOMOSYNTHESIS BI: CPT | Mod: GC

## 2023-07-28 PROCEDURE — 77067 SCR MAMMO BI INCL CAD: CPT | Mod: GC

## 2023-07-30 NOTE — PROGRESS NOTES
[facility] Hepatology    Assessment  48 year old female with PMHx of metabolic syndrome (type II diabetes, obesity, HTN and hyperlipidemia), metabolic associated fatty liver disease (MAFLD - formerly NAFLD), hypothyroidism, chronic pain on opioids and hepatic adenomas..     #. MAFLD  #. Hepatic adenomas  #. Focal Nodular Hyperplasia  #. Metabolic Syndrome: type II diabetes, obesity, HTN and hyperlipidemia    Patient without evidence of advanced fibrosis based on MR elastography in March 2022 with hepatic steatosis.  Fib-4: 0.44 was not suggestive of advanced fibrosis.  Repeat patient without thrombocytopenia but mild splenomegaly on recent MRI (6/2023). INR within normal limits suggestive of intact hepatic synthetic function.  Patient with significant metabolic syndrome including uncontrolled type 2 diabetes, class II obesity, hypertension and hyperlipidemia.    In terms of the patient's hepatic adenomas, her lesions were stable on imaging 6/2023 - mostly hepatic adenomas + FNH. Given this, she is on a progesterone only OCP.    Plan:   -- Lifestyle and dietary changes with the goal of 7-10% weight loss   * Continue Vyvanse (lisdexamfetamine dimesylate) and topiramate for weight loss    * Continue semaglutide for weight loss    * Limit portion sizes, excluding MAFLD promoting components - processed food, foods & beverages high in added fructose  -- Given poorly controlled diabetes which is impairing her ability to undergo surgery plan for endocrinology referral  -- Nutrition referral  -- Referral to metabolic weight loss clinic; if patient's weight does not improve with pharmacotherapy + lifestyle changes, consider bariatric surgery if BMI > 35  -- Continue statin   -- Continue to avoid estrogen containing contraceptives  -- Continue to limit/avoid alcohol   -- Recommend reducing alcohol given alcohol can contribute to weight gain as well and may promote hepatic inflammation  -- Repeat MRI for hepatic adenomas  and [facility] in 1 year    Other:  - CRC Screening: due 2033    RTC: 1 year    Karina Monroy MD (Lizzie)   of Medicine  Advanced & Transplant Hepatology  [facility]    HPI:    Patient reports that she was supposed to undergo a right foot surgery at the end of June however her hemoglobin A1c was 10.8 so this was deferred for 3 months.  She is currently on medical leave from work which has been quite stressful for her.  She gets around using a knee scooter.  She cannot exercise.    She reports that her diabetes control is likely worse in the setting of binge eating disorder.  She is currently seeing a mental health provider given the stressors in her life and for binge eating disorder.    She is currently taking semaglutide, glipizide and Jardiance for her diabetes.  She reports that she is currently using a Dexcom and that her blood sugars have been less than 200 on most occasions, but some up to 250s.  She reports that when she goes to sleep her blood sugar will be about 120 but when she wakes up in the morning and will be 1  before she eats. Her diabetes is currently managed by her PCP.  She continues at 230 pounds.  She is currently on Vyvanse and topiramate for her weight loss.  She is currently only on topiramate 50 mg but with her primary care doctor she is working on getting up to 100 mg/day because she feels that it is working better for her.  She will intermittently miss her topiramate dose.     She remains on her minipill for birth control which is progesterone only.    Medical hx Surgical hx   Past Medical History:   Diagnosis Date    Diabetes mellitus (H)     Hepatic adenoma     HTN (hypertension)     Hyperlipidemia     Hypothyroidism     Migraine     NAFLD (nonalcoholic fatty liver disease)       Past Surgical History:   Procedure Laterality Date    CHOLECYSTECTOMY      teen years    COLONOSCOPY N/A 5/18/2023    Procedure: Colonoscopy;  Surgeon: Dustin  Chance BARRETT MD;  Location: [facility] OR    ESOPHAGOSCOPY, GASTROSCOPY, DUODENOSCOPY (EGD), COMBINED N/A 5/18/2023    Procedure: ESOPHAGOGASTRODUODENOSCOPY, WITH BIOPSY;  Surgeon: Chance Chan MD;  Location: [facility] OR    RADIO FREQUENCY ABLATION / DESTRUCTION OF SACROILOAC JOINT LATERAL BRANCHES (S1/S2/S3) Bilateral 6/15/2023    Procedure: Bilateral Lumbar 5 medial branch block and Sacral 1,2,3 lateral branch block;  Surgeon: Layne Weber MD;  Location: [facility] OR          Medications  Current Outpatient Medications   Medication Sig Dispense Refill    brexpiprazole (REXULTI) 1 MG tablet Take 1 tablet (1 mg) by mouth daily 90 tablet 3    buPROPion (WELLBUTRIN XL) 150 MG 24 hr tablet Take 1 tablet (150 mg) by mouth every morning 90 tablet 3    buPROPion (WELLBUTRIN XL) 300 MG 24 hr tablet Take 1 tablet (300 mg) by mouth daily 90 tablet 3    cariprazine (VRAYLAR) 3 MG capsule Take 4.5 mg by mouth      Continuous Blood Gluc  (DEXCOM G6 ) [place] Use to read blood sugars as per 's instructions. 1 each 0    Continuous Blood Gluc  (DEXCOM G7 ) R[place] Use to read blood sugars as per 's instructions. 1 each 0    Continuous Blood Gluc Sensor (DEXCOM G6 SENSOR) MISC Change every 10 days. 3 each 5    Continuous Blood Gluc Sensor (DEXCOM G7 SENSOR) MISC Change every 10 days. 2 each 5    Continuous Blood Gluc Transmit (DEXCOM G6 TRANSMITTER) MISC Change every 3 months. 1 each 1    diclofenac (VOLTAREN) 1 % topical gel Apply 2g topically to affected areas qid prn pain.      diphenhydrAMINE (BENADRYL) 50 MG tablet Take 50 mg by mouth      doxepin (SINEQUAN) 25 MG capsule Take 2 capsules (50 mg) by mouth At Bedtime 30 capsule 3    empagliflozin (JARDIANCE) 25 MG TABS tablet Take 1 tablet (25 mg) by mouth daily 90 tablet 1    folic acid (FOLVITE) 1 MG tablet Take 1 tablet (1,000 mcg) by mouth daily 90 tablet 3    gabapentin (NEURONTIN) 300 MG capsule Take 4  capsules (1,200 mg) by mouth 3 times daily 1080 capsule 3    glipiZIDE (GLUCOTROL XL) 10 MG 24 hr tablet Take 2 tablets (20 mg) by mouth daily 180 tablet 3    hydrochlorothiazide (MICROZIDE) 12.5 MG capsule Take 1 capsule (12.5 mg) by mouth daily 90 capsule 3    hydrOXYzine (VISTARIL) 50 MG capsule Take 1 capsule (50 mg) by mouth daily 90 capsule 3    indomethacin (INDOCIN) 50 MG capsule Take 1 capsule (50 mg) by mouth 3 times daily 270 capsule 3    lamoTRIgine (LAMICTAL) 200 MG tablet Take 2 tablets (400 mg) by mouth At Bedtime for 360 days 180 tablet 3    levothyroxine (SYNTHROID/LEVOTHROID) 125 MCG tablet Take 1 tablet (125 mcg) by mouth daily 90 tablet 3    lidocaine, viscous, (XYLOCAINE) 2 % solution Swish and spit 5 mLs in mouth 5 times daily 100 mL 1    metFORMIN (GLUCOPHAGE XR) 500 MG 24 hr tablet Take 4 tablets (2,000 mg) by mouth daily 360 tablet 3    nabumetone (RELAFEN) 750 MG tablet Take 750 mg by mouth 2 times daily      naloxone (NARCAN) 4 MG/0.1ML nasal spray Spray 1 spray (4 mg) into one nostril alternating nostrils as needed for opioid reversal every 2-3 minutes until assistance arrives 0.2 mL 3    neomycin-polymyxin-hydrocortisone (CORTISPORIN) 3.5-21983-6 otic suspension Apply 4 drops to affected toenail twice daily after soaks      norethindrone (MICRONOR) 0.35 MG tablet Take 1 tablet (0.35 mg) by mouth daily 84 tablet 4    nystatin (MYCOSTATIN) 362358 UNIT/GM external cream Apply 100,000 g topically 3 times daily      omeprazole (PRILOSEC) 20 MG DR capsule Take 1 capsule (20 mg) by mouth 2 times daily 180 capsule 3    ondansetron (ZOFRAN) 4 MG tablet Take 1 tablet (4 mg) by mouth 3 times daily 90 tablet 1    oxyCODONE (ROXICODONE) 5 MG tablet Take 1 tablet (5 mg) by mouth 5 times daily for 30 days 150 tablet 0    [START ON 8/22/2023] oxyCODONE (ROXICODONE) 5 MG tablet Take 1 tablet (5 mg) by mouth 4 times daily 120 tablet 0    pravastatin (PRAVACHOL) 40 MG tablet Take 1 tablet (40 mg) by mouth  "daily 90 tablet 3    prochlorperazine (COMPAZINE) 10 MG tablet Take 1 tablet (10 mg) by mouth 4 times daily as needed for nausea (with migraine) 120 tablet 5    REXULTI 2 MG tablet       rizatriptan (MAXALT) 10 MG tablet Take 1 tablet (10 mg) by mouth 2 times daily 60 tablet 3    Semaglutide (RYBELSUS) 14 MG tablet Take 14 mg by mouth daily 90 tablet 3    tiZANidine (ZANAFLEX) 2 MG tablet Take 1 tablet (2 mg) by mouth 3 times daily 270 tablet 3    topiramate (TOPAMAX) 50 MG tablet Take 1 tablet (50 mg) by mouth every evening 90 tablet 3    triamcinolone (KENALOG) 0.1 % external ointment Apply twice daily on hand for 2-4 weeks. 15 g 1    VYVANSE 60 MG capsule          Allergies  Allergies   Allergen Reactions    Lithium Other (See Comments) and Dizziness     Other reaction(s): PSYCHOSIS    Sulfa Antibiotics Swelling     lips  Cold sores per pt      Adhesive Tape Rash    Albuterol Nausea and Vomiting    Clarithromycin Nausea and Vomiting    Erythromycin Nausea and Vomiting    Amoxicillin-Pot Clavulanate Nausea and Vomiting    Venlafaxine Other (See Comments)     Other reaction(s): PSYCHOSIS  Psychosis  Psychosis  Per transferred records         Family hx Social hx   Family History   Problem Relation Age of Onset    Hypertension Mother     Diabetes Maternal Grandmother     Heart Disease Paternal Grandmother     Liver Disease No family hx of       - Employment: Pharmacy tech at [employer]  - Lives alone in [place].   - Alcohol: rare alcohol use  - Tobacco: former use, quit in 2018  - Drugs: previous: MDMA +, amphetamine +     Review of systems  A 10-point review of systems was negative.    Examination  /73   Pulse 82   Ht 1.715 m (5' 7.5\")   Wt 104.6 kg (230 lb 9.6 oz)   LMP 07/10/2023 (Approximate)   SpO2 95%   BMI 35.58 kg/m    Body mass index is 35.58 kg/m .    Gen-NAD  Eye- EOMI  ENT- MMM  CVS- RRR, no murmurs  RS- CTA bilaterally  Abd- obese, soft, non-tender, non-distended  Extr- 2+ radial pulses " bilaterally, no lower extremity edema bilaterally  MS- hands without clubbing  Neuro- A+Ox3, no asterixis  Skin- no rash or jaundice  Psych- normal mood    Laboratory  BMP  Recent Labs   Lab Test 06/28/23  0936 06/15/23  1010 05/18/23  0833 03/02/23  0857 12/01/22  1013     --   --  135* 142   POTASSIUM 4.8  --   --  5.3 4.9   CHLORIDE 102  --   --  98 103   CON 9.5  --   --  9.3 9.6   CO2 26  --   --  28 19*   BUN 10.7  --   --  15.9 14.1   CR 0.68  --   --  0.79 0.85   * 331* 261* 398* 257*     CBC  Recent Labs   Lab Test 06/28/23  0936 03/02/23  0857 12/01/22  1013   WBC 7.4 6.8 6.8   RBC 4.65 4.60 4.69   HGB 12.3 12.7 13.4   HCT 38.8 39.3 41.8   MCV 83 85 89   MCH 26.5 27.6 28.6   MCHC 31.7 32.3 32.1   RDW 13.5 13.1 13.7    392 326     Liver Function Studies -   Recent Labs   Lab Test 03/02/23  0857   PROTTOTAL 6.6   ALBUMIN 4.2   BILITOTAL 0.2   ALKPHOS 132*   AST 14   ALT 15      INR   INR   Date Value Ref Range Status   03/02/2023 0.94 0.85 - 1.15 Final      A1AT: 228 (H)  TIGIST < 1:40 (2016)  TIGIST negative (2019)  Ceruloplasmin 55 (H)  Hep A IgG non-reactive  Hep B s Ag non-reactive  Hep B s Ab <8  Hep B c Ab non-reactive  Hep C Ab non-reactive    Radiology  MRI Abdomen 6/2023  Stable (since 3/21/2022 MRI) at least 10 arterial enhancing observations scattered throughout the liver previously characterized as predominantly adenomas with at least one FNH.  1.  Mild hepatic steatosis.  2.  Mild splenomegaly.    MRI Abdomen w/Contrast + Elastography 3/2022  1.  Liver lesions are unchanged from prior exam, including numerous (at least 10) hepatic adenomas and two foci of focal nodular hyperplasia.   2.  Mean hepatic stiffness either represents normal liver or mild inflammation.   3.  Hepatic steatosis with fat fraction of 12%.   4.  Normal hepatic iron content.    Liver Elastography 9/2020  3.3kPa;     MRI Abdomen 12/2019  1. Multiple (30-40) small (less than 2 cm) hepatic adenomas.   2.  Hepatic steatosis.    Endoscopy   Colonoscopy 5/2023:  The entire examined colon is normal.     EGD 5/2023  - Normal esophagus.  - Erythematous mucosa in the antrum. Biopsied.   - Normal duodenal bulb, first portion of the duodenum and second portion of the duodenum. Biopsied.     A.  DUODENUM, BIOPSY:  - Unremarkable duodenal mucosa  - No evidence of celiac disease    B.  GASTRIC, BIOPSY:  - Mild reactive gastropathic changes, in a background of mild chronic gastritis  - No H Pylori  - No intestinal metaplasia identified  - Features of gastric antral vascular ectasia including intravascular fibrin thrombi or perivascular fibrohyalinosis are not identified

## 2023-08-02 ENCOUNTER — OFFICE VISIT (OUTPATIENT)
Dept: GASTROENTEROLOGY | Facility: CLINIC | Age: 48
End: 2023-08-02
Attending: INTERNAL MEDICINE
Payer: COMMERCIAL

## 2023-08-02 VITALS
HEIGHT: 68 IN | DIASTOLIC BLOOD PRESSURE: 73 MMHG | HEART RATE: 82 BPM | OXYGEN SATURATION: 95 % | WEIGHT: 230.6 LBS | BODY MASS INDEX: 34.95 KG/M2 | SYSTOLIC BLOOD PRESSURE: 111 MMHG

## 2023-08-02 DIAGNOSIS — E66.812 CLASS 2 SEVERE OBESITY DUE TO EXCESS CALORIES WITH SERIOUS COMORBIDITY AND BODY MASS INDEX (BMI) OF 35.0 TO 35.9 IN ADULT (H): ICD-10-CM

## 2023-08-02 DIAGNOSIS — E11.9 TYPE 2 DIABETES MELLITUS WITHOUT COMPLICATION, WITHOUT LONG-TERM CURRENT USE OF INSULIN (H): Primary | ICD-10-CM

## 2023-08-02 DIAGNOSIS — K76.0 NAFLD (NONALCOHOLIC FATTY LIVER DISEASE): ICD-10-CM

## 2023-08-02 DIAGNOSIS — E66.01 CLASS 2 SEVERE OBESITY DUE TO EXCESS CALORIES WITH SERIOUS COMORBIDITY AND BODY MASS INDEX (BMI) OF 35.0 TO 35.9 IN ADULT (H): ICD-10-CM

## 2023-08-02 DIAGNOSIS — D13.4 HEPATIC ADENOMA: ICD-10-CM

## 2023-08-02 DIAGNOSIS — K76.89 FOCAL NODULAR HYPERPLASIA OF LIVER: ICD-10-CM

## 2023-08-02 PROCEDURE — 99214 OFFICE O/P EST MOD 30 MIN: CPT | Performed by: INTERNAL MEDICINE

## 2023-08-02 PROCEDURE — G0463 HOSPITAL OUTPT CLINIC VISIT: HCPCS | Performed by: INTERNAL MEDICINE

## 2023-08-02 ASSESSMENT — PAIN SCALES - GENERAL: PAINLEVEL: MILD PAIN (3)

## 2023-08-02 NOTE — PATIENT INSTRUCTIONS
- Plan for labs when able  - Nutritional referral  - Endocrinology referral for your diabetes  - Comprehensive weight loss clinic referral

## 2023-08-02 NOTE — NURSING NOTE
"Chief Complaint   Patient presents with    RECHECK     Hepatic adenoma     /73   Pulse 82   Ht 1.715 m (5' 7.5\")   Wt 104.6 kg (230 lb 9.6 oz)   LMP 07/10/2023 (Approximate)   SpO2 95%   BMI 35.58 kg/m    Kayleen Montana, [facility]   8/2/2023 8:53 AM    "

## 2023-08-02 NOTE — LETTER
8/2/2023         RE: Elicia Calero  [address]  [place] [address]        Dear Colleague,    Thank you for referring your patient, Elicia Calero, to the [facility] [place]. Please see a copy of my visit note below.    [facility] Hepatology    Assessment  48 year old female with PMHx of metabolic syndrome (type II diabetes, obesity, HTN and hyperlipidemia), metabolic associated fatty liver disease (MAFLD - formerly NAFLD), hypothyroidism, chronic pain on opioids and hepatic adenomas..     #. MAFLD  #. Hepatic adenomas  #. Focal Nodular Hyperplasia  #. Metabolic Syndrome: type II diabetes, obesity, HTN and hyperlipidemia    Patient without evidence of advanced fibrosis based on MR elastography in March 2022 with hepatic steatosis.  Fib-4: 0.44 was not suggestive of advanced fibrosis.  Repeat patient without thrombocytopenia but mild splenomegaly on recent MRI (6/2023). INR within normal limits suggestive of intact hepatic synthetic function.  Patient with significant metabolic syndrome including uncontrolled type 2 diabetes, class II obesity, hypertension and hyperlipidemia.    In terms of the patient's hepatic adenomas, her lesions were stable on imaging 6/2023 - mostly hepatic adenomas + FNH. Given this, she is on a progesterone only OCP.    Plan:   -- Lifestyle and dietary changes with the goal of 7-10% weight loss   * Continue Vyvanse (lisdexamfetamine dimesylate) and topiramate for weight loss    * Continue semaglutide for weight loss    * Limit portion sizes, excluding MAFLD promoting components - processed food, foods & beverages high in added fructose  -- Given poorly controlled diabetes which is impairing her ability to undergo surgery plan for endocrinology referral  -- Nutrition referral  -- Referral to metabolic weight loss clinic; if patient's weight does not improve with pharmacotherapy + lifestyle changes, consider bariatric surgery if BMI > 35  --  Continue statin   -- Continue to avoid estrogen containing contraceptives  -- Continue to limit/avoid alcohol   -- Recommend reducing alcohol given alcohol can contribute to weight gain as well and may promote hepatic inflammation  -- Repeat MRI for hepatic adenomas and FNH in 1 year    Other:  - CRC Screening: due 2033    RTC: 1 year    Karina Monroy MD (Lizzie)   of Medicine  Advanced & Transplant Hepatology  [facility]    HPI:    Patient reports that she was supposed to undergo a right foot surgery at the end of June however her hemoglobin A1c was 10.8 so this was deferred for 3 months.  She is currently on medical leave from work which has been quite stressful for her.  She gets around using a knee scooter.  She cannot exercise.    She reports that her diabetes control is likely worse in the setting of binge eating disorder.  She is currently seeing a mental health provider given the stressors in her life and for binge eating disorder.    She is currently taking semaglutide, glipizide and Jardiance for her diabetes.  She reports that she is currently using a Dexcom and that her blood sugars have been less than 200 on most occasions, but some up to 250s.  She reports that when she goes to sleep her blood sugar will be about 120 but when she wakes up in the morning and will be 1  before she eats. Her diabetes is currently managed by her PCP.  She continues at 230 pounds.  She is currently on Vyvanse and topiramate for her weight loss.  She is currently only on topiramate 50 mg but with her primary care doctor she is working on getting up to 100 mg/day because she feels that it is working better for her.  She will intermittently miss her topiramate dose.     She remains on her minipill for birth control which is progesterone only.    Medical hx Surgical hx   Past Medical History:   Diagnosis Date    Diabetes mellitus (H)     Hepatic adenoma     HTN  (hypertension)     Hyperlipidemia     Hypothyroidism     Migraine     NAFLD (nonalcoholic fatty liver disease)       Past Surgical History:   Procedure Laterality Date    CHOLECYSTECTOMY      teen years    COLONOSCOPY N/A 5/18/2023    Procedure: Colonoscopy;  Surgeon: Chance Chan MD;  Location: [facility] OR    ESOPHAGOSCOPY, GASTROSCOPY, DUODENOSCOPY (EGD), COMBINED N/A 5/18/2023    Procedure: ESOPHAGOGASTRODUODENOSCOPY, WITH BIOPSY;  Surgeon: Chance Chan MD;  Location: [facility] OR    RADIO FREQUENCY ABLATION / DESTRUCTION OF SACROILOAC JOINT LATERAL BRANCHES (S1/S2/S3) Bilateral 6/15/2023    Procedure: Bilateral Lumbar 5 medial branch block and Sacral 1,2,3 lateral branch block;  Surgeon: Layne Weber MD;  Location: [facility] OR          Medications  Current Outpatient Medications   Medication Sig Dispense Refill    brexpiprazole (REXULTI) 1 MG tablet Take 1 tablet (1 mg) by mouth daily 90 tablet 3    buPROPion (WELLBUTRIN XL) 150 MG 24 hr tablet Take 1 tablet (150 mg) by mouth every morning 90 tablet 3    buPROPion (WELLBUTRIN XL) 300 MG 24 hr tablet Take 1 tablet (300 mg) by mouth daily 90 tablet 3    cariprazine (VRAYLAR) 3 MG capsule Take 4.5 mg by mouth      Continuous Blood Gluc  (DEXCOM G6 ) RALEIGH Use to read blood sugars as per 's instructions. 1 each 0    Continuous Blood Gluc  (DEXCOM G7 ) RALEIGH Use to read blood sugars as per 's instructions. 1 each 0    Continuous Blood Gluc Sensor (DEXCOM G6 SENSOR) MISC Change every 10 days. 3 each 5    Continuous Blood Gluc Sensor (DEXCOM G7 SENSOR) MISC Change every 10 days. 2 each 5    Continuous Blood Gluc Transmit (DEXCOM G6 TRANSMITTER) MISC Change every 3 months. 1 each 1    diclofenac (VOLTAREN) 1 % topical gel Apply 2g topically to affected areas qid prn pain.      diphenhydrAMINE (BENADRYL) 50 MG tablet Take 50 mg by mouth      doxepin (SINEQUAN) 25 MG capsule Take 2 capsules (50  mg) by mouth At Bedtime 30 capsule 3    empagliflozin (JARDIANCE) 25 MG TABS tablet Take 1 tablet (25 mg) by mouth daily 90 tablet 1    folic acid (FOLVITE) 1 MG tablet Take 1 tablet (1,000 mcg) by mouth daily 90 tablet 3    gabapentin (NEURONTIN) 300 MG capsule Take 4 capsules (1,200 mg) by mouth 3 times daily 1080 capsule 3    glipiZIDE (GLUCOTROL XL) 10 MG 24 hr tablet Take 2 tablets (20 mg) by mouth daily 180 tablet 3    hydrochlorothiazide (MICROZIDE) 12.5 MG capsule Take 1 capsule (12.5 mg) by mouth daily 90 capsule 3    hydrOXYzine (VISTARIL) 50 MG capsule Take 1 capsule (50 mg) by mouth daily 90 capsule 3    indomethacin (INDOCIN) 50 MG capsule Take 1 capsule (50 mg) by mouth 3 times daily 270 capsule 3    lamoTRIgine (LAMICTAL) 200 MG tablet Take 2 tablets (400 mg) by mouth At Bedtime for 360 days 180 tablet 3    levothyroxine (SYNTHROID/LEVOTHROID) 125 MCG tablet Take 1 tablet (125 mcg) by mouth daily 90 tablet 3    lidocaine, viscous, (XYLOCAINE) 2 % solution Swish and spit 5 mLs in mouth 5 times daily 100 mL 1    metFORMIN (GLUCOPHAGE XR) 500 MG 24 hr tablet Take 4 tablets (2,000 mg) by mouth daily 360 tablet 3    nabumetone (RELAFEN) 750 MG tablet Take 750 mg by mouth 2 times daily      naloxone (NARCAN) 4 MG/0.1ML nasal spray Spray 1 spray (4 mg) into one nostril alternating nostrils as needed for opioid reversal every 2-3 minutes until assistance arrives 0.2 mL 3    neomycin-polymyxin-hydrocortisone (CORTISPORIN) 3.5-40399-5 otic suspension Apply 4 drops to affected toenail twice daily after soaks      norethindrone (MICRONOR) 0.35 MG tablet Take 1 tablet (0.35 mg) by mouth daily 84 tablet 4    nystatin (MYCOSTATIN) 792708 UNIT/GM external cream Apply 100,000 g topically 3 times daily      omeprazole (PRILOSEC) 20 MG DR capsule Take 1 capsule (20 mg) by mouth 2 times daily 180 capsule 3    ondansetron (ZOFRAN) 4 MG tablet Take 1 tablet (4 mg) by mouth 3 times daily 90 tablet 1    oxyCODONE  "(ROXICODONE) 5 MG tablet Take 1 tablet (5 mg) by mouth 5 times daily for 30 days 150 tablet 0    [START ON 8/22/2023] oxyCODONE (ROXICODONE) 5 MG tablet Take 1 tablet (5 mg) by mouth 4 times daily 120 tablet 0    pravastatin (PRAVACHOL) 40 MG tablet Take 1 tablet (40 mg) by mouth daily 90 tablet 3    prochlorperazine (COMPAZINE) 10 MG tablet Take 1 tablet (10 mg) by mouth 4 times daily as needed for nausea (with migraine) 120 tablet 5    REXULTI 2 MG tablet       rizatriptan (MAXALT) 10 MG tablet Take 1 tablet (10 mg) by mouth 2 times daily 60 tablet 3    Semaglutide (RYBELSUS) 14 MG tablet Take 14 mg by mouth daily 90 tablet 3    tiZANidine (ZANAFLEX) 2 MG tablet Take 1 tablet (2 mg) by mouth 3 times daily 270 tablet 3    topiramate (TOPAMAX) 50 MG tablet Take 1 tablet (50 mg) by mouth every evening 90 tablet 3    triamcinolone (KENALOG) 0.1 % external ointment Apply twice daily on hand for 2-4 weeks. 15 g 1    VYVANSE 60 MG capsule          Allergies  Allergies   Allergen Reactions    Lithium Other (See Comments) and Dizziness     Other reaction(s): PSYCHOSIS    Sulfa Antibiotics Swelling     lips  Cold sores per pt      Adhesive Tape Rash    Albuterol Nausea and Vomiting    Clarithromycin Nausea and Vomiting    Erythromycin Nausea and Vomiting    Amoxicillin-Pot Clavulanate Nausea and Vomiting    Venlafaxine Other (See Comments)     Other reaction(s): PSYCHOSIS  Psychosis  Psychosis  Per transferred records         Family hx Social hx   Family History   Problem Relation Age of Onset    Hypertension Mother     Diabetes Maternal Grandmother     Heart Disease Paternal Grandmother     Liver Disease No family hx of       - Employment: Pharmacy tech at [employer]  - Lives alone in [place].   - Alcohol: rare alcohol use  - Tobacco: former use, quit in 2018  - Drugs: previous: MDMA +, amphetamine +     Review of systems  A 10-point review of systems was negative.    Examination  /73   Pulse 82   Ht 1.715 m (5' 7.5\")   " Wt 104.6 kg (230 lb 9.6 oz)   LMP 07/10/2023 (Approximate)   SpO2 95%   BMI 35.58 kg/m    Body mass index is 35.58 kg/m .    Gen-NAD  Eye- EOMI  ENT- MMM  CVS- RRR, no murmurs  RS- CTA bilaterally  Abd- obese, soft, non-tender, non-distended  Extr- 2+ radial pulses bilaterally, no lower extremity edema bilaterally  MS- hands without clubbing  Neuro- A+Ox3, no asterixis  Skin- no rash or jaundice  Psych- normal mood    Laboratory  BMP  Recent Labs   Lab Test 06/28/23  0936 06/15/23  1010 05/18/23  0833 03/02/23  0857 12/01/22  1013     --   --  135* 142   POTASSIUM 4.8  --   --  5.3 4.9   CHLORIDE 102  --   --  98 103   CON 9.5  --   --  9.3 9.6   CO2 26  --   --  28 19*   BUN 10.7  --   --  15.9 14.1   CR 0.68  --   --  0.79 0.85   * 331* 261* 398* 257*     CBC  Recent Labs   Lab Test 06/28/23  0936 03/02/23  0857 12/01/22  1013   WBC 7.4 6.8 6.8   RBC 4.65 4.60 4.69   HGB 12.3 12.7 13.4   HCT 38.8 39.3 41.8   MCV 83 85 89   MCH 26.5 27.6 28.6   MCHC 31.7 32.3 32.1   RDW 13.5 13.1 13.7    392 326     Liver Function Studies -   Recent Labs   Lab Test 03/02/23  0857   PROTTOTAL 6.6   ALBUMIN 4.2   BILITOTAL 0.2   ALKPHOS 132*   AST 14   ALT 15      INR   INR   Date Value Ref Range Status   03/02/2023 0.94 0.85 - 1.15 Final      A1AT: 228 (H)  TIGIST < 1:40 (2016)  TIGIST negative (2019)  Ceruloplasmin 55 (H)  Hep A IgG non-reactive  Hep B s Ag non-reactive  Hep B s Ab <8  Hep B c Ab non-reactive  Hep C Ab non-reactive    Radiology  MRI Abdomen 6/2023  Stable (since 3/21/2022 MRI) at least 10 arterial enhancing observations scattered throughout the liver previously characterized as predominantly adenomas with at least one FNH.  1.  Mild hepatic steatosis.  2.  Mild splenomegaly.    MRI Abdomen w/Contrast + Elastography 3/2022  1.  Liver lesions are unchanged from prior exam, including numerous (at least 10) hepatic adenomas and two foci of focal nodular hyperplasia.   2.  Mean hepatic stiffness either  represents normal liver or mild inflammation.   3.  Hepatic steatosis with fat fraction of 12%.   4.  Normal hepatic iron content.    Liver Elastography 9/2020  3.3kPa;     MRI Abdomen 12/2019  1. Multiple (30-40) small (less than 2 cm) hepatic adenomas.   2. Hepatic steatosis.    Endoscopy   Colonoscopy 5/2023:  The entire examined colon is normal.     EGD 5/2023  - Normal esophagus.  - Erythematous mucosa in the antrum. Biopsied.   - Normal duodenal bulb, first portion of the duodenum and second portion of the duodenum. Biopsied.     A.  DUODENUM, BIOPSY:  - Unremarkable duodenal mucosa  - No evidence of celiac disease    B.  GASTRIC, BIOPSY:  - Mild reactive gastropathic changes, in a background of mild chronic gastritis  - No H Pylori  - No intestinal metaplasia identified  - Features of gastric antral vascular ectasia including intravascular fibrin thrombi or perivascular fibrohyalinosis are not identified      Again, thank you for allowing me to participate in the care of your patient.        Sincerely,        Karina Monroy MD

## 2023-08-04 ENCOUNTER — MYC MEDICAL ADVICE (OUTPATIENT)
Dept: FAMILY MEDICINE | Facility: CLINIC | Age: 48
End: 2023-08-04

## 2023-08-04 ENCOUNTER — ANCILLARY PROCEDURE (OUTPATIENT)
Dept: GENERAL RADIOLOGY | Facility: CLINIC | Age: 48
End: 2023-08-04
Attending: NURSE PRACTITIONER
Payer: COMMERCIAL

## 2023-08-04 DIAGNOSIS — G89.29 CHRONIC LOW BACK PAIN WITHOUT SCIATICA, UNSPECIFIED BACK PAIN LATERALITY: ICD-10-CM

## 2023-08-04 DIAGNOSIS — M54.50 CHRONIC LOW BACK PAIN WITHOUT SCIATICA, UNSPECIFIED BACK PAIN LATERALITY: ICD-10-CM

## 2023-08-04 PROCEDURE — 72100 X-RAY EXAM L-S SPINE 2/3 VWS: CPT | Performed by: STUDENT IN AN ORGANIZED HEALTH CARE EDUCATION/TRAINING PROGRAM

## 2023-08-11 ENCOUNTER — MYC MEDICAL ADVICE (OUTPATIENT)
Dept: FAMILY MEDICINE | Facility: CLINIC | Age: 48
End: 2023-08-11
Payer: COMMERCIAL

## 2023-08-11 NOTE — TELEPHONE ENCOUNTER
Dr. Waggoner patient will be needed disability forms. Will you need a visit? Please advise-thanks!

## 2023-08-13 NOTE — PROGRESS NOTES
Medication Therapy Management (MTM) Encounter    ASSESSMENT:                            Medication Adherence/Access: No issues identified    Type 2 Diabetes/Obesity:    Patient is not meeting A1c goal of < 7%. Self monitoring of blood glucose is not at goal of fasting  mg/dL and post prandial < 180 mg/dL. Patient is not meeting average glucose goal of <150mg/dL. Patient is now meeting goal of > 70% time in target with continuous glucose monitoring--mtm educated patient to add G6 dexcom abiodun to her cell phone --mtm now has remote blood sugar access.   Patient would benefit from new gameplan: STOP Rybelsus and Glipizide and replace with 5mg./week Mounjaro- patient dislikes injectables but agrees to try this drug as needle is enclosed and she won't see it.  Also--patient will work hard on eliminating all late night snacking --ok to drink slim-fast or atkins protein shake to fill her up if needed.       . Due for annual foot exam. Due for annual eye exam.         Hyperlipidemia:   Stable.  Patient is on moderate intensity statin which is indicated based on 2019 ACC/AHA guidelines for lipid management.        PLAN:                              1. A1c 10.8% --need to be <8%  to have ruptured tendon surgery . Lets trial a new game plan - STOP all Rybelsus and Glipizide , lets replace those with ONCE WEEKLY injection of Mounjaro 5mg. This is an injectable but you will NOT see the needle. Ok to inject in either stomach or right center of top of the thigh.   This drug helps curb your appetite and help you lose weight along with lowering blood sugars .  Keep concentrating on your low-carb diet and hopefully we can quickly eliminate all your late night snacking. Drink a low carb slim-fast or atkins protein shake if needed.   Eat enough carbs/day to stay mentally happy!    Keep using your dexcom G6 CGM --thank you signing up today with remote blood sugar access .  Stay on Jardiance, Metformin, as is .    Continue all other  medications as you are.     Follow-up: Return in about 2 weeks (around 8/28/2023), or @ 9:30 AM via telephone call, for Medication Therapy Management Visit, Blood sugar meter review.        SUBJECTIVE/OBJECTIVE:                          Elicia Calero is a 48 year old female called for an initial visit. She was referred to me from Loc Waggoner DO. (PCP).  .      Reason for visit:   Reason for Referral:  uncontrolled diabetes  Seeing metabolic team- 9-20-23 endo dr. Barton, MARIETTA Morgan and MARIETTA Hernández RD.     Needs A1c <8% to have asap tendon surgery --she wants a new game plan today .   .    Allergies/ADRs: Reviewed in chart  Past Medical History: Per patient diagnosed 10-15 years --did lose wt. To 150lbs years ago and reversed her DM . Ate 1200 cals/day , low carb   Tobacco: She reports that she quit smoking about 5 years ago. Her smoking use included cigarettes. She has a 20.00 pack-year smoking history. She has never used smokeless tobacco.  Alcohol: Less than 1 beverage / month  Other Substance Use: none   E-cigarette Use: none   Caffeine:  1 coffee or crystal light every morning.   Social: off work due to tendon issue (ruptured).  She is a pharmacy tech at [employer].   Personal Healthcare Goals: fix dm soon   Activity: not much due to ruptured tendon.     Medication Adherence/Access: no issues reported    Type 2 Diabetes/Obesity:    Failed bydureon in the past , fears needles--refuses all injectables.   Rybelsus 14mg./day   Metformin [id number] ER trabs daily  Glipizide Er 10mg tabs --2 tabs daily   Jardiance 25mg./day   Not taking aspirin due to age.  Patient is not experiencing side effects.  Blood sugar monitoring: Continuous Glucose Monitor Average glucose : mtm added remote bs access via dexcom G^ abiodun on her phone :    Am bs's 210--why?  When bedtime 120';s.(Mtm explained jose phenomenon).               Current diabetes symptoms: polyphagia  Diet/Exercise: needs tendon surgery , can use foot pedals only.    Bfast /lunch --low carb , eat dinner --after dinner keeps eating after feeling full!  Has low carb slimfast or atkins shakes to use.   Sleep iffy due to intense hunger. Awakes 2-3 am --snacking and can't get back to sleep. She has mental health issues --struggles with depression and unable to move much with ruptured tendon --discouraged.       Eye exam: due  Foot exam: due  Urine Albumin:   Lab Results   Component Value Date    UMALCR  12/01/2022      Comment:      Unable to calculate, urine albumin and/or urine creatinine is outside detectable limits.  Microalbuminuria is defined as an albumin:creatinine ratio of 17 to 299 for males and 25 to 299 for females. A ratio of albumin:creatinine of 300 or higher is indicative of overt proteinuria.  Due to biologic variability, positive results should be confirmed by a second, first-morning random or 24-hour timed urine specimen. If there is discrepancy, a third specimen is recommended. When 2 out of 3 results are in the microalbuminuria range, this is evidence for incipient nephropathy and warrants increased efforts at glucose control, blood pressure control, and institution of therapy with an angiotensin-converting-enzyme (ACE) inhibitor (if the patient can tolerate it).        Lab Results   Component Value Date    A1C 10.8 (H) 06/28/2023     Hyperlipidemia:   Pravastatin 40mg daily  Patient reports no significant myalgias or other side effects.  The 10-year ASCVD risk score (Carmita CORREIA, et al., 2019) is: 0.9%    Values used to calculate the score:      Age: 48 years      Sex: Female      Is Non- : No      Diabetic: Yes      Tobacco smoker: No      Systolic Blood Pressure: 111 mmHg      Is BP treated: No      HDL Cholesterol: 52 mg/dL      Total Cholesterol: 133 mg/dL  Recent Labs   Lab Test 06/28/23  0936 12/01/22  1013   CHOL 133 129   HDL 52 46*   LDL 60 61   TRIG 105 109          -----------------------------------------------------------------------------------------------------------------------------------      I spent 60 minutes with this patient today. All changes were made via collaborative practice agreement with Loc Waggoner DO. A copy of the visit note was provided to the patient's provider(s).    A summary of these recommendations was sent via [employer].    Dacia Myers Rph.  Medication Therapy Management Provider  [phone]      Telemedicine Visit Details  Type of service:  Telephone visit  Start Time: 2:00 PM  End Time: 3:00 PM     Medication Therapy Recommendations  Type 2 diabetes mellitus without complication, without long-term current use of insulin (H)    Current Medication: Continuous Blood Gluc Sensor (DEXCOM G6 SENSOR) MISC   Rationale: Medication requires monitoring - Needs additional monitoring   Recommendation: Self-Monitoring - added G6 abiodun to smart phone and mt now has remote access.   Status: Accepted per CPA          Current Medication: Semaglutide (RYBELSUS) 14 MG tablet (Discontinued)   Rationale: More effective medication available - Ineffective medication - Effectiveness   Recommendation: Change Medication - Mounjaro 5 MG/0.5ML Sopn   Status: Accepted per CPA          Current Medication: glipiZIDE (GLUCOTROL XL) 10 MG 24 hr tablet   Rationale: More effective medication available - Ineffective medication - Effectiveness   Recommendation: Change Medication - stop glip and rybelsus -start Mounjaro weekly injection.   Status: Accepted per CPA

## 2023-08-14 ENCOUNTER — VIRTUAL VISIT (OUTPATIENT)
Dept: PHARMACY | Facility: CLINIC | Age: 48
End: 2023-08-14
Attending: STUDENT IN AN ORGANIZED HEALTH CARE EDUCATION/TRAINING PROGRAM
Payer: COMMERCIAL

## 2023-08-14 DIAGNOSIS — E78.5 HYPERLIPIDEMIA LDL GOAL <100: ICD-10-CM

## 2023-08-14 DIAGNOSIS — E11.9 TYPE 2 DIABETES MELLITUS WITHOUT COMPLICATION, WITHOUT LONG-TERM CURRENT USE OF INSULIN (H): Primary | ICD-10-CM

## 2023-08-14 PROCEDURE — 99607 MTMS BY PHARM ADDL 15 MIN: CPT | Performed by: PHARMACIST

## 2023-08-14 PROCEDURE — 99605 MTMS BY PHARM NP 15 MIN: CPT | Performed by: PHARMACIST

## 2023-08-14 RX ORDER — TIRZEPATIDE 5 MG/.5ML
5 INJECTION, SOLUTION SUBCUTANEOUS
Qty: 2 ML | Refills: 1 | Status: SHIPPED | OUTPATIENT
Start: 2023-08-14 | End: 2023-08-28 | Stop reason: DRUGHIGH

## 2023-08-14 NOTE — Clinical Note
Loc--Esteban. For mtm referral --let see if she responds to my drug change to weekly Mounjaro injection.  I have remore blood sugar access now to her dexcom --2 weeks f/up with me.  Koffi

## 2023-08-14 NOTE — PATIENT INSTRUCTIONS
"Recommendations from today's MTM visit:                                                    MTM (medication therapy management) is a service provided by a clinical pharmacist designed to help you get the most of out of your medicines.   Today we reviewed what your medicines are for, how to know if they are working, that your medicines are safe and how to make your medicine regimen as easy as possible.      1. A1c 10.8% --need to be <8%  to have ruptured tendon surgery . Lets trial a new game plan - STOP all Rybelsus and Glipizide , lets replace those with ONCE WEEKLY injection of Mounjaro 5mg. This is an injectable but you will NOT see the needle. Ok to inject in either stomach or right center of top of the thigh.   This drug helps curb your appetite and help you lose weight along with lowering blood sugars .  Keep concentrating on your low-carb diet and hopefully we can quickly eliminate all your late night snacking. Drink a low carb slim-fast or atkins protein shake if needed.   Eat enough carbs/day to stay mentally happy!    Keep using your dexcom G6 CGM --thank you signing up today with remote blood sugar access . I checked tonight -I have now have access to your blood sugars .   Stay on Jardiance, Metformin, as is .    Continue all other medications as you are.     Follow-up: Return in about 2 weeks (around 8/28/2023), or @ 9:30 AM via telephone call, for Medication Therapy Management Visit, Blood sugar meter review.    It was great speaking with you today.  I value your experience and would be very thankful for your time in providing feedback in our clinic survey. In the next few days, you may receive an email or text message from [employer] with a link to a survey related to your  clinical pharmacist.\"     To schedule another MTM appointment, please call the clinic directly or you may call the MTM scheduling line at [phone] or toll-free at [phone].     My Clinical Pharmacist's contact information:  "                                                     Please feel free to contact me with any questions or concerns you have.      Dacia Myers Rph.  Medication Therapy Management Provider  [phone]

## 2023-08-14 NOTE — TELEPHONE ENCOUNTER
Same day is ok (virtual or in person) however I would need her forms prior to this visit.  Does she know when the forms will be here?    Dr. Waggoner

## 2023-08-14 NOTE — TELEPHONE ENCOUNTER
Patient called in, nothing avail until mid Sept. Please advise if same day okay to use or perhaps e visit-thanks!

## 2023-08-15 ENCOUNTER — TELEPHONE (OUTPATIENT)
Dept: FAMILY MEDICINE | Facility: CLINIC | Age: 48
End: 2023-08-15
Payer: COMMERCIAL

## 2023-08-15 NOTE — TELEPHONE ENCOUNTER
Forms/Letter Request    Type of form/letter: Short-term disability    Have you been seen for this request: N/A    Do we have the form/letter: Yes: Dr. Waggoner form folder    Who is the form from? Patient    Where did/will the form come from? Patient or family brought in       When is form/letter needed by: ASAP    How would you like the form/letter returned: Fax : Pt would like form faxed to [facility] at [phone]    Patient Notified form requests are processed in 3-5 business days:Yes    Could we send this information to you in [employer] or would you prefer to receive a phone call?:   Yes  Okay to leave a detailed message?: Yes at Home number on file [phone] (home)

## 2023-08-16 ENCOUNTER — MYC MEDICAL ADVICE (OUTPATIENT)
Dept: FAMILY MEDICINE | Facility: CLINIC | Age: 48
End: 2023-08-16

## 2023-08-16 ENCOUNTER — VIRTUAL VISIT (OUTPATIENT)
Dept: FAMILY MEDICINE | Facility: CLINIC | Age: 48
End: 2023-08-16
Payer: COMMERCIAL

## 2023-08-16 DIAGNOSIS — M25.571 PAIN IN JOINT, ANKLE AND FOOT, RIGHT: ICD-10-CM

## 2023-08-16 DIAGNOSIS — L03.019 INFECTION OF FINGERNAIL: Primary | ICD-10-CM

## 2023-08-16 DIAGNOSIS — Z02.89 ENCOUNTER FOR COMPLETION OF FORM WITH PATIENT: ICD-10-CM

## 2023-08-16 DIAGNOSIS — S93.499S: ICD-10-CM

## 2023-08-16 DIAGNOSIS — E11.65 UNCONTROLLED TYPE 2 DIABETES MELLITUS WITH HYPERGLYCEMIA (H): ICD-10-CM

## 2023-08-16 DIAGNOSIS — E11.9 TYPE 2 DIABETES MELLITUS WITHOUT COMPLICATION, WITHOUT LONG-TERM CURRENT USE OF INSULIN (H): Primary | ICD-10-CM

## 2023-08-16 PROCEDURE — 99214 OFFICE O/P EST MOD 30 MIN: CPT | Mod: VID | Performed by: STUDENT IN AN ORGANIZED HEALTH CARE EDUCATION/TRAINING PROGRAM

## 2023-08-16 RX ORDER — CEPHALEXIN 500 MG/1
500 CAPSULE ORAL 3 TIMES DAILY
Qty: 15 CAPSULE | Refills: 0 | Status: SHIPPED | OUTPATIENT
Start: 2023-08-16 | End: 2023-08-21

## 2023-08-16 ASSESSMENT — ANXIETY QUESTIONNAIRES
2. NOT BEING ABLE TO STOP OR CONTROL WORRYING: NOT AT ALL
6. BECOMING EASILY ANNOYED OR IRRITABLE: NOT AT ALL
4. TROUBLE RELAXING: SEVERAL DAYS
5. BEING SO RESTLESS THAT IT IS HARD TO SIT STILL: NOT AT ALL
7. FEELING AFRAID AS IF SOMETHING AWFUL MIGHT HAPPEN: NOT AT ALL
IF YOU CHECKED OFF ANY PROBLEMS ON THIS QUESTIONNAIRE, HOW DIFFICULT HAVE THESE PROBLEMS MADE IT FOR YOU TO DO YOUR WORK, TAKE CARE OF THINGS AT HOME, OR GET ALONG WITH OTHER PEOPLE: NOT DIFFICULT AT ALL
3. WORRYING TOO MUCH ABOUT DIFFERENT THINGS: NOT AT ALL
GAD7 TOTAL SCORE: 2
1. FEELING NERVOUS, ANXIOUS, OR ON EDGE: SEVERAL DAYS
GAD7 TOTAL SCORE: 2

## 2023-08-16 NOTE — PATIENT INSTRUCTIONS
Disability forms   -R ankle pain (moderate to severe joint space loss at ankle joint, osteophytosis at joint)  -working to get diabetes under control to have surgery

## 2023-08-16 NOTE — TELEPHONE ENCOUNTER
Form filled out, placed in care team 4 bin.  Please fax.  Please keep copy in patient's file.    Dr. Waggoner

## 2023-08-16 NOTE — PROGRESS NOTES
Elicia is a 48 year old who is being evaluated via a billable video visit.      How would you like to obtain your AVS? MyChart  If the video visit is dropped, the invitation should be resent by: Text to cell phone: [phone]  Will anyone else be joining your video visit? No        Assessment & Plan     Infection of fingernail  Mild infection near lateral nail on L hand. Has been trying topical bacitracin and covering it w/o relief of symptoms. Some yellow drainage, swelling, redness and pain. Will treat with keflex TID x 5 days; warm epsom salt baths, bacitracin. Follow up if no improvement.  - cephALEXin (KEFLEX) 500 MG capsule  Dispense: 15 capsule; Refill: 0    Encounter for completion of form with patient  R ankle pain  Ruptured ATF ligmament, R  Type 2 diabetes  History of R ankle pain since fracture in 2020. Has had 3 surgeries thus far. Now has ruptured ATF ligament, fallen arch. Unable to get surgery until A1C is under 8. Working with MTM on diabetes. Follows with [facility] orthopedics for ankle pain. Can't work until surgery and pain is improved. Short term disability forms filled out. Will fax and keep copy in patient file.      Loc Waggoner, [facility]    Subjective   Elicia is a 48 year old, presenting for the following health issues:  Forms (Sore on left pinky finger.)        8/16/2023     9:24 AM   Additional Questions   Roomed by Birgit VIVAS       History of Present Illness       Reason for visit:  Paperwork    She eats 0-1 servings of fruits and vegetables daily.She consumes 0 sweetened beverage(s) daily.She exercises with enough effort to increase her heart rate 9 or less minutes per day.  She exercises with enough effort to increase her heart rate 3 or less days per week. She is missing 2 dose(s) of medications per week.  She is not taking prescribed medications regularly due to remembering to take.     Grace lin  6/23    Coryell  -once A1c is improved will see get  surgery    Ruptured tendon  Arch is fallen    Oxycodone  Indomethicin  Tizanidine  gabapentin    Review of Systems   Constitutional, HEENT, cardiovascular, pulmonary, gi and gu systems are negative, except as otherwise noted.      Objective       Vitals:  No vitals were obtained today due to virtual visit.    Physical Exam   GENERAL: Healthy, alert and no distress  EYES: Eyes grossly normal to inspection.  No discharge or erythema, or obvious scleral/conjunctival abnormalities.  RESP: No audible wheeze, cough, or visible cyanosis.  No visible retractions or increased work of breathing.    SKIN: Visible skin clear. No significant rash, abnormal pigmentation or lesions.  NEURO: Cranial nerves grossly intact.  Mentation and speech appropriate for age.  PSYCH: Mentation appears normal, affect normal/bright, judgement and insight intact, normal speech and appearance well-groomed.      Video-Visit Details    Type of service:  Video Visit   Originating Location (pt. Location): Home  Distant Location (provider location):  On-site  Platform used for Video Visit: [email or web address]

## 2023-08-17 ENCOUNTER — ANCILLARY PROCEDURE (OUTPATIENT)
Dept: MAMMOGRAPHY | Facility: CLINIC | Age: 48
End: 2023-08-17
Attending: NURSE PRACTITIONER
Payer: COMMERCIAL

## 2023-08-17 DIAGNOSIS — R92.8 ABNORMAL MAMMOGRAM OF LEFT BREAST: ICD-10-CM

## 2023-08-17 PROCEDURE — G0279 TOMOSYNTHESIS, MAMMO: HCPCS | Mod: LT | Performed by: STUDENT IN AN ORGANIZED HEALTH CARE EDUCATION/TRAINING PROGRAM

## 2023-08-17 PROCEDURE — 76642 ULTRASOUND BREAST LIMITED: CPT | Mod: LT | Performed by: STUDENT IN AN ORGANIZED HEALTH CARE EDUCATION/TRAINING PROGRAM

## 2023-08-17 PROCEDURE — 77065 DX MAMMO INCL CAD UNI: CPT | Mod: LT | Performed by: STUDENT IN AN ORGANIZED HEALTH CARE EDUCATION/TRAINING PROGRAM

## 2023-08-17 RX ORDER — BLOOD PRESSURE TEST KIT
1 KIT MISCELLANEOUS DAILY
Qty: 300 EACH | Refills: 3 | Status: SHIPPED | OUTPATIENT
Start: 2023-08-17

## 2023-08-22 ENCOUNTER — MYC MEDICAL ADVICE (OUTPATIENT)
Dept: FAMILY MEDICINE | Facility: CLINIC | Age: 48
End: 2023-08-22
Payer: COMMERCIAL

## 2023-08-22 NOTE — TELEPHONE ENCOUNTER
Received another Fax request for form that was already faxed. Re-Faxed on 08/22/23    Fax# [phone]

## 2023-08-22 NOTE — TELEPHONE ENCOUNTER
See RN response to patient's mychart message re:finger infection    Linda Hernandez, MARCOSN RN  [facility]

## 2023-08-23 ENCOUNTER — MYC REFILL (OUTPATIENT)
Dept: FAMILY MEDICINE | Facility: CLINIC | Age: 48
End: 2023-08-23

## 2023-08-23 ENCOUNTER — OFFICE VISIT (OUTPATIENT)
Dept: URGENT CARE | Facility: URGENT CARE | Age: 48
End: 2023-08-23
Payer: COMMERCIAL

## 2023-08-23 VITALS
WEIGHT: 230 LBS | OXYGEN SATURATION: 97 % | DIASTOLIC BLOOD PRESSURE: 72 MMHG | TEMPERATURE: 97.8 F | BODY MASS INDEX: 36.1 KG/M2 | RESPIRATION RATE: 15 BRPM | HEART RATE: 76 BPM | HEIGHT: 67 IN | SYSTOLIC BLOOD PRESSURE: 110 MMHG

## 2023-08-23 DIAGNOSIS — L60.0 INGROWN FINGERNAIL: Primary | ICD-10-CM

## 2023-08-23 DIAGNOSIS — M25.571 PAIN IN JOINT INVOLVING ANKLE AND FOOT, RIGHT: ICD-10-CM

## 2023-08-23 PROCEDURE — 99213 OFFICE O/P EST LOW 20 MIN: CPT | Performed by: FAMILY MEDICINE

## 2023-08-23 RX ORDER — OXYCODONE HYDROCHLORIDE 5 MG/1
5 TABLET ORAL
Qty: 150 TABLET | Refills: 0 | Status: CANCELLED | OUTPATIENT
Start: 2023-08-23

## 2023-08-23 RX ORDER — DOXYCYCLINE 100 MG/1
100 CAPSULE ORAL 2 TIMES DAILY
Qty: 14 CAPSULE | Refills: 0 | Status: SHIPPED | OUTPATIENT
Start: 2023-08-23 | End: 2023-08-30

## 2023-08-23 NOTE — TELEPHONE ENCOUNTER
Gil/Dr. Waggoner:  please see message; this was filled 8/22/23    Patient Comment: I had to fill a 4 times a day prescription because i thought Gil had put a 5 times a day on file for this month. I thought she said she did but i was mistaken. If you could put future rx s on file for next time that would be great but I will continue at 5 times a day despite the actual directions because I wasn t going to be able to get a new prescription in time for my refill due to you being on vacation. Any questions let me know.     Juany VILLATORO RN  [facility]

## 2023-08-23 NOTE — PROGRESS NOTES
"  Assessment & Plan     Ingrown fingernail  Failure of 5 days outpatient Keflex.  We will escalate to Doxy twice daily x7 days to cover MRSA.  Patient is several allergies to antibiotics.  No significant evidence of cellulitis on exam.  - doxycycline hyclate (VIBRAMYCIN) 100 MG capsule; Take 1 capsule (100 mg) by mouth 2 times daily for 7 days             BMI:   Estimated body mass index is 36.02 kg/m  as calculated from the following:    Height as of this encounter: 1.702 m (5' 7\").    Weight as of this encounter: 104.3 kg (230 lb).           No follow-ups on file.    Merlyn Darnell MD  [facility]    Emily Rubi is a 48 year old, presenting for the following health issues:  Finger (X7 days ago swelling and pain started on left index finger /Was given antibiotics and just finished the prescription but finger is still red and swollen )      HPI     This is a very pleasant 48-year-old female coming in today with right distal first finger paronychia.  This is ongoing for several days.  Was treated by primary over a telemedicine visit with Keflex x5 days.  States that symptoms improved however not completely resolved.  She still notes some mild pain in DIP joint.  No injury specifically.  Denies any other systemic symptoms.        Review of Systems   Constitutional, HEENT, cardiovascular, pulmonary, gi and gu systems are negative, except as otherwise noted.      Objective    /72   Pulse 76   Temp 97.8  F (36.6  C) (Temporal)   Resp 15   Ht 1.702 m (5' 7\")   Wt 104.3 kg (230 lb)   LMP 08/12/2023 (Approximate)   SpO2 97%   BMI 36.02 kg/m    Body mass index is 36.02 kg/m .  Physical Exam   GENERAL: healthy, alert and no distress  NECK: no adenopathy, no asymmetry, masses, or scars and thyroid normal to palpation  RESP: lungs clear to auscultation - no rales, rhonchi or wheezes  CV: regular rate and rhythm, normal S1 S2, no S3 or S4, no murmur, click or rub, no " peripheral edema and peripheral pulses strong  ABDOMEN: soft, nontender, no hepatosplenomegaly, no masses and bowel sounds normal  MS: no gross musculoskeletal defects noted, no edema

## 2023-08-24 ENCOUNTER — MYC MEDICAL ADVICE (OUTPATIENT)
Dept: FAMILY MEDICINE | Facility: CLINIC | Age: 48
End: 2023-08-24
Payer: COMMERCIAL

## 2023-08-24 RX ORDER — OXYCODONE HYDROCHLORIDE 5 MG/1
5 TABLET ORAL
Qty: 150 TABLET | Refills: 0 | OUTPATIENT
Start: 2023-08-24

## 2023-08-25 ENCOUNTER — MYC MEDICAL ADVICE (OUTPATIENT)
Dept: FAMILY MEDICINE | Facility: CLINIC | Age: 48
End: 2023-08-25
Payer: COMMERCIAL

## 2023-08-27 RX ORDER — OXYCODONE HYDROCHLORIDE 5 MG/1
5 TABLET ORAL
Qty: 150 TABLET | Refills: 0 | Status: SHIPPED | OUTPATIENT
Start: 2023-09-15 | End: 2023-10-12

## 2023-08-27 NOTE — TELEPHONE ENCOUNTER
Last oxycodone filled 8/23/23 for 120 tablets (4x/day dosing).  Oxycodone dosing should 5x/day until able to get ankle surgery.  UDS, CSA signed and up to date.  Will update medications until this apppointment.    Pt transferred pain management from Romulo Mann to me.  Discussed doing evisits every 3 months (next is due Oct 2024).  Will send 1 months of medications, and have patient follow up early October prior to next refill.    Dr. Waggoner

## 2023-08-28 ENCOUNTER — VIRTUAL VISIT (OUTPATIENT)
Dept: PHARMACY | Facility: CLINIC | Age: 48
End: 2023-08-28
Payer: COMMERCIAL

## 2023-08-28 DIAGNOSIS — E78.5 HYPERLIPIDEMIA LDL GOAL <100: ICD-10-CM

## 2023-08-28 DIAGNOSIS — E11.9 TYPE 2 DIABETES MELLITUS WITHOUT COMPLICATION, WITHOUT LONG-TERM CURRENT USE OF INSULIN (H): Primary | ICD-10-CM

## 2023-08-28 PROCEDURE — 99606 MTMS BY PHARM EST 15 MIN: CPT | Performed by: PHARMACIST

## 2023-08-28 PROCEDURE — 99607 MTMS BY PHARM ADDL 15 MIN: CPT | Performed by: PHARMACIST

## 2023-08-28 RX ORDER — TIRZEPATIDE 10 MG/.5ML
10 INJECTION, SOLUTION SUBCUTANEOUS
Qty: 2 ML | Refills: 0 | Status: SHIPPED | OUTPATIENT
Start: 2023-08-28 | End: 2023-09-20

## 2023-08-28 NOTE — Clinical Note
Geovanna--marcia--harriet to see you 9-20-23 --please recheck A1c that day --good chance she will be < 8% and then can have surgery , I tweaked her mounjaro weekly dose today to help her get there.  Dacia Orellana, [facility]. Medication Therapy Management Provider [phone]

## 2023-08-28 NOTE — PATIENT INSTRUCTIONS
"Recommendations from today's MTM visit:                                                         1. Blood sugars much improved last 60 days --lets increase your weekly Mounjaro dose now to 10mg./week . (Use anti-nausea pill if needed).  Stay on Jardiance and Metformin + Glucoredi tabs as is , keep good low carb diet + intermittent fasting.     Please see Geovanna Morgan 9-20-23 --have her do A1c lab recheck --if <8% --should be ok'd for tendon surgery .     Remember -if your ok'd for surgery you must STOP your weekly Molunjaro injection at least 7 days prior to surgery .       Follow-up: Return in about 24 days (around 9/21/2023), or @10:30 AM via telephone, for Blood sugar meter review, Medication Therapy Management Visit, A1c lab.    It was great speaking with you today.  I value your experience and would be very thankful for your time in providing feedback in our clinic survey. In the next few days, you may receive an email or text message from [employer] with a link to a survey related to your  clinical pharmacist.\"     To schedule another MTM appointment, please call the clinic directly or you may call the MTM scheduling line at [phone] or toll-free at [phone].     My Clinical Pharmacist's contact information:                                                      Please feel free to contact me with any questions or concerns you have.      Dacia Myers Rph.  Medication Therapy Management Provider  [phone]    "

## 2023-09-12 ENCOUNTER — MYC MEDICAL ADVICE (OUTPATIENT)
Dept: FAMILY MEDICINE | Facility: CLINIC | Age: 48
End: 2023-09-12
Payer: COMMERCIAL

## 2023-09-13 ENCOUNTER — MYC MEDICAL ADVICE (OUTPATIENT)
Dept: FAMILY MEDICINE | Facility: CLINIC | Age: 48
End: 2023-09-13
Payer: COMMERCIAL

## 2023-09-14 NOTE — TELEPHONE ENCOUNTER
Patient requesting increased dosing for oxycodone.   Patient instructed to schedule a virtual visit with PCP.     Patient stated she would to that.     MARCOS RoseN RN  [facility]

## 2023-09-15 NOTE — TELEPHONE ENCOUNTER
RECORDS RECEIVED FROM: INTERNAL/[facility]   DATE RECEIVED: 9/15/23   NOTES (FOR ALL VISITS) STATUS DETAILS   OFFICE NOTES from referring provider Internal 8/2/23   Karina Monroy MD      MEDICATION LIST Internal    IMAGING      LABS     DIABETES: HBGA1C, CREATININE, FASTING LIPIDS, MICROALBUMIN URINE, POTASSIUM, TSH, T4    THYROID: TSH, T4, CBC, THYRODLONULIN, TOTAL T3, FREE T4, CALCITONIN, CEA Internal   DIABETES:  HBGA1C:6/28/23  LIPIDS:6/28/23  MICROALBUMIN:6/7/22  TSH:6/28/23    THYROID:  TSH:6/28/23  CBC:7/18/23  FREE T4:6/28/23

## 2023-09-18 ENCOUNTER — VIRTUAL VISIT (OUTPATIENT)
Dept: FAMILY MEDICINE | Facility: CLINIC | Age: 48
End: 2023-09-18
Payer: COMMERCIAL

## 2023-09-18 DIAGNOSIS — M25.571 PAIN IN JOINT INVOLVING ANKLE AND FOOT, RIGHT: Primary | ICD-10-CM

## 2023-09-18 PROCEDURE — 99214 OFFICE O/P EST MOD 30 MIN: CPT | Mod: 95 | Performed by: NURSE PRACTITIONER

## 2023-09-18 RX ORDER — HYDROXYZINE HYDROCHLORIDE 25 MG/1
25 TABLET, FILM COATED ORAL
Qty: 150 TABLET | Refills: 0 | Status: SHIPPED | OUTPATIENT
Start: 2023-09-18 | End: 2023-10-12

## 2023-09-18 ASSESSMENT — ANXIETY QUESTIONNAIRES
GAD7 TOTAL SCORE: 1
2. NOT BEING ABLE TO STOP OR CONTROL WORRYING: NOT AT ALL
7. FEELING AFRAID AS IF SOMETHING AWFUL MIGHT HAPPEN: NOT AT ALL
3. WORRYING TOO MUCH ABOUT DIFFERENT THINGS: SEVERAL DAYS
6. BECOMING EASILY ANNOYED OR IRRITABLE: NOT AT ALL
GAD7 TOTAL SCORE: 1
5. BEING SO RESTLESS THAT IT IS HARD TO SIT STILL: NOT AT ALL
4. TROUBLE RELAXING: NOT AT ALL
IF YOU CHECKED OFF ANY PROBLEMS ON THIS QUESTIONNAIRE, HOW DIFFICULT HAVE THESE PROBLEMS MADE IT FOR YOU TO DO YOUR WORK, TAKE CARE OF THINGS AT HOME, OR GET ALONG WITH OTHER PEOPLE: SOMEWHAT DIFFICULT
1. FEELING NERVOUS, ANXIOUS, OR ON EDGE: NOT AT ALL

## 2023-09-18 NOTE — PROGRESS NOTES
"Elicia is a 48 year old who is being evaluated via a billable video visit.      How would you like to obtain your AVS? MyChart  If the video visit is dropped, the invitation should be resent by: Text to cell phone: [phone]  Will anyone else be joining your video visit? No          Assessment & Plan     Pain in joint involving ankle and foot, right  We discussed that patient needs to follow with one provider for prescription of chronic opioids.  She recently transferred care over to Dr. Waggoner and just had refills of her pain medications.  SHe should follow up in October as schedule and I would continue with adjunctive comfort measures.  SHe is already on several sedating medications and I am uncomfortable prescribing increased dose of oxycodone today.  We will add hydroxyzine that can be taken with her oxycodoen and topical voltaren.  Follow up with PCP as scheduled in October.  - hydrOXYzine (ATARAX) 25 MG tablet; Take 1 tablet (25 mg) by mouth 5 x daily PRN for itching or other (With oxycodone doses)  - diclofenac (VOLTAREN) 1 % topical gel; Apply 4 g topically 4 times daily             BMI:   Estimated body mass index is 36.02 kg/m  as calculated from the following:    Height as of 8/23/23: 1.702 m (5' 7\").    Weight as of 8/23/23: 104.3 kg (230 lb).           CANDACE Blevins [facility]    Subjective   Elicia is a 48 year old, presenting for the following health issues:  Medication Follow-up (Discuss Medications )        9/18/2023     8:08 AM   Additional Questions   Roomed by Amada Samano   Accompanied by Self       History of Present Illness       Reason for visit:  Change dose in oxycodone    She eats 2-3 servings of fruits and vegetables daily.She consumes 0 sweetened beverage(s) daily.She exercises with enough effort to increase her heart rate 9 or less minutes per day.  She exercises with enough effort to increase her heart rate 3 or less days per week.   She is taking " "medications regularly.       On oxycodone.  Waiting to have ankle surgery.    Pain is more severe and she wants to increase her dose.  Rates pain at a constant 6-7 throughout the day.    Following with Dr. Cesar at [facility], needing to get A1C down before she can have surgery.  A1C.    Does follow with pain clinic, they wont prescribe her Pain medications, reports has to be done through PCP.    Also using indomethacin, xanaflex, and gabapentin.   Not using Voltaren.       Has liver issues, so she can't take tylenol.          Review of Systems   Constitutional, HEENT, cardiovascular, pulmonary, gi and gu systems are negative, except as otherwise noted.      Objective    Vitals - Patient Reported  Weight (Patient Reported): 95.3 kg (210 lb)  Height (Patient Reported): 170.2 cm (5' 7\")  BMI (Based on Pt Reported Ht/Wt): 32.89  Pain Score: Severe Pain (6)  Pain Loc: Ankle        Physical Exam   GENERAL: Healthy, alert and no distress  EYES: Eyes grossly normal to inspection.  No discharge or erythema, or obvious scleral/conjunctival abnormalities.  RESP: No audible wheeze, cough, or visible cyanosis.  No visible retractions or increased work of breathing.    SKIN: Visible skin clear. No significant rash, abnormal pigmentation or lesions.  NEURO: Cranial nerves grossly intact.  Mentation and speech appropriate for age.  PSYCH: Mentation appears normal, affect normal/bright, judgement and insight intact, normal speech and appearance well-groomed.                Video-Visit Details    Type of service:  Video Visit     Originating Location (pt. Location): Home    Distant Location (provider location):  Off-site  Platform used for Video Visit: [employer]    Answers submitted by the patient for this visit:  MARYLIN-7 (Submitted on 9/18/2023)  MARYLIN 7 TOTAL SCORE: 1  General Questionnaire (Submitted on 9/18/2023)  Chief Complaint: Chronic problems general questions HPI Form  What is the reason for your visit today? : Change dose in " oxycodone  How many servings of fruits and vegetables do you eat daily?: 2-3  On average, how many sweetened beverages do you drink each day (Examples: soda, juice, sweet tea, etc.  Do NOT count diet or artificially sweetened beverages)?: 0  How many minutes a day do you exercise enough to make your heart beat faster?: 9 or less  How many days a week do you exercise enough to make your heart beat faster?: 3 or less  How many days per week do you miss taking your medication?: 0

## 2023-09-19 ENCOUNTER — MYC MEDICAL ADVICE (OUTPATIENT)
Dept: ENDOCRINOLOGY | Facility: CLINIC | Age: 48
End: 2023-09-19
Payer: COMMERCIAL

## 2023-09-19 NOTE — PROGRESS NOTES
"Video-Visit Details    Type of service:  Video Visit    Video Start Time: 7:57 AM   Video End Time: 8:33 AM    Originating Location (pt. Location): Home    Distant Location (provider location):  Offsite (providers home) [facility] [place]     Platform used for Video Visit: [employer]    New Weight Management Nutrition Consultation    Elicia Calero is a 48 year old female presents today for new weight management nutrition consultation.  Patient referred by Geovanna Morgan NP on 2023.    Patient with Co-morbidities of obesity includin/19/2023     8:28 AM   --   I have the following health issues associated with obesity Type II Diabetes    High Cholesterol    GERD (Reflux)    Fatty Liver    Stress Incontinence    Hypothyroidism   I have the following symptoms associated with obesity Depression         Anthropometrics:  Estimated body mass index is 34.04 kg/m  as calculated from the following:    Height as of 23: 1.715 m (5' 7.5\").    Weight as of 23: 100.1 kg (220 lb 9.6 oz).    Medications for Weight Loss:  Mounjaro  Metformin and Jardiance for DMII  H/o BED managed with topiramate and vyvanse.    NUTRITION HISTORY  Food allergies: None  Food intolerances: None  Dislikes textures of cottage cheese, tomatoes, hummus, guac, salmon.    Vitamin/Mineral Supplements: Vitamin B, folic acid     Previous methods of diet modification for weight loss: Had met with RD when first dx with DMII about 10 years ago. More recently, lost 60-70lb with calorie restriction (1200 wilberto/day) and carb counting but this did not feel sustainable and ultimately regained all that weight.     HgbA1c from 10.8  -> 7.5 in last 2 months. Has been working closely with [facility] Pharm D to get HgbA1c below 8, and now below 7. Needs HgbA1c below 7 for ankle surgery.     Working on less snacking/binging at night. Going to bed sooner has helped with this.   Has been limiting her carb intake to <70 gm per " day, and tends to save for later meal and evening snacks.   Preferences: casseroles, breads, dairy, potato. Not a huge fan of protein.     Recent Diet Recall:  Breakfast: 5-7 am protein shake (Orgain with 2% milk) - has been doing shake for past 5-6 weeks.    Lunch: 11-12 pm baby potatos in air fryer with sour cream and cheese or another protein shake  Dinner: 5-6 pm largest meal - casserole and veggies; chicken/pork and carb and veggies     Snacks: Alternates sweet then salty snacks throughout the night - [place] sheet pan bars with truvia; atkins protein bar; 30 mins later pc of cheese, crackers, 15 mins sweets after.    Beverages: Water (24 oz bottle x3-4); Crystal lite    Dining Out/take-out: Not often, couple times per month.           9/19/2023     8:28 AM   Diet Recall Review with Patient   If you do eat breakfast, what types of food do you eat? Protein shake or something small that is higer protein/low carb becaus my blood sugars are ususualy high   If you do eat lunch, what types of food do you typically eat? Something small because I m not usually real hungry so I may air hawk a few baby potatoes w/ sour cream or some yogurt or a protein shake   If you do eat supper, what types of food do you typically eat? I ll usually make a bigger meal or eat leftovers. It may be a casserole or some sort of vegetable and protein. I try to stay away from a carb at dinnertime.   If you do snack, what types of food do you typically eat? Cheese. Atkins candy, pickles, olives, sweets I baked using Truvia.   How many glasses of juice do you drink in a typical day? 0   How many of glasses of milk do you drink in a typical day? 0   How many 8oz glasses of sugar containing drinks such as Nitin-Aid/sweet tea do you drink in a day? 0   How many cans/bottles of sugar pop/soda/tea/sports drinks do you drink in a day? 0   How many cans/bottles of diet pop/soda/tea or sports drink do you drink in a day? 5   How often do you have a drink  of alcohol? Monthly or Less   If you do drink, how many drinks might you have in a day? 1 or 2           9/19/2023     8:28 AM   Eating Habits   Generally, my meals include foods like these bread, pasta, rice, potatoes, corn, crackers, sweet dessert, pop, or juice A Few Times a Week   Generally, my meals include foods like these fried meats, brats, burgers, french fries, pizza, cheese, chips, or ice cream A Few Times a Week   Eat fast food (like McDonalds, Burger Raj, Taco Bell) Less Than Weekly   Eat at a buffet or sit-down restaurant Less Than Weekly   Eat most of my meals in front of the TV or computer Everyday   Often skip meals, eat at random times, have no regular eating times Almost Everyday   Rarely sit down for a meal but snack or graze throughout A Few Times a Week   Eat extra snacks between meals Once a Week   Eat most of my food at the end of the day Almost Everyday   Eat in the middle of the night or wake up at night to eat Never   Eat extra snacks to prevent or correct low blood sugar Less Than Weekly   Eat to prevent acid reflux or stomach pain Never   Worry about not having enough food to eat Never   I eat when I am depressed Almost Everyday   I eat when I am stressed Almost Everyday   I eat when I am bored Almost Everyday   I eat when I am anxious Almost Everyday   I eat when I am happy or as a reward Almost Everyday   I feel hungry all the time even if I just have eaten Almost Everyday   Feeling full is important to me Almost Everyday   I finish all the food on my plate even if I am already full Almost Everyday   I can't resist eating delicious food or walk past the good food/smell A Few Times a Week   I eat/snack without noticing that I am eating Less Than Weekly   I eat when I am preparing the meal A Few Times a Week   I eat more than usual when I see others eating Less Than Weekly   I have trouble not eating sweets, ice cream, cookies, or chips if they are around the house A Few Times a Week    I think about food all day Almost Everyday   What foods, if any, do you crave? Sweets/Candy/Chocolate   Please list any other foods you crave? I crave the chips/crackers and the cheese too.  I go from craving sweet to salty/savory to sweet salty/savory all the time and it doesn t usually matter what it is.           9/19/2023     8:28 AM   Amount of Food   I feel out of control when eating Almost Everyday   I eat a large amount of food, like a loaf of bread, a box of cookies, a pint/quart of ice cream, all at once Monthly   I eat a large amount of food even when I am not hungry Almost Everyday   I eat rapidly Monthly   I eat alone because I feel embarrassed and do not want others to see how much I have eaten Almost Everyday   I eat until I am uncomfortably full Almost Everyday   I feel bad, disgusted, or guilty after I overeat Everyday       Physical Activity:  Limited d/t ankle injury.         9/19/2023     8:28 AM   Activity/Exercise History   How much of a typical 12 hour day do you spend sitting? Most of the Day   How much of a typical 12 hour day do you spend lying down? Less Than Half the Day   How much of a typical day do you spend walking/standing? Less Than Half the Day   How many hours (not including work) do you spend on the TV/Video Games/Computer/Tablet/Phone? 6 Hours or More   How many times a week are you active for the purpose of exercise? Never   What keeps you from being more active? Pain    Other   How many total minutes do you spend doing some activity for the purpose of exercising when you exercise? None       Nutrition Prescription  Recommended energy/nutrient modification.    Nutrition Diagnosis  Obesity r/t long history of positive energy balance aeb BMI >30.    Nutrition Intervention  Materials/education provided on hypocaloric diet for weight loss and low/moderate carb diet for mgmt of DMII. Discussed Volumetric eating to help satiety level on fewer calories; portion control and healthy  "food choices (Plate Method and Volumetrics handouts), balanced snack choices, meal and snack planning and websites, mindful eating. Patient demonstrates understanding.  Co-developed goals to work towards.   Provided pt with list of goals and resources below via [employer].     Expected Engagement: good  Follow-Up Plans: meal/snack planning      Nutrition Goals  Nutrition Goals  1) Aim for 20-30 gm protein per meal. Limit carbs to 45 gm per meal.    - See meal ideas below  2) For evening snack: put both sweet and salty food on a plate. Make sure foods with protein and fiber are included.    - Example: 2 part-skim cheese sticks, palm-size sweet/bar, and sliced veggies (carrots, celery, cucumbers, peppers)  3) Prior to snack, turn off distractions (TV), sit at the table, and take a few deep breaths, trying to relax prior to snacking.     Breakfast [place]: whole grain english muffin, slice of turkey sausage, 1 egg and 1 slice part-skim mozzarella  Egg Bake recipes:   - https://wwwPhilrealestates/recipes/spinach-renee-gruyere-breakfast-strata  - https://www.BIOSAFE/recipe/wild-mushroom-egg-bake  - https://www.BIOSAFE/recipe/cheesy-vegetable-moussaka  - https://www.diabetesfoodhub.org/recipes/vegetable-frittata.html  Air-Fryer \"Egg Cups:\" https://www.diabetesfoodhub.org/recipes/air-fryer-crisp-egg-cups.html  High-protein casserole recipes:  - https://www.diabetesfoodhub.org/recipes/chicken-kinsey-casserole.html  -  https://www.diabetesfoodhub.org/recipes/unstuffed-pepper-casserole.html  - https://www.diabetesfoodhub.org/recipes/easy-chicken-and-vegetable-casserole.html    High Fiber Foods:  Bran cereal, 1/3 cup, 8.6 gm fiber   Cooked kidney beans   cup 7.9 gm  Cooked lentils   cup 7.8 gm  Cooked black beans   cup 7.6 gm  Canned chickpeas   cup 5.3 gm  Baked beans   cup 5.2 gm  Pear 1 5.1 gm  Soybeans   cup 5.1 gm  Quinoa   cup 5 gm  Slim seeds, 1 tbsp 5 gm  Baked sweet potato, with skin 1 medium 4.8 " gm  Avocado, half small 4.5 gm  Baked potato, with skin 1 medium 4.4 gm  Cooked frozen green peas   cup 4.4 gm  Bulgur   cup 4.1 gm  Cooked frozen mixed vegetables   cup 4 gm  Raspberries   cup 4 gm  Blackberries   cup 3.8 gm  Almonds 1 oz 3.5 gm  Cooked frozen spinach   cup 3.5 gm  Vegetable or soy deep 1 each 3.4 gm  Apple 1 medium 3.3 gm  Dried dates 5 pieces 3.3 gm    The Plate Method  [email or web address]    Protein Sources   [email or web address]     Carbohydrate Counting  [email or web address]     Mindful Eating  [email or web address]     Summary of Volumetrics Eating Plan  [email or web address]       Follow-Up:  6 weeks, PRN    Time spent with patient: 36 minutes.  Jeri Hernández, BLUE, LD

## 2023-09-20 ENCOUNTER — OFFICE VISIT (OUTPATIENT)
Dept: ENDOCRINOLOGY | Facility: CLINIC | Age: 48
End: 2023-09-20
Payer: COMMERCIAL

## 2023-09-20 ENCOUNTER — LAB (OUTPATIENT)
Dept: LAB | Facility: CLINIC | Age: 48
End: 2023-09-20
Payer: COMMERCIAL

## 2023-09-20 VITALS
HEIGHT: 68 IN | DIASTOLIC BLOOD PRESSURE: 79 MMHG | SYSTOLIC BLOOD PRESSURE: 118 MMHG | WEIGHT: 220.6 LBS | BODY MASS INDEX: 33.43 KG/M2 | HEART RATE: 94 BPM | OXYGEN SATURATION: 98 %

## 2023-09-20 DIAGNOSIS — K76.0 NAFLD (NONALCOHOLIC FATTY LIVER DISEASE): ICD-10-CM

## 2023-09-20 DIAGNOSIS — E66.812 CLASS 2 SEVERE OBESITY DUE TO EXCESS CALORIES WITH SERIOUS COMORBIDITY AND BODY MASS INDEX (BMI) OF 35.0 TO 35.9 IN ADULT (H): ICD-10-CM

## 2023-09-20 DIAGNOSIS — E66.01 CLASS 2 SEVERE OBESITY DUE TO EXCESS CALORIES WITH SERIOUS COMORBIDITY AND BODY MASS INDEX (BMI) OF 35.0 TO 35.9 IN ADULT (H): Primary | ICD-10-CM

## 2023-09-20 DIAGNOSIS — E66.812 CLASS 2 SEVERE OBESITY DUE TO EXCESS CALORIES WITH SERIOUS COMORBIDITY AND BODY MASS INDEX (BMI) OF 35.0 TO 35.9 IN ADULT (H): Primary | ICD-10-CM

## 2023-09-20 DIAGNOSIS — E61.1 IRON DEFICIENCY: ICD-10-CM

## 2023-09-20 DIAGNOSIS — E11.9 TYPE 2 DIABETES MELLITUS WITHOUT COMPLICATION, WITHOUT LONG-TERM CURRENT USE OF INSULIN (H): ICD-10-CM

## 2023-09-20 DIAGNOSIS — E66.01 CLASS 2 SEVERE OBESITY DUE TO EXCESS CALORIES WITH SERIOUS COMORBIDITY AND BODY MASS INDEX (BMI) OF 35.0 TO 35.9 IN ADULT (H): ICD-10-CM

## 2023-09-20 LAB
ALBUMIN SERPL BCG-MCNC: 4.9 G/DL (ref 3.5–5.2)
ALP SERPL-CCNC: 93 U/L (ref 35–104)
ALT SERPL W P-5'-P-CCNC: 19 U/L (ref 0–50)
ANION GAP SERPL CALCULATED.3IONS-SCNC: 14 MMOL/L (ref 7–15)
AST SERPL W P-5'-P-CCNC: 20 U/L (ref 0–45)
BILIRUB DIRECT SERPL-MCNC: <0.2 MG/DL (ref 0–0.3)
BILIRUB SERPL-MCNC: 0.2 MG/DL
BUN SERPL-MCNC: 24.2 MG/DL (ref 6–20)
CALCIUM SERPL-MCNC: 10.1 MG/DL (ref 8.6–10)
CHLORIDE SERPL-SCNC: 102 MMOL/L (ref 98–107)
CREAT SERPL-MCNC: 1.03 MG/DL (ref 0.51–0.95)
DEPRECATED HCO3 PLAS-SCNC: 24 MMOL/L (ref 22–29)
EGFRCR SERPLBLD CKD-EPI 2021: 67 ML/MIN/1.73M2
ERYTHROCYTE [DISTWIDTH] IN BLOOD BY AUTOMATED COUNT: 15.1 % (ref 10–15)
GLUCOSE SERPL-MCNC: 160 MG/DL (ref 70–99)
HBA1C MFR BLD: 7.5 %
HCT VFR BLD AUTO: 44.6 % (ref 35–47)
HGB BLD-MCNC: 14 G/DL (ref 11.7–15.7)
INR PPP: 0.99 (ref 0.85–1.15)
MCH RBC QN AUTO: 24.8 PG (ref 26.5–33)
MCHC RBC AUTO-ENTMCNC: 31.4 G/DL (ref 31.5–36.5)
MCV RBC AUTO: 79 FL (ref 78–100)
PLATELET # BLD AUTO: 441 10E3/UL (ref 150–450)
POTASSIUM SERPL-SCNC: 4.3 MMOL/L (ref 3.4–5.3)
PROT SERPL-MCNC: 7.5 G/DL (ref 6.4–8.3)
RBC # BLD AUTO: 5.64 10E6/UL (ref 3.8–5.2)
SODIUM SERPL-SCNC: 140 MMOL/L (ref 136–145)
WBC # BLD AUTO: 7.2 10E3/UL (ref 4–11)

## 2023-09-20 PROCEDURE — 80053 COMPREHEN METABOLIC PANEL: CPT | Performed by: PATHOLOGY

## 2023-09-20 PROCEDURE — 85610 PROTHROMBIN TIME: CPT | Performed by: PATHOLOGY

## 2023-09-20 PROCEDURE — 99205 OFFICE O/P NEW HI 60 MIN: CPT | Performed by: NURSE PRACTITIONER

## 2023-09-20 PROCEDURE — 85027 COMPLETE CBC AUTOMATED: CPT | Performed by: PATHOLOGY

## 2023-09-20 PROCEDURE — 99000 SPECIMEN HANDLING OFFICE-LAB: CPT | Performed by: PATHOLOGY

## 2023-09-20 PROCEDURE — 82248 BILIRUBIN DIRECT: CPT | Performed by: PATHOLOGY

## 2023-09-20 PROCEDURE — 36415 COLL VENOUS BLD VENIPUNCTURE: CPT | Performed by: PATHOLOGY

## 2023-09-20 PROCEDURE — 86364 TISS TRNSGLTMNASE EA IG CLAS: CPT | Performed by: INTERNAL MEDICINE

## 2023-09-20 PROCEDURE — 83036 HEMOGLOBIN GLYCOSYLATED A1C: CPT | Performed by: NURSE PRACTITIONER

## 2023-09-20 ASSESSMENT — PAIN SCALES - GENERAL: PAINLEVEL: SEVERE PAIN (6)
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
## 2023-09-20 NOTE — NURSING NOTE
"(   Chief Complaint   Patient presents with    New Patient     New Medical Weight Management Consult    )    ( Weight: 220 lb 9.6 oz )  ( Height: 5' 7.5\" )  ( BMI (Calculated): 34.04 )  (   )  (   )  (   )  (   )  (   )  (   )    ( BP: 118/79 )  (   )  (   )  (   )  ( Pulse: 94 )  (   )  ( SpO2: 98 % )    (   Patient Active Problem List   Diagnosis    Gastroesophageal reflux disease with esophagitis without hemorrhage    Hypothyroidism, unspecified type    Hepatic adenoma    NAFLD (nonalcoholic fatty liver disease)    Type 2 diabetes mellitus without complication, without long-term current use of insulin (H)    Irritable bowel syndrome, unspecified type    Mild episode of recurrent major depressive disorder (H)    Chronic migraine without aura without status migrainosus, not intractable    Chronic low back pain without sciatica, unspecified back pain laterality    Bipolar affective disorder, current episode mixed, current episode severity unspecified (H)    Pain in joint involving ankle and foot, right    Persistent insomnia    Localized edema    Facet arthropathy, lumbosacral    Atypical psychosis (H)    Depressive disorder, not elsewhere classified    Former smoker    Irregular menstrual cycle    Lymphedema    Myofascial pain syndrome    Myopia    Obesity (BMI 30-39.9)    Regular astigmatism    Tension type headache    Chronic pain disorder    F11.9 - Chronic, continuous use of opioids    Class 2 severe obesity due to excess calories with serious comorbidity in adult (H)    )  (   Current Outpatient Medications   Medication Sig Dispense Refill    Alcohol Swabs PADS 1 each daily 300 each 3    buPROPion (WELLBUTRIN XL) 150 MG 24 hr tablet Take 1 tablet (150 mg) by mouth every morning 90 tablet 3    buPROPion (WELLBUTRIN XL) 300 MG 24 hr tablet Take 1 tablet (300 mg) by mouth daily 90 tablet 3    Continuous Blood Gluc  (DEXCOM G6 ) [place] Use to read blood sugars as per 's instructions. 1 " each 0    Continuous Blood Gluc Sensor (DEXCOM G6 SENSOR) MISC Change every 10 days. 3 each 5    Continuous Blood Gluc Transmit (DEXCOM G6 TRANSMITTER) MISC Change every 3 months. 1 each 1    diclofenac (VOLTAREN) 1 % topical gel Apply 4 g topically 4 times daily 350 g 1    diphenhydrAMINE (BENADRYL) 50 MG tablet Take 50 mg by mouth      doxepin (SINEQUAN) 25 MG capsule Take 2 capsules (50 mg) by mouth At Bedtime 30 capsule 3    empagliflozin (JARDIANCE) 25 MG TABS tablet Take 1 tablet (25 mg) by mouth daily 90 tablet 1    folic acid (FOLVITE) 1 MG tablet Take 1 tablet (1,000 mcg) by mouth daily 90 tablet 3    gabapentin (NEURONTIN) 300 MG capsule Take 4 capsules (1,200 mg) by mouth 3 times daily 1080 capsule 3    hydrochlorothiazide (MICROZIDE) 12.5 MG capsule Take 1 capsule (12.5 mg) by mouth daily 90 capsule 3    hydrOXYzine (ATARAX) 25 MG tablet Take 1 tablet (25 mg) by mouth 5 x daily PRN for itching or other (With oxycodone doses) 150 tablet 0    hydrOXYzine (VISTARIL) 50 MG capsule Take 1 capsule (50 mg) by mouth daily 90 capsule 3    indomethacin (INDOCIN) 50 MG capsule Take 1 capsule (50 mg) by mouth 3 times daily 270 capsule 3    lamoTRIgine (LAMICTAL) 200 MG tablet Take 2 tablets (400 mg) by mouth At Bedtime for 360 days 180 tablet 3    levothyroxine (SYNTHROID/LEVOTHROID) 125 MCG tablet Take 1 tablet (125 mcg) by mouth daily 90 tablet 3    lidocaine, viscous, (XYLOCAINE) 2 % solution Swish and spit 5 mLs in mouth 5 times daily 100 mL 1    metFORMIN (GLUCOPHAGE XR) 500 MG 24 hr tablet Take 4 tablets (2,000 mg) by mouth daily 360 tablet 3    naloxone (NARCAN) 4 MG/0.1ML nasal spray Spray 1 spray (4 mg) into one nostril alternating nostrils as needed for opioid reversal every 2-3 minutes until assistance arrives 0.2 mL 3    norethindrone (MICRONOR) 0.35 MG tablet Take 1 tablet (0.35 mg) by mouth daily 84 tablet 4    nystatin (MYCOSTATIN) 111812 UNIT/GM external cream Apply 100,000 g topically 3 times daily       omeprazole (PRILOSEC) 20 MG DR capsule Take 1 capsule (20 mg) by mouth 2 times daily 180 capsule 3    ondansetron (ZOFRAN) 4 MG tablet Take 1 tablet (4 mg) by mouth 3 times daily 90 tablet 1    oxyCODONE (ROXICODONE) 5 MG tablet Take 1 tablet (5 mg) by mouth 5 times daily for 30 days 150 tablet 0    pravastatin (PRAVACHOL) 40 MG tablet Take 1 tablet (40 mg) by mouth daily 90 tablet 3    prochlorperazine (COMPAZINE) 10 MG tablet Take 1 tablet (10 mg) by mouth 4 times daily as needed for nausea (with migraine) 120 tablet 5    REXULTI 2 MG tablet Take 2 mg by mouth daily      rizatriptan (MAXALT) 10 MG tablet Take 1 tablet (10 mg) by mouth 2 times daily 60 tablet 3    tirzepatide (MOUNJARO) 10 MG/0.5ML pen Inject 10 mg Subcutaneous every 7 days 2 mL 0    tirzepatide (MOUNJARO) 2.5 MG/0.5ML pen Inject 2.5 mg Subcutaneous every 7 days Fyi--from [facility]- patient off insurance --needs free 1month [employer] coupon BUT it's only for the 2.5mg dose-- please use following [employer] rx savings coupon to process; RXbin# [id number]  PCN #- [id number] , Grp#- [id number] , id#- [id number]   exp date : 12-. 2 mL 0    tiZANidine (ZANAFLEX) 2 MG tablet Take 1 tablet (2 mg) by mouth 3 times daily 270 tablet 3    topiramate (TOPAMAX) 50 MG tablet Take 1 tablet (50 mg) by mouth every evening 90 tablet 3    triamcinolone (KENALOG) 0.1 % external ointment Apply twice daily on hand for 2-4 weeks. 15 g 1    VYVANSE 60 MG capsule       )  ( Diabetes Eval:    )    ( Pain Eval:  Severe Pain (6) )    ( Wound Eval:       )    (   History   Smoking Status    Former    Packs/day: 1.00    Years: 20.00    Types: Cigarettes    Quit date: 2018   Smokeless Tobacco    Never    )    ( Signed By:  Cadence Hendrickson; September 20, 2023; 6:59 AM )
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
## 2023-09-20 NOTE — PATIENT INSTRUCTIONS
"Carlos Rubi, it was nice to meet you today!  Thank you for allowing us the privilege of caring for you. We hope we provided you with the excellent service you deserve.   Please let us know if there is anything else we can do for you so that we can be sure you are completely satisfied with your care experience.    To ensure the quality of our services you may be receiving a patient satisfaction survey from an independent patient satisfaction monitoring company.    The greatest compliment you can give is a \"Likely to Recommend\"    Your visit was with Geovanna Morgan NP today.    Instructions per today's visit:     Follow up  plan:  Labs today   Brush teeth after dinner   Work with dietitian - move more nutrition to earlier in the day   Focus on protein   Increase mounjaro to 10mg when available - may need to do 5mg in the middle if at 2.5mg for too long   Follow up with me in 3 months   ___________________________________________________________________________  Important contact and scheduling information:  Please call our contact center at [phone] to schedule your next appointments.  For any nursing questions or concerns call Salena Tipton LPN at [phone] or Rosy Lomeli RN at [phone]  Please call during clinic hours Monday through Friday 8:00a - 4:00p if you have questions or you can contact us via shenzhoufu at anytime and we will reply during clinic hours.    Lab results will be communicated through My Chart or letter (if My Chart not used). Please call the clinic if you have not received communication after 1 week or if you have any questions.?  Clinic Fax: [phone]  __________________________________________________________________________    If labs were ordered today:    Please make an appointment to have them drawn at your convenience.     To schedule the Lab Appointment using shenzhoufu:  Select \"Schedule an Appointment\"  Select \"Lab Only\"  For \"A couple of questions\", select \"Other\"  For \"Which " locations work for you?, select the location and set up the appointment    To schedule by phone call [phone] to schedule a lab only appointment at any [facility] lab.  ___________________________________________________________________________  Work with A Health !  Virtual Sessions are Available through [facility]    To learn more, call to schedule a free, Health  Q&A appointment: [phone]     What is Health Coaching?  Do you know what you are supposed to do, but you just aren't doing it?  Then, HEALTH COACHING may help you!   Get unstuck and move forward with the support of a professionally trained NBC-HWC (National Board-Certified Health and ) who uses evidence-based approaches to help you move forward with healthy lifestyle changes in the areas of weight loss, stress management and overall well-being.    Health Coaches help you identify goals that will work best for you. Health Coaches provide support and encouragement with overcoming barriers and help you to find inspiration and motivation to lead a healthy lifestyle.    Option one:  Health Coaching 3-Pack; Three, 30-minute Health Coaching Visits, for $99  Visits are done virtually (phone or video)  This is a self pay service; we do not accept insurance for malachi coaching.    Option two:   The 24 week Plan; 11 Health Coaching Visits, and a 7 months subscription to [employer]-- on-demand fitness, nutrition and mindfulness classes, for $499 (employee discounts may be available). Participants will also meet regularly with a weight management Medical Provider and a Registered/Licensed Dietician.  This is a self-pay service; we do not accept insurance for health coaching.    To Schedule a free Health  Q&A appointment to learn more,  call [phone].  ____________________________________________________________________    [facility]   Healthy  Lifestyle Virtual Support Group    Healthy Lifestyle Virtual Support Group?  This is 60 minutes of small group guided discussion, support and resources. All are welcome who want a healthy lifestyle.  WHEN: Starting in July 2023, this group meets the 1st Friday of the month from 12:30 PM - 1:30 PM virtually using Microsoft Teams.    FACILITATOR: Led by National Board Certified Health and , Joyce Dunbar [facility].   TO REGISTER: Please send an email to Joyce [email or web address] to receive monthly invites to the group or if you have any questions about having a health .  Prior to the meeting, a link with directions on how to join the meeting will be sent to you.    2023 Meetings  May 19: Let's Talk  June 9: Create Your Coaching Toolkit: Learn How to  Yourself  July 7: Let's Talk  August 4: Benefits of Fiber with [employer]  September 1: Show and Tell (share your aps, podcasts, recipes, hacks, books)  October 6 :Let's Talk  November 3: Introduction to Mindfulness   December 1: Let's Talk    If you would like bariatric surgery specific support group info please let your care team know.         Thank you,   [facility] Management Team

## 2023-09-21 ENCOUNTER — VIRTUAL VISIT (OUTPATIENT)
Dept: ENDOCRINOLOGY | Facility: CLINIC | Age: 48
End: 2023-09-21
Payer: COMMERCIAL

## 2023-09-21 ENCOUNTER — TELEPHONE (OUTPATIENT)
Dept: ENDOCRINOLOGY | Facility: CLINIC | Age: 48
End: 2023-09-21

## 2023-09-21 DIAGNOSIS — Z71.3 NUTRITIONAL COUNSELING: Primary | ICD-10-CM

## 2023-09-21 DIAGNOSIS — E66.01 CLASS 2 SEVERE OBESITY DUE TO EXCESS CALORIES WITH SERIOUS COMORBIDITY AND BODY MASS INDEX (BMI) OF 35.0 TO 35.9 IN ADULT (H): ICD-10-CM

## 2023-09-21 DIAGNOSIS — E66.812 CLASS 2 SEVERE OBESITY DUE TO EXCESS CALORIES WITH SERIOUS COMORBIDITY AND BODY MASS INDEX (BMI) OF 35.0 TO 35.9 IN ADULT (H): ICD-10-CM

## 2023-09-21 DIAGNOSIS — N17.9 AKI (ACUTE KIDNEY INJURY) (H): Primary | ICD-10-CM

## 2023-09-21 DIAGNOSIS — K76.0 NAFLD (NONALCOHOLIC FATTY LIVER DISEASE): ICD-10-CM

## 2023-09-21 DIAGNOSIS — E11.9 TYPE 2 DIABETES MELLITUS WITHOUT COMPLICATION, WITHOUT LONG-TERM CURRENT USE OF INSULIN (H): ICD-10-CM

## 2023-09-21 LAB
TTG IGA SER-ACNC: 0.2 U/ML
TTG IGG SER-ACNC: <0.6 U/ML

## 2023-09-21 PROCEDURE — 99207 PR NO CHARGE LOS: CPT | Mod: VID | Performed by: DIETITIAN, REGISTERED

## 2023-09-21 PROCEDURE — 97802 MEDICAL NUTRITION INDIV IN: CPT | Mod: VID | Performed by: DIETITIAN, REGISTERED

## 2023-09-21 ASSESSMENT — PAIN SCALES - GENERAL: PAINLEVEL: SEVERE PAIN (7)

## 2023-09-21 ASSESSMENT — PATIENT HEALTH QUESTIONNAIRE - PHQ9: SUM OF ALL RESPONSES TO PHQ QUESTIONS 1-9: 13

## 2023-09-21 NOTE — LETTER
"2023       RE: Elicia Calero  [address]  [place] [address]     Dear Colleague,    Thank you for referring your patient, Elicia Calero, to the [facility] [place] at [facility]. Please see a copy of my visit note below.    Video-Visit Details    Type of service:  Video Visit    Video Start Time: 7:57 AM   Video End Time: 8:33 AM    Originating Location (pt. Location): Home    Distant Location (provider location):  Offsite (providers home) [facility] [place]     Platform used for Video Visit: Rollerscoot    New Weight Management Nutrition Consultation    Elicia Calero is a 48 year old female presents today for new weight management nutrition consultation.  Patient referred by Geovanna Morgan NP on 2023.    Patient with Co-morbidities of obesity includin/19/2023     8:28 AM   --   I have the following health issues associated with obesity Type II Diabetes    High Cholesterol    GERD (Reflux)    Fatty Liver    Stress Incontinence    Hypothyroidism   I have the following symptoms associated with obesity Depression         Anthropometrics:  Estimated body mass index is 34.04 kg/m  as calculated from the following:    Height as of 23: 1.715 m (5' 7.5\").    Weight as of 23: 100.1 kg (220 lb 9.6 oz).    Medications for Weight Loss:  Mounjaro  Metformin and Jardiance for DMII  H/o BED managed with topiramate and vyvanse.    NUTRITION HISTORY  Food allergies: None  Food intolerances: None  Dislikes textures of cottage cheese, tomatoes, hummus, guac, salmon.    Vitamin/Mineral Supplements: Vitamin B, folic acid     Previous methods of diet modification for weight loss: Had met with RD when first dx with DMII about 10 years ago. More recently, lost 60-70lb with calorie restriction (1200 wilberto/day) and carb counting but this did not feel sustainable and ultimately regained " all that weight.     HgbA1c from 10.8  -> 7.5 in last 2 months. Has been working closely with [facility] Pharm D to get HgbA1c below 8, and now below 7. Needs HgbA1c below 7 for ankle surgery.     Working on less snacking/binging at night. Going to bed sooner has helped with this.   Has been limiting her carb intake to <70 gm per day, and tends to save for later meal and evening snacks.   Preferences: casseroles, breads, dairy, potato. Not a huge fan of protein.     Recent Diet Recall:  Breakfast: 5-7 am protein shake (Orgain with 2% milk) - has been doing shake for past 5-6 weeks.    Lunch: 11-12 pm baby potatos in air fryer with sour cream and cheese or another protein shake  Dinner: 5-6 pm largest meal - casserole and veggies; chicken/pork and carb and veggies     Snacks: Alternates sweet then salty snacks throughout the night - [place] sheet pan bars with truvia; Veacon protein bar; 30 mins later pc of cheese, crackers, 15 mins sweets after.    Beverages: Water (24 oz bottle x3-4); Crystal lite    Dining Out/take-out: Not often, couple times per month.           9/19/2023     8:28 AM   Diet Recall Review with Patient   If you do eat breakfast, what types of food do you eat? Protein shake or something small that is higer protein/low carb becaus my blood sugars are ususualy high   If you do eat lunch, what types of food do you typically eat? Something small because I m not usually real hungry so I may air hawk a few baby potatoes w/ sour cream or some yogurt or a protein shake   If you do eat supper, what types of food do you typically eat? I ll usually make a bigger meal or eat leftovers. It may be a casserole or some sort of vegetable and protein. I try to stay away from a carb at dinnertime.   If you do snack, what types of food do you typically eat? Cheese. Atkins candy, pickles, olives, sweets I baked using Truvia.   How many glasses of juice do you drink in a typical day? 0   How many of glasses of milk do you drink  in a typical day? 0   How many 8oz glasses of sugar containing drinks such as Nitin-Aid/sweet tea do you drink in a day? 0   How many cans/bottles of sugar pop/soda/tea/sports drinks do you drink in a day? 0   How many cans/bottles of diet pop/soda/tea or sports drink do you drink in a day? 5   How often do you have a drink of alcohol? Monthly or Less   If you do drink, how many drinks might you have in a day? 1 or 2           9/19/2023     8:28 AM   Eating Habits   Generally, my meals include foods like these bread, pasta, rice, potatoes, corn, crackers, sweet dessert, pop, or juice A Few Times a Week   Generally, my meals include foods like these fried meats, brats, burgers, french fries, pizza, cheese, chips, or ice cream A Few Times a Week   Eat fast food (like McDonalds, Burger Raj, Taco Bell) Less Than Weekly   Eat at a buffet or sit-down restaurant Less Than Weekly   Eat most of my meals in front of the TV or computer Everyday   Often skip meals, eat at random times, have no regular eating times Almost Everyday   Rarely sit down for a meal but snack or graze throughout A Few Times a Week   Eat extra snacks between meals Once a Week   Eat most of my food at the end of the day Almost Everyday   Eat in the middle of the night or wake up at night to eat Never   Eat extra snacks to prevent or correct low blood sugar Less Than Weekly   Eat to prevent acid reflux or stomach pain Never   Worry about not having enough food to eat Never   I eat when I am depressed Almost Everyday   I eat when I am stressed Almost Everyday   I eat when I am bored Almost Everyday   I eat when I am anxious Almost Everyday   I eat when I am happy or as a reward Almost Everyday   I feel hungry all the time even if I just have eaten Almost Everyday   Feeling full is important to me Almost Everyday   I finish all the food on my plate even if I am already full Almost Everyday   I can't resist eating delicious food or walk past the good  food/smell A Few Times a Week   I eat/snack without noticing that I am eating Less Than Weekly   I eat when I am preparing the meal A Few Times a Week   I eat more than usual when I see others eating Less Than Weekly   I have trouble not eating sweets, ice cream, cookies, or chips if they are around the house A Few Times a Week   I think about food all day Almost Everyday   What foods, if any, do you crave? Sweets/Candy/Chocolate   Please list any other foods you crave? I crave the chips/crackers and the cheese too.  I go from craving sweet to salty/savory to sweet salty/savory all the time and it doesn t usually matter what it is.           9/19/2023     8:28 AM   Amount of Food   I feel out of control when eating Almost Everyday   I eat a large amount of food, like a loaf of bread, a box of cookies, a pint/quart of ice cream, all at once Monthly   I eat a large amount of food even when I am not hungry Almost Everyday   I eat rapidly Monthly   I eat alone because I feel embarrassed and do not want others to see how much I have eaten Almost Everyday   I eat until I am uncomfortably full Almost Everyday   I feel bad, disgusted, or guilty after I overeat Everyday       Physical Activity:  Limited d/t ankle injury.         9/19/2023     8:28 AM   Activity/Exercise History   How much of a typical 12 hour day do you spend sitting? Most of the Day   How much of a typical 12 hour day do you spend lying down? Less Than Half the Day   How much of a typical day do you spend walking/standing? Less Than Half the Day   How many hours (not including work) do you spend on the TV/Video Games/Computer/Tablet/Phone? 6 Hours or More   How many times a week are you active for the purpose of exercise? Never   What keeps you from being more active? Pain    Other   How many total minutes do you spend doing some activity for the purpose of exercising when you exercise? None       Nutrition Prescription  Recommended energy/nutrient  "modification.    Nutrition Diagnosis  Obesity r/t long history of positive energy balance aeb BMI >30.    Nutrition Intervention  Materials/education provided on hypocaloric diet for weight loss and low/moderate carb diet for mgmt of DMII. Discussed Volumetric eating to help satiety level on fewer calories; portion control and healthy food choices (Plate Method and Volumetrics handouts), balanced snack choices, meal and snack planning and websites, mindful eating. Patient demonstrates understanding.  Co-developed goals to work towards.   Provided pt with list of goals and resources below via Luxolat.     Expected Engagement: good  Follow-Up Plans: meal/snack planning      Nutrition Goals  Nutrition Goals  1) Aim for 20-30 gm protein per meal. Limit carbs to 45 gm per meal.    - See meal ideas below  2) For evening snack: put both sweet and salty food on a plate. Make sure foods with protein and fiber are included.    - Example: 2 part-skim cheese sticks, palm-size sweet/bar, and sliced veggies (carrots, celery, cucumbers, peppers)  3) Prior to snack, turn off distractions (TV), sit at the table, and take a few deep breaths, trying to relax prior to snacking.     Breakfast [place]: whole grain english muffin, slice of turkey sausage, 1 egg and 1 slice part-skim mozzarella  Egg Bake recipes:   - https://www.[employer]/recipes/spinach-renee-gruyere-breakfast-strata  - https://www.[employer]/recipe/wild-mushroom-egg-bake  - https://www.[employer]/recipe/cheesy-vegetable-moussaka  - https://www.diabetesfoodhub.org/recipes/vegetable-frittata.html  Air-Fryer \"Egg Cups:\" https://www.diabetesfoodhub.org/recipes/air-fryer-crisp-egg-cups.html  High-protein casserole recipes:  - https://www.diabetesfoodhub.org/recipes/chicken-kinsey-casserole.html  -  https://www.diabetesfoodhub.org/recipes/unstuffed-pepper-casserole.html  - https://www.diabetesfoodhub.org/recipes/easy-chicken-and-vegetable-casserole.html    High " Fiber Foods:  Bran cereal, 1/3 cup, 8.6 gm fiber   Cooked kidney beans   cup 7.9 gm  Cooked lentils   cup 7.8 gm  Cooked black beans   cup 7.6 gm  Canned chickpeas   cup 5.3 gm  Baked beans   cup 5.2 gm  Pear 1 5.1 gm  Soybeans   cup 5.1 gm  Quinoa   cup 5 gm  Slim seeds, 1 tbsp 5 gm  Baked sweet potato, with skin 1 medium 4.8 gm  Avocado, half small 4.5 gm  Baked potato, with skin 1 medium 4.4 gm  Cooked frozen green peas   cup 4.4 gm  Bulgur   cup 4.1 gm  Cooked frozen mixed vegetables   cup 4 gm  Raspberries   cup 4 gm  Blackberries   cup 3.8 gm  Almonds 1 oz 3.5 gm  Cooked frozen spinach   cup 3.5 gm  Vegetable or soy deep 1 each 3.4 gm  Apple 1 medium 3.3 gm  Dried dates 5 pieces 3.3 gm    The Plate Method  Http://www.[email or web address]    Protein Sources   http://[employer]/965074.pdf     Carbohydrate Counting  http://[email or web address]     Mindful Eating  http://[employer]/432536.pdf     Summary of Volumetrics Eating Plan  [email or web address]       Follow-Up:  6 weeks, PRN    Time spent with patient: 36 minutes.  Jeri Hernández RD, LD

## 2023-09-21 NOTE — NURSING NOTE
Is the patient currently in the state of [place]? YES    Visit mode:VIDEO    If the visit is dropped, the patient can be reconnected by: VIDEO VISIT: Send to e-mail at: [email or web address]    Will anyone else be joining the visit? NO  (If patient encounters technical issues they should call [phone]    How would you like to obtain your AVS? MyChart    Are changes needed to the allergy or medication list? No    Pt states height/weight same as yesterday's visit.  Pt is awaiting ankle surgery - ankle pain today 7/10.    Reason for visit: Consult    Depression Response    Patient completed the PHQ-9 assessment for depression and scored >9? Yes  Question 9 on the PHQ-9 was positive for suicidality? Yes  Does patient have current mental health provider? Yes    Is this a virtual visit? Yes   Does patient have suicidal ideation (positive question 9)? Yes (adult) - transfer to Red Flag Triage ([phone]) Patient declined transfer.  Notify provider.     I personally notified the following: visit provider           Elie SAMUEL

## 2023-09-21 NOTE — ASSESSMENT & PLAN NOTE
Adult onset weight gain after divorce at age 26. Gradual gain over time. She has been able to lose weight in the past, most recently lost 60-70lb with calorie restriction and carb counting but this did not feel sustainable and ultimately regained all that weight. With decreased activity since original Ankle fracture in 2020 with 3 subsequent surgeries she has seen more weight regain.  Recent tendon rupture and on short term disability until that can be repaired. Ortho needs her A1C between 6 and 7 to do her surgery; this is discouraging for her as she was originally told less than 8. Last A1C was 11 and she has been working with [facility] to manage her diabetes. She has been working hard and believes her A1C is now less than 8. Continues on metformin and jardiance. Mounjaro had to be reduced recently due to a insurance issue this past month.     Referred to me from hepatology clinic. MAFLD without evidence of fibrosis with ongoing monitoring of adenomas of liver. Encouraged diabetes management and cessation of alcohol along with weight management. Has follow up in 1 year.     Describes more snacking than regular meals. Has been working hard on this with [facility] pharmacist to recognize difference between snack and meal and trying to stick to just meals. Most often struggles with snacking in the evenings. Protein is a challenge because she doesn't enjoy eating a lot of meat. Gravitates towards carbs and cheese. New diagnosis of binge eating disorder which has been managed by primary care thus far with vyvanse 60mg and topiramate. Tapered up to 100mg topiramate with her psychiatrist. Decreased binging episodes. Food noise was better controlled the two weeks she was able to take mounjaro 10mg but now back at 2.5mg she is having difficulty. Ultimately, we'd like to get her tapered back up to 10mg tirzepatide. We may need to consider alternative GLP1 with insurance changes. Could also consider higher dose of topiramate in the  future.     Discussed role of protein to manage hunger/ cravings as well as blood sugar. Plan is to work with RD to help find alternative proteins to optimize nutrition. Discussed avoiding excessive hunger to prevent binges. She continues to work with therapist.     Labs today   Brush teeth after dinner   Work with dietitian - move more nutrition to earlier in the day   Focus on protein   Increase mounjaro to 10mg when available - may need to do 5mg in the middle if at 2.5mg for too long   Follow up with me in 3 months

## 2023-09-21 NOTE — TELEPHONE ENCOUNTER
9/21 left ,  for staff Geovanna Leggett, NP  [facility]uling Registration Pool  See Jeri in 4 weeks and me in 8-12  weeks

## 2023-09-21 NOTE — PATIENT INSTRUCTIONS
"Carlos Rubi,    Follow-up with RD in 6 weeks.     Thank you,    Jeri Hernández, BLUE, LD  If you would like to schedule or reschedule an appointment with the RD, please call [phone]    Nutrition Goals  1) Aim for 20-30 gm protein per meal. Limit carbs to 45 gm per meal.    - See meal ideas below  2) For evening snack: put both sweet and salty food on a plate. Make sure foods with protein and fiber are included.    - Example: 2 part-skim cheese sticks, palm-size sweet/bar, and sliced veggies (carrots, celery, cucumbers, peppers)  3) Prior to snack, turn off distractions (TV), sit at the table, and take a few deep breaths, trying to relax prior to snacking.     Breakfast Tivoli: whole grain english muffin, slice of turkey sausage, 1 egg and 1 slice part-skim mozzarella  Egg Bake recipes:   - https://www.[employer]/recipes/spinach-renee-gruyere-breakfast-strata  - https://www.Dash[employer]/recipe/wild-mushroom-egg-bake  - https://www.[employer]/recipe/cheesy-vegetable-moussaka  - https://www.diabetesfoodhub.org/recipes/vegetable-frittata.html  Air-Fryer \"Egg Cups:\" https://www.diabetesfoodhub.org/recipes/air-fryer-crisp-egg-cups.html  High-protein casserole recipes:  - https://www.diabetesfoodhub.org/recipes/chicken-kinsey-casserole.html  -  https://www.diabetesfoodhub.org/recipes/unstuffed-pepper-casserole.html  - https://www.diabetesfoodhub.org/recipes/easy-chicken-and-vegetable-casserole.html    High Fiber Foods:  Bran cereal, 1/3 cup, 8.6 gm fiber   Cooked kidney beans   cup 7.9 gm  Cooked lentils   cup 7.8 gm  Cooked black beans   cup 7.6 gm  Canned chickpeas   cup 5.3 gm  Baked beans   cup 5.2 gm  Pear 1 5.1 gm  Soybeans   cup 5.1 gm  Quinoa   cup 5 gm  Slim seeds, 1 tbsp 5 gm  Baked sweet potato, with skin 1 medium 4.8 gm  Avocado, half small 4.5 gm  Baked potato, with skin 1 medium 4.4 gm  Cooked frozen green peas   cup 4.4 gm  Bulgur   cup 4.1 gm  Cooked frozen mixed vegetables   cup 4 gm  Raspberries   " cup 4 gm  Blackberries   cup 3.8 gm  Almonds 1 oz 3.5 gm  Cooked frozen spinach   cup 3.5 gm  Vegetable or soy deep 1 each 3.4 gm  Apple 1 medium 3.3 gm  Dried dates 5 pieces 3.3 gm    The Plate Method  [email or web address]    Protein Sources   [email or web address]     Carbohydrate Counting  [email or web address]     Mindful Eating  [email or web address]     Summary of Volumetrics Eating Plan  [email or web address]             COMPREHENSIVE WEIGHT MANAGEMENT PROGRAM  VIRTUAL SUPPORT GROUPS    For Support Group Information:      We offer support groups for patients who are working on weight loss and considering, preparing for or have had weight loss surgery.   There is no cost for this opportunity.  You are invited to attend the?Virtual Support Groups?provided by any of the following locations:    [facility] via Microsoft Teams with Viky Venegas RN  2.   [place] via Wedding Party with Bradford Peña, PhD, LP  3.   [place] via Wedding Party with Joyce Tate RN  4.   [facility] via Microsoft Teams with Joyce Dunbar [facility]    The following Support Group information can also be found on our website:  [email or web address]://www.[email or web address]/treatments/weight-loss-surgery-support-groups    https://www.[facility].org/treatments/weight-loss-and-weight-loss-surgery-support-groups    [facility] Weight Loss Surgery Support Group    [facility] Weight Loss Surgery Support Group  The support group is a patient-lead forum that meets monthly to share experiences, encouragement and education. It is open to those who have had weight loss surgery, are scheduled for surgery, or are considering surgery.   WHEN: This group meets on the 3rd Wednesday of each month from 5:00PM - 6:00PM virtually using Microsoft Teams.   FACILITATOR: Led by Viky Gomes RD, LD, RN, the program's Clinical Coordinator.   TO REGISTER: Please contact the clinic via  "Antony or call the nurse line directly at [phone] to inform our staff that you would like an invite sent to you and to let us know the email you would like the invite sent to. Prior to the meeting, a link with directions on how to join the meeting will be sent to you.    2023 Meetings April 19: Guest Speaker, Isaias Puckett, RD, LD, \"Maintaining Weight Loss after Bariatric Surgery\".  May 17: \"Let's Talk\" a time for the group to share.  June 21: \"Let's Talk\" a time for the group to share.  July 19  August 16  September 20  October 18  November 15  December 20    [facility] Support Groups    Connections Bariatric Care Support Group?  This is open to all pre- and post- operative bariatric surgery patients as well as their support system.   WHEN: This group meets the 2nd Tuesday of each month from 6:30 PM - 8:00 PM virtually using Microsoft Teams.   FACILITATOR: Led by Bradford Peña, Ph.D who is a Licensed Psychologist with the [facility] Comprehensive Weight Management Program.   TO REGISTER: Please send an email to Bradford Peña, Ph.D., LP at?[email or web address]?if you would like an invitation to the group and to learn about using Microsoft Teams.    2023 Meetings April 11: Guest speaker, Kate Choudhury MD, Bariatrician, [facility] Comprehensive Weight Management Program, \"Injectable Weight Loss Medications\".  May 9  Cristina 13  July 11  August 8  September 12  October 10  November 14  December 12    Connections Post-Operative Bariatric Surgery Support Group  This is a support group for [facility] bariatric patients (and those external to [facility]) who have had bariatric surgery and are at least 3 months post-surgery.  WHEN: This support group meets the 4th Wednesday of the month from 11:00 AM - 12:00 PM virtually using Microsoft Teams.   FACILITATOR: Led by Certified Bariatric Nurse, Joyce Tate RN.   TO REGISTER: Please send an email " to Joyce at [email or web address] if you would like an invitation to the group and to learn about using Microsoft Teams.    2023 Meetings  April 26  May 24  Cristina 28  July 26  August 23  September 27  October 25  November 22  December 27    [facility]   Healthy Lifestyle Virtual Support Group    Healthy Lifestyle Virtual Support Group?  This is 60 minutes of small group guided discussion, support and resources. All are welcome who want a healthy lifestyle.  WHEN: This group meets monthly on a Friday from 12:30 PM - 1:30 PM virtually using Microsoft Teams.  This group will meet the first Friday of the month beginning In July  FACILITATOR: Led by National Board Certified Health and , Joyce Dunbar, [facility].   TO REGISTER: Please send an email to Joyce [email or web address] to receive monthly invites to the group or if you have any questions about having a health .  Prior to the meeting, a link with directions on how to join the meeting will be sent to you.    2023 Meetings  May 19: Let's Talk  June 9: Create Your Coaching Toolkit: Learn How to  Yourself  July 7: Let's Talk  August 4: Benefits of Fiber with BLUE Wang  September 1: Show and Tell (share your aps, podcasts, recipes, hacks, books)  October 6 :Let's Talk  November 3: Introduction to Mindfulness   December 1: Let's Talk

## 2023-09-26 ENCOUNTER — TRANSFERRED RECORDS (OUTPATIENT)
Dept: HEALTH INFORMATION MANAGEMENT | Facility: CLINIC | Age: 48
End: 2023-09-26
Payer: COMMERCIAL

## 2023-09-26 LAB — RETINOPATHY: NEGATIVE

## 2023-10-05 ENCOUNTER — OFFICE VISIT (OUTPATIENT)
Dept: FAMILY MEDICINE | Facility: CLINIC | Age: 48
End: 2023-10-05
Payer: COMMERCIAL

## 2023-10-05 VITALS
HEART RATE: 89 BPM | WEIGHT: 216.4 LBS | DIASTOLIC BLOOD PRESSURE: 66 MMHG | HEIGHT: 68 IN | BODY MASS INDEX: 32.8 KG/M2 | OXYGEN SATURATION: 97 % | TEMPERATURE: 98 F | SYSTOLIC BLOOD PRESSURE: 118 MMHG | RESPIRATION RATE: 15 BRPM

## 2023-10-05 DIAGNOSIS — Z01.818 PREOP GENERAL PHYSICAL EXAM: Primary | ICD-10-CM

## 2023-10-05 DIAGNOSIS — E11.9 TYPE 2 DIABETES MELLITUS WITHOUT COMPLICATION, WITHOUT LONG-TERM CURRENT USE OF INSULIN (H): ICD-10-CM

## 2023-10-05 LAB
ANION GAP SERPL CALCULATED.3IONS-SCNC: 14 MMOL/L (ref 7–15)
BUN SERPL-MCNC: 23.8 MG/DL (ref 6–20)
CALCIUM SERPL-MCNC: 9.8 MG/DL (ref 8.6–10)
CHLORIDE SERPL-SCNC: 101 MMOL/L (ref 98–107)
CREAT SERPL-MCNC: 0.89 MG/DL (ref 0.51–0.95)
DEPRECATED HCO3 PLAS-SCNC: 24 MMOL/L (ref 22–29)
EGFRCR SERPLBLD CKD-EPI 2021: 80 ML/MIN/1.73M2
GLUCOSE SERPL-MCNC: 147 MG/DL (ref 70–99)
POTASSIUM SERPL-SCNC: 4.6 MMOL/L (ref 3.4–5.3)
SODIUM SERPL-SCNC: 139 MMOL/L (ref 135–145)

## 2023-10-05 PROCEDURE — 90471 IMMUNIZATION ADMIN: CPT | Performed by: NURSE PRACTITIONER

## 2023-10-05 PROCEDURE — 90686 IIV4 VACC NO PRSV 0.5 ML IM: CPT | Performed by: NURSE PRACTITIONER

## 2023-10-05 PROCEDURE — 80048 BASIC METABOLIC PNL TOTAL CA: CPT | Performed by: NURSE PRACTITIONER

## 2023-10-05 PROCEDURE — 99214 OFFICE O/P EST MOD 30 MIN: CPT | Mod: 25 | Performed by: NURSE PRACTITIONER

## 2023-10-05 PROCEDURE — 36415 COLL VENOUS BLD VENIPUNCTURE: CPT | Performed by: NURSE PRACTITIONER

## 2023-10-05 RX ORDER — LISDEXAMFETAMINE DIMESYLATE 20 MG/1
20 CAPSULE ORAL EVERY MORNING
COMMUNITY
End: 2024-04-10

## 2023-10-05 RX ORDER — LISDEXAMFETAMINE DIMESYLATE 50 MG/1
50 CAPSULE ORAL EVERY MORNING
COMMUNITY
End: 2024-04-10

## 2023-10-05 ASSESSMENT — PAIN SCALES - GENERAL: PAINLEVEL: MODERATE PAIN (4)

## 2023-10-05 NOTE — PATIENT INSTRUCTIONS
Preparing for Your Surgery  Getting started  A nurse will call you to review your health history and instructions. They will give you an arrival time based on your scheduled surgery time. Please be ready to share:  Your doctor's clinic name and phone number  Your medical, surgical, and anesthesia history  A list of allergies and sensitivities  A list of medicines, including herbal treatments and over-the-counter drugs  Whether the patient has a legal guardian (ask how to send us the papers in advance)  Please tell us if you're pregnant--or if there's any chance you might be pregnant. Some surgeries may injure a fetus (unborn baby), so they require a pregnancy test. Surgeries that are safe for a fetus don't always need a test, and you can choose whether to have one.   If you have a child who's having surgery, please ask for a copy of Preparing for Your Child's Surgery.    Preparing for surgery  Within 10 to 30 days of surgery: Have a pre-op exam (sometimes called an H&P, or History and Physical). This can be done at a clinic or pre-operative center.  If you're having a , you may not need this exam. Talk to your care team.  At your pre-op exam, talk to your care team about all medicines you take. If you need to stop any medicines before surgery, ask when to start taking them again.  We do this for your safety. Many medicines can make you bleed too much during surgery. Some change how well surgery (anesthesia) drugs work.  Call your insurance company to let them know you're having surgery. (If you don't have insurance, call [phone].)  Call your clinic if there's any change in your health. This includes signs of a cold or flu (sore throat, runny nose, cough, rash, fever). It also includes a scrape or scratch near the surgery site.  If you have questions on the day of surgery, call your hospital or surgery center.  Eating and drinking guidelines  For your safety: Unless your surgeon tells you otherwise,  follow the guidelines below.  Eat and drink as usual until 8 hours before you arrive for surgery. After that, no food or milk.  Drink clear liquids until 2 hours before you arrive. These are liquids you can see through, like water, Gatorade, and Propel Water. They also include plain black coffee and tea (no cream or milk), candy, and breath mints. You can spit out gum when you arrive.  If you drink alcohol: Stop drinking it the night before surgery.  If your care team tells you to take medicine on the morning of surgery, it's okay to take it with a sip of water.  Preventing infection  Shower or bathe the night before and morning of your surgery. Follow the instructions your clinic gave you. (If no instructions, use regular soap.)  Don't shave or clip hair near your surgery site. We'll remove the hair if needed.  Don't smoke or vape the morning of surgery. You may chew nicotine gum up to 2 hours before surgery. A nicotine patch is okay.  Note: Some surgeries require you to completely quit smoking and nicotine. Check with your surgeon.  Your care team will make every effort to keep you safe from infection. We will:  Clean our hands often with soap and water (or an alcohol-based hand rub).  Clean the skin at your surgery site with a special soap that kills germs.  Give you a special gown to keep you warm. (Cold raises the risk of infection.)  Wear special hair covers, masks, gowns and gloves during surgery.  Give antibiotic medicine, if prescribed. Not all surgeries need antibiotics.  What to bring on the day of surgery  Photo ID and insurance card  Copy of your health care directive, if you have one  Glasses and hearing aids (bring cases)  You can't wear contacts during surgery  Inhaler and eye drops, if you use them (tell us about these when you arrive)  CPAP machine or breathing device, if you use them  A few personal items, if spending the night  If you have . . .  A pacemaker, ICD (cardiac defibrillator) or other  implant: Bring the ID card.  An implanted stimulator: Bring the remote control.  A legal guardian: Bring a copy of the certified (court-stamped) guardianship papers.  Please remove any jewelry, including body piercings. Leave jewelry and other valuables at home.  If you're going home the day of surgery  You must have a responsible adult drive you home. They should stay with you overnight as well.  If you don't have someone to stay with you, and you aren't safe to go home alone, we may keep you overnight. Insurance often won't pay for this.  After surgery  If it's hard to control your pain or you need more pain medicine, please call your surgeon's office.  Questions?   If you have any questions for your care team, list them here: _________________________________________________________________________________________________________________________________________________________________________ ____________________________________ ____________________________________ ____________________________________  For informational purposes only. Not to replace the advice of your health care provider. Copyright   2003, 2019 [employer]. All rights reserved. Clinically reviewed by Klaudia Yeh MD. [employer] 670897 - REV 12/22.    How to Take Your Medication Before Surgery  - HOLD (do not take) your HYDROCHLOROTHIAZIDE on the morning of surgery.   - HOLD (do not take) your METFORMIN on the morning of surgery.  - HOLD (do not take) vistaril and Vyvanse the day of surgery  - STOP taking all vitamins and herbal supplements 14 days before surgery.  -Hold Jardiance 4 days before surgery  -Hold Indomethacin, Maxalt, 1 day before surgery  -Hold Manjaro 7 days before surgery  -Take Lamictal, Resulte and Sinequan the night before and the night of surgery  -Take levothyroxine, Topamax, Prevastatin, gabapentin, the day of surgery  -Take micronor, oxycodone, omeprazole, Wellbutrin XL the morning of surgery  -Take compazine as  needed the morning of surgery      Instructions About Your Continuous Glucose Monitor  You should be prepared to remove the Continuous Glucose Monitor prior to the operation in order to avoid damage to the equipment during the procedure. Your Continuous Glucose Monitor will not be the source of glucose monitoring during the operation.

## 2023-10-05 NOTE — PROGRESS NOTES
[facility]  [address] [place] [address]  Phone: [phone]  Fax: [phone]  Primary Provider: Loc Waggoner  Pre-op Performing Provider: RAMU MONTEZ      PREOPERATIVE EVALUATION:  Today's date: 10/5/2023    Elicia is a 48 year old female who presents for a preoperative evaluation.      10/5/2023     8:45 AM   Additional Questions   Roomed by maria d ordoñez       Surgical Information:  Surgery/Procedure: Right FDL tendon transfer, calcaneal osteotomy for lateral column lengthening  Surgery Location: [facility]  Surgeon: Dr. Kunal Cesar  Surgery Date: 10/19/23  Time of Surgery: TBD  Where patient plans to recover: At home with family  Fax number for surgical facility: [phone]    Assessment & Plan     The proposed surgical procedure is considered INTERMEDIATE risk.    Preop general physical exam  - Basic metabolic panel  (Ca, Cl, CO2, Creat, Gluc, K, Na, BUN)  - Basic metabolic panel  (Ca, Cl, CO2, Creat, Gluc, K, Na, BUN)  Reviewed and provided with surgery preparation care instruction. Follow surgeon's -surgery center instruction for surgery preparation.    Type 2 diabetes mellitus without complication, without long-term current use of insulin (H)  Last A1C 7.5 on 9/20/23  On Jardiance, Metformin, and Monjaro  Has CGM -no low blood sugars     Risks and Recommendations:  The patient has the following additional risks and recommendations for perioperative complications:  Diabetes:  - Patient is not on insulin therapy: regular NPO guidelines can be followed.     Antiplatelet or Anticoagulation Medication Instructions:   - Patient is on no antiplatelet or anticoagulation medications.    Additional Medication Instructions:  - HOLD (do not take) your METFORMIN, Vyvanse, Vistaril on the morning of surgery.  - STOP taking all vitamins and herbal supplements 14 days before surgery.  -Hold Jardiance 4 days before surgery  -Hold Indomethacin, Maxalt, 1  day before surgery  -Hold Manjaro 7 days before surgery  -Take Lamictal, Rexulti and Sinequan the night before and the night of surgery  -Take levothyroxine, Topamax, Prevastatin, gabapentin, the day of surgery (takes in the evening)  -Take micronor, oxycodone, omeprazole, Wellbutrin XL the morning of surgery  -Take compazine as needed the morning of surgery  Medications should be taken with sips of water the morning of surgery    RECOMMENDATION:  APPROVAL GIVEN to proceed with proposed procedure, without further diagnostic evaluation.    Review of the result(s) of each unique test - A1C, BMP, Hepatic Function        Subjective       HPI related to upcoming procedure: Right ankle fracture in 2020-has had multiple ankle surgeries, now her tendon is ruptured, will need tendon repair surgery. Right ankle chronic pain.        10/5/2023     8:35 AM   Preop Questions   1. Have you ever had a heart attack or stroke? No   2. Have you ever had surgery on your heart or blood vessels, such as a stent placement, a coronary artery bypass, or surgery on an artery in your head, neck, heart, or legs? No   3. Do you have chest pain with activity? No   4. Do you have a history of  heart failure? No   5. Do you currently have a cold, bronchitis or symptoms of other infection? No   6. Do you have a cough, shortness of breath, or wheezing? No   7. Do you or anyone in your family have previous history of blood clots? No   8. Do you or does anyone in your family have a serious bleeding problem such as prolonged bleeding following surgeries or cuts? No   9. Have you ever had problems with anemia or been told to take iron pills? No   10. Have you had any abnormal blood loss such as black, tarry or bloody stools, or abnormal vaginal bleeding? No   11. Have you ever had a blood transfusion? No   12. Are you willing to have a blood transfusion if it is medically needed before, during, or after your surgery? Yes   13. Have you or any of your  relatives ever had problems with anesthesia? YES - nausea and vomiting afterwards   14. Do you have sleep apnea, excessive snoring or daytime drowsiness? No   15. Do you have any artifical heart valves or other implanted medical devices like a pacemaker, defibrillator, or continuous glucose monitor? No   16. Do you have artificial joints? No   17. Are you allergic to latex? No   18. Is there any chance that you may be pregnant? No       Health Care Directive:  Patient does not have a Health Care Directive or Living Will: Discussed advance care planning with patient; information given to patient to review.    Preoperative Review of :   reviewed - controlled substances reflected in medication list.      Status of Chronic Conditions:  See problem list for active medical problems.  Problems all longstanding and stable, except as noted/documented.  See ROS for pertinent symptoms related to these conditions.    Review of Systems  Constitutional, neuro, ENT, endocrine, pulmonary, cardiac, gastrointestinal, genitourinary, musculoskeletal, integument and psychiatric systems are negative, except as otherwise noted.    Patient Active Problem List    Diagnosis Date Noted    Class 2 severe obesity due to excess calories with serious comorbidity in adult (H) 08/02/2023     Priority: Medium    F11.9 - Chronic, continuous use of opioids 07/24/2023     Priority: Medium    Facet arthropathy, lumbosacral 05/03/2023     Priority: Medium     Added automatically from request for surgery [id number]      Gastroesophageal reflux disease with esophagitis without hemorrhage 11/28/2022     Priority: Medium    Hypothyroidism, unspecified type 11/28/2022     Priority: Medium    Hepatic adenoma 11/28/2022     Priority: Medium    NAFLD (nonalcoholic fatty liver disease) 11/28/2022     Priority: Medium    Type 2 diabetes mellitus without complication, without long-term current use of insulin (H) 11/28/2022     Priority: Medium    Irritable bowel  syndrome, unspecified type 11/28/2022     Priority: Medium    Mild episode of recurrent major depressive disorder (H24) 11/28/2022     Priority: Medium    Chronic migraine without aura without status migrainosus, not intractable 11/28/2022     Priority: Medium    Chronic low back pain without sciatica, unspecified back pain laterality 11/28/2022     Priority: Medium    Bipolar affective disorder, current episode mixed, current episode severity unspecified (H) 11/28/2022     Priority: Medium    Pain in joint involving ankle and foot, right 11/28/2022     Priority: Medium    Persistent insomnia 11/28/2022     Priority: Medium    Localized edema 11/28/2022     Priority: Medium    Chronic pain disorder 10/28/2016     Priority: Medium     Formatting of this note might be different from the original.  oxycodone 5mg #126 / month  Brooks Tyson MD 8/3/2017 12:47 PM  Formatting of this note might be different from the original.  Overview:   oxycodone 5mg #126 / month  Brooks Tyson MD 8/3/2017 12:47 PM   oxycodone 5mg #126 / month  Brooks Tyson MD 8/3/2017 12:47 PM  Formatting of this note might be different from the original.  oxycodone 5mg #126 / month  Brooks Tyson MD 8/3/2017 12:47 PM      Myofascial pain syndrome 03/01/2016     Priority: Medium    Lymphedema 03/12/2015     Priority: Medium     Formatting of this note might be different from the original.  Venous study [facility] 2015  Brooks Tyson MD 3/12/2015 3:25 PM  Formatting of this note might be different from the original.  Overview:   Venous study [facility] 2015  Brooks Tyson MD 3/12/2015 3:25 PM   Venous study [facility] 2015  Brooks Tyson MD 3/12/2015 3:25 PM  Formatting of this note might be different from the original.  Venous study [facility] 2015  Brooks Tyson MD 3/12/2015 3:25 PM      Former smoker 02/13/2014     Priority: Medium    Atypical psychosis (H) 01/10/2013     Priority: Medium    Irregular menstrual cycle 03/25/2009     Priority: Medium    Obesity (BMI  30-39.9) 03/25/2009     Priority: Medium     Formatting of this note might be different from the original.  IMO Annual Load  Formatting of this note might be different from the original.  Overview:   IMO Annual Load  Overview:   IMO Annual Load      Tension type headache 03/25/2009     Priority: Medium    Myopia 04/23/2008     Priority: Medium    Regular astigmatism 04/23/2008     Priority: Medium    Depressive disorder, not elsewhere classified 02/07/2006     Priority: Medium      Past Medical History:   Diagnosis Date    Diabetes mellitus (H)     Hepatic adenoma     HTN (hypertension)     Hyperlipidemia     Hypothyroidism     Migraine     NAFLD (nonalcoholic fatty liver disease)      Past Surgical History:   Procedure Laterality Date    CHOLECYSTECTOMY      teen years    COLONOSCOPY N/A 5/18/2023    Procedure: Colonoscopy;  Surgeon: Chance Chan MD;  Location: [facility] OR    ESOPHAGOSCOPY, GASTROSCOPY, DUODENOSCOPY (EGD), COMBINED N/A 5/18/2023    Procedure: ESOPHAGOGASTRODUODENOSCOPY, WITH BIOPSY;  Surgeon: Chance Chan MD;  Location: [facility] OR    RADIO FREQUENCY ABLATION / DESTRUCTION OF SACROILOAC JOINT LATERAL BRANCHES (S1/S2/S3) Bilateral 6/15/2023    Procedure: Bilateral Lumbar 5 medial branch block and Sacral 1,2,3 lateral branch block;  Surgeon: Layne Weber MD;  Location: [facility] OR     Current Outpatient Medications   Medication Sig Dispense Refill    Alcohol Swabs PADS 1 each daily 300 each 3    buPROPion (WELLBUTRIN XL) 150 MG 24 hr tablet Take 1 tablet (150 mg) by mouth every morning 90 tablet 3    buPROPion (WELLBUTRIN XL) 300 MG 24 hr tablet Take 1 tablet (300 mg) by mouth daily 90 tablet 3    Continuous Blood Gluc  (DEXCOM G6 ) [place] Use to read blood sugars as per 's instructions. 1 each 0    Continuous Blood Gluc Sensor (DEXCOM G6 SENSOR) MISC Change every 10 days. 3 each 5    Continuous Blood Gluc Transmit (DEXCOM G6 TRANSMITTER) MISC Change  every 3 months. 1 each 1    diclofenac (VOLTAREN) 1 % topical gel Apply 4 g topically 4 times daily 350 g 1    diphenhydrAMINE (BENADRYL) 50 MG tablet Take 50 mg by mouth      doxepin (SINEQUAN) 25 MG capsule Take 2 capsules (50 mg) by mouth At Bedtime 30 capsule 3    empagliflozin (JARDIANCE) 25 MG TABS tablet Take 1 tablet (25 mg) by mouth daily 90 tablet 1    folic acid (FOLVITE) 1 MG tablet Take 1 tablet (1,000 mcg) by mouth daily 90 tablet 3    gabapentin (NEURONTIN) 300 MG capsule Take 4 capsules (1,200 mg) by mouth 3 times daily 1080 capsule 3    hydrochlorothiazide (MICROZIDE) 12.5 MG capsule Take 1 capsule (12.5 mg) by mouth daily 90 capsule 3    hydrOXYzine (ATARAX) 25 MG tablet Take 1 tablet (25 mg) by mouth 5 x daily PRN for itching or other (With oxycodone doses) 150 tablet 0    hydrOXYzine (VISTARIL) 50 MG capsule Take 1 capsule (50 mg) by mouth daily 90 capsule 3    indomethacin (INDOCIN) 50 MG capsule Take 1 capsule (50 mg) by mouth 3 times daily 270 capsule 3    lamoTRIgine (LAMICTAL) 200 MG tablet Take 2 tablets (400 mg) by mouth At Bedtime for 360 days 180 tablet 3    levothyroxine (SYNTHROID/LEVOTHROID) 125 MCG tablet Take 1 tablet (125 mcg) by mouth daily 90 tablet 3    lidocaine, viscous, (XYLOCAINE) 2 % solution Swish and spit 5 mLs in mouth 5 times daily 100 mL 1    lisdexamfetamine (VYVANSE) 20 MG capsule Take 20 mg by mouth every morning Takes with 50mg daily every morning -total of 70mg daily      lisdexamfetamine (VYVANSE) 50 MG capsule Take 50 mg by mouth every morning      metFORMIN (GLUCOPHAGE XR) 500 MG 24 hr tablet Take 4 tablets (2,000 mg) by mouth daily 360 tablet 3    naloxone (NARCAN) 4 MG/0.1ML nasal spray Spray 1 spray (4 mg) into one nostril alternating nostrils as needed for opioid reversal every 2-3 minutes until assistance arrives 0.2 mL 3    norethindrone (MICRONOR) 0.35 MG tablet Take 1 tablet (0.35 mg) by mouth daily 84 tablet 4    nystatin (MYCOSTATIN) 163648 UNIT/GM  external cream Apply 100,000 g topically 3 times daily      omeprazole (PRILOSEC) 20 MG DR capsule Take 1 capsule (20 mg) by mouth 2 times daily 180 capsule 3    ondansetron (ZOFRAN) 4 MG tablet Take 1 tablet (4 mg) by mouth 3 times daily 90 tablet 1    oxyCODONE (ROXICODONE) 5 MG tablet Take 1 tablet (5 mg) by mouth 5 times daily for 30 days 150 tablet 0    pravastatin (PRAVACHOL) 40 MG tablet Take 1 tablet (40 mg) by mouth daily 90 tablet 3    prochlorperazine (COMPAZINE) 10 MG tablet Take 1 tablet (10 mg) by mouth 4 times daily as needed for nausea (with migraine) 120 tablet 5    REXULTI 2 MG tablet Take 2 mg by mouth daily      rizatriptan (MAXALT) 10 MG tablet Take 1 tablet (10 mg) by mouth 2 times daily 60 tablet 3    tirzepatide (MOUNJARO) 10 MG/0.5ML pen Inject 10 mg Subcutaneous every 7 days 2 mL 1    tiZANidine (ZANAFLEX) 2 MG tablet Take 1 tablet (2 mg) by mouth 3 times daily 270 tablet 3    topiramate (TOPAMAX) 50 MG tablet Take 1 tablet (50 mg) by mouth every evening 90 tablet 3    triamcinolone (KENALOG) 0.1 % external ointment Apply twice daily on hand for 2-4 weeks. 15 g 1       Allergies   Allergen Reactions    Lithium Other (See Comments) and Dizziness     Other reaction(s): PSYCHOSIS    Sulfa Antibiotics Swelling     lips  Cold sores per pt      Adhesive Tape Rash    Albuterol Nausea and Vomiting    Clarithromycin Nausea and Vomiting    Erythromycin Nausea and Vomiting    Amoxicillin-Pot Clavulanate Nausea and Vomiting    Venlafaxine Other (See Comments)     Other reaction(s): PSYCHOSIS  Psychosis  Psychosis  Per transferred records          Social History     Tobacco Use    Smoking status: Former     Packs/day: 1.00     Years: 20.00     Pack years: 20.00     Types: Cigarettes     Quit date: 2018     Years since quittin.7    Smokeless tobacco: Never   Substance Use Topics    Alcohol use: Yes     Comment: occasional       History   Drug Use Unknown         Objective     /66 (BP Location:  "Left arm, Patient Position: Sitting, Cuff Size: Adult Large)   Pulse 89   Temp 98  F (36.7  C)   Resp 15   Ht 1.715 m (5' 7.5\")   Wt 98.2 kg (216 lb 6.4 oz)   LMP 08/12/2023 (Approximate)   SpO2 97%   BMI 33.39 kg/m      Physical Exam    GENERAL APPEARANCE: healthy, alert and no distress     EYES: EOMI, PERRL     HENT: ear canals and TM's normal and nose and mouth without ulcers or lesions     NECK: no adenopathy, no asymmetry, masses, or scars and thyroid normal to palpation     RESP: lungs clear to auscultation - no rales, rhonchi or wheezes     CV: regular rates and rhythm, normal S1 S2, no S3 or S4 and no murmur, click or rub     ABDOMEN:  soft, nontender, no HSM or masses and bowel sounds normal     MS: extremities normal- no gross deformities noted, wearing support brace on right ankle.     SKIN: no suspicious lesions or rashes     NEURO: Normal strength and tone, sensory exam grossly normal, mentation intact and speech normal     PSYCH: mentation appears normal. and affect normal/bright     LYMPHATICS: No cervical adenopathy    Recent Labs   Lab Test 09/20/23  0813 06/28/23  0936 03/02/23  0857   HGB 14.0 12.3 12.7    390 392   INR 0.99  --  0.94    139 135*   POTASSIUM 4.3 4.8 5.3   CR 1.03* 0.68 0.79   A1C 7.5* 10.8* 7.9*        Diagnostics:  Labs pending at this time.  Results will be reviewed when available.   No EKG required, no history of coronary heart disease, significant arrhythmia, peripheral arterial disease or other structural heart disease.    Revised Cardiac Risk Index (RCRI):  The patient has the following serious cardiovascular risks for perioperative complications:   - No serious cardiac risks = 0 points     RCRI Interpretation: 0 points: Class I (very low risk - 0.4% complication rate)     Signed Electronically by: CANDACE Hills CNP  Copy of this evaluation report is provided to requesting physician.      "

## 2023-10-12 ENCOUNTER — VIRTUAL VISIT (OUTPATIENT)
Dept: FAMILY MEDICINE | Facility: CLINIC | Age: 48
End: 2023-10-12
Payer: COMMERCIAL

## 2023-10-12 DIAGNOSIS — G43.709 CHRONIC MIGRAINE WITHOUT AURA WITHOUT STATUS MIGRAINOSUS, NOT INTRACTABLE: ICD-10-CM

## 2023-10-12 DIAGNOSIS — E66.01 CLASS 2 SEVERE OBESITY DUE TO EXCESS CALORIES WITH SERIOUS COMORBIDITY AND BODY MASS INDEX (BMI) OF 37.0 TO 37.9 IN ADULT (H): ICD-10-CM

## 2023-10-12 DIAGNOSIS — M25.571 PAIN IN JOINT INVOLVING ANKLE AND FOOT, RIGHT: Primary | ICD-10-CM

## 2023-10-12 DIAGNOSIS — E66.812 CLASS 2 SEVERE OBESITY DUE TO EXCESS CALORIES WITH SERIOUS COMORBIDITY AND BODY MASS INDEX (BMI) OF 37.0 TO 37.9 IN ADULT (H): ICD-10-CM

## 2023-10-12 DIAGNOSIS — G89.4 CHRONIC PAIN DISORDER: ICD-10-CM

## 2023-10-12 DIAGNOSIS — F11.90 CHRONIC, CONTINUOUS USE OF OPIOIDS: ICD-10-CM

## 2023-10-12 DIAGNOSIS — E11.9 TYPE 2 DIABETES MELLITUS WITHOUT COMPLICATION, WITHOUT LONG-TERM CURRENT USE OF INSULIN (H): ICD-10-CM

## 2023-10-12 PROCEDURE — 99214 OFFICE O/P EST MOD 30 MIN: CPT | Mod: VID | Performed by: STUDENT IN AN ORGANIZED HEALTH CARE EDUCATION/TRAINING PROGRAM

## 2023-10-12 RX ORDER — TOPIRAMATE 100 MG/1
100 TABLET, FILM COATED ORAL EVERY EVENING
Qty: 90 TABLET | Refills: 1 | Status: SHIPPED | OUTPATIENT
Start: 2023-10-12 | End: 2024-02-21

## 2023-10-12 RX ORDER — TIZANIDINE 2 MG/1
4 TABLET ORAL 3 TIMES DAILY
Qty: 180 TABLET | Refills: 3 | Status: SHIPPED | OUTPATIENT
Start: 2023-10-12 | End: 2024-01-03

## 2023-10-12 RX ORDER — OXYCODONE HYDROCHLORIDE 5 MG/1
5 TABLET ORAL
Qty: 150 TABLET | Refills: 0 | Status: SHIPPED | OUTPATIENT
Start: 2023-10-12 | End: 2023-11-02

## 2023-10-12 ASSESSMENT — ANXIETY QUESTIONNAIRES
6. BECOMING EASILY ANNOYED OR IRRITABLE: NOT AT ALL
GAD7 TOTAL SCORE: 2
3. WORRYING TOO MUCH ABOUT DIFFERENT THINGS: SEVERAL DAYS
4. TROUBLE RELAXING: NOT AT ALL
7. FEELING AFRAID AS IF SOMETHING AWFUL MIGHT HAPPEN: NOT AT ALL
IF YOU CHECKED OFF ANY PROBLEMS ON THIS QUESTIONNAIRE, HOW DIFFICULT HAVE THESE PROBLEMS MADE IT FOR YOU TO DO YOUR WORK, TAKE CARE OF THINGS AT HOME, OR GET ALONG WITH OTHER PEOPLE: NOT DIFFICULT AT ALL
1. FEELING NERVOUS, ANXIOUS, OR ON EDGE: NOT AT ALL
2. NOT BEING ABLE TO STOP OR CONTROL WORRYING: SEVERAL DAYS
GAD7 TOTAL SCORE: 2
5. BEING SO RESTLESS THAT IT IS HARD TO SIT STILL: NOT AT ALL

## 2023-10-12 NOTE — PROGRESS NOTES
Elicia is a 48 year old who is being evaluated via a billable video visit.      How would you like to obtain your AVS? MyChart  If the video visit is dropped, the invitation should be resent by: Send to e-mail at: [email or web address]  Will anyone else be joining your video visit? No      Assessment & Plan     Pain in joint involving ankle and foot, right  Chronic pain disorder  Chronic, continuous use of opioids  Chronic pain of ankle/foot. Having surgery to repair tendon and fix fallen arch on 10/19, which hopefully will improve her pain. Pt requesting oxycodone increase which I declined as she is already taking oxycodone 5mg five times/day and is on other sedating medications (hydroxyzine, gabapentin, zanaflex).     Plan:  -continue oxycodone 5mg five times/day PRN (150 tablets given)  -PDMP checked, appropriate  -CSA and UDS are up to date  -naloxone given  -goal: to decrease oxycodone amount after surgery, will reassess pain in 1 month; would not recommend increasing dose  -will be NWB for 6 weeks; plan to work with PT after this period  -continue gabapentin, indomethacin, zanaflex  -will STOP hydroxyzine as this did not help pain and made her tired    Class 2 obesity  Doing well on topiramate. Pt self titrated up to 100mg daily. Prescription sent at this higher dose.    Type 2 diabetes  Following with MTM. A1C much improved. Refilled jardiance.    Follow up pain in 1 month.      Loc Waggoner, [facility]    Subjective   Elicia is a 48 year old, presenting for the following health issues:  Medication Refill        10/12/2023    10:15 AM   Additional Questions   Roomed by Amada Smaano   Accompanied by Self       History of Present Illness       Reason for visit:  Oxycodone check/dose increase?    She eats 2-3 servings of fruits and vegetables daily.She consumes 0 sweetened beverage(s) daily.She exercises with enough effort to increase her heart rate 9 or less minutes per day.  She  "exercises with enough effort to increase her heart rate 3 or less days per week.   She is taking medications regularly.     Surgery in 1 week  Fixing the tendon rupture and fallen arch  6 weeks non-weight bearing, then 2 weeks gradual   Then lots of PT    Today:  -oxycodone only getting a few days after surgery  -oxycodone-has been on it for a long time (dose up/down for years)  -taking oxycodone every 4 hours (sometimes will take 10mg at a time)  -wears off between doses  -hydroxyzine-no change in pain (just tired)  -tizanidine-was taking 4mg TID (no side effects)  -voltaren too hard to do  -indomethacin    Pain ankle/foot  -having surgery 10/19/23  -meds   -oxycodone 5x/day dosing   -voltaren gel   -hydroxyzine PRN   -indomethacin TID   -zanaflex   -gabapentin    Chronic opioids  -oxycodone last filled 150 tablets on 9/15/23  -UDS and CSA signed/up to date  -need game plan for tapering down  -do you have naloxone??      Review of Systems   Constitutional, HEENT, cardiovascular, pulmonary, gi and gu systems are negative, except as otherwise noted.      Objective    Vitals - Patient Reported  Weight (Patient Reported): 96.6 kg (213 lb)  Height (Patient Reported): 171.5 cm (5' 7.5\")  BMI (Based on Pt Reported Ht/Wt): 32.87  Pain Score: No Pain (0)    Physical Exam   GENERAL: Healthy, alert and no distress  EYES: Eyes grossly normal to inspection.  No discharge or erythema, or obvious scleral/conjunctival abnormalities.  RESP: No audible wheeze, cough, or visible cyanosis.  No visible retractions or increased work of breathing.    SKIN: Visible skin clear. No significant rash, abnormal pigmentation or lesions.  NEURO: Cranial nerves grossly intact.  Mentation and speech appropriate for age.  PSYCH: Mentation appears normal, affect normal/bright, judgement and insight intact, normal speech and appearance well-groomed.    Video-Visit Details    Type of service:  Video Visit   Originating Location (pt. Location): " Home  Distant Location (provider location):  On-site  Platform used for Video Visit: Diana    Answers submitted by the patient for this visit:  General Questionnaire (Submitted on 10/11/2023)  Chief Complaint: Chronic problems general questions HPI Form  What is the reason for your visit today? : Oxycodone check/dose increase?  How many servings of fruits and vegetables do you eat daily?: 2-3  On average, how many sweetened beverages do you drink each day (Examples: soda, juice, sweet tea, etc.  Do NOT count diet or artificially sweetened beverages)?: 0  How many minutes a day do you exercise enough to make your heart beat faster?: 9 or less  How many days a week do you exercise enough to make your heart beat faster?: 3 or less  How many days per week do you miss taking your medication?: 0

## 2023-10-13 ENCOUNTER — TRANSFERRED RECORDS (OUTPATIENT)
Dept: HEALTH INFORMATION MANAGEMENT | Facility: CLINIC | Age: 48
End: 2023-10-13
Payer: COMMERCIAL

## 2023-10-15 NOTE — PROGRESS NOTES
Medication Therapy Management (MTM) Encounter    ASSESSMENT:                            Medication Adherence/Access: No issues identified    Type 2 Diabetes/Obesity:    Patient is not meeting A1c goal of < 7%(7.5%). Self monitoring of blood glucose is closer to goal of fasting  mg/dL and post prandial < 180 mg/dL. Patient is not meeting average glucose goal of <150mg/dL. Patient is now meeting goal of > 70% (88%)time in target with continuous glucose monitoring--mtm educated patient to add G6 dexcom abiodun to her cell phone --mtm now has remote blood sugar access.   Patient would benefit from new gameplan: Post tendon surgery on 10-19-23 , she can restart 10mg./week Mounjaro 10-20-23. This med working very well to curb her appetite, lose weight and control bs's.     Continue daily metformin and jardiance and otc pill glucoredi as well.   Also--patient will work hard on eliminating all late night snacking --ok to drink slim-fast or atkins protein shake to fill her up if needed.   . Due for annual foot exam. Due for annual eye exam.         Hyperlipidemia:   Stable.  Patient is on moderate intensity statin which is indicated based on 2019 ACC/AHA guidelines for lipid management.        PLAN:                              1. Good luck with tendon surgery tomorrow ! You have a great post-op plan , restart Mounjaro 10mg./week this Friday if you feel ok post surgery.        Follow-up: Return in about 4 weeks (around 11/15/2023), or @ 9 AM via Telephone, for Medication Therapy Management Visit, Blood sugar meter review.      SUBJECTIVE/OBJECTIVE:                          Elicia Calero is a 48 year old female called for a follow-up (8-28-23) visit. She was referred to me from Loc Waggoner DO. (PCP).  .      Reason for visit:   DM review, A1c 7.5% --ok'd for tendon surgery now 10-19-23. Holding indocin, jardiance, mounjaro, hydrochlorothiazide now for surgery tomorrow.  .    Allergies/ADRs: Reviewed in chart  Past  Medical History: Per patient diagnosed 10-15 years --did lose wt. To 150lbs years ago and reversed her DM . Ate 1200 cals/day , low carb   Tobacco: She reports that she quit smoking about 5 years ago. Her smoking use included cigarettes. She has a 20.00 pack-year smoking history. She has never used smokeless tobacco.  Alcohol: Less than 1 beverage / month  Other Substance Use: none   E-cigarette Use: none   Caffeine:  1 coffee or crystal light every morning.   Social: off work due to tendon issue (ruptured).  She is a pharmacy tech at  discharge.   Personal Healthcare Goals: fix dm soon   Activity: not much due to ruptured tendon.     Medication Adherence/Access: no issues reported    Type 2 Diabetes/Obesity:    Failed bydureon in the past , fears needles--refuses all injectables before last visit but doing ok with Mounjaro as needle is hidden. She stopped mounjaro 10 days ago --having tendon surgery 10-19-23.    Rybelsus 14mg./day --off this now 8-14-23   Metformin [id number] ER trabs daily  Glipizide ER 10mg tabs --2 tabs daily --stopped 8-14-23 --on Mounjaro 10mg./week now-but now on hold last 10 days for upcoming surgery. Losing weight and controlling food intake and bs's --she loves it!  Jardiance 25mg./day   Also taking 2 capsules /day otc -Glucoredi herbal with gymnemna and berberi.   Not taking aspirin due to age.  Patient is not experiencing side effects.  Blood sugar monitoring: Continuous Glucose Monitor Average glucose : mtm added remote bs access via dexcom G6 abiodun on her phone :  Am bs's 210--why?  When bedtime 120';s.(Mtm explained jose phenomenon at last visit to her ).                                     Current diabetes symptoms:none now -Mounjaro has corrected her polyphagia.  Diet/Exercise: needs tendon surgery 10-19-23, can use foot pedals only.   Mounjaro curbing appetite --2 meals/day bfast and dinner , shake at lunch .    Previously:   Bfast /lunch --low carb , eat dinner --after dinner keeps  eating after feeling full!  Has low carb slimfast or atkins shakes to use.   Sleep iffy due to intense hunger. Awakes 2-3 am --snacking and can't get back to sleep. She has mental health issues --struggles with depression and unable to move much with ruptured tendon --discouraged.       Eye exam: due  Foot exam: due  Urine Albumin:   Lab Results   Component Value Date    UMALCR  12/01/2022      Comment:      Unable to calculate, urine albumin and/or urine creatinine is outside detectable limits.  Microalbuminuria is defined as an albumin:creatinine ratio of 17 to 299 for males and 25 to 299 for females. A ratio of albumin:creatinine of 300 or higher is indicative of overt proteinuria.  Due to biologic variability, positive results should be confirmed by a second, first-morning random or 24-hour timed urine specimen. If there is discrepancy, a third specimen is recommended. When 2 out of 3 results are in the microalbuminuria range, this is evidence for incipient nephropathy and warrants increased efforts at glucose control, blood pressure control, and institution of therapy with an angiotensin-converting-enzyme (ACE) inhibitor (if the patient can tolerate it).        Lab Results   Component Value Date    A1C 7.5 (H) 09/20/2023     Hyperlipidemia:   Pravastatin 40mg daily  Patient reports no significant myalgias or other side effects.  The 10-year ASCVD risk score (Carmita CORREIA, et al., 2019) is: 1%    Values used to calculate the score:      Age: 48 years      Sex: Female      Is Non- : No      Diabetic: Yes      Tobacco smoker: No      Systolic Blood Pressure: 118 mmHg      Is BP treated: No      HDL Cholesterol: 52 mg/dL      Total Cholesterol: 133 mg/dL  Recent Labs   Lab Test 06/28/23  0936 12/01/22  1013   CHOL 133 129   HDL 52 46*   LDL 60 61   TRIG 105 109         -------------------------------------------------------------------------------------------------------------------      I  spent 30 minutes with this patient today. All changes were made via collaborative practice agreement with Loc Waggoner DO. A copy of the visit note was provided to the patient's provider(s).    A summary of these recommendations was sent via [employer]    Dacia Myers Rph.  Medication Therapy Management Provider  [phone]      Telemedicine Visit Details  Type of service:  Telephone visit  Start Time: 9:00 AM  End Time: 9:30AM     Medication Therapy Recommendations  No medication therapy recommendations to display

## 2023-10-18 ENCOUNTER — VIRTUAL VISIT (OUTPATIENT)
Dept: PHARMACY | Facility: CLINIC | Age: 48
End: 2023-10-18
Payer: COMMERCIAL

## 2023-10-18 DIAGNOSIS — E78.5 HYPERLIPIDEMIA LDL GOAL <100: ICD-10-CM

## 2023-10-18 DIAGNOSIS — E11.9 TYPE 2 DIABETES MELLITUS WITHOUT COMPLICATION, WITHOUT LONG-TERM CURRENT USE OF INSULIN (H): Primary | ICD-10-CM

## 2023-10-18 PROCEDURE — 99606 MTMS BY PHARM EST 15 MIN: CPT | Performed by: PHARMACIST

## 2023-10-18 NOTE — Clinical Note
Loc--spoke with harriet today --so glad to hear that tendon surgery is ok;d for tomorrow , she will then restart weekly mounjaro Friday if feeling well post surgery , f/up with both of us same day 11-15-23.  Koffi

## 2023-10-18 NOTE — PATIENT INSTRUCTIONS
"Recommendations from today's MTM visit:                                                         1. Good luck with tendon surgery tomorrow ! You have a great post-op plan , restart Mounjaro 10mg./week this Friday if you feel ok post surgery.        Follow-up: Return in about 4 weeks (around 11/15/2023), or @ 9 AM via Telephone, for Medication Therapy Management Visit, Blood sugar meter review.    It was great speaking with you today.  I value your experience and would be very thankful for your time in providing feedback in our clinic survey. In the next few days, you may receive an email or text message from [facility] with a link to a survey related to your  clinical pharmacist.\"     To schedule another MTM appointment, please call the clinic directly or you may call the MTM scheduling line at [phone] or toll-free at [phone].     My Clinical Pharmacist's contact information:                                                      Please feel free to contact me with any questions or concerns you have.      Dacia Myers Rph.  Medication Therapy Management Provider  [phone]    "

## 2023-10-19 ENCOUNTER — TRANSFERRED RECORDS (OUTPATIENT)
Dept: HEALTH INFORMATION MANAGEMENT | Facility: CLINIC | Age: 48
End: 2023-10-19
Payer: COMMERCIAL

## 2023-10-31 ENCOUNTER — TRANSFERRED RECORDS (OUTPATIENT)
Dept: HEALTH INFORMATION MANAGEMENT | Facility: CLINIC | Age: 48
End: 2023-10-31
Payer: COMMERCIAL

## 2023-11-02 ENCOUNTER — VIRTUAL VISIT (OUTPATIENT)
Dept: FAMILY MEDICINE | Facility: CLINIC | Age: 48
End: 2023-11-02
Payer: COMMERCIAL

## 2023-11-02 DIAGNOSIS — G89.4 CHRONIC PAIN DISORDER: ICD-10-CM

## 2023-11-02 DIAGNOSIS — F11.90 CHRONIC, CONTINUOUS USE OF OPIOIDS: ICD-10-CM

## 2023-11-02 DIAGNOSIS — M25.571 PAIN IN JOINT INVOLVING ANKLE AND FOOT, RIGHT: ICD-10-CM

## 2023-11-02 PROCEDURE — 99214 OFFICE O/P EST MOD 30 MIN: CPT | Mod: VID | Performed by: STUDENT IN AN ORGANIZED HEALTH CARE EDUCATION/TRAINING PROGRAM

## 2023-11-02 RX ORDER — OXYCODONE HCL 10 MG/1
10 TABLET, FILM COATED, EXTENDED RELEASE ORAL EVERY 12 HOURS
Qty: 60 TABLET | Refills: 0 | Status: SHIPPED | OUTPATIENT
Start: 2023-11-02 | End: 2023-11-20

## 2023-11-02 RX ORDER — OXYCODONE HYDROCHLORIDE 5 MG/1
5-10 TABLET ORAL EVERY 4 HOURS PRN
Qty: 180 TABLET | Refills: 0 | Status: SHIPPED | OUTPATIENT
Start: 2023-11-06 | End: 2023-11-20

## 2023-11-02 ASSESSMENT — PATIENT HEALTH QUESTIONNAIRE - PHQ9
SUM OF ALL RESPONSES TO PHQ QUESTIONS 1-9: 6
10. IF YOU CHECKED OFF ANY PROBLEMS, HOW DIFFICULT HAVE THESE PROBLEMS MADE IT FOR YOU TO DO YOUR WORK, TAKE CARE OF THINGS AT HOME, OR GET ALONG WITH OTHER PEOPLE: NOT DIFFICULT AT ALL
SUM OF ALL RESPONSES TO PHQ QUESTIONS 1-9: 6

## 2023-11-02 NOTE — PROGRESS NOTES
Elicia is a 48 year old who is being evaluated via a billable video visit.      How would you like to obtain your AVS? MyChart  If the video visit is dropped, the invitation should be resent by: Send to e-mail at: [email or web address]  Will anyone else be joining your video visit? No      Assessment & Plan     Pain in joint involving ankle and foot, right  Chronic pain disorder  Chronic, continuous use of opioids  Chronic pain of ankle/foot. S/P ankle tendon/arch surgery on 10/19 which went well. Struggling with post-op pain, affecting sleep. Pain is compounded by the fact that she cannot take NSAIDs post-operatively. Non-weight bearing for another 6 weeks-will start PT at that time. Currently icing, wearing brace, using tylenol, muscle relaxer.    Opioid use has increased significantly due to post-op pain. Previously was taking oxycodone 25mg total per day (MME 37.5). She is now is taking oxycodone 55mg total per day (MME 82.5). Discussed risks of oversedation/accidental overdose with MME >50 and risk of hyperalgesia with higher doses. Pt has narcan.     Plan:  -discussed goals of weaning down at next visit (expect post-op/acute pain to be improved)  -START long acting oxycontin 10mg BID (60 tablets given)  -CONTINUE short acting oxycodone 5-10mg every 4hrs PRN (180 tablets given)  -total MME: 47-75  -continue scheduled tylenol, icing 15mn at a time, elevating leg  -increase hydroxyzine to 75mg before bed to help with sleep    Follow up on 11/22 (sooner if needed)  _____________    PDMP checked-appropriate   -filled 150 oxycodone tablets on 10/12/23    -filled 30 oxycodone tablets from surgeon on 10/31/23    - oxyCODONE (ROXICODONE) 5 MG tablet  Dispense: 180 tablet; Refill: 0  - oxyCODONE (OXYCONTIN) 10 MG 12 hr tablet  Dispense: 60 tablet; Refill: 0       Loc Waggoner [facility]   Elicia is a 48 year old, presenting for the following health issues:  Medication  "Follow-up (Med review)        11/2/2023     9:55 AM   Additional Questions   Roomed by Amada Samano   Accompanied by Self       History of Present Illness       Reason for visit:  Medication review, oxycodone    She eats 2-3 servings of fruits and vegetables daily.She consumes 0 sweetened beverage(s) daily.She exercises with enough effort to increase her heart rate 9 or less minutes per day.  She exercises with enough effort to increase her heart rate 3 or less days per week. She is missing 1 dose(s) of medications per week.  She is not taking prescribed medications regularly due to remembering to take.     Chronic ankle/foot pain  -had surgery on 10/19 (calcaneal osteotomy, tendon transfer, FLD to medial cuneiform)  -meds: hydroxyzine, chris, zanaflex  -given 150 oxycodone tablets on 10/12/23 (oxycodone 5x/day)   MME: 37.5   Filled 150 tablets on 10/12/23   Filled 30 tablets from surgeon on 10/31/23  -as about tablets 40 tablets of oxycodone    Surgery went well  Pain is not going well  More controlled during the day-needs to take oxy every 4-6 hours (pain controlled with 5mg q4hr)    Surgeon gave extra oxycodone  Currenlty taking 10mg in AM, then 5-10mg every 4hours, then 10mg at bedtime (20 + 35; total 55mg)  Not sleeping due to pain  Very uncomfortable due to surgery  Even boot/straps   Non weightbearing for another 6 weeks (total 8 weeks)  Still numbness/tingling  Seeing surgeon next week    Not taking hydoxyzine during the day (makes too sleepy)   Can't take NSAID due surgery  Tylenol       Review of Systems   Constitutional, HEENT, cardiovascular, pulmonary, gi and gu systems are negative, except as otherwise noted.      Objective    Vitals - Patient Reported  Weight (Patient Reported): 94.8 kg (209 lb)  Height (Patient Reported): 171.5 cm (5' 7.5\")  BMI (Based on Pt Reported Ht/Wt): 32.25  Pain Score: Extreme Pain (8)  Pain Loc: Foot (Lower back)        Physical Exam   GENERAL: Healthy, alert and no " distress  EYES: Eyes grossly normal to inspection.  No discharge or erythema, or obvious scleral/conjunctival abnormalities.  RESP: No audible wheeze, cough, or visible cyanosis.  No visible retractions or increased work of breathing.    SKIN: Visible skin clear. No significant rash, abnormal pigmentation or lesions.  NEURO: Cranial nerves grossly intact.  Mentation and speech appropriate for age.  PSYCH: Mentation appears normal, affect normal/bright, judgement and insight intact, normal speech and appearance well-groomed.            Video-Visit Details    Type of service:  Video Visit   Originating Location (pt. Location): Home  Distant Location (provider location):  On-site  Platform used for Video Visit: Diana

## 2023-11-07 ENCOUNTER — VIRTUAL VISIT (OUTPATIENT)
Dept: ENDOCRINOLOGY | Facility: CLINIC | Age: 48
End: 2023-11-07
Payer: COMMERCIAL

## 2023-11-07 VITALS — HEIGHT: 68 IN | BODY MASS INDEX: 31.67 KG/M2 | WEIGHT: 209 LBS

## 2023-11-07 DIAGNOSIS — E66.812 CLASS 2 SEVERE OBESITY DUE TO EXCESS CALORIES WITH SERIOUS COMORBIDITY AND BODY MASS INDEX (BMI) OF 35.0 TO 35.9 IN ADULT (H): ICD-10-CM

## 2023-11-07 DIAGNOSIS — E11.9 TYPE 2 DIABETES MELLITUS WITHOUT COMPLICATION, WITHOUT LONG-TERM CURRENT USE OF INSULIN (H): ICD-10-CM

## 2023-11-07 DIAGNOSIS — Z71.3 NUTRITIONAL COUNSELING: Primary | ICD-10-CM

## 2023-11-07 DIAGNOSIS — E66.01 CLASS 2 SEVERE OBESITY DUE TO EXCESS CALORIES WITH SERIOUS COMORBIDITY AND BODY MASS INDEX (BMI) OF 35.0 TO 35.9 IN ADULT (H): ICD-10-CM

## 2023-11-07 PROCEDURE — 97803 MED NUTRITION INDIV SUBSEQ: CPT | Mod: VID | Performed by: DIETITIAN, REGISTERED

## 2023-11-07 PROCEDURE — 99207 PR NO CHARGE LOS: CPT | Mod: VID | Performed by: DIETITIAN, REGISTERED

## 2023-11-07 ASSESSMENT — PAIN SCALES - GENERAL: PAINLEVEL: EXTREME PAIN (8)

## 2023-11-07 NOTE — NURSING NOTE
Is the patient currently in the state of [place]? YES    Visit mode:VIDEO    If the visit is dropped, the patient can be reconnected by: VIDEO VISIT: Send to e-mail at: [email or web address]    Will anyone else be joining the visit? NO  (If patient encounters technical issues they should call [phone])    How would you like to obtain your AVS? MyChart    Are changes needed to the allergy or medication list? No, Pt stated no changes to allergies, and Pt stated no med changes    Reason for visit: RECHECK ([employer] [employer])    Nargis SAMUEL

## 2023-11-07 NOTE — LETTER
"2023       RE: Elicia Calero  [address]  [place] [address]     Dear Colleague,    Thank you for referring your patient, Elicia Calero, to the [facility] [place] at [facility]. Please see a copy of my visit note below.    Video-Visit Details    Type of service:  Video Visit    Video Start Time: 10:08 AM   Video End Time: 10:23 AM    Originating Location (pt. Location): Home    Distant Location (provider location):  Offsite (providers home) [facility] [place]     Platform used for Video Visit: [employer]      Weight Management Nutrition Consultation    Elicia Calero is a 48 year old female presents today for returnweight management nutrition consultation.  Patient referred by Geovanna Morgan NP on 2023.    Patient with Co-morbidities of obesity includin/19/2023     8:28 AM   --   I have the following health issues associated with obesity Type II Diabetes    High Cholesterol    GERD (Reflux)    Fatty Liver    Stress Incontinence    Hypothyroidism   I have the following symptoms associated with obesity Depression         Anthropometrics:  Estimated body mass index is 34.04 kg/m  as calculated from the following:    Height as of 23: 1.715 m (5' 7.5\").    Weight as of 23: 100.1 kg (220 lb 9.6 oz).    Current:  Estimated body mass index is 32.25 kg/m  as calculated from the following:    Height as of this encounter: 1.715 m (5' 7.5\").    Weight as of this encounter: 94.8 kg (209 lb). (-11 lbs from last visit)       Medications for Weight Loss:  Mounjaro  Metformin and Jardiance for DMII  H/o BED managed with topiramate and vyvanse.    NUTRITION HISTORY  Food allergies: None  Food intolerances: None  Dislikes textures of cottage cheese, tomatoes, hummus, guac, salmon.    Vitamin/Mineral Supplements: Vitamin B, folic acid     Previous methods of diet modification " for weight loss: Had met with RD when first dx with DMII about 10 years ago. More recently, lost 60-70lb with calorie restriction (1200 wilberto/day) and carb counting but this did not feel sustainable and ultimately regained all that weight.     HgbA1c from 10.8  -> 7.5 in last 2 months. Has been working closely with MTM Pharm D to get HgbA1c below 8, and now below 7. Needs HgbA1c below 7 for ankle surgery.     Working on less snacking/binging at night. Going to bed sooner has helped with this.   Has been limiting her carb intake to <70 gm per day, and tends to save for later meal and evening snacks.   Preferences: casseroles, breads, dairy, potato. Not a huge fan of protein.     Today - Had ankle surgery three weeks ago. Has to stay off her feet for 6 weeks total. Then will start PT. Continues to replace one meal daily with protein shake. Focusing on smaller portions at dinner. Continues to count carbs. Will be returning to work mid-Dec.     Recent Diet Recall:  Breakfast: 5-7 am protein shake (Orgain with 2% milk); Kashi simple cereal w/o milk  Lunch: 11-12 pm Protein and veggies   Dinner: 5-6 pm protein shake; protein and veggies   Snacks: Less overall snacking. May do string cheese, Kind bars. Yasso bars.   Beverages: Water (24 oz bottle x3-4); Crystal lite with collagen powder (30 gm pro).    Dining Out/take-out: Not often, couple times per month.     Progress Towards Previous Goals:  1) Aim for 20-30 gm protein per meal. Limit carbs to 45 gm per meal. - Improving,    - See meal ideas below  2) For evening snack: put both sweet and salty food on a plate. Make sure foods with protein and fiber are included. - Significant reduction in snacks.    - Example: 2 part-skim cheese sticks, palm-size sweet/bar, and sliced veggies (carrots, celery, cucumbers, peppers)  3) Prior to snack, turn off distractions (TV), sit at the table, and take a few deep breaths, trying to relax prior to snacking.           9/19/2023     8:28  AM   Diet Recall Review with Patient   If you do eat breakfast, what types of food do you eat? Protein shake or something small that is higer protein/low carb becaus my blood sugars are ususualy high   If you do eat lunch, what types of food do you typically eat? Something small because I m not usually real hungry so I may air hawk a few baby potatoes w/ sour cream or some yogurt or a protein shake   If you do eat supper, what types of food do you typically eat? I ll usually make a bigger meal or eat leftovers. It may be a casserole or some sort of vegetable and protein. I try to stay away from a carb at dinnertime.   If you do snack, what types of food do you typically eat? Cheese. Atkins candy, pickles, olives, sweets I baked using Truvia.   How many glasses of juice do you drink in a typical day? 0   How many of glasses of milk do you drink in a typical day? 0   How many 8oz glasses of sugar containing drinks such as Nitin-Aid/sweet tea do you drink in a day? 0   How many cans/bottles of sugar pop/soda/tea/sports drinks do you drink in a day? 0   How many cans/bottles of diet pop/soda/tea or sports drink do you drink in a day? 5   How often do you have a drink of alcohol? Monthly or Less   If you do drink, how many drinks might you have in a day? 1 or 2           9/19/2023     8:28 AM   Eating Habits   Generally, my meals include foods like these bread, pasta, rice, potatoes, corn, crackers, sweet dessert, pop, or juice A Few Times a Week   Generally, my meals include foods like these fried meats, brats, burgers, french fries, pizza, cheese, chips, or ice cream A Few Times a Week   Eat fast food (like McDonalds, Burger Raj, Taco Bell) Less Than Weekly   Eat at a buffet or sit-down restaurant Less Than Weekly   Eat most of my meals in front of the TV or computer Everyday   Often skip meals, eat at random times, have no regular eating times Almost Everyday   Rarely sit down for a meal but snack or graze throughout A  Few Times a Week   Eat extra snacks between meals Once a Week   Eat most of my food at the end of the day Almost Everyday   Eat in the middle of the night or wake up at night to eat Never   Eat extra snacks to prevent or correct low blood sugar Less Than Weekly   Eat to prevent acid reflux or stomach pain Never   Worry about not having enough food to eat Never   I eat when I am depressed Almost Everyday   I eat when I am stressed Almost Everyday   I eat when I am bored Almost Everyday   I eat when I am anxious Almost Everyday   I eat when I am happy or as a reward Almost Everyday   I feel hungry all the time even if I just have eaten Almost Everyday   Feeling full is important to me Almost Everyday   I finish all the food on my plate even if I am already full Almost Everyday   I can't resist eating delicious food or walk past the good food/smell A Few Times a Week   I eat/snack without noticing that I am eating Less Than Weekly   I eat when I am preparing the meal A Few Times a Week   I eat more than usual when I see others eating Less Than Weekly   I have trouble not eating sweets, ice cream, cookies, or chips if they are around the house A Few Times a Week   I think about food all day Almost Everyday   What foods, if any, do you crave? Sweets/Candy/Chocolate   Please list any other foods you crave? I crave the chips/crackers and the cheese too.  I go from craving sweet to salty/savory to sweet salty/savory all the time and it doesn t usually matter what it is.           9/19/2023     8:28 AM   Amount of Food   I feel out of control when eating Almost Everyday   I eat a large amount of food, like a loaf of bread, a box of cookies, a pint/quart of ice cream, all at once Monthly   I eat a large amount of food even when I am not hungry Almost Everyday   I eat rapidly Monthly   I eat alone because I feel embarrassed and do not want others to see how much I have eaten Almost Everyday   I eat until I am uncomfortably  full Almost Everyday   I feel bad, disgusted, or guilty after I overeat Everyday       Physical Activity:  Limited d/t ankle surgery        9/19/2023     8:28 AM   Activity/Exercise History   How much of a typical 12 hour day do you spend sitting? Most of the Day   How much of a typical 12 hour day do you spend lying down? Less Than Half the Day   How much of a typical day do you spend walking/standing? Less Than Half the Day   How many hours (not including work) do you spend on the TV/Video Games/Computer/Tablet/Phone? 6 Hours or More   How many times a week are you active for the purpose of exercise? Never   What keeps you from being more active? Pain    Other   How many total minutes do you spend doing some activity for the purpose of exercising when you exercise? None       Nutrition Prescription  Recommended energy/nutrient modification.    Nutrition Diagnosis  Obesity r/t long history of positive energy balance aeb BMI >30. - Improving    Nutrition Intervention  Materials/education provided on hypocaloric diet for weight loss and low/moderate carb diet for mgmt of DMII. Reviewed protein needs/sources.   Discussed strategies for coping with constipation.  Praised pt on the many positive changes she has made to diet.   Co-developed goals to work towards.   Provided pt with list of goals and resources below via [employer].     Expected Engagement: good  Follow-Up Plans: meal/snack planning        Nutrition Goals  1) Aim for 20-30 gm protein per meal (total 60-90 gm per day). Limit carbs to 45 gm per meal.   2) Ensure you are drinking 64+ oz fluid per day      Follow-Up:  6 weeks, PRN    Time spent with patient: 15 minutes.  Jeri Hernández RD, LD

## 2023-11-07 NOTE — PATIENT INSTRUCTIONS
Carlos Rbui,     Follow-up with RD in 6 weeks.     Thank you,    Jeri Hernández, BLUE, LD  If you would like to schedule or reschedule an appointment with the RD, please call [phone]    Nutrition Goals  1) Aim for 20-30 gm protein per meal (total 60-90 gm per day). Limit carbs to 45 gm per meal.   2) Ensure you are drinking 64+ oz fluid per day      COMPREHENSIVE WEIGHT MANAGEMENT PROGRAM  VIRTUAL SUPPORT GROUPS    For Support Group Information:      We offer support groups for patients who are working on weight loss and considering, preparing for or have had weight loss surgery.   There is no cost for this opportunity.  You are invited to attend the?Virtual Support Groups?provided by any of the following locations:    [facility] via Microsoft Teams with Viky Venegas RN  2.   [place] via [employer] with Bradford Peña, PhD, LP  3.   [place] [employer] with Joyce Tate RN  4.   [facility] via Microsoft Teams with Joyce Dunbar, [facility]    The following Support Group information can also be found on our website:  [email or web address]    [email or web address]      [facility] Weight Loss Surgery Support Group    [facility] Weight Loss Surgery Support Group  The support group is a patient-lead forum that meets monthly to share experiences, encouragement and education. It is open to those who have had weight loss surgery, are scheduled for surgery, or are considering surgery.   WHEN: This group meets on the 3rd Wednesday of each month from 5:00PM - 6:00PM virtually using Microsoft Teams.   FACILITATOR: Led by Viky Gomes RD, LD, RN, the program's Clinical Coordinator.   TO REGISTER: Please contact the clinic via [employer] or call the nurse line directly at [phone] to inform our staff that you would like an invite sent to you and to let  "us know the email you would like the invite sent to. Prior to the meeting, a link with directions on how to join the meeting will be sent to you.    2023 Meetings   April 19: Guest Speaker, Isaias Puckett RD, LD, \"Maintaining Weight Loss after Bariatric Surgery\".  May 17: \"Let's Talk\" a time for the group to share.  June 21: \"Let's Talk\" a time for the group to share.  July 19  August 16  September 20  October 18  November 15  December 20    [facility] Support Groups    Connections Bariatric Care Support Group?  This is open to all pre- and post- operative bariatric surgery patients as well as their support system.   WHEN: This group meets the 2nd Tuesday of each month from 6:30 PM - 8:00 PM virtually using Microsoft Teams.   FACILITATOR: Led by Bradford Peña, Ph.D who is a Licensed Psychologist with the [facility] Comprehensive Weight Management Program.   TO REGISTER: Please send an email to Bradford Peña, Ph.D., LP [email or web address]?if you would like an invitation to the group and to learn about using Microsoft Teams.    2023 Meetings  April 11: Guest speaker, Kate Choudhury MD, Bariatrician, [facility] Comprehensive Weight Management Program, \"Injectable Weight Loss Medications\".  May 9  Cristina 13  July 11  August 8  September 12  October 10  November 14  December 12    Connections Post-Operative Bariatric Surgery Support Group  This is a support group for [facility] bariatric patients (and those external to [facility]) who have had bariatric surgery and are at least 3 months post-surgery.  WHEN: This support group meets the 4th Wednesday of the month from 11:00 AM - 12:00 PM virtually using Microsoft Teams.   FACILITATOR: Led by Certified Bariatric Nurse, Joyce Tate RN.   TO REGISTER: Please send an email to Joyce at [email or web address] if you would like an invitation to the group and to learn about using Microsoft Teams.    2023 " "Meetings  April 26  May 24  Cristina 28  July 26  August 23  September 27  October 25  November 22  December 27      [facility]   Healthy Lifestyle Group    Healthy Lifestyle Group?  This is a 60 minute virtual coaching group for those who want to lead a healthier lifestyle. Come together to set goals and overcome barriers in a supportive group environment. We will address the four pillars of health--nutrition, exercise, sleep and emotional well-being.  This group is highly recommended for those who are participating in the 24 week Healthy Lifestyle Plan and our Health Coaching sessions.    WHEN: This group meets the first Friday of the month, 12:30 PM - 1:30 PM online, via a zoom meeting.      FACILITATOR: Led by National Board Certified Health and , Joyce Dunbar [facility].     TO REGISTER: Please call the Call Center at [phone] to register. You will get an appointment to attend in [place]. Fifteen minutes prior to the meeting, complete the e-check in and you will get the link to join the meeting.  There is no charge to attend this group and space is limited.       2023 and 2024 Meeting Topics and Dates:  November 3: Introduction to Mindfulness (Learn simple and effective mindfulness practices and how it can benefit you)  December 8: Let's Talk (guided discussion on our wins and challenges)  January 5: New Years Vision: Manifest your Best 2024! (Guided imagery,  journaling and discussion)  February 2: Let's Talk  March 1: 10 Percent Happier by Kartik Arenas (Book Bites; a guided discussion on the nuggets of wisdom from favorite wellness books; no need to read the book but highly encouraged)  April 5: Let's Talk  May 3: \"Essentialism; The Disciplined Pursuit of Less by Flo Cai (book bites discussion)  June 7: Let's Talk  July 5: NO MEETING, off for the 4th of July Holiday  August 2: The Blue Zones, Secrets for Living a Longer Life by Kartik Tyler (book " bites discussion)

## 2023-11-07 NOTE — PROGRESS NOTES
"Video-Visit Details    Type of service:  Video Visit    Video Start Time: 10:08 AM   Video End Time: 10:23 AM    Originating Location (pt. Location): Home    Distant Location (provider location):  Offsite (providers home) [facility] [place]     Platform used for Video Visit: Well      Weight Management Nutrition Consultation    Elicia Calero is a 48 year old female presents today for returnweight management nutrition consultation.  Patient referred by Geovanna Morgan NP on 2023.    Patient with Co-morbidities of obesity includin/19/2023     8:28 AM   --   I have the following health issues associated with obesity Type II Diabetes    High Cholesterol    GERD (Reflux)    Fatty Liver    Stress Incontinence    Hypothyroidism   I have the following symptoms associated with obesity Depression         Anthropometrics:  Estimated body mass index is 34.04 kg/m  as calculated from the following:    Height as of 23: 1.715 m (5' 7.5\").    Weight as of 23: 100.1 kg (220 lb 9.6 oz).    Current:  Estimated body mass index is 32.25 kg/m  as calculated from the following:    Height as of this encounter: 1.715 m (5' 7.5\").    Weight as of this encounter: 94.8 kg (209 lb). (-11 lbs from last visit)       Medications for Weight Loss:  Mounjaro  Metformin and Jardiance for DMII  H/o BED managed with topiramate and vyvanse.    NUTRITION HISTORY  Food allergies: None  Food intolerances: None  Dislikes textures of cottage cheese, tomatoes, hummus, guac, salmon.    Vitamin/Mineral Supplements: Vitamin B, folic acid     Previous methods of diet modification for weight loss: Had met with RD when first dx with DMII about 10 years ago. More recently, lost 60-70lb with calorie restriction (1200 wilberto/day) and carb counting but this did not feel sustainable and ultimately regained all that weight.     HgbA1c from 10.8  -> 7.5 in last 2 months. Has been working closely with [facility] " D to get HgbA1c below 8, and now below 7. Needs HgbA1c below 7 for ankle surgery.     Working on less snacking/binging at night. Going to bed sooner has helped with this.   Has been limiting her carb intake to <70 gm per day, and tends to save for later meal and evening snacks.   Preferences: casseroles, breads, dairy, potato. Not a huge fan of protein.     Today - Had ankle surgery three weeks ago. Has to stay off her feet for 6 weeks total. Then will start PT. Continues to replace one meal daily with protein shake. Focusing on smaller portions at dinner. Continues to count carbs. Will be returning to work mid-Dec.     Recent Diet Recall:  Breakfast: 5-7 am protein shake (Orgain with 2% milk); Kashi simple cereal w/o milk  Lunch: 11-12 pm Protein and veggies   Dinner: 5-6 pm protein shake; protein and veggies   Snacks: Less overall snacking. May do string cheese, Kind bars. Yasso bars.   Beverages: Water (24 oz bottle x3-4); Crystal lite with collagen powder (30 gm pro).    Dining Out/take-out: Not often, couple times per month.     Progress Towards Previous Goals:  1) Aim for 20-30 gm protein per meal. Limit carbs to 45 gm per meal. - Improving,    - See meal ideas below  2) For evening snack: put both sweet and salty food on a plate. Make sure foods with protein and fiber are included. - Significant reduction in snacks.    - Example: 2 part-skim cheese sticks, palm-size sweet/bar, and sliced veggies (carrots, celery, cucumbers, peppers)  3) Prior to snack, turn off distractions (TV), sit at the table, and take a few deep breaths, trying to relax prior to snacking.           9/19/2023     8:28 AM   Diet Recall Review with Patient   If you do eat breakfast, what types of food do you eat? Protein shake or something small that is higer protein/low carb becaus my blood sugars are ususualy high   If you do eat lunch, what types of food do you typically eat? Something small because I m not usually real hungry so I may  air hawk a few baby potatoes w/ sour cream or some yogurt or a protein shake   If you do eat supper, what types of food do you typically eat? I ll usually make a bigger meal or eat leftovers. It may be a casserole or some sort of vegetable and protein. I try to stay away from a carb at dinnertime.   If you do snack, what types of food do you typically eat? Cheese. Atkins candy, pickles, olives, sweets I baked using Truvia.   How many glasses of juice do you drink in a typical day? 0   How many of glasses of milk do you drink in a typical day? 0   How many 8oz glasses of sugar containing drinks such as Intin-Aid/sweet tea do you drink in a day? 0   How many cans/bottles of sugar pop/soda/tea/sports drinks do you drink in a day? 0   How many cans/bottles of diet pop/soda/tea or sports drink do you drink in a day? 5   How often do you have a drink of alcohol? Monthly or Less   If you do drink, how many drinks might you have in a day? 1 or 2           9/19/2023     8:28 AM   Eating Habits   Generally, my meals include foods like these bread, pasta, rice, potatoes, corn, crackers, sweet dessert, pop, or juice A Few Times a Week   Generally, my meals include foods like these fried meats, brats, burgers, french fries, pizza, cheese, chips, or ice cream A Few Times a Week   Eat fast food (like McDonalds, Burger Raj, Taco Bell) Less Than Weekly   Eat at a buffet or sit-down restaurant Less Than Weekly   Eat most of my meals in front of the TV or computer Everyday   Often skip meals, eat at random times, have no regular eating times Almost Everyday   Rarely sit down for a meal but snack or graze throughout A Few Times a Week   Eat extra snacks between meals Once a Week   Eat most of my food at the end of the day Almost Everyday   Eat in the middle of the night or wake up at night to eat Never   Eat extra snacks to prevent or correct low blood sugar Less Than Weekly   Eat to prevent acid reflux or stomach pain Never   Worry  about not having enough food to eat Never   I eat when I am depressed Almost Everyday   I eat when I am stressed Almost Everyday   I eat when I am bored Almost Everyday   I eat when I am anxious Almost Everyday   I eat when I am happy or as a reward Almost Everyday   I feel hungry all the time even if I just have eaten Almost Everyday   Feeling full is important to me Almost Everyday   I finish all the food on my plate even if I am already full Almost Everyday   I can't resist eating delicious food or walk past the good food/smell A Few Times a Week   I eat/snack without noticing that I am eating Less Than Weekly   I eat when I am preparing the meal A Few Times a Week   I eat more than usual when I see others eating Less Than Weekly   I have trouble not eating sweets, ice cream, cookies, or chips if they are around the house A Few Times a Week   I think about food all day Almost Everyday   What foods, if any, do you crave? Sweets/Candy/Chocolate   Please list any other foods you crave? I crave the chips/crackers and the cheese too.  I go from craving sweet to salty/savory to sweet salty/savory all the time and it doesn t usually matter what it is.           9/19/2023     8:28 AM   Amount of Food   I feel out of control when eating Almost Everyday   I eat a large amount of food, like a loaf of bread, a box of cookies, a pint/quart of ice cream, all at once Monthly   I eat a large amount of food even when I am not hungry Almost Everyday   I eat rapidly Monthly   I eat alone because I feel embarrassed and do not want others to see how much I have eaten Almost Everyday   I eat until I am uncomfortably full Almost Everyday   I feel bad, disgusted, or guilty after I overeat Everyday       Physical Activity:  Limited d/t ankle surgery        9/19/2023     8:28 AM   Activity/Exercise History   How much of a typical 12 hour day do you spend sitting? Most of the Day   How much of a typical 12 hour day do you spend lying down?  Less Than Half the Day   How much of a typical day do you spend walking/standing? Less Than Half the Day   How many hours (not including work) do you spend on the TV/Video Games/Computer/Tablet/Phone? 6 Hours or More   How many times a week are you active for the purpose of exercise? Never   What keeps you from being more active? Pain    Other   How many total minutes do you spend doing some activity for the purpose of exercising when you exercise? None       Nutrition Prescription  Recommended energy/nutrient modification.    Nutrition Diagnosis  Obesity r/t long history of positive energy balance aeb BMI >30. - Improving    Nutrition Intervention  Materials/education provided on hypocaloric diet for weight loss and low/moderate carb diet for mgmt of DMII. Reviewed protein needs/sources.   Discussed strategies for coping with constipation.  Praised pt on the many positive changes she has made to diet.   Co-developed goals to work towards.   Provided pt with list of goals and resources below via [employer].     Expected Engagement: good  Follow-Up Plans: meal/snack planning        Nutrition Goals  1) Aim for 20-30 gm protein per meal (total 60-90 gm per day). Limit carbs to 45 gm per meal.   2) Ensure you are drinking 64+ oz fluid per day      Follow-Up:  6 weeks, PRN    Time spent with patient: 15 minutes.  Jeri Hernández RD, LD

## 2023-11-09 ENCOUNTER — TRANSFERRED RECORDS (OUTPATIENT)
Dept: HEALTH INFORMATION MANAGEMENT | Facility: CLINIC | Age: 48
End: 2023-11-09
Payer: COMMERCIAL

## 2023-11-15 ENCOUNTER — TRANSFERRED RECORDS (OUTPATIENT)
Dept: HEALTH INFORMATION MANAGEMENT | Facility: CLINIC | Age: 48
End: 2023-11-15

## 2023-11-17 DIAGNOSIS — E11.9 TYPE 2 DIABETES MELLITUS WITHOUT COMPLICATION, WITHOUT LONG-TERM CURRENT USE OF INSULIN (H): ICD-10-CM

## 2023-11-17 RX ORDER — TIRZEPATIDE 10 MG/.5ML
INJECTION, SOLUTION SUBCUTANEOUS
Qty: 2 ML | Refills: 3 | Status: SHIPPED | OUTPATIENT
Start: 2023-11-17 | End: 2023-11-22

## 2023-11-20 ENCOUNTER — TELEPHONE (OUTPATIENT)
Dept: FAMILY MEDICINE | Facility: CLINIC | Age: 48
End: 2023-11-20
Payer: COMMERCIAL

## 2023-11-20 DIAGNOSIS — M25.571 PAIN IN JOINT INVOLVING ANKLE AND FOOT, RIGHT: ICD-10-CM

## 2023-11-20 DIAGNOSIS — F11.90 CHRONIC, CONTINUOUS USE OF OPIOIDS: ICD-10-CM

## 2023-11-20 DIAGNOSIS — G89.4 CHRONIC PAIN DISORDER: ICD-10-CM

## 2023-11-20 RX ORDER — OXYCODONE HYDROCHLORIDE 5 MG/1
5-10 TABLET ORAL EVERY 4 HOURS PRN
Qty: 150 TABLET | Refills: 0 | Status: SHIPPED | OUTPATIENT
Start: 2023-11-30 | End: 2023-12-01

## 2023-11-20 RX ORDER — OXYCODONE HCL 10 MG/1
10 TABLET, FILM COATED, EXTENDED RELEASE ORAL EVERY 12 HOURS
Qty: 60 TABLET | Refills: 0 | Status: SHIPPED | OUTPATIENT
Start: 2023-11-30 | End: 2023-12-01

## 2023-11-20 NOTE — TELEPHONE ENCOUNTER
Patient states the oxycontin is very helpful for the pain.  She is currently using the oxycodone 4-5 tabs daily: 1 [if needed due to poor sleep] in the am, 2 mid-day and 2 at bedtime.    She has no need for refills on any medications at this time.     Anju Magdaleno RN, BSN, PHN  [facility]  [phone]

## 2023-11-20 NOTE — TELEPHONE ENCOUNTER
Please let her know I sent a bridge that can be picked up 11/30. Let her know I reduced the total pills to 150 per month, to reflect her current use and plan to start tapering down. Please have her follow up with me in December if her insurance changes. Otherwise she should establish w/a provider that accepts her insurance.    Dr Waggoner

## 2023-11-20 NOTE — TELEPHONE ENCOUNTER
Yes, I can give her a bridge. Can you clarify what medications she needs? Can you ask if the oxycontin (long acting) is helping? And how much short acting oxycodone she is taking? She should not need a refill until beginning of December.    Dr. Waggoner    PDMP checked  -filled 180 oxycodone tablets on 11/6/23  -filled 60 oxycontin tablets on 11/2/23

## 2023-11-20 NOTE — PROGRESS NOTES
Medication Therapy Management (MTM) Encounter    ASSESSMENT:                            Medication Adherence/Access: No issues identified    Type 2 Diabetes/Obesity:    Patient is not meeting A1c goal of < 7%(7.5%). Self monitoring of blood glucose is closer to goal of fasting  mg/dL and post prandial < 180 mg/dL. Patient is not meeting average glucose goal of <150mg/dL. Patient is now meeting goal of > 70% (98%)time in target with continuous glucose monitoring.  Patient would benefit from staying on 10mg./week Mounjaro --This med working very well to curb her appetite, lose weight and control bs's.     Continue daily metformin and jardiance and otc pill glucoredi as well.   Also--patient will work hard on eliminating all late night snacking --ok to drink slim-fast or atkins protein shake to fill her up if needed.   . Due for annual foot exam. Due for annual eye exam.         Hyperlipidemia:   Stable.  Patient is on moderate intensity statin which is indicated based on 2019 ACC/AHA guidelines for lipid management.        PLAN:                                1.  Congrats on great looking blood sugars and 30lbs. On wt.loss on Mounjaro .  Make a lab only appt. at [facility] for Friday December 15th . (A1c/BMP labs).    Please weigh yourself on clinic scale when your there for labs.     Stay on all same medications for now --lets review those on December 20th .         Follow-up: Return in about 29 days (around 12/20/2023), or @ 8;30 AM via Telephone, for Medication Therapy Management Visit, Lab Work, Weight loss.      SUBJECTIVE/OBJECTIVE:                          Elicia Calero is a 48 year old female called for a follow-up (10-18-23) visit. She was referred to me from Loc Waggoner DO. (PCP).  .    Now has [facility] care ma ins. --our clinic does not cover this ins. -[facility] Thru work.      Reason for visit:   Tendon surgery went well, back on  mounjaro.   .    Allergies/ADRs: Reviewed in chart  Past Medical History: Per patient diagnosed 10-15 years --did lose wt. To 150lbs years ago and reversed her DM . Ate 1200 cals/day , low carb   Tobacco: She reports that she quit smoking about 5 years ago. Her smoking use included cigarettes. She has a 20.00 pack-year smoking history. She has never used smokeless tobacco.  Alcohol: Less than 1 beverage / month  Other Substance Use: none   E-cigarette Use: none   Caffeine:  1 coffee or crystal light every morning.   Social: off work due to tendon issue (ruptured).  She is a pharmacy tech at  discharge.   Personal Healthcare Goals: fix dm soon   Activity: not much due to ruptured tendon.     Medication Adherence/Access: no issues reported    Type 2 Diabetes/Obesity:    Failed bydureon in the past , fears needles--refuses all injectables before last visit but doing ok with Mounjaro as needle is hidden.     Rybelsus 14mg./day --off this now 8-14-23   Metformin [id number] ER tabs daily  Glipizide ER 10mg tabs --2 tabs daily --stopped 8-14-23 --on Mounjaro 10mg./week  Losing weight (199lbs. At home , 209lbs in clinic)  and controlling food intake and bs's --she loves it!  Jardiance 25mg./day   Also taking 2 capsules /day otc -Glucoredi herbal with gymnemna and berberi.   Not taking aspirin due to age.  Patient is not experiencing side effects.  Blood sugar monitoring: Continuous Glucose Monitor Average glucose : mtm added remote bs access via dexcom G6 abiodun on her phone :  Am bs's 210--why?  When bedtime 120';s.(Mtm explained jose phenomenon at last visit to her ).       Wt Readings from Last 5 Encounters:   11/07/23 209 lb (94.8 kg)   10/05/23 216 lb 6.4 oz (98.2 kg)   09/20/23 220 lb 9.6 oz (100.1 kg)   08/23/23 230 lb (104.3 kg)   08/02/23 230 lb 9.6 oz (104.6 kg)                           Current diabetes symptoms:none now -Mounjaro has corrected her polyphagia.  Diet/Exercise: needs tendon surgery 10-19-23, can use  foot pedals only.   Mounjaro curbing appetite --2 meals/day bfast and dinner , shake at lunch .    Previously:   Bfast /lunch --low carb , eat dinner --after dinner keeps eating after feeling full!  Has low carb slimfast or atkins shakes to use.   Sleep iffy due to intense hunger. Awakes 2-3 am --snacking and can't get back to sleep. She has mental health issues --struggles with depression and unable to move much with ruptured tendon --discouraged.       Eye exam: due  Foot exam: due  Urine Albumin:   Lab Results   Component Value Date    UMALCR  12/01/2022      Comment:      Unable to calculate, urine albumin and/or urine creatinine is outside detectable limits.  Microalbuminuria is defined as an albumin:creatinine ratio of 17 to 299 for males and 25 to 299 for females. A ratio of albumin:creatinine of 300 or higher is indicative of overt proteinuria.  Due to biologic variability, positive results should be confirmed by a second, first-morning random or 24-hour timed urine specimen. If there is discrepancy, a third specimen is recommended. When 2 out of 3 results are in the microalbuminuria range, this is evidence for incipient nephropathy and warrants increased efforts at glucose control, blood pressure control, and institution of therapy with an angiotensin-converting-enzyme (ACE) inhibitor (if the patient can tolerate it).        Lab Results   Component Value Date    A1C 7.5 (H) 09/20/2023     Hyperlipidemia:   Pravastatin 40mg daily  Patient reports no significant myalgias or other side effects.  The 10-year ASCVD risk score (Carmita CORREIA, et al., 2019) is: 1%    Values used to calculate the score:      Age: 48 years      Sex: Female      Is Non- : No      Diabetic: Yes      Tobacco smoker: No      Systolic Blood Pressure: 118 mmHg      Is BP treated: No      HDL Cholesterol: 52 mg/dL      Total Cholesterol: 133 mg/dL  Recent Labs   Lab Test 06/28/23  0936 12/01/22  1013   CHOL 133 129    HDL 52 46*   LDL 60 61   TRIG 105 109           -------------------------------------------------------------------------------------------------------------------      I spent 30 minutes with this patient today. All changes were made via collaborative practice agreement with Loc Waggoner DO. A copy of the visit note was provided to the patient's provider(s).    A summary of these recommendations was sent via [employer].    Dacia Myers Rph.  Medication Therapy Management Provider  [phone]      Telemedicine Visit Details  Type of service:  Telephone visit  Start Time: 2;30 PM  End Time: 3:00 PM     Medication Therapy Recommendations  No medication therapy recommendations to display

## 2023-11-20 NOTE — TELEPHONE ENCOUNTER
Patient had upcoming appointment on 11/22 with Dr. Waggoner. Patient has active [employer] [place] Care insurance through the state and [facility] is unable to take this insurance. Writer called patient to confirm that patient still has this insurance and patient stated she will have this insurance through end of the month. Writer informed patient that insurance can be used at this clinic for specialty services/urgent care but not family practice, patient would need to be seen at clinics at [facility] that can take her insurance. Patient stated she will reach out to Dr. Waggoner to ask if she can get a bridge script in the meanwhile she can get scheduled at another location. Writer informed patient Dr. Waggoner will be advised of the situation. Patient expressed understanding of information and will call back to schedule at [place] once she no longer has the above insurance active

## 2023-11-21 ENCOUNTER — TRANSFERRED RECORDS (OUTPATIENT)
Dept: HEALTH INFORMATION MANAGEMENT | Facility: CLINIC | Age: 48
End: 2023-11-21
Payer: COMMERCIAL

## 2023-11-21 ENCOUNTER — VIRTUAL VISIT (OUTPATIENT)
Dept: PHARMACY | Facility: CLINIC | Age: 48
End: 2023-11-21
Payer: COMMERCIAL

## 2023-11-21 DIAGNOSIS — E11.9 TYPE 2 DIABETES MELLITUS WITHOUT COMPLICATION, WITHOUT LONG-TERM CURRENT USE OF INSULIN (H): Primary | ICD-10-CM

## 2023-11-21 DIAGNOSIS — M25.571 PAIN IN JOINT INVOLVING ANKLE AND FOOT, RIGHT: ICD-10-CM

## 2023-11-21 DIAGNOSIS — G43.709 CHRONIC MIGRAINE WITHOUT AURA WITHOUT STATUS MIGRAINOSUS, NOT INTRACTABLE: ICD-10-CM

## 2023-11-21 DIAGNOSIS — E78.5 HYPERLIPIDEMIA LDL GOAL <100: ICD-10-CM

## 2023-11-21 PROCEDURE — 99605 MTMS BY PHARM NP 15 MIN: CPT | Performed by: PHARMACIST

## 2023-11-21 RX ORDER — PROCHLORPERAZINE 25 MG/1
SUPPOSITORY RECTAL
Qty: 9 EACH | Refills: 3 | Status: SHIPPED | OUTPATIENT
Start: 2023-11-21 | End: 2024-01-03

## 2023-11-21 RX ORDER — PROCHLORPERAZINE 25 MG/1
SUPPOSITORY RECTAL
Qty: 1 EACH | Refills: 3 | Status: SHIPPED | OUTPATIENT
Start: 2023-11-21 | End: 2024-01-03

## 2023-11-21 NOTE — TELEPHONE ENCOUNTER
Writer called patient and reviewed message per Dr. Waggoner.    Patient verbalized understanding and in agreement with plan.  MYRIAM Kate, RN-BC  [facility]

## 2023-11-21 NOTE — Clinical Note
Loc--harriet had successful tendon surgery --yeah --losing about 25-30 lbs now on munjaro --will have her repeat A1c and bmp labs 12-15-23 and f/up with me on the 20th .  Beverly

## 2023-11-21 NOTE — PATIENT INSTRUCTIONS
"Recommendations from today's MTM visit:                                                         1.  Congrats on great looking blood sugars and 30lbs. On wt.loss on Mounjaro .  Make a lab only appt. at [facility] for Friday December 15th .   Please weigh yourself on clinic scale when your there for labs.     Stay on all same medications for now --lets review those on December 20th .         Follow-up: Return in about 29 days (around 12/20/2023), or @ 8;30 AM via Telephone, for Medication Therapy Management Visit, Lab Work, Weight loss.    It was great speaking with you today.  I value your experience and would be very thankful for your time in providing feedback in our clinic survey. In the next few days, you may receive an email or text message from [employer] with a link to a survey related to your  clinical pharmacist.\"     To schedule another MTM appointment, please call the clinic directly or you may call the MTM scheduling line at [phone] or toll-free at [phone].     My Clinical Pharmacist's contact information:                                                      Please feel free to contact me with any questions or concerns you have.      Dacia Myers Rph.  Medication Therapy Management Provider  [phone]    "

## 2023-11-21 NOTE — PROGRESS NOTES
Outcome for 11/21/23 9:43 AM: Zhihu message sent  Patient is showing 5/5 [place] met.   Outcome for 11/27/23 10:30 AM: Data obtained via Dexcom website

## 2023-11-22 ENCOUNTER — MYC MEDICAL ADVICE (OUTPATIENT)
Dept: PHARMACY | Facility: CLINIC | Age: 48
End: 2023-11-22

## 2023-11-22 DIAGNOSIS — E11.9 TYPE 2 DIABETES MELLITUS WITHOUT COMPLICATION, WITHOUT LONG-TERM CURRENT USE OF INSULIN (H): Primary | ICD-10-CM

## 2023-11-22 RX ORDER — GABAPENTIN 300 MG/1
1200 CAPSULE ORAL 3 TIMES DAILY
Qty: 1080 CAPSULE | Refills: 3 | Status: SHIPPED | OUTPATIENT
Start: 2023-11-22 | End: 2024-01-03

## 2023-11-22 NOTE — TELEPHONE ENCOUNTER
Annel Pederson   [facility], they won t approve with a prior authorization stating my results on it?      I also got bad news yesterday I may be off of work longer than the 8 weeks so this may not be good. I guess whichever you think would be best for me. If I can inject into my thigh I would prefer that method as well in whichever you choose but if not that s okay. The  Mounjaro helped keep the  chatter  down for me to eat snacks all the time that s why I m I little nervous and afraid I m going to backslide.      If you could try a prior authorization I d appreciate it so much. I can t believe with the results I ve had they d deny it. Anyway, you choose whichever you think is going to work the best for me on my situation.     Thanks for letting me know.         Elicia      Due to ins. Change must trial weekly bydureon now --mtm will send in rx.  Also will note ocp potential drug int. To patient .    Dacia Myers [facility].  Medication Therapy Management Provider  [phone]      Thanks for doing that I just have a question, I just inject the byduran 7 days after my last Mounjaro injection correct?  And is there anything else like a specific time of day? Thank you so much if I have to renew my insurance in January I m definitely going to change it [facility]. Again thank you and have a wonderful Thanksgiving and weekend.          Elicia Taveras notes yes 7 days after last mounjaro --start bydureon.

## 2023-11-29 ENCOUNTER — VIRTUAL VISIT (OUTPATIENT)
Dept: ENDOCRINOLOGY | Facility: CLINIC | Age: 48
End: 2023-11-29
Attending: INTERNAL MEDICINE
Payer: COMMERCIAL

## 2023-11-29 ENCOUNTER — PRE VISIT (OUTPATIENT)
Dept: ENDOCRINOLOGY | Facility: CLINIC | Age: 48
End: 2023-11-29

## 2023-11-29 DIAGNOSIS — E11.9 TYPE 2 DIABETES MELLITUS WITHOUT COMPLICATION, WITHOUT LONG-TERM CURRENT USE OF INSULIN (H): ICD-10-CM

## 2023-11-29 PROCEDURE — 99204 OFFICE O/P NEW MOD 45 MIN: CPT | Mod: VID | Performed by: PHYSICIAN ASSISTANT

## 2023-11-29 ASSESSMENT — PAIN SCALES - GENERAL: PAINLEVEL: MODERATE PAIN (4)

## 2023-11-29 NOTE — NURSING NOTE
Is the patient currently in the state of [place]? YES    Visit mode:VIDEO    If the visit is dropped, the patient can be reconnected by: VIDEO VISIT: Text to cell phone:   Telephone Information:   Mobile [phone]       Will anyone else be joining the visit? NO  (If patient encounters technical issues they should call [phone]    How would you like to obtain your AVS? MyChart    Are changes needed to the allergy or medication list? No    Reason for visit: Consult    Shelby Kocher VVF

## 2023-11-29 NOTE — LETTER
11/29/2023       RE: Elicia Calero  [address]  [place] [address]     Dear Colleague,    Thank you for referring your patient, Elicia Calero, to the [facility] [place] [facility]. Please see a copy of my visit note below.    Outcome for 11/21/23 9:43 AM: Emitless message sent  Patient is showing 5/5 [place] met.   Outcome for 11/27/23 10:30 AM: Data obtained via Dexcom website                  Virtual Visit Details    Type of service:  Video Visit  Video Start Time:   Video End Time:    Originating Location (pt. Location):     Distant Location (provider location):    Platform used for Video Visit:     Time of start: 2:38 pm   Time of end: 3:00 pm   Total duration of video visit:22 minutes.  Providers location: offsite.  Patients location: [place].    [facility]  Elicia Calero is a 48 year old female with type 2 diabetes mellitus being seen today as a new patient for diabetes evaluation and management.   Pt states she was dx having type 2 diabetes approx 7 years ago.  She has no known diabetic retinopathy, nephropathy or diabetic neuropathy.  Pt has hx of obesity, hypothyroidism, CAT, GERD, depression, migraines, chronic lower back pain, eating disorder and insomnia.  She recently had right ankle surgery and is on short term disability.  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  For her diabetes, she is taking Mounjaro 10 mg subcutaneous once a week, Jardiance 25 mg each am and Metformin 500 mg 4 tabs daily.  Apparently her medical insurance has changed and will not cover Mounjaro for the rest of this year and she has been prescribed Bydureon 2 mg subcutaneous once a week which she plans to start next week. She is hopeful that her new insurance starting in Jan 2024 will cover Mounjaro.  She has been working closing with pharmacy staff and dietitian and her PCP for her diabetes care.  Elicia was scheduled to be seen in our  clinic several months ago when her A1C was > 10 %. She needed to have her A1C lower prior to having her right ankle surgery which took place on 10/19/2023. Pt's A1C improved with addition of Mounjaro and most recent A1C was 7.5 % on 9/20/2023.  For her diabetes, she is currently taking Mounjaro 10 mg subcutaneous once a week, Jardiance 25 mg each am and Metformin 500 mg 4 tabs daily.  Most recent A1C 7.5 % on 9/20/2023, A1C 10.8 % on 6/28/2023, A1C 7.9 % in 3/2023, A1C 8.6 % in Dec 2022, A1C 7.4 % in Oct 2021 and 6.7 % in May 2021.  Elicia wears a DexcomG6 sensor. Her blood sugar is in target 99 % of the time with an average glucose 126 and estimated AC 6.3 %.  On ROS today, 30 lb weight loss on Mounjaro.  Right ankle healing.  Hx of chronic headaches.  Numbness in fingers from carpal tunnel.  Feet ok per patient. Denies foot ulcers.  Pt denies hx of CVA, blurred vision, n/v, SOB at rest, cough or fever.  She does not smoke.  Denies chest pain or cardiac history  Denies abd pain, diarrhea, dysuria or hematuria.  Pt denies sx of vaginal yeast infection.    Diabetes Care  Retinopathy: none; pt seen by Oph in Fall 2023.  Nephropathy: none; urine microalbuminuria negative in Dec 2022.  Neuropathy: none.  Foot Exam: no exam.  Taking aspirin: no.  Lipids: LDL 60 in June 2023. Pt taking Pravastatin.  Insulin: none.  DM meds: Mounjaro, Jardiance and Metformin. Pt will be switching to Bydureon next week- due to insurance change.  Testing: DexcomG6 sensor.          ROS  Please see under HPI.    Allergies  Allergies   Allergen Reactions    Lithium Other (See Comments) and Dizziness     Other reaction(s): PSYCHOSIS    Sulfa Antibiotics Swelling     lips  Cold sores per pt      Adhesive Tape Rash    Albuterol Nausea and Vomiting    Clarithromycin Nausea and Vomiting    Erythromycin Nausea and Vomiting    Amoxicillin-Pot Clavulanate Nausea and Vomiting    Venlafaxine Other (See Comments)     Other reaction(s):  PSYCHOSIS  Psychosis  Psychosis  Per transferred records         Medications  Current Outpatient Medications   Medication Sig Dispense Refill    Alcohol Swabs PADS 1 each daily 300 each 3    buPROPion (WELLBUTRIN XL) 150 MG 24 hr tablet Take 1 tablet (150 mg) by mouth every morning 90 tablet 3    buPROPion (WELLBUTRIN XL) 300 MG 24 hr tablet Take 1 tablet (300 mg) by mouth daily 90 tablet 3    Continuous Blood Gluc  (DEXCOM G6 ) R[place]IGH Use to read blood sugars as per 's instructions. 1 each 0    Continuous Blood Gluc Sensor (DEXCOM G6 SENSOR) MISC Change every 10 days. 9 each 3    Continuous Blood Gluc Transmit (DEXCOM G6 TRANSMITTER) MISC Change every 3 months. 1 each 3    diclofenac (VOLTAREN) 1 % topical gel Apply 4 g topically 4 times daily 350 g 1    diphenhydrAMINE (BENADRYL) 50 MG tablet Take 50 mg by mouth      doxepin (SINEQUAN) 25 MG capsule Take 2 capsules (50 mg) by mouth At Bedtime 30 capsule 3    empagliflozin (JARDIANCE) 25 MG TABS tablet Take 1 tablet (25 mg) by mouth daily 90 tablet 1    exenatide ER (BYDUREON BCISE) 2 MG/0.85ML auto-injector Inject 2 mg Subcutaneous every 7 days 3.4 mL 5    folic acid (FOLVITE) 1 MG tablet Take 1 tablet (1,000 mcg) by mouth daily 90 tablet 3    gabapentin (NEURONTIN) 300 MG capsule Take 4 capsules (1,200 mg) by mouth 3 times daily 1080 capsule 3    hydrochlorothiazide (MICROZIDE) 12.5 MG capsule Take 1 capsule (12.5 mg) by mouth daily 90 capsule 3    hydrOXYzine (VISTARIL) 50 MG capsule Take 1 capsule (50 mg) by mouth daily 90 capsule 3    indomethacin (INDOCIN) 50 MG capsule Take 1 capsule (50 mg) by mouth 3 times daily 270 capsule 3    lamoTRIgine (LAMICTAL) 200 MG tablet Take 2 tablets (400 mg) by mouth At Bedtime for 360 days 180 tablet 3    levothyroxine (SYNTHROID/LEVOTHROID) 125 MCG tablet Take 1 tablet (125 mcg) by mouth daily 90 tablet 3    lidocaine, viscous, (XYLOCAINE) 2 % solution Swish and spit 5 mLs in mouth 5 times daily  100 mL 1    lisdexamfetamine (VYVANSE) 20 MG capsule Take 20 mg by mouth every morning Takes with 50mg daily every morning -total of 70mg daily      lisdexamfetamine (VYVANSE) 50 MG capsule Take 50 mg by mouth every morning      metFORMIN (GLUCOPHAGE XR) 500 MG 24 hr tablet Take 4 tablets (2,000 mg) by mouth daily 360 tablet 3    naloxone (NARCAN) 4 MG/0.1ML nasal spray Spray 1 spray (4 mg) into one nostril alternating nostrils as needed for opioid reversal every 2-3 minutes until assistance arrives 1 each 2    norethindrone (MICRONOR) 0.35 MG tablet Take 1 tablet (0.35 mg) by mouth daily 84 tablet 4    nystatin (MYCOSTATIN) 781332 UNIT/GM external cream Apply 100,000 g topically 3 times daily      omeprazole (PRILOSEC) 20 MG DR capsule Take 1 capsule (20 mg) by mouth 2 times daily 180 capsule 3    ondansetron (ZOFRAN) 4 MG tablet Take 1 tablet (4 mg) by mouth 3 times daily 90 tablet 1    oxyCODONE (OXYCONTIN) 10 MG 12 hr tablet Take 1 tablet (10 mg) by mouth every 12 hours 60 tablet 0    oxyCODONE (ROXICODONE) 5 MG tablet Take 1-2 tablets (5-10 mg) by mouth every 4 hours as needed for severe pain 150 tablet 0    pravastatin (PRAVACHOL) 40 MG tablet Take 1 tablet (40 mg) by mouth daily 90 tablet 3    prochlorperazine (COMPAZINE) 10 MG tablet Take 1 tablet (10 mg) by mouth 4 times daily as needed for nausea (with migraine) 120 tablet 5    REXULTI 2 MG tablet Take 2 mg by mouth daily      rizatriptan (MAXALT) 10 MG tablet Take 1 tablet (10 mg) by mouth 2 times daily 60 tablet 3    tiZANidine (ZANAFLEX) 2 MG tablet Take 2 tablets (4 mg) by mouth 3 times daily 180 tablet 3    topiramate (TOPAMAX) 100 MG tablet Take 1 tablet (100 mg) by mouth every evening 90 tablet 1    triamcinolone (KENALOG) 0.1 % external ointment Apply twice daily on hand for 2-4 weeks. 15 g 1       Family History  family history includes Diabetes in her maternal grandmother; Heart Disease in her paternal grandmother; Hypertension in her  mother.    Social History   reports that she quit smoking about 5 years ago. Her smoking use included cigarettes. She has a 20.00 pack-year smoking history. She has never used smokeless tobacco. She reports current alcohol use. She reports that she does not use drugs.     Past Medical History  Past Medical History:   Diagnosis Date    Diabetes mellitus (H)     Hepatic adenoma     HTN (hypertension)     Hyperlipidemia     Hypothyroidism     Migraine     NAFLD (nonalcoholic fatty liver disease)        Past Surgical History:   Procedure Laterality Date    CHOLECYSTECTOMY      teen years    COLONOSCOPY N/A 5/18/2023    Procedure: Colonoscopy;  Surgeon: Chance Chan MD;  [place] [facility] [place]    ESOPHAGOSCOPY, GASTROSCOPY, DUODENOSCOPY (EGD), COMBINED N/A 5/18/2023    Procedure: ESOPHAGOGASTRODUODENOSCOPY, WITH BIOPSY;  Surgeon: Chance Chan MD;  [place] [facility] [place]    RADIO FREQUENCY ABLATION / DESTRUCTION OF SACROILOAC JOINT LATERAL BRANCHES (S1/S2/S3) Bilateral 6/15/2023    Procedure: Bilateral Lumbar 5 medial branch block and Sacral 1,2,3 lateral branch block;  Surgeon: Layne Weber MD;  [place] [facility] [place]       Physical Exam    No exam today.    RESULTS  Creatinine   Date Value Ref Range Status   10/05/2023 0.89 0.51 - 0.95 mg/dL Final     GFR Estimate   Date Value Ref Range Status   10/05/2023 80 >60 mL/min/1.73m2 Final     Hemoglobin A1C   Date Value Ref Range Status   09/20/2023 7.5 (H) <5.7 % Final     Comment:     Normal <5.7%   Prediabetes 5.7-6.4%    Diabetes 6.5% or higher     Note: Adopted from ADA consensus guidelines.     Potassium   Date Value Ref Range Status   10/05/2023 4.6 3.4 - 5.3 mmol/L Final     ALT   Date Value Ref Range Status   09/20/2023 19 0 - 50 U/L Final     Comment:     Reference intervals for this test were updated on 6/12/2023 to more accurately reflect our healthy population. There may be differences in the flagging of prior results with similar values  performed with this method. Interpretation of those prior results can be made in the context of the updated reference intervals.       AST   Date Value Ref Range Status   09/20/2023 20 0 - 45 U/L Final     Comment:     Reference intervals for this test were updated on 6/12/2023 to more accurately reflect our healthy population. There may be differences in the flagging of prior results with similar values performed with this method. Interpretation of those prior results can be made in the context of the updated reference intervals.     TSH   Date Value Ref Range Status   06/28/2023 0.94 0.30 - 4.20 uIU/mL Final       Cholesterol   Date Value Ref Range Status   06/28/2023 133 <200 mg/dL Final   12/01/2022 129 <200 mg/dL Final     Direct Measure HDL   Date Value Ref Range Status   06/28/2023 52 >=50 mg/dL Final   12/01/2022 46 (L) >=50 mg/dL Final     LDL Cholesterol Calculated   Date Value Ref Range Status   06/28/2023 60 <=100 mg/dL Final   12/01/2022 61 <=100 mg/dL Final     Triglycerides   Date Value Ref Range Status   06/28/2023 105 <150 mg/dL Final   12/01/2022 109 <150 mg/dL Final         ASSESSMENT/PLAN:      TYPE 2 DIABETES MELLITUS: Type 2 diabetes mellitus without microvascular complications currently well controlled on Mounjaro, Jardiance and Metformin. Unfortunately her insurance will not cover Mounjaro the rest of this year. She is hopeful that her medical insurance will cover Mounjaro again starting in Jan 2024. She will be switching from Mounjaro to Bydureon next week. Elicia eats healthy and has been seeing RD and her activity has been limited since her right ankle surgery. No hx of diabetic retinopathy and she was seen by Oph in Fall 2023. No hx of nephropathy or diabetic neuropathy. BP was 118/66 in Oct 2023.  WEIGHT: Pt reports 30 lb weight loss with Mounjaro.  LIPIDS: LDL 60 in June 2023. Pt taking Pravachol.  HYPOTHYROIDISM: TSH normal in 6/2023. Continue current dose of Levothyroxine.  CAT: She is  aware of the importance of healthy eating, weight loss and good glycemic control  FOLLOW UP: Elicia's diabetes control is good at this time and she will continue to follow up with her PCP, pharmacy and CDE/RD staff.    Time spent reviewing chart, labs and DexcomG6 sensor today = 10 minutes.  Time for video visit today = 22 minutes.  Time for documentation today = 15 minutes.    Total time for visit today= 47 minutes.    Reshma Barton PA-C

## 2023-11-29 NOTE — PROGRESS NOTES
Virtual Visit Details    Type of service:  Video Visit  Video Start Time:   Video End Time:    Originating Location (pt. Location):     Distant Location (provider location):    Platform used for Video Visit:

## 2023-11-30 ENCOUNTER — VIRTUAL VISIT (OUTPATIENT)
Dept: ENDOCRINOLOGY | Facility: CLINIC | Age: 48
End: 2023-11-30
Payer: COMMERCIAL

## 2023-11-30 ENCOUNTER — TELEPHONE (OUTPATIENT)
Dept: ENDOCRINOLOGY | Facility: CLINIC | Age: 48
End: 2023-11-30

## 2023-11-30 VITALS — BODY MASS INDEX: 30.16 KG/M2 | HEIGHT: 68 IN | WEIGHT: 199 LBS

## 2023-11-30 DIAGNOSIS — E66.812 CLASS 2 SEVERE OBESITY DUE TO EXCESS CALORIES WITH SERIOUS COMORBIDITY AND BODY MASS INDEX (BMI) OF 35.0 TO 35.9 IN ADULT (H): Primary | ICD-10-CM

## 2023-11-30 DIAGNOSIS — G89.4 CHRONIC PAIN DISORDER: ICD-10-CM

## 2023-11-30 DIAGNOSIS — E66.9 OBESITY (BMI 30-39.9): ICD-10-CM

## 2023-11-30 DIAGNOSIS — E66.01 CLASS 2 SEVERE OBESITY DUE TO EXCESS CALORIES WITH SERIOUS COMORBIDITY AND BODY MASS INDEX (BMI) OF 35.0 TO 35.9 IN ADULT (H): Primary | ICD-10-CM

## 2023-11-30 DIAGNOSIS — M25.571 PAIN IN JOINT INVOLVING ANKLE AND FOOT, RIGHT: ICD-10-CM

## 2023-11-30 DIAGNOSIS — F11.90 CHRONIC, CONTINUOUS USE OF OPIOIDS: ICD-10-CM

## 2023-11-30 DIAGNOSIS — E11.9 TYPE 2 DIABETES MELLITUS WITHOUT COMPLICATION, WITHOUT LONG-TERM CURRENT USE OF INSULIN (H): ICD-10-CM

## 2023-11-30 PROCEDURE — 99213 OFFICE O/P EST LOW 20 MIN: CPT | Mod: VID | Performed by: NURSE PRACTITIONER

## 2023-11-30 ASSESSMENT — PAIN SCALES - GENERAL: PAINLEVEL: MODERATE PAIN (4)

## 2023-11-30 NOTE — ASSESSMENT & PLAN NOTE
13%total body weight loss since starting Mounjaro 8/2023 (high weight 230lb- BMI 35). A1C was down to 7.5 and she was able to have her ortho surgery in October. Still non weight bearing so managing some isolation at this point. Tolerating mounjaro 10mg now- helpful with cravings and food chatter which is helping her also manage disordered eating. Feels this is life changing for her     Stressed importance of adequate nutrition to avoid muscle loss.     Insurance changing with prolonged disability. MTM helping with med changes to accomodates.     Keep up the great work   Continue to focus on protein   Could consider Wegovy/ Zepbound if running into trouble getting the mounjaro next month - [place] should cover these if available   Keep working on finding things to keep you busy  Follow up 3 months    Willie Wright patient - Attn Shameka Galeana:    

## 2023-11-30 NOTE — PROGRESS NOTES
Time of start: 2:38 pm   Time of end: 3:00 pm   Total duration of video visit:22 minutes.  Providers location: offsite.  Patients location: [place].    [facility]  Elicia Calero is a 48 year old female with type 2 diabetes mellitus being seen today as a new patient for diabetes evaluation and management.   Pt states she was dx having type 2 diabetes approx 7 years ago.  She has no known diabetic retinopathy, nephropathy or diabetic neuropathy.  Pt has hx of obesity, hypothyroidism, CAT, GERD, depression, migraines, chronic lower back pain, eating disorder and insomnia.  She recently had right ankle surgery and is on short term disability.  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  For her diabetes, she is taking Mounjaro 10 mg subcutaneous once a week, Jardiance 25 mg each am and Metformin 500 mg 4 tabs daily.  Apparently her medical insurance has changed and will not cover Mounjaro for the rest of this year and she has been prescribed Bydureon 2 mg subcutaneous once a week which she plans to start next week. She is hopeful that her new insurance starting in Jan 2024 will cover Mounjaro.  She has been working closing with pharmacy staff and dietitian and her PCP for her diabetes care.  Elicia was scheduled to be seen in our clinic several months ago when her A1C was > 10 %. She needed to have her A1C lower prior to having her right ankle surgery which took place on 10/19/2023. Pt's A1C improved with addition of Mounjaro and most recent A1C was 7.5 % on 9/20/2023.  For her diabetes, she is currently taking Mounjaro 10 mg subcutaneous once a week, Jardiance 25 mg each am and Metformin 500 mg 4 tabs daily.  Most recent A1C 7.5 % on 9/20/2023, A1C 10.8 % on 6/28/2023, A1C 7.9 % in 3/2023, A1C 8.6 % in Dec 2022, A1C 7.4 % in Oct 2021 and 6.7 % in May 2021.  Elicia wears a DexcomG6 sensor. Her blood sugar is in target 99 % of the time with an average glucose 126 and estimated AC 6.3 %.  On ROS today, 30 lb weight loss on  Mounjaro.  Right ankle healing.  Hx of chronic headaches.  Numbness in fingers from carpal tunnel.  Feet ok per patient. Denies foot ulcers.  Pt denies hx of CVA, blurred vision, n/v, SOB at rest, cough or fever.  She does not smoke.  Denies chest pain or cardiac history  Denies abd pain, diarrhea, dysuria or hematuria.  Pt denies sx of vaginal yeast infection.    Diabetes Care  Retinopathy: none; pt seen by Oph in Fall 2023.  Nephropathy: none; urine microalbuminuria negative in Dec 2022.  Neuropathy: none.  Foot Exam: no exam.  Taking aspirin: no.  Lipids: LDL 60 in June 2023. Pt taking Pravastatin.  Insulin: none.  DM meds: Mounjaro, Jardiance and Metformin. Pt will be switching to Bydureon next week- due to insurance change.  Testing: DexcomG6 sensor.          ROS  Please see under HPI.    Allergies  Allergies   Allergen Reactions    Lithium Other (See Comments) and Dizziness     Other reaction(s): PSYCHOSIS    Sulfa Antibiotics Swelling     lips  Cold sores per pt      Adhesive Tape Rash    Albuterol Nausea and Vomiting    Clarithromycin Nausea and Vomiting    Erythromycin Nausea and Vomiting    Amoxicillin-Pot Clavulanate Nausea and Vomiting    Venlafaxine Other (See Comments)     Other reaction(s): PSYCHOSIS  Psychosis  Psychosis  Per transferred records         Medications  Current Outpatient Medications   Medication Sig Dispense Refill    Alcohol Swabs PADS 1 each daily 300 each 3    buPROPion (WELLBUTRIN XL) 150 MG 24 hr tablet Take 1 tablet (150 mg) by mouth every morning 90 tablet 3    buPROPion (WELLBUTRIN XL) 300 MG 24 hr tablet Take 1 tablet (300 mg) by mouth daily 90 tablet 3    Continuous Blood Gluc  (DEXCOM G6 ) RALEIGH Use to read blood sugars as per 's instructions. 1 each 0    Continuous Blood Gluc Sensor (DEXCOM G6 SENSOR) MISC Change every 10 days. 9 each 3    Continuous Blood Gluc Transmit (DEXCOM G6 TRANSMITTER) MISC Change every 3 months. 1 each 3    diclofenac  (VOLTAREN) 1 % topical gel Apply 4 g topically 4 times daily 350 g 1    diphenhydrAMINE (BENADRYL) 50 MG tablet Take 50 mg by mouth      doxepin (SINEQUAN) 25 MG capsule Take 2 capsules (50 mg) by mouth At Bedtime 30 capsule 3    empagliflozin (JARDIANCE) 25 MG TABS tablet Take 1 tablet (25 mg) by mouth daily 90 tablet 1    exenatide ER (BYDUREON BCISE) 2 MG/0.85ML auto-injector Inject 2 mg Subcutaneous every 7 days 3.4 mL 5    folic acid (FOLVITE) 1 MG tablet Take 1 tablet (1,000 mcg) by mouth daily 90 tablet 3    gabapentin (NEURONTIN) 300 MG capsule Take 4 capsules (1,200 mg) by mouth 3 times daily 1080 capsule 3    hydrochlorothiazide (MICROZIDE) 12.5 MG capsule Take 1 capsule (12.5 mg) by mouth daily 90 capsule 3    hydrOXYzine (VISTARIL) 50 MG capsule Take 1 capsule (50 mg) by mouth daily 90 capsule 3    indomethacin (INDOCIN) 50 MG capsule Take 1 capsule (50 mg) by mouth 3 times daily 270 capsule 3    lamoTRIgine (LAMICTAL) 200 MG tablet Take 2 tablets (400 mg) by mouth At Bedtime for 360 days 180 tablet 3    levothyroxine (SYNTHROID/LEVOTHROID) 125 MCG tablet Take 1 tablet (125 mcg) by mouth daily 90 tablet 3    lidocaine, viscous, (XYLOCAINE) 2 % solution Swish and spit 5 mLs in mouth 5 times daily 100 mL 1    lisdexamfetamine (VYVANSE) 20 MG capsule Take 20 mg by mouth every morning Takes with 50mg daily every morning -total of 70mg daily      lisdexamfetamine (VYVANSE) 50 MG capsule Take 50 mg by mouth every morning      metFORMIN (GLUCOPHAGE XR) 500 MG 24 hr tablet Take 4 tablets (2,000 mg) by mouth daily 360 tablet 3    naloxone (NARCAN) 4 MG/0.1ML nasal spray Spray 1 spray (4 mg) into one nostril alternating nostrils as needed for opioid reversal every 2-3 minutes until assistance arrives 1 each 2    norethindrone (MICRONOR) 0.35 MG tablet Take 1 tablet (0.35 mg) by mouth daily 84 tablet 4    nystatin (MYCOSTATIN) 623220 UNIT/GM external cream Apply 100,000 g topically 3 times daily      omeprazole  (PRILOSEC) 20 MG DR capsule Take 1 capsule (20 mg) by mouth 2 times daily 180 capsule 3    ondansetron (ZOFRAN) 4 MG tablet Take 1 tablet (4 mg) by mouth 3 times daily 90 tablet 1    oxyCODONE (OXYCONTIN) 10 MG 12 hr tablet Take 1 tablet (10 mg) by mouth every 12 hours 60 tablet 0    oxyCODONE (ROXICODONE) 5 MG tablet Take 1-2 tablets (5-10 mg) by mouth every 4 hours as needed for severe pain 150 tablet 0    pravastatin (PRAVACHOL) 40 MG tablet Take 1 tablet (40 mg) by mouth daily 90 tablet 3    prochlorperazine (COMPAZINE) 10 MG tablet Take 1 tablet (10 mg) by mouth 4 times daily as needed for nausea (with migraine) 120 tablet 5    REXULTI 2 MG tablet Take 2 mg by mouth daily      rizatriptan (MAXALT) 10 MG tablet Take 1 tablet (10 mg) by mouth 2 times daily 60 tablet 3    tiZANidine (ZANAFLEX) 2 MG tablet Take 2 tablets (4 mg) by mouth 3 times daily 180 tablet 3    topiramate (TOPAMAX) 100 MG tablet Take 1 tablet (100 mg) by mouth every evening 90 tablet 1    triamcinolone (KENALOG) 0.1 % external ointment Apply twice daily on hand for 2-4 weeks. 15 g 1       Family History  family history includes Diabetes in her maternal grandmother; Heart Disease in her paternal grandmother; Hypertension in her mother.    Social History   reports that she quit smoking about 5 years ago. Her smoking use included cigarettes. She has a 20.00 pack-year smoking history. She has never used smokeless tobacco. She reports current alcohol use. She reports that she does not use drugs.     Past Medical History  Past Medical History:   Diagnosis Date    Diabetes mellitus (H)     Hepatic adenoma     HTN (hypertension)     Hyperlipidemia     Hypothyroidism     Migraine     NAFLD (nonalcoholic fatty liver disease)        Past Surgical History:   Procedure Laterality Date    CHOLECYSTECTOMY      teen years    COLONOSCOPY N/A 5/18/2023    Procedure: Colonoscopy;  Surgeon: Chance Chan MD;  Location: [facility] OR    ESOPHAGOSCOPY,  GASTROSCOPY, DUODENOSCOPY (EGD), COMBINED N/A 5/18/2023    Procedure: ESOPHAGOGASTRODUODENOSCOPY, WITH BIOPSY;  Surgeon: Chance Chan MD;  Location: [facility] [place]    RADIO FREQUENCY ABLATION / DESTRUCTION OF SACROILOAC JOINT LATERAL BRANCHES (S1/S2/S3) Bilateral 6/15/2023    Procedure: Bilateral Lumbar 5 medial branch block and Sacral 1,2,3 lateral branch block;  Surgeon: Layne Weber MD;  Location: [facility] [place]       Physical Exam    No exam today.    RESULTS  Creatinine   Date Value Ref Range Status   10/05/2023 0.89 0.51 - 0.95 mg/dL Final     GFR Estimate   Date Value Ref Range Status   10/05/2023 80 >60 mL/min/1.73m2 Final     Hemoglobin A1C   Date Value Ref Range Status   09/20/2023 7.5 (H) <5.7 % Final     Comment:     Normal <5.7%   Prediabetes 5.7-6.4%    Diabetes 6.5% or higher     Note: Adopted from ADA consensus guidelines.     Potassium   Date Value Ref Range Status   10/05/2023 4.6 3.4 - 5.3 mmol/L Final     ALT   Date Value Ref Range Status   09/20/2023 19 0 - 50 U/L Final     Comment:     Reference intervals for this test were updated on 6/12/2023 to more accurately reflect our healthy population. There may be differences in the flagging of prior results with similar values performed with this method. Interpretation of those prior results can be made in the context of the updated reference intervals.       AST   Date Value Ref Range Status   09/20/2023 20 0 - 45 U/L Final     Comment:     Reference intervals for this test were updated on 6/12/2023 to more accurately reflect our healthy population. There may be differences in the flagging of prior results with similar values performed with this method. Interpretation of those prior results can be made in the context of the updated reference intervals.     TSH   Date Value Ref Range Status   06/28/2023 0.94 0.30 - 4.20 uIU/mL Final       Cholesterol   Date Value Ref Range Status   06/28/2023 133 <200 mg/dL Final   12/01/2022 129 <200 mg/dL  Final     Direct Measure HDL   Date Value Ref Range Status   06/28/2023 52 >=50 mg/dL Final   12/01/2022 46 (L) >=50 mg/dL Final     LDL Cholesterol Calculated   Date Value Ref Range Status   06/28/2023 60 <=100 mg/dL Final   12/01/2022 61 <=100 mg/dL Final     Triglycerides   Date Value Ref Range Status   06/28/2023 105 <150 mg/dL Final   12/01/2022 109 <150 mg/dL Final         ASSESSMENT/PLAN:      TYPE 2 DIABETES MELLITUS: Type 2 diabetes mellitus without microvascular complications currently well controlled on Mounjaro, Jardiance and Metformin. Unfortunately her insurance will not cover Mounjaro the rest of this year. She is hopeful that her medical insurance will cover Mounjaro again starting in Jan 2024. She will be switching from Mounjaro to Bydureon next week. Elicia eats healthy and has been seeing RD and her activity has been limited since her right ankle surgery. No hx of diabetic retinopathy and she was seen by Oph in Fall 2023. No hx of nephropathy or diabetic neuropathy. BP was 118/66 in Oct 2023.  WEIGHT: Pt reports 30 lb weight loss with Mounjaro.  LIPIDS: LDL 60 in June 2023. Pt taking Pravachol.  HYPOTHYROIDISM: TSH normal in 6/2023. Continue current dose of Levothyroxine.  CAT: She is aware of the importance of healthy eating, weight loss and good glycemic control  FOLLOW UP: Elicia's diabetes control is good at this time and she will continue to follow up with her PCP, pharmacy and CDE/RD staff.    Time spent reviewing chart, labs and DexcomG6 sensor today = 10 minutes.  Time for video visit today = 22 minutes.  Time for documentation today = 15 minutes.    Total time for visit today= 47 minutes.    Reshma Barton PA-C     none

## 2023-11-30 NOTE — TELEPHONE ENCOUNTER
We need a new prescription for Elicia Calero on both her oxycontin 10mg and Oxycodone 5 mg tabs per Loc Waggoner she would like the doctor of the day to resend them.  She is having issues with her medicaid credentialing and the scripts can not be filled.  Any questions regarding this please contact Loc Waggoner     Thanks,  Rosy Yoo  Pharmacy Technician   [facility]  [phone]

## 2023-11-30 NOTE — LETTER
2023       RE: Elicia Calero  [address]  [place] [address]     Dear Colleague,    Thank you for referring your patient, Elicia Calero, to the [facility] [place] at [facility]. Please see a copy of my visit note below.        Return Medical Weight Management Note     Elicia Calero  MRN:  [id number]  :  1975  JALEEL:  2023    Dear Loc Waggoner, ,    I had the pleasure of seeing your patient Elicia Calero. She is a 48 year old female who I am continuing to see for treatment of obesity related to:        2023     8:28 AM   --   I have the following health issues associated with obesity Type II Diabetes    High Cholesterol    GERD (Reflux)    Fatty Liver    Stress Incontinence    Hypothyroidism   I have the following symptoms associated with obesity Depression       Assessment & Plan   Problem List Items Addressed This Visit          Digestive    Obesity (BMI 30-39.9)    Class 2 severe obesity due to excess calories with serious comorbidity in adult (H) - Primary     13%total body weight loss since starting Mounjaro 2023 (high weight 230lb- BMI 35). A1C was down to 7.5 and she was able to have her ortho surgery in October. Still non weight bearing so managing some isolation at this point. Tolerating mounjaro 10mg now- helpful with cravings and food chatter which is helping her also manage disordered eating. Feels this is life changing for her     Stressed importance of adequate nutrition to avoid muscle loss.     Insurance changing with prolonged disability. MTM helping with med changes to accomodates.     Keep up the great work   Continue to focus on protein   Could consider Wegovy/ Zepbound if running into trouble getting the mounjaro next month - [place] should cover these if available   Keep working on finding things to keep you busy  Follow up 3 months             Endocrine    Type 2 diabetes  "mellitus without complication, without long-term current use of insulin (H)          INTERVAL HISTORY:    New MWM 9/20/2023 BMI 35 needing A1C between 6 and 7 for upcoming ortho surgery, most recent A1C was 11. Was already taking metformin and jardiance. Was also taking mounjaro but had availability issues. Referred from hepatology for MAFLD without fibrosis. Uses CGM.     Ortho surgery done in October (6 weeks ago)   Saw Endocrine yesterday.     Will need to switch to bydureon next week due to insurance changes. Hopes to switch to to [facility] next Jan.      Anti-obesity medication history    Current:   Metformin  jardiance  mounjaro 10mg - helpful with food chatter, reduced hunger \"life saver\"     Recent diet changes:   Tries to eat \"something\" at each eating time   3 times a day- much smaller portions   Bigger meal at lunch time   Prepping ahead due to not being able to stand     Recent exercise/activity changes: starts PT in 2 weeks   Activity otherwise limited     Recent stressors:   Brother and mom come and visit   Leaving house weekly to see ortho   Visited family for thanksgiving   Getting lonely - starting to find things to occupy time - trying to avoid boredom eating   Hopes to go back to work Jan/Feb     Recent sleep changes: sometimes limited       CURRENT WEIGHT:   199 lbs 0 oz    Initial Weight (lbs): 230 lbs  Last Visits Weight: 94.8 kg (209 lb)  Cumulative weight loss (lbs): 31  Weight Loss Percentage: 13.48%      MEDICATIONS:   Current Outpatient Medications   Medication Sig Dispense Refill    Alcohol Swabs PADS 1 each daily 300 each 3    buPROPion (WELLBUTRIN XL) 150 MG 24 hr tablet Take 1 tablet (150 mg) by mouth every morning 90 tablet 3    buPROPion (WELLBUTRIN XL) 300 MG 24 hr tablet Take 1 tablet (300 mg) by mouth daily 90 tablet 3    Continuous Blood Gluc  (DEXCOM G6 ) RALEIGH Use to read blood sugars as per 's instructions. 1 each 0    Continuous Blood Gluc " Sensor (DEXCOM G6 SENSOR) MISC Change every 10 days. 9 each 3    Continuous Blood Gluc Transmit (DEXCOM G6 TRANSMITTER) MISC Change every 3 months. 1 each 3    diclofenac (VOLTAREN) 1 % topical gel Apply 4 g topically 4 times daily 350 g 1    diphenhydrAMINE (BENADRYL) 50 MG tablet Take 50 mg by mouth      doxepin (SINEQUAN) 25 MG capsule Take 2 capsules (50 mg) by mouth At Bedtime 30 capsule 3    empagliflozin (JARDIANCE) 25 MG TABS tablet Take 1 tablet (25 mg) by mouth daily 90 tablet 1    exenatide ER (BYDUREON BCISE) 2 MG/0.85ML auto-injector Inject 2 mg Subcutaneous every 7 days 3.4 mL 5    folic acid (FOLVITE) 1 MG tablet Take 1 tablet (1,000 mcg) by mouth daily 90 tablet 3    gabapentin (NEURONTIN) 300 MG capsule Take 4 capsules (1,200 mg) by mouth 3 times daily 1080 capsule 3    hydrochlorothiazide (MICROZIDE) 12.5 MG capsule Take 1 capsule (12.5 mg) by mouth daily 90 capsule 3    hydrOXYzine (VISTARIL) 50 MG capsule Take 1 capsule (50 mg) by mouth daily 90 capsule 3    indomethacin (INDOCIN) 50 MG capsule Take 1 capsule (50 mg) by mouth 3 times daily 270 capsule 3    lamoTRIgine (LAMICTAL) 200 MG tablet Take 2 tablets (400 mg) by mouth At Bedtime for 360 days 180 tablet 3    levothyroxine (SYNTHROID/LEVOTHROID) 125 MCG tablet Take 1 tablet (125 mcg) by mouth daily 90 tablet 3    lidocaine, viscous, (XYLOCAINE) 2 % solution Swish and spit 5 mLs in mouth 5 times daily 100 mL 1    lisdexamfetamine (VYVANSE) 20 MG capsule Take 20 mg by mouth every morning Takes with 50mg daily every morning -total of 70mg daily      lisdexamfetamine (VYVANSE) 50 MG capsule Take 50 mg by mouth every morning      metFORMIN (GLUCOPHAGE XR) 500 MG 24 hr tablet Take 4 tablets (2,000 mg) by mouth daily 360 tablet 3    naloxone (NARCAN) 4 MG/0.1ML nasal spray Spray 1 spray (4 mg) into one nostril alternating nostrils as needed for opioid reversal every 2-3 minutes until assistance arrives 1 each 2    norethindrone (MICRONOR) 0.35 MG  tablet Take 1 tablet (0.35 mg) by mouth daily 84 tablet 4    nystatin (MYCOSTATIN) 911911 UNIT/GM external cream Apply 100,000 g topically 3 times daily      omeprazole (PRILOSEC) 20 MG DR capsule Take 1 capsule (20 mg) by mouth 2 times daily 180 capsule 3    ondansetron (ZOFRAN) 4 MG tablet Take 1 tablet (4 mg) by mouth 3 times daily 90 tablet 1    oxyCODONE (OXYCONTIN) 10 MG 12 hr tablet Take 1 tablet (10 mg) by mouth every 12 hours 60 tablet 0    oxyCODONE (ROXICODONE) 5 MG tablet Take 1-2 tablets (5-10 mg) by mouth every 4 hours as needed for severe pain 150 tablet 0    pravastatin (PRAVACHOL) 40 MG tablet Take 1 tablet (40 mg) by mouth daily 90 tablet 3    prochlorperazine (COMPAZINE) 10 MG tablet Take 1 tablet (10 mg) by mouth 4 times daily as needed for nausea (with migraine) 120 tablet 5    REXULTI 2 MG tablet Take 2 mg by mouth daily      rizatriptan (MAXALT) 10 MG tablet Take 1 tablet (10 mg) by mouth 2 times daily 60 tablet 3    tiZANidine (ZANAFLEX) 2 MG tablet Take 2 tablets (4 mg) by mouth 3 times daily 180 tablet 3    topiramate (TOPAMAX) 100 MG tablet Take 1 tablet (100 mg) by mouth every evening 90 tablet 1    triamcinolone (KENALOG) 0.1 % external ointment Apply twice daily on hand for 2-4 weeks. 15 g 1           11/26/2023     7:48 PM   Weight Loss Medication History Reviewed With Patient   Which weight loss medications are you currently taking on a regular basis? Bydureon (exenatide)    Topamax (topiramate)    Vyvanse    Bupropion    Metformin   If you are not taking a weight loss medication that was prescribed to you, please indicate why: My insurance does not cover the cost   Are you having any side effects from the weight loss medication that we have prescribed you? No       Office Visit on 10/05/2023   Component Date Value Ref Range Status    Sodium 10/05/2023 139  135 - 145 mmol/L Final    Reference intervals for this test were updated on 09/26/2023 to more accurately reflect our healthy  "population. There may be differences in the flagging of prior results with similar values performed with this method. Interpretation of those prior results can be made in the context of the updated reference intervals.     Potassium 10/05/2023 4.6  3.4 - 5.3 mmol/L Final    Chloride 10/05/2023 101  98 - 107 mmol/L Final    Carbon Dioxide (CO2) 10/05/2023 24  22 - 29 mmol/L Final    Anion Gap 10/05/2023 14  7 - 15 mmol/L Final    Urea Nitrogen 10/05/2023 23.8 (H)  6.0 - 20.0 mg/dL Final    Creatinine 10/05/2023 0.89  0.51 - 0.95 mg/dL Final    GFR Estimate 10/05/2023 80  >60 mL/min/1.73m2 Final    Calcium 10/05/2023 9.8  8.6 - 10.0 mg/dL Final    Glucose 10/05/2023 147 (H)  70 - 99 mg/dL Final           9/20/2023     6:36 AM   KAREN Score (Last Two)   KAREN Raw Score 32   Activation Score 62.6   KAREN Level 3         PHYSICAL EXAM:  Objective    Ht 1.715 m (5' 7.5\")   Wt 90.3 kg (199 lb)   BMI 30.71 kg/m      Vitals - Patient Reported  Pain Score: Moderate Pain (4)  Pain Loc: Ankle (and back)        GENERAL: Healthy, alert and no distress  EYES: Eyes grossly normal to inspection.  No discharge or erythema, or obvious scleral/conjunctival abnormalities.  RESP: No audible wheeze, cough, or visible cyanosis.  No visible retractions or increased work of breathing.    SKIN: Visible skin clear. No significant rash, abnormal pigmentation or lesions.  NEURO: Cranial nerves grossly intact.  Mentation and speech appropriate for age.  PSYCH: Mentation appears normal, affect normal/bright, judgement and insight intact, normal speech and appearance well-groomed.        Sincerely,    Geovanna Morgan NP      24 minutes spent by me on the date of the encounter doing chart review, history and exam, documentation and further activities per the note  Virtual Visit Details    Type of service:  Video Visit   Video Start Time: 0739  Video End Time:7:56 AM    Originating Location (pt. Location): Home    Distant Location (provider location):  " Off-site  Platform used for Video Visit: Diana

## 2023-11-30 NOTE — PROGRESS NOTES
Return Medical Weight Management Note     Elicia Calero  MRN:  [id number]  :  1975  JALEEL:  2023    Dear Loc Waggoner, ,    I had the pleasure of seeing your patient Elicia Calero. She is a 48 year old female who I am continuing to see for treatment of obesity related to:        2023     8:28 AM   --   I have the following health issues associated with obesity Type II Diabetes    High Cholesterol    GERD (Reflux)    Fatty Liver    Stress Incontinence    Hypothyroidism   I have the following symptoms associated with obesity Depression       Assessment & Plan   Problem List Items Addressed This Visit        Digestive    Obesity (BMI 30-39.9)    Class 2 severe obesity due to excess calories with serious comorbidity in adult (H) - Primary     13%total body weight loss since starting Mounjaro 2023 (high weight 230lb- BMI 35). A1C was down to 7.5 and she was able to have her ortho surgery in October. Still non weight bearing so managing some isolation at this point. Tolerating mounjaro 10mg now- helpful with cravings and food chatter which is helping her also manage disordered eating. Feels this is life changing for her     Stressed importance of adequate nutrition to avoid muscle loss.     Insurance changing with prolonged disability. MTM helping with med changes to accomodates.     Keep up the great work   Continue to focus on protein   Could consider Wegovy/ Zepbound if running into trouble getting the mounjaro next month - [place] should cover these if available   Keep working on finding things to keep you busy  Follow up 3 months             Endocrine    Type 2 diabetes mellitus without complication, without long-term current use of insulin (H)          INTERVAL HISTORY:    New MWM 2023 BMI 35 needing A1C between 6 and 7 for upcoming ortho surgery, most recent A1C was 11. Was already taking metformin and jardiance. Was also taking mounjaro but had availability issues. Referred from  "hepatology for MAFLD without fibrosis. Uses CGM.     Ortho surgery done in October (6 weeks ago)   Saw Endocrine yesterday.     Will need to switch to bydureon next week due to insurance changes. Hopes to switch to to [facility] next Jan.      Anti-obesity medication history    Current:   Metformin  jardiance  mounjaro 10mg - helpful with food chatter, reduced hunger \"life saver\"     Recent diet changes:   Tries to eat \"something\" at each eating time   3 times a day- much smaller portions   Bigger meal at lunch time   Prepping ahead due to not being able to stand     Recent exercise/activity changes: starts PT in 2 weeks   Activity otherwise limited     Recent stressors:   Brother and mom come and visit   Leaving house weekly to see ortho   Visited family for thanksgiving   Getting lonely - starting to find things to occupy time - trying to avoid boredom eating   Hopes to go back to work Jan/Feb     Recent sleep changes: sometimes limited       CURRENT WEIGHT:   199 lbs 0 oz    Initial Weight (lbs): 230 lbs  Last Visits Weight: 94.8 kg (209 lb)  Cumulative weight loss (lbs): 31  Weight Loss Percentage: 13.48%      MEDICATIONS:   Current Outpatient Medications   Medication Sig Dispense Refill     Alcohol Swabs PADS 1 each daily 300 each 3     buPROPion (WELLBUTRIN XL) 150 MG 24 hr tablet Take 1 tablet (150 mg) by mouth every morning 90 tablet 3     buPROPion (WELLBUTRIN XL) 300 MG 24 hr tablet Take 1 tablet (300 mg) by mouth daily 90 tablet 3     Continuous Blood Gluc  (DEXCOM G6 ) RALEIGH Use to read blood sugars as per 's instructions. 1 each 0     Continuous Blood Gluc Sensor (DEXCOM G6 SENSOR) MISC Change every 10 days. 9 each 3     Continuous Blood Gluc Transmit (DEXCOM G6 TRANSMITTER) MISC Change every 3 months. 1 each 3     diclofenac (VOLTAREN) 1 % topical gel Apply 4 g topically 4 times daily 350 g 1     diphenhydrAMINE (BENADRYL) 50 MG tablet Take 50 mg by mouth       doxepin " (SINEQUAN) 25 MG capsule Take 2 capsules (50 mg) by mouth At Bedtime 30 capsule 3     empagliflozin (JARDIANCE) 25 MG TABS tablet Take 1 tablet (25 mg) by mouth daily 90 tablet 1     exenatide ER (BYDUREON BCISE) 2 MG/0.85ML auto-injector Inject 2 mg Subcutaneous every 7 days 3.4 mL 5     folic acid (FOLVITE) 1 MG tablet Take 1 tablet (1,000 mcg) by mouth daily 90 tablet 3     gabapentin (NEURONTIN) 300 MG capsule Take 4 capsules (1,200 mg) by mouth 3 times daily 1080 capsule 3     hydrochlorothiazide (MICROZIDE) 12.5 MG capsule Take 1 capsule (12.5 mg) by mouth daily 90 capsule 3     hydrOXYzine (VISTARIL) 50 MG capsule Take 1 capsule (50 mg) by mouth daily 90 capsule 3     indomethacin (INDOCIN) 50 MG capsule Take 1 capsule (50 mg) by mouth 3 times daily 270 capsule 3     lamoTRIgine (LAMICTAL) 200 MG tablet Take 2 tablets (400 mg) by mouth At Bedtime for 360 days 180 tablet 3     levothyroxine (SYNTHROID/LEVOTHROID) 125 MCG tablet Take 1 tablet (125 mcg) by mouth daily 90 tablet 3     lidocaine, viscous, (XYLOCAINE) 2 % solution Swish and spit 5 mLs in mouth 5 times daily 100 mL 1     lisdexamfetamine (VYVANSE) 20 MG capsule Take 20 mg by mouth every morning Takes with 50mg daily every morning -total of 70mg daily       lisdexamfetamine (VYVANSE) 50 MG capsule Take 50 mg by mouth every morning       metFORMIN (GLUCOPHAGE XR) 500 MG 24 hr tablet Take 4 tablets (2,000 mg) by mouth daily 360 tablet 3     naloxone (NARCAN) 4 MG/0.1ML nasal spray Spray 1 spray (4 mg) into one nostril alternating nostrils as needed for opioid reversal every 2-3 minutes until assistance arrives 1 each 2     norethindrone (MICRONOR) 0.35 MG tablet Take 1 tablet (0.35 mg) by mouth daily 84 tablet 4     nystatin (MYCOSTATIN) 708248 UNIT/GM external cream Apply 100,000 g topically 3 times daily       omeprazole (PRILOSEC) 20 MG DR capsule Take 1 capsule (20 mg) by mouth 2 times daily 180 capsule 3     ondansetron (ZOFRAN) 4 MG tablet Take 1  tablet (4 mg) by mouth 3 times daily 90 tablet 1     oxyCODONE (OXYCONTIN) 10 MG 12 hr tablet Take 1 tablet (10 mg) by mouth every 12 hours 60 tablet 0     oxyCODONE (ROXICODONE) 5 MG tablet Take 1-2 tablets (5-10 mg) by mouth every 4 hours as needed for severe pain 150 tablet 0     pravastatin (PRAVACHOL) 40 MG tablet Take 1 tablet (40 mg) by mouth daily 90 tablet 3     prochlorperazine (COMPAZINE) 10 MG tablet Take 1 tablet (10 mg) by mouth 4 times daily as needed for nausea (with migraine) 120 tablet 5     REXULTI 2 MG tablet Take 2 mg by mouth daily       rizatriptan (MAXALT) 10 MG tablet Take 1 tablet (10 mg) by mouth 2 times daily 60 tablet 3     tiZANidine (ZANAFLEX) 2 MG tablet Take 2 tablets (4 mg) by mouth 3 times daily 180 tablet 3     topiramate (TOPAMAX) 100 MG tablet Take 1 tablet (100 mg) by mouth every evening 90 tablet 1     triamcinolone (KENALOG) 0.1 % external ointment Apply twice daily on hand for 2-4 weeks. 15 g 1           11/26/2023     7:48 PM   Weight Loss Medication History Reviewed With Patient   Which weight loss medications are you currently taking on a regular basis? Bydureon (exenatide)    Topamax (topiramate)    Vyvanse    Bupropion    Metformin   If you are not taking a weight loss medication that was prescribed to you, please indicate why: My insurance does not cover the cost   Are you having any side effects from the weight loss medication that we have prescribed you? No       Office Visit on 10/05/2023   Component Date Value Ref Range Status     Sodium 10/05/2023 139  135 - 145 mmol/L Final    Reference intervals for this test were updated on 09/26/2023 to more accurately reflect our healthy population. There may be differences in the flagging of prior results with similar values performed with this method. Interpretation of those prior results can be made in the context of the updated reference intervals.      Potassium 10/05/2023 4.6  3.4 - 5.3 mmol/L Final     Chloride  "10/05/2023 101  98 - 107 mmol/L Final     Carbon Dioxide (CO2) 10/05/2023 24  22 - 29 mmol/L Final     Anion Gap 10/05/2023 14  7 - 15 mmol/L Final     Urea Nitrogen 10/05/2023 23.8 (H)  6.0 - 20.0 mg/dL Final     Creatinine 10/05/2023 0.89  0.51 - 0.95 mg/dL Final     GFR Estimate 10/05/2023 80  >60 mL/min/1.73m2 Final     Calcium 10/05/2023 9.8  8.6 - 10.0 mg/dL Final     Glucose 10/05/2023 147 (H)  70 - 99 mg/dL Final           9/20/2023     6:36 AM   KAREN Score (Last Two)   KAREN Raw Score 32   Activation Score 62.6   KAREN Level 3         PHYSICAL EXAM:  Objective    Ht 1.715 m (5' 7.5\")   Wt 90.3 kg (199 lb)   BMI 30.71 kg/m      Vitals - Patient Reported  Pain Score: Moderate Pain (4)  Pain Loc: Ankle (and back)        GENERAL: Healthy, alert and no distress  EYES: Eyes grossly normal to inspection.  No discharge or erythema, or obvious scleral/conjunctival abnormalities.  RESP: No audible wheeze, cough, or visible cyanosis.  No visible retractions or increased work of breathing.    SKIN: Visible skin clear. No significant rash, abnormal pigmentation or lesions.  NEURO: Cranial nerves grossly intact.  Mentation and speech appropriate for age.  PSYCH: Mentation appears normal, affect normal/bright, judgement and insight intact, normal speech and appearance well-groomed.        Sincerely,    Geovanna Morgan NP      24 minutes spent by me on the date of the encounter doing chart review, history and exam, documentation and further activities per the note  Virtual Visit Details    Type of service:  Video Visit   Video Start Time: 0739  Video End Time:7:56 AM    Originating Location (pt. Location): Home    Distant Location (provider location):  Off-site  Platform used for Video Visit: Diana"

## 2023-11-30 NOTE — NURSING NOTE
Is the patient currently in the state of [place]? YES    Visit mode:VIDEO    If the visit is dropped, the patient can be reconnected by: VIDEO VISIT: Send to e-mail at: [email or web address]    Will anyone else be joining the visit? NO  (If patient encounters technical issues they should call [phone]    How would you like to obtain your AVS? MyChart    Are changes needed to the allergy or medication list? No    Reason for visit: RECHECK    Janice SAMUEL

## 2023-11-30 NOTE — TELEPHONE ENCOUNTER
11/30 short staffed, sent [facility] msg for    Geovanna Morgan NP  P [facility] Bariatric Scheduling Registration Pool  Follow up 3months

## 2023-11-30 NOTE — PATIENT INSTRUCTIONS
"Thank you for allowing us the privilege of caring for you. We hope we provided you with the excellent service you deserve.   Please let us know if there is anything else we can do for you so that we can be sure you are completely satisfied with your care experience.    To ensure the quality of our services you may be receiving a patient satisfaction survey from an independent patient satisfaction monitoring company.    The greatest compliment you can give is a \"Likely to Recommend\"    Your visit was with Geovanna Morgan NP today.    Instructions per today's visit:     Carlos Calero, it was great to visit with you today.  Here is a review of our visit.  If our clinic scheduler is not able to reach you please call [phone] to schedule your next appointments.    Keep up the great work   Continue to focus on protein   Could consider Wegovy/ Zepbound if running into trouble getting the mounjaro next month - [place] should cover these if available   Keep working on finding things to keep you busy  Follow up 3 months       Information about Video Visits with [facility]: video visit information  _________________________________________________________________________________________________________________________________________________________  If you are asked by your clinic team to have your blood pressure checked:  [facility] do offer several locations for blood pressure checks. Please follow the below link to schedule an appointment. Scheduling an appointment at the pharmacy for a blood pressure check is now preferred.    Appointment Plus ([email or web address])  _________________________________________________________________________________________________________________________________________________________  Important contact and scheduling information:  Please call our contact center at [phone] to schedule your next appointments.  To find a lab location near you, please call [phone].  For " any nursing questions or concerns call Salena Tipton LPN at [phone] or Rosy Lomeli RN at [phone]  Please call during clinic hours Monday through Friday 8:00a - 4:00p if you have questions or you can contact us via Certonat at anytime and we will reply during clinic hours.    Lab results will be communicated through My Chart or letter (if My Chart not used). Please call the clinic if you have not received communication after 1 week or if you have any questions.?  Clinic Fax: [phone]    _________________________________________________________________________________________________________________________________________________________  Meal Replacement Products:    Here is the link to our new e-store where you can purchase our meal replacement products    [facility] E-Store  [email or web address]    The one week starter kit is a great way to sample a variety of products and see what works for you.    If you want more information about the product go to: Fresh Th[employer]ve    If you are an employee or [facility] or [facility] please contact your care team for a 10% estore discount    Free Shipping for orders over $75     Benefits of meal replacements products:    Portion and calorie control  Improved nutrition  Structured eating  Simplified food choices  Avoid contact with trigger foods  _________________________________________________________________________________________________________________________________________________________  Interested in working with a health ?  Health coaches work with you to improve your overall health and wellbeing.  They look at the whole person, and may involve discussion of different areas of life, including, but not limited to the four pillars of health (sleep, exercise, nutrition, and stress management). Discuss with your care team if you would like to start working a health .  Health  Coaching-3 Pack: Schedule by calling [phone]    $99 for three health coaching visits    Visits may be done in person or via phone    Coaching is a partnership between the  and the client; Coaches do not prescribe or diagnose    Coaching helps inspire the client to reach his/her personal goals   _________________________________________________________________________________________________________________________________________________________  24 Week Healthy Lifestyle Plan:    Our mission in the 24-week Healthy Lifestyle Plan is to provide you with individualized care by giving you the tools, education and support you need to lose weight and maintain a healthy lifestyle. In your 24-week journey, you ll be supported by a dedicated weight loss team that includes registered dietitians, medical weight management providers, health coaches, and nurses -- all with special expertise in weight loss -- to help you every step of the way.     Monthly meetings with your registered dietician or medical weight management provider help to review your progress, update your care plan, and make any adjustments needed to ensure success. Between these visits, weekly and bi-weekly health  visits will help you focus on the four pillars of weight loss -- stress, sleep, nutrition, and exercise -- and how you can best adapt each to achieve sustainable weight loss results.    In addition, you will be given exclusive access to online wellbeing classes through [employer].  Your initial visit will be with a medical weight management provider who will help to understand your weight loss goals and ensure this program is the right fit for you. Please let our team know if you are interested in the 24 week plan by sending a message to your care team or calling [phone] to  schedule.  _________________________________________________________________________________________________________________________________________________________  __________  [facility] Get Moving Program  Our team of physical therapists is trained to help you understand and take control of your condition. They will perform a thorough evaluation to determine your ability for activity and develop a customized plan to fit your goals and physical ability.  Scheduling: Unsure if the Get Moving program is right for you? Discuss the program with your medical provider or diabetes educator. You can also call us at [phone] to ask questions or schedule an appointment.   [facility] Get Moving Program  ____________________________________________________________________________________________________________________________________________________________________________   [employer] Diabetes Prevention Program (DPP)  If you have prediabetes and Medicare please contact us via SmartMenuCardt to learn more about the Diabetes Prevention Program (DPP)  Program Details:   [employer] offers the year-long Diabetes Prevention Program (DPP). The program helps you to make lifestyle changes that prevent or delay type 2 diabetes by supporting healthy eating, increased physical activity, stress reduction and use of coping skills.   On average, previous [facility] DPP cohorts have lost and maintained at least 5% of their starting weight throughout the program and averaged more than 150 minutes of physical activity per week.  Participants meet weekly for one-hour group sessions over sixteen weeks, every other week for the next 8 weeks, and monthly for the last six months.   A year-long maintenance program is also available for participants who complete the first year.   Location & Cost:   During the COVID-19 Public Health Emergency, the program is offered virtually. When in-person classes can resume, they  will be held at [facility].  For people with Medicare, the program is covered in full. A self-pay option will also be available for those with non-Medicare insurance plans.   ______________________________________________________________________________________________________________________________________________________________________________________________________________________________    To work with a Behavioral Health Psychologist:    Call to schedule:    Figueroa Cardoso - [phone]  Desire Corea - [phone]  Portia White - [phone]  Herminia Crane - [phone]   Marcie Trevino PhD (cannot accept Medicare) [phone]        Thank you,   [facility] Comprehensive Weight Management Team

## 2023-12-01 ENCOUNTER — TRANSFERRED RECORDS (OUTPATIENT)
Dept: HEALTH INFORMATION MANAGEMENT | Facility: CLINIC | Age: 48
End: 2023-12-01
Payer: COMMERCIAL

## 2023-12-01 ENCOUNTER — TELEPHONE (OUTPATIENT)
Dept: FAMILY MEDICINE | Facility: CLINIC | Age: 48
End: 2023-12-01
Payer: COMMERCIAL

## 2023-12-01 DIAGNOSIS — G89.4 CHRONIC PAIN DISORDER: ICD-10-CM

## 2023-12-01 DIAGNOSIS — F11.90 CHRONIC, CONTINUOUS USE OF OPIOIDS: ICD-10-CM

## 2023-12-01 DIAGNOSIS — M25.571 PAIN IN JOINT INVOLVING ANKLE AND FOOT, RIGHT: ICD-10-CM

## 2023-12-01 RX ORDER — OXYCODONE HCL 10 MG/1
10 TABLET, FILM COATED, EXTENDED RELEASE ORAL EVERY 12 HOURS
Qty: 60 TABLET | Refills: 0 | Status: SHIPPED | OUTPATIENT
Start: 2023-12-01 | End: 2024-01-03

## 2023-12-01 RX ORDER — OXYCODONE HYDROCHLORIDE 5 MG/1
5-10 TABLET ORAL EVERY 4 HOURS PRN
Qty: 150 TABLET | Refills: 0 | OUTPATIENT
Start: 2023-12-01

## 2023-12-01 RX ORDER — OXYCODONE HYDROCHLORIDE 5 MG/1
5-10 TABLET ORAL EVERY 4 HOURS PRN
Qty: 150 TABLET | Refills: 0 | Status: SHIPPED | OUTPATIENT
Start: 2023-12-01 | End: 2023-12-01

## 2023-12-02 RX ORDER — OXYCODONE HYDROCHLORIDE 5 MG/1
5-10 TABLET ORAL EVERY 4 HOURS PRN
Qty: 94 TABLET | Refills: 0 | Status: SHIPPED | OUTPATIENT
Start: 2023-12-02 | End: 2024-01-03

## 2023-12-02 NOTE — TELEPHONE ENCOUNTER
new insurance, patient can only receive 56 tabs of oxycodone 5mg IR as the first fill. Please send in another rx for the reminder quantity 150-56= 94 tabs   - Renewed for additional 94 tablets.  CANDACE Youngblood CNP

## 2023-12-05 NOTE — TELEPHONE ENCOUNTER
Central Prior Authorization Team   Phone: [phone]    PA Initiation    Medication: Oxycontin 10mg ndc [address]-10 not covered  Insurance Company: [employer] - Phone [phone] Fax [phone]  Pharmacy Filling the Rx: [facility] - [place], [place] - [address] [facility]  Filling Pharmacy Phone: [phone]  Filling Pharmacy Fax: [phone]  Start Date: 12/5/2023

## 2023-12-05 NOTE — TELEPHONE ENCOUNTER
Routing high priority patient is out and called wonder the status of the prior authorization    Thanks,  Rosy Yoo  Pharmacy Technician   [facility]  [phone]

## 2023-12-05 NOTE — TELEPHONE ENCOUNTER
Prior Authorization Approval    Authorization Effective Date: 11/5/2023  Authorization Expiration Date: 12/5/2024  Medication: Oxycontin 10mg - APPROVED  Approved Dose/Quantity:    Reference #:     Insurance Company: [employer] - Phone [phone] Fax [phone]  Expected CoPay:       CoPay Card Available:      Foundation Assistance Needed:    Which Pharmacy is filling the prescription (Not needed for infusion/clinic administered): [facility] - [place], [place] - [address] [place] [address]  Pharmacy Notified:  YES   Patient Notified:  YES  **Instructed pharmacy to notify patient when script is ready to /ship.**

## 2023-12-12 ENCOUNTER — TELEPHONE (OUTPATIENT)
Dept: FAMILY MEDICINE | Facility: CLINIC | Age: 48
End: 2023-12-12
Payer: COMMERCIAL

## 2023-12-12 NOTE — TELEPHONE ENCOUNTER
Forms/Letter Request    Type of form/letter: Short-term disability    Have you been seen for this request: N/A    Do we have the form/letter: Yes: Placed in care team 4    Who is the form from? Insurance comp    Where did/will the form come from? form was faxed in    When is form/letter needed by: as soon as able    How would you like the form/letter returned: Fax : [phone]

## 2023-12-13 NOTE — TELEPHONE ENCOUNTER
Form should be filled out by her orthopedic specialist at [facility], as they know the specifics of her restrictions for her foot. Can you call the patient and let her know?       Dr. Waggoner

## 2023-12-14 ENCOUNTER — TRANSFERRED RECORDS (OUTPATIENT)
Dept: HEALTH INFORMATION MANAGEMENT | Facility: CLINIC | Age: 48
End: 2023-12-14

## 2023-12-14 ENCOUNTER — LAB (OUTPATIENT)
Dept: LAB | Facility: CLINIC | Age: 48
End: 2023-12-14
Payer: COMMERCIAL

## 2023-12-14 DIAGNOSIS — E11.9 TYPE 2 DIABETES MELLITUS WITHOUT COMPLICATION, WITHOUT LONG-TERM CURRENT USE OF INSULIN (H): ICD-10-CM

## 2023-12-14 LAB
ANION GAP SERPL CALCULATED.3IONS-SCNC: 13 MMOL/L (ref 7–15)
BUN SERPL-MCNC: 18.1 MG/DL (ref 6–20)
CALCIUM SERPL-MCNC: 9.6 MG/DL (ref 8.6–10)
CHLORIDE SERPL-SCNC: 102 MMOL/L (ref 98–107)
CREAT SERPL-MCNC: 0.94 MG/DL (ref 0.51–0.95)
DEPRECATED HCO3 PLAS-SCNC: 26 MMOL/L (ref 22–29)
EGFRCR SERPLBLD CKD-EPI 2021: 74 ML/MIN/1.73M2
GLUCOSE SERPL-MCNC: 103 MG/DL (ref 70–99)
HBA1C MFR BLD: 6 % (ref 0–5.6)
POTASSIUM SERPL-SCNC: 4.2 MMOL/L (ref 3.4–5.3)
SODIUM SERPL-SCNC: 141 MMOL/L (ref 135–145)

## 2023-12-14 PROCEDURE — 80048 BASIC METABOLIC PNL TOTAL CA: CPT

## 2023-12-14 PROCEDURE — 36415 COLL VENOUS BLD VENIPUNCTURE: CPT

## 2023-12-14 PROCEDURE — 83036 HEMOGLOBIN GLYCOSYLATED A1C: CPT

## 2023-12-15 ENCOUNTER — TELEPHONE (OUTPATIENT)
Dept: FAMILY MEDICINE | Facility: CLINIC | Age: 48
End: 2023-12-15
Payer: COMMERCIAL

## 2023-12-15 NOTE — TELEPHONE ENCOUNTER
Can change her virtual visit from 1/25 to the first week of January. Ok to use same day slot, or double book at the end of the day.    Dr. Waggoner

## 2023-12-15 NOTE — TELEPHONE ENCOUNTER
Reason for Call:  Appointment Request    Patient requesting this type of appt: Chronic Diease Management/Medication/Follow-Up    Requested provider: Loc Waggoner    Reason patient unable to be scheduled: Not within requested timeframe    When does patient want to be seen/preferred time:  Beginning of Jan. 2024 when they start their new insurance but before Jan 25, 2024    Comments: Pt will run out of their medication: oxyCODONE (OXYCONTIN) 10 MG 12 hr tablet by Jan 5, 2024 and was told by provider they won't refill it until they make an appt - so pt would like something sooner than Jan 25, 2024, but nothing earlier than the first week of Jan 2024 due to new health insurance coverage     Could we send this information to you in [employer] or would you prefer to receive a phone call?:   Patient would prefer a phone call   Okay to leave a detailed message?: Yes at Cell number on file:    Telephone Information:   Mobile [phone]       Call taken on 12/15/2023 at 2:26 PM by Geeta Jensen

## 2023-12-17 NOTE — PROGRESS NOTES
Medication Therapy Management (MTM) Encounter    ASSESSMENT:                            Medication Adherence/Access: No issues identified    Type 2 Diabetes/Obesity:    Patient is meeting A1c goal of < 7%(6.0%). Self monitoring of blood glucose is closer to goal of fasting  mg/dL and post prandial < 180 mg/dL. Patient is  meeting average glucose goal of <150mg/dL. Patient is now meeting goal of > 70% (97%)time in target with continuous glucose monitoring.  Patient would benefit from staying on 2mg./week Bydureon dose --but after 1-1-24- she will have new ins. And try to refill her 10mg./week Mounjaro -If that is not covered we heard that Ozempic will be added to the [place] drug formulary 1-1-24--we can trial that if needed.   GLP-1 drugs have worked very well for her --she has lost 35 lbs . And feels great !     Continue daily metformin and jardiance and otc pill glucoredi as well.   Also--patient will work hard on eliminating all late night snacking --ok to drink slim-fast or atkins protein shake to fill her up if needed.   . Due for annual foot exam. Due for annual eye exam.         Hyperlipidemia:   Stable.  Patient is on moderate intensity statin which is indicated based on 2019 ACC/AHA guidelines for lipid management.        PLAN:                              1. Congrats A1c is now 6.0% --wow -fantastic .   Stay on weekly Bydureon dose for now --try to refill your Mounjaro in early January on your new insurance--let me know via "[employer] how that goes --I did hear that Ozempic will be added to [place] drug formulary 1-1-24 as a potential option for you.        Follow-up: Return in about 3 weeks (around 1/10/2024), or @ 8:30 AM via Telephone, for Medication Therapy Management Visit, Blood sugar meter review.        SUBJECTIVE/OBJECTIVE:                          Elicia Calero is a 48 year old female called for a follow-up (11-21-23) visit. She was referred to me from Loc Waggoner DO. (PCP).  .    [place] [employer] [place] --our clinic does not cover this ins. -she will go to  [facility] until back on regular ins. Thru work.      Reason for visit:   A1c f/up.   Still on disability post tendon /foot surgery until feb 2024.   .    Allergies/ADRs: Reviewed in chart  Past Medical History: Per patient diagnosed 10-15 years --did lose wt. To 150lbs years ago and reversed her DM . Ate 1200 cals/day , low carb   Tobacco: She reports that she quit smoking about 5 years ago. Her smoking use included cigarettes. She has a 20 pack-year smoking history. She has never used smokeless tobacco.  Alcohol: Less than 1 beverage / month  Other Substance Use: none   E-cigarette Use: none   Caffeine:  1 coffee or crystal light every morning.   Social: off work due to tendon issue (ruptured).  She is a pharmacy tech at  discharge.   Personal Healthcare Goals: fix dm soon   Activity: not much due to ruptured tendon.     Medication Adherence/Access: no issues reported    Type 2 Diabetes/Obesity:    Failed bydureon in the past , fears needles--refuses all injectables before last visit but doing ok with Mounjaro as needle is hidden.   Due to ins. Issues--now on bydureon 2mg./week. now.   Post 1-1-24 --wants Mounjaro back.   Clinic weight last week 195.5lbs --down 35 lbs since August 2023.     Rybelsus 14mg./day --off this now 8-14-23   Metformin [id number] ER tabs daily  Glipizide ER 10mg tabs --2 tabs daily --stopped 8-14-23 --on Mounjaro 10mg./week  Losing weight (199lbs. At home , 209lbs in clinic)  and controlling food intake and bs's --she loves it!  Jardiance 25mg./day   Also taking 2 capsules /day otc -Glucoredi herbal with gymnemna and berberi.   Not taking aspirin due to age.  Patient is not experiencing side effects.  Blood sugar monitoring: Continuous Glucose Monitor Average glucose : mtm added remote bs access via dexcom G6 abiodun on her phone :  Am bs's 210--why?  When bedtime 120';s.(Mtm explained jose phenomenon at last visit to her  ).       Wt Readings from Last 5 Encounters:   11/30/23 199 lb (90.3 kg)   11/07/23 209 lb (94.8 kg)   10/05/23 216 lb 6.4 oz (98.2 kg)   09/20/23 220 lb 9.6 oz (100.1 kg)   08/23/23 230 lb (104.3 kg)                             Current diabetes symptoms:none now -Mounjaro has corrected her polyphagia.  Diet/Exercise: needs tendon surgery 10-19-23, can use foot pedals only.   Mounjaro curbing appetite --2 meals/day bfast and dinner , shake at lunch .    Previously:   Bfast /lunch --low carb , eat dinner --after dinner keeps eating after feeling full!  Has low carb slimfast or atkins shakes to use.   Sleep iffy due to intense hunger. Awakes 2-3 am --snacking and can't get back to sleep. She has mental health issues --struggles with depression and unable to move much with ruptured tendon --discouraged.       Eye exam: due  Foot exam: due  Urine Albumin:   Lab Results   Component Value Date    UMALCR  12/01/2022      Comment:      Unable to calculate, urine albumin and/or urine creatinine is outside detectable limits.  Microalbuminuria is defined as an albumin:creatinine ratio of 17 to 299 for males and 25 to 299 for females. A ratio of albumin:creatinine of 300 or higher is indicative of overt proteinuria.  Due to biologic variability, positive results should be confirmed by a second, first-morning random or 24-hour timed urine specimen. If there is discrepancy, a third specimen is recommended. When 2 out of 3 results are in the microalbuminuria range, this is evidence for incipient nephropathy and warrants increased efforts at glucose control, blood pressure control, and institution of therapy with an angiotensin-converting-enzyme (ACE) inhibitor (if the patient can tolerate it).        Lab Results   Component Value Date    A1C 6.0 12/14/2023    A1C 7.5 09/20/2023    A1C 10.8 06/28/2023    A1C 7.9 03/02/2023    A1C 8.6 12/01/2022         Hyperlipidemia:   Pravastatin 40mg daily  Patient reports no significant  myalgias or other side effects.  The 10-year ASCVD risk score (Carmita CORREIA, et al., 2019) is: 1%    Values used to calculate the score:      Age: 48 years      Sex: Female      Is Non- : No      Diabetic: Yes      Tobacco smoker: No      Systolic Blood Pressure: 118 mmHg      Is BP treated: No      HDL Cholesterol: 52 mg/dL      Total Cholesterol: 133 mg/dL  Recent Labs   Lab Test 06/28/23  0936 12/01/22  1013   CHOL 133 129   HDL 52 46*   LDL 60 61   TRIG 105 109           -------------------------------------------------------------------------------------------------------------------      I spent 30 minutes with this patient today. All changes were made via collaborative practice agreement with Loc Waggoner DO. A copy of the visit note was provided to the patient's provider(s).    A summary of these recommendations was sent via [employer].    Dacia Myers Rph.  Medication Therapy Management Provider  [phone]      Telemedicine Visit Details  Type of service:  Telephone visit  Start Time: 8:30 AM  End Time: 9:00 AM     Medication Therapy Recommendations  No medication therapy recommendations to display

## 2023-12-19 NOTE — PROGRESS NOTES
"Video-Visit Details    Type of service:  Video Visit    Video Start Time: 7:03 AM  Video End Time: 7:20 AM    Originating Location (pt. Location): Home    Distant Location (provider location):  Offsite (providers home) [facility] [place]     Platform used for Video Visit: Well      Weight Management Nutrition Consultation    Elicia Calero is a 48 year old female presents today for returnweight management nutrition consultation.  Patient referred by Geovanna Morgan NP on 2023.    Patient with Co-morbidities of obesity includin/19/2023     8:28 AM   --   I have the following health issues associated with obesity Type II Diabetes    High Cholesterol    GERD (Reflux)    Fatty Liver    Stress Incontinence    Hypothyroidism   I have the following symptoms associated with obesity Depression         Anthropometrics:  Estimated body mass index is 34.04 kg/m  as calculated from the following:    Height as of 23: 1.715 m (5' 7.5\").    Weight as of 23: 100.1 kg (220 lb 9.6 oz).    Current:  Estimated body mass index is 30.07 kg/m  as calculated from the following:    Height as of this encounter: 1.715 m (5' 7.52\").    Weight as of this encounter: 88.5 kg (195 lb). (-14 lbs from last month, - 35 lbs from initial)       Medications for Weight Loss:  Mounjaro previously. On Bydureon d/t insurance change.   Metformin and Jardiance for DMII  H/o BED managed with topiramate and vyvanse.    NUTRITION HISTORY  Food allergies: None  Food intolerances: None  Dislikes textures of cottage cheese, tomatoes, hummus, guac, salmon.    Vitamin/Mineral Supplements: Vitamin B, folic acid     Previous methods of diet modification for weight loss: Had met with RD when first dx with DMII about 10 years ago. More recently, lost 60-70lb with calorie restriction (1200 wilberto/day) and carb counting but this did not feel sustainable and ultimately regained weight.     Working on less " snacking/binging at night. Going to bed sooner has helped with this.   Has been limiting her carb intake to <70 gm per day, and tends to save for later meal and evening snacks.   Preferences: casseroles, breads, dairy, potato. Not a huge fan of protein.     9/20/23 HgbA1c: 7.5 - down from 10.8 on 6/28 11/7/23 - Had ankle surgery three weeks ago. Has to stay off her feet for 6 weeks total. Then will start PT. Continues to replace one meal daily with protein shake. Focusing on smaller portions at dinner. Continues to count carbs. Will be returning to work mid-Dec.    12/14/23: HgbA1c: 6    Today -  She has noticed a little more appetite since being on Bydureon from Mounjaro, but has been manageable. Continues to work on limiting junk foods, can be challenging at times to meet protein needs. Will do protein shake, yogurt with protein powder, deli meat, chicken, and adds collagen powder to her Crystal Lite. Aiming to have lunch be her larger meal. Continues to do well with smaller portions of crabs and low carb diet.   Just stared baring weight on ankle. Will start PT in Jan.      Recent Diet Recall:  Adding collagen powder to crystal lite in the morning.   Breakfast: 5-7 am protein shake (Orgain with 2% milk); Kashi high-fiber cereal w/o milk, and apple.    Lunch: 11-12 pm sandwich (4-5 slice ham and sprouted bread); chopped salad with 4-5 thin slices ham with cheese and nuts; protein yogurt; left-overs   Dinner: 5-6 pm 3 chicken tenders, brown 1/2-3/4 c rice/4-5 small potatoes and veggies  Snacks: Less overall snacking. May do string cheese, Kind bars. Yasso bars.   Beverages: Water (52+ oz/day); Crystal lite with collagen powder (30 gm pro).    Dining Out/take-out: Not often, couple times per month.     Progress Towards Previous Goals:  1) Aim for 20-30 gm protein per meal (total 60-90 gm per day). Limit carbs to 45 gm per meal. - Improved  2) Ensure you are drinking 64+ oz fluid per day - Improving       Physical  Activity:  Limited d/t ankle surgery        9/19/2023     8:28 AM   Activity/Exercise History   How much of a typical 12 hour day do you spend sitting? Most of the Day   How much of a typical 12 hour day do you spend lying down? Less Than Half the Day   How much of a typical day do you spend walking/standing? Less Than Half the Day   How many hours (not including work) do you spend on the TV/Video Games/Computer/Tablet/Phone? 6 Hours or More   How many times a week are you active for the purpose of exercise? Never   What keeps you from being more active? Pain    Other   How many total minutes do you spend doing some activity for the purpose of exercising when you exercise? None       Nutrition Prescription  Recommended energy/nutrient modification.    Nutrition Diagnosis  Obesity r/t long history of positive energy balance aeb BMI >30. - Improving    Nutrition Intervention  Materials/education provided on hypocaloric diet for weight loss and low/moderate carb diet for mgmt of DMII.   Reviewed progress towards previous goals.   Praised pt on the many positive changes she has made to diet.  Reviewed protein needs/sources.    Co-developed goals to work towards.   Provided pt with list of goals and resources below via [employer].     Expected Engagement: good  Follow-Up Plans: meal/snack planning        Nutrition Goals  1) Aim for 20-30 gm protein per meal (total 60-90 gm per day). Limit carbs to 45 gm per meal.   2) Ensure you are drinking 64+ oz fluid per day      Follow-Up:  6 weeks, PRN    Time spent with patient: 17 minutes.  Jeri Hernández, RD, LD

## 2023-12-20 ENCOUNTER — OFFICE VISIT (OUTPATIENT)
Dept: URGENT CARE | Facility: URGENT CARE | Age: 48
End: 2023-12-20
Payer: COMMERCIAL

## 2023-12-20 ENCOUNTER — VIRTUAL VISIT (OUTPATIENT)
Dept: PHARMACY | Facility: CLINIC | Age: 48
End: 2023-12-20
Payer: COMMERCIAL

## 2023-12-20 VITALS
OXYGEN SATURATION: 98 % | SYSTOLIC BLOOD PRESSURE: 101 MMHG | WEIGHT: 195.5 LBS | DIASTOLIC BLOOD PRESSURE: 69 MMHG | TEMPERATURE: 98.2 F | RESPIRATION RATE: 16 BRPM | HEART RATE: 76 BPM | BODY MASS INDEX: 30.17 KG/M2

## 2023-12-20 DIAGNOSIS — E78.5 HYPERLIPIDEMIA LDL GOAL <100: ICD-10-CM

## 2023-12-20 DIAGNOSIS — L03.032 PARONYCHIA OF TOE, LEFT: ICD-10-CM

## 2023-12-20 DIAGNOSIS — E11.9 TYPE 2 DIABETES MELLITUS WITHOUT COMPLICATION, WITHOUT LONG-TERM CURRENT USE OF INSULIN (H): Primary | ICD-10-CM

## 2023-12-20 DIAGNOSIS — S99.829A TOENAIL TORN AWAY: Primary | ICD-10-CM

## 2023-12-20 PROCEDURE — 99606 MTMS BY PHARM EST 15 MIN: CPT | Mod: 93 | Performed by: PHARMACIST

## 2023-12-20 PROCEDURE — 99213 OFFICE O/P EST LOW 20 MIN: CPT | Performed by: PHYSICIAN ASSISTANT

## 2023-12-20 RX ORDER — CEPHALEXIN 500 MG/1
500 CAPSULE ORAL 4 TIMES DAILY
Qty: 28 CAPSULE | Refills: 0 | Status: SHIPPED | OUTPATIENT
Start: 2023-12-20 | End: 2023-12-27

## 2023-12-20 NOTE — PROGRESS NOTES
Assessment & Plan        1. Paronychia of toe, left    - cephALEXin (KEFLEX) 500 MG capsule; Take 1 capsule (500 mg) by mouth 4 times daily for 7 days  Dispense: 28 capsule; Refill: 0    2. Toenail torn away     - Orthopedic  Referral; Future      Patient Instructions   Follow up with Podiatry for toe nail evaluation and possible removal    Return for Follow up with Podiatry.    At the end of the encounter, I discussed results, diagnosis, medications. Discussed red flags for immediate return to clinic/ER, as well as indications for follow up if no improvement. Patient understood and agreed to plan. Patient was stable for discharge.    Emily Rubi is a 48 year old female who presents to clinic today for the following health issues:  Chief Complaint   Patient presents with    Urgent Care    Nail Problem     Lt toe infection, puss and fluid x1 week.      HPI    Patient reports left big toe infection for one week. She has noticed fluid and pus coming out of the toe. She reports the toe is coming off. Patient has a history of diabetes. No fever or chills.     Review of Systems    Problem List:  2023-08: Class 2 severe obesity due to excess calories with serious   comorbidity in adult (H)  2023-07: F11.9 - Chronic, continuous use of opioids  2023-05: Facet arthropathy, lumbosacral  2022-11: Gastroesophageal reflux disease with esophagitis without   hemorrhage  2022-11: Hypothyroidism, unspecified type  2022-11: Hepatic adenoma  2022-11: NAFLD (nonalcoholic fatty liver disease)  2022-11: Type 2 diabetes mellitus without complication, without long-  term current use of insulin (H)  2022-11: Irritable bowel syndrome, unspecified type  2022-11: Mild episode of recurrent major depressive disorder (H24)  2022-11: Chronic migraine without aura without status migrainosus,   not intractable  2022-11: Chronic low back pain without sciatica, unspecified back   pain laterality  2022-11: Bipolar affective disorder,  current episode mixed, current   episode severity unspecified (H)  2022: Pain in joint involving ankle and foot, right  2022: Persistent insomnia  2022: Localized edema  2016-10: Chronic pain disorder  2016: Myofascial pain syndrome  2015: Lymphedema  2014: Former smoker  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)  2009: Irregular menstrual cycle  2009: Obesity (BMI 30-39.9)  2009: Tension type headache  2008: Myopia  2008: Regular astigmatism  2006: Depressive disorder, not elsewhere classified      Past Medical History:   Diagnosis Date    Diabetes mellitus (H)     Hepatic adenoma     HTN (hypertension)     Hyperlipidemia     Hypothyroidism     Migraine     NAFLD (nonalcoholic fatty liver disease)        Social History     Tobacco Use    Smoking status: Former     Packs/day: 1.00     Years: 20.00     Additional pack years: 0.00     Total pack years: 20.00     Types: Cigarettes     Quit date:      Years since quittin.9    Smokeless tobacco: Never   Substance Use Topics    Alcohol use: Yes     Comment: occasional           Objective    /69   Pulse 76   Temp 98.2  F (36.8  C) (Temporal)   Resp 16   Wt 88.7 kg (195 lb 8 oz)   SpO2 98%   BMI 30.17 kg/m    Physical Exam  Constitutional:       Appearance: Normal appearance.   HENT:      Head: Normocephalic.   Cardiovascular:      Rate and Rhythm: Normal rate and regular rhythm.   Pulmonary:      Effort: Pulmonary effort is normal.      Breath sounds: Normal breath sounds.   Musculoskeletal:      Cervical back: Normal range of motion and neck supple.        Feet:    Feet:      Left foot:      Skin integrity: Erythema present.      Toenail Condition: Left toenails are abnormally thick.      Comments: Left big toe nail partially removed  Lymphadenopathy:      Head:      Right side of head: No submental, submandibular or tonsillar adenopathy.      Left side of head: No submental, submandibular or tonsillar adenopathy.   Skin:      General: Skin is warm and dry.      Findings: Erythema present.      Comments: Erythema, edema of the lateral nail bed of left big toe   Neurological:      Mental Status: She is alert and oriented to person, place, and time.              Justina Serna PA-C

## 2023-12-20 NOTE — Clinical Note
Loc--spoke with elijah today --as you know recent A1c 6%  and down 35 lbs on glp-1 drugs --she will have new ins. In 1-1-24--I will try to put her back on mounjaro then ....  Koffi

## 2023-12-20 NOTE — PATIENT INSTRUCTIONS
"Recommendations from today's MTM visit:                                                         1. Congrats A1c is now 6.0% --wow -fantastic .   Stay on weekly Bydureon dose for now --try to refill your Mounjaro in early January on your new insurance--let me know via [employer] how that goes --I did hear that Ozempic will be [place] to [place] drug formul[place] 1-1-24 as a potential option for you.        Follow-up: Return in about 3 weeks (around 1/10/2024), or @ 8:30 AM via Telephone, for Medication Therapy Management Visit, Blood sugar meter review.    It was great speaking with you today.  I value your experience and would be very thankful for your time in providing feedback in our clinic survey. In the next few days, you may receive an email or text message from [employer] with a link to a survey related to your  clinical pharmacist.\"     To schedule another MTM appointment, please call the clinic directly or you may call the MTM scheduling line at [phone] or toll-free at [phone].     My Clinical Pharmacist's contact information:                                                      Please feel free to contact me with any questions or concerns you have.      Dacia Myers Rph.  Medication Therapy Management Provider  [phone]    "

## 2023-12-21 ENCOUNTER — OFFICE VISIT (OUTPATIENT)
Dept: PODIATRY | Facility: CLINIC | Age: 48
End: 2023-12-21
Attending: PHYSICIAN ASSISTANT
Payer: COMMERCIAL

## 2023-12-21 ENCOUNTER — VIRTUAL VISIT (OUTPATIENT)
Dept: ENDOCRINOLOGY | Facility: CLINIC | Age: 48
End: 2023-12-21
Payer: COMMERCIAL

## 2023-12-21 VITALS — HEART RATE: 75 BPM | HEIGHT: 68 IN | OXYGEN SATURATION: 92 % | BODY MASS INDEX: 29.55 KG/M2 | WEIGHT: 195 LBS

## 2023-12-21 VITALS — WEIGHT: 195 LBS | BODY MASS INDEX: 29.55 KG/M2 | HEIGHT: 68 IN

## 2023-12-21 DIAGNOSIS — E11.9 TYPE 2 DIABETES MELLITUS WITHOUT COMPLICATION, WITHOUT LONG-TERM CURRENT USE OF INSULIN (H): ICD-10-CM

## 2023-12-21 DIAGNOSIS — Z71.3 NUTRITIONAL COUNSELING: Primary | ICD-10-CM

## 2023-12-21 DIAGNOSIS — L03.032 PARONYCHIA OF GREAT TOE OF LEFT FOOT: Primary | ICD-10-CM

## 2023-12-21 DIAGNOSIS — E66.9 OBESITY (BMI 30-39.9): ICD-10-CM

## 2023-12-21 PROCEDURE — 99207 PR NO CHARGE LOS: CPT | Mod: VID | Performed by: DIETITIAN, REGISTERED

## 2023-12-21 PROCEDURE — 97803 MED NUTRITION INDIV SUBSEQ: CPT | Mod: VID | Performed by: DIETITIAN, REGISTERED

## 2023-12-21 PROCEDURE — 99203 OFFICE O/P NEW LOW 30 MIN: CPT | Mod: 25 | Performed by: PODIATRIST

## 2023-12-21 PROCEDURE — 11730 AVULSION NAIL PLATE SIMPLE 1: CPT | Mod: TA | Performed by: PODIATRIST

## 2023-12-21 RX ORDER — LIDOCAINE HYDROCHLORIDE 20 MG/ML
5 INJECTION, SOLUTION INFILTRATION; PERINEURAL ONCE
Status: COMPLETED | OUTPATIENT
Start: 2023-12-21 | End: 2023-12-21

## 2023-12-21 RX ADMIN — LIDOCAINE HYDROCHLORIDE 5 ML: 20 INJECTION, SOLUTION INFILTRATION; PERINEURAL at 15:38

## 2023-12-21 NOTE — PATIENT INSTRUCTIONS
Carlos Rubi,     Follow-up with BLUE in 6 weeks.     Thank you,    Jeri Hernández, BLUE, LD  If you would like to schedule or reschedule an appointment with the RD, please call [phone]    Nutrition Goals  1) Aim for 20-30 gm protein per meal (total 60-90 gm per day). Limit carbs to 45 gm per meal.   2) Ensure you are drinking 64+ oz fluid per day      COMPREHENSIVE WEIGHT MANAGEMENT PROGRAM  VIRTUAL SUPPORT GROUPS    At [facility], our Comprehensive Weight Management program offers on-line support groups for patients who are working on weight loss and considering, preparing for, or have had weight loss surgery.     There is no cost for this opportunity.  You are invited to attend the?Virtual Support Groups?provided by any of the following locations:    [facility] via Microsoft Teams with Viky Venegas RN  2.   [place] via Game [employer] with Bradford Peña, PhD, LP  3.   [place] via [employer] with Joyce Tate RN  4.   [facility] via a Zoom Meeting with HEATH Chacko    The following Support Group information can also be found on our website:  [email or web address]      [facility] Weight Loss Surgery Support Group  The support group is a patient-lead forum that meets monthly to share experiences, encouragement and education. It is open to those who have had weight loss surgery, are scheduled for surgery, or are considering surgery.   WHEN: This group meets on the 3rd Wednesday of each month from 5:00PM - 6:00PM virtually using Microsoft Teams.   FACILITATOR: Led by Viky Gomes RD, MAZIN, RN, the program's Clinical Coordinator.   TO REGISTER: Please contact the clinic via Conta[employer] or call the nurse line directly at [phone] to inform our staff that you would like an invite sent to you and to let us know the email you would like the invite sent to. Prior to the meeting, a link with directions  "on how to join the meeting will be sent to you.    2023 and 2024 Meetings   December 20  January 17  February 21  March 20  April 17  May 15  Cristina 19      [facility] Bariatric Care Support Group?  This is open to all pre- and post- operative bariatric surgery patients as well as their support system.   WHEN: This group meets the 3rd Tuesday of each month from 6:30 PM - 8:00 PM virtually using Microsoft Teams.   FACILITATOR: Led by Bradford Peña, Ph.D who is a Licensed Psychologist with the [facility] Comprehensive Weight Management Program.   TO REGISTER: Please send an email to Bradford Peña, Ph.D.,  at?[email or web address]?if you would like an invitation to the group. Prior to the meeting, a link with directions on how to join the meeting will be sent to you.    2023 and 2024 Meetings  December 19 January 16: \"Medication Management and Bariatric Surgery\", Iram Cast, PharmD, Pharmacy Resident at [facility]  February 20: \"A Bariatric Surgery Patient's Perspective\", REE Henley, [facility], Behavioral Health Clinician at [facility]  March 19  April 16  May 21  Cristina 18: \"Nutritional Labeling\", Dietitian speaker to be announced.  November 19: \"Holiday Eating\", Dietitian speaker to be announced.    [facility] Post-Operative Bariatric Surgery Support Group  This is a support group for [facility] bariatric patients (and those external to [facility]) who have had bariatric surgery and are at least 3 months post-surgery.  WHEN: This support group meets the 4th Wednesday of the month from 11:00 AM - 12:00 PM virtually using Microsoft Teams.   FACILITATOR: Led by Certified Bariatric Nurse, Joyce Tate RN.   TO REGISTER: Please send an email to Joyce at [email or web address] if you would like an invitation to the group.  Prior to " "the meeting, a link with directions on how to join the meeting will be sent to you.    2023 and 2024 Meetings  December 27  January 24  February 28  March 27  April 24  May 22  Cristina 26    [facility] Healthy Lifestyle Group?  This is a 60 minute virtual coaching group for those who want to lead a healthier lifestyle. Come together to set goals and overcome barriers in a supportive group environment. We will address the four pillars of health: nutrition, exercise, sleep and emotional well-being.  This group is highly recommended for those who are participating in the 24 week Healthy Lifestyle Plan and our Health Coaching sessions.  WHEN: This group meets the 1st Friday of the month, 12:30 PM - 1:30 PM online, via a zoom meeting.    FACILITATOR: Led by National Board Certified Health and , Joyce Dunbar [facility].   TO REGISTER: Please call the Call Center at [phone] to register.  You will get an appointment to attend in [facility]. Fifteen minutes prior to the meeting, complete the e-check in and you will get the link to join the meeting.    There is no charge to attend this group and space is limited.     2023 and 2024 Meetings  December 1: \"Let's Talk\" (guided discussion on our wins and challenges)  January 5: \"New Years Vision: Manifest your Best 2024!\" (guided imagery,  journaling and discussion)  February 2: \"Let's Talk\"  March 1: \"10 Percent Happier\" by Kartik Arenas (Book Bites - a guided discussion on the nuggets of wisdom from favorite wellness books, no need to read the book but highly encouraged)  April 5: \"Let's Talk\"  May 3: \"Essentialism: The Disciplined Pursuit of Less\" by Flo Mayes (Book Bites discussion)  June 7: \"Let's Talk\"  July 5: NO MEETING, off for the 4th of July Holiday  August 2: \"The Blue Zones, Secrets for Living a Longer Life\" by Kartik Tyler (Book Bites discussion)                    "

## 2023-12-21 NOTE — PROGRESS NOTES
FOOT AND ANKLE SURGERY/PODIATRY CONSULT NOTE         ASSESSMENT:   Paronychia left great toenail      TREATMENT:  Performed total nail avulsion of the left great toenail today under local anesthesia of 2% lidocaine plain.  The patient tolerated the procedure and anesthesia well and was discharged in good condition.  The patient was given both written and verbal postoperative instructions.  She is to return to the clinic as needed.  She was told that she will get a new nail over the next several months.        HPI: I was asked to see Elicia Calero today to evaluate and treat an infected toenail involving the left great toe.  The patient stated that over the past several days she had some redness and drainage and pain involving the left great toenail.  She was seen by urgent care on 2023.  She was started on oral antibiotics.  She stated that she continues to have redness and some drainage from underneath the toenail.  She does not recall any recent trauma to the toe.  Her pain is moderate.  She has no difficulty with ambulation.  The patient was seen in consultation at the request of Justina Serna PA-C for evaluation and treatment of infected toenail left great toe.     Past Medical History:   Diagnosis Date    Diabetes mellitus (H)     Hepatic adenoma     HTN (hypertension)     Hyperlipidemia     Hypothyroidism     Migraine     NAFLD (nonalcoholic fatty liver disease)        Social History     Socioeconomic History    Marital status: Single     Spouse name: Not on file    Number of children: Not on file    Years of education: Not on file    Highest education level: Not on file   Occupational History    Not on file   Tobacco Use    Smoking status: Former     Packs/day: 1.00     Years: 20.00     Additional pack years: 0.00     Total pack years: 20.00     Types: Cigarettes     Quit date:      Years since quittin.9    Smokeless tobacco: Never   Vaping Use    Vaping Use: Never used   Substance and Sexual  Activity    Alcohol use: Yes     Comment: occasional    Drug use: Never    Sexual activity: Not on file   Other Topics Concern    Not on file   Social History Narrative    Not on file     Social Determinants of Health     Financial Resource Strain: Low Risk  (10/5/2023)    Financial Resource Strain     Within the past 12 months, have you or your family members you live with been unable to get utilities (heat, electricity) when it was really needed?: No   Food Insecurity: Low Risk  (10/5/2023)    Food Insecurity     Within the past 12 months, did you worry that your food would run out before you got money to buy more?: No     Within the past 12 months, did the food you bought just not last and you didn t have money to get more?: No   Transportation Needs: Low Risk  (10/5/2023)    Transportation Needs     Within the past 12 months, has lack of transportation kept you from medical appointments, getting your medicines, non-medical meetings or appointments, work, or from getting things that you need?: No   Physical Activity: Not on file   Stress: Not on file   Social Connections: Not on file   Interpersonal Safety: Low Risk  (10/5/2023)    Interpersonal Safety     Do you feel physically and emotionally safe where you currently live?: Yes     Within the past 12 months, have you been hit, slapped, kicked or otherwise physically hurt by someone?: No     Within the past 12 months, have you been humiliated or emotionally abused in other ways by your partner or ex-partner?: No   Housing Stability: Low Risk  (10/5/2023)    Housing Stability     Do you have housing? : Yes     Are you worried about losing your housing?: No          Allergies   Allergen Reactions    Lithium Other (See Comments) and Dizziness     Other reaction(s): PSYCHOSIS    Sulfa Antibiotics Swelling     lips  Cold sores per pt      Adhesive Tape Rash    Albuterol Nausea and Vomiting    Clarithromycin Nausea and Vomiting    Erythromycin Nausea and Vomiting     Amoxicillin-Pot Clavulanate Nausea and Vomiting    Venlafaxine Other (See Comments)     Other reaction(s): PSYCHOSIS  Psychosis  Psychosis  Per transferred records            Current Outpatient Medications:     Alcohol Swabs PADS, 1 each daily, Disp: 300 each, Rfl: 3    buPROPion (WELLBUTRIN XL) 150 MG 24 hr tablet, Take 1 tablet (150 mg) by mouth every morning, Disp: 90 tablet, Rfl: 3    buPROPion (WELLBUTRIN XL) 300 MG 24 hr tablet, Take 1 tablet (300 mg) by mouth daily, Disp: 90 tablet, Rfl: 3    cephALEXin (KEFLEX) 500 MG capsule, Take 1 capsule (500 mg) by mouth 4 times daily for 7 days, Disp: 28 capsule, Rfl: 0    Continuous Blood Gluc  (DEXCOM G6 ) [place], Use to read blood sugars as per 's instructions., Disp: 1 each, Rfl: 0    Continuous Blood Gluc Sensor (DEXCOM G6 SENSOR) MISC, Change every 10 days., Disp: 9 each, Rfl: 3    Continuous Blood Gluc Transmit (DEXCOM G6 TRANSMITTER) MISC, Change every 3 months., Disp: 1 each, Rfl: 3    diclofenac (VOLTAREN) 1 % topical gel, Apply 4 g topically 4 times daily, Disp: 350 g, Rfl: 1    diphenhydrAMINE (BENADRYL) 50 MG tablet, Take 50 mg by mouth, Disp: , Rfl:     doxepin (SINEQUAN) 25 MG capsule, Take 2 capsules (50 mg) by mouth At Bedtime, Disp: 30 capsule, Rfl: 3    empagliflozin (JARDIANCE) 25 MG TABS tablet, Take 1 tablet (25 mg) by mouth daily, Disp: 90 tablet, Rfl: 1    exenatide ER (BYDUREON BCISE) 2 MG/0.85ML auto-injector, Inject 2 mg Subcutaneous every 7 days, Disp: 3.4 mL, Rfl: 5    folic acid (FOLVITE) 1 MG tablet, Take 1 tablet (1,000 mcg) by mouth daily, Disp: 90 tablet, Rfl: 3    gabapentin (NEURONTIN) 300 MG capsule, Take 4 capsules (1,200 mg) by mouth 3 times daily, Disp: 1080 capsule, Rfl: 3    hydrochlorothiazide (MICROZIDE) 12.5 MG capsule, Take 1 capsule (12.5 mg) by mouth daily, Disp: 90 capsule, Rfl: 3    hydrOXYzine (VISTARIL) 50 MG capsule, Take 1 capsule (50 mg) by mouth daily, Disp: 90 capsule, Rfl: 3     indomethacin (INDOCIN) 50 MG capsule, Take 1 capsule (50 mg) by mouth 3 times daily, Disp: 270 capsule, Rfl: 3    levothyroxine (SYNTHROID/LEVOTHROID) 125 MCG tablet, Take 1 tablet (125 mcg) by mouth daily, Disp: 90 tablet, Rfl: 3    lidocaine, viscous, (XYLOCAINE) 2 % solution, Swish and spit 5 mLs in mouth 5 times daily, Disp: 100 mL, Rfl: 1    lisdexamfetamine (VYVANSE) 20 MG capsule, Take 20 mg by mouth every morning Takes with 50mg daily every morning -total of 70mg daily, Disp: , Rfl:     lisdexamfetamine (VYVANSE) 50 MG capsule, Take 50 mg by mouth every morning, Disp: , Rfl:     metFORMIN (GLUCOPHAGE XR) 500 MG 24 hr tablet, Take 4 tablets (2,000 mg) by mouth daily, Disp: 360 tablet, Rfl: 3    naloxone (NARCAN) 4 MG/0.1ML nasal spray, Spray 1 spray (4 mg) into one nostril alternating nostrils as needed for opioid reversal every 2-3 minutes until assistance arrives, Disp: 1 each, Rfl: 2    norethindrone (MICRONOR) 0.35 MG tablet, Take 1 tablet (0.35 mg) by mouth daily, Disp: 84 tablet, Rfl: 4    nystatin (MYCOSTATIN) 005570 UNIT/GM external cream, Apply 100,000 g topically 3 times daily, Disp: , Rfl:     omeprazole (PRILOSEC) 20 MG DR capsule, Take 1 capsule (20 mg) by mouth 2 times daily, Disp: 180 capsule, Rfl: 3    ondansetron (ZOFRAN) 4 MG tablet, Take 1 tablet (4 mg) by mouth 3 times daily, Disp: 90 tablet, Rfl: 1    oxyCODONE (OXYCONTIN) 10 MG 12 hr tablet, Take 1 tablet (10 mg) by mouth every 12 hours, Disp: 60 tablet, Rfl: 0    oxyCODONE (ROXICODONE) 5 MG tablet, Take 1-2 tablets (5-10 mg) by mouth every 4 hours as needed for severe pain, Disp: 94 tablet, Rfl: 0    pravastatin (PRAVACHOL) 40 MG tablet, Take 1 tablet (40 mg) by mouth daily, Disp: 90 tablet, Rfl: 3    prochlorperazine (COMPAZINE) 10 MG tablet, Take 1 tablet (10 mg) by mouth 4 times daily as needed for nausea (with migraine), Disp: 120 tablet, Rfl: 5    REXULTI 2 MG tablet, Take 2 mg by mouth daily, Disp: , Rfl:     rizatriptan (MAXALT)  10 MG tablet, Take 1 tablet (10 mg) by mouth 2 times daily, Disp: 60 tablet, Rfl: 3    tiZANidine (ZANAFLEX) 2 MG tablet, Take 2 tablets (4 mg) by mouth 3 times daily, Disp: 180 tablet, Rfl: 3    topiramate (TOPAMAX) 100 MG tablet, Take 1 tablet (100 mg) by mouth every evening, Disp: 90 tablet, Rfl: 1    triamcinolone (KENALOG) 0.1 % external ointment, Apply twice daily on hand for 2-4 weeks., Disp: 15 g, Rfl: 1    lamoTRIgine (LAMICTAL) 200 MG tablet, Take 2 tablets (400 mg) by mouth At Bedtime for 360 days, Disp: 180 tablet, Rfl: 3     Family History   Problem Relation Age of Onset    Hypertension Mother     Diabetes Maternal Grandmother     Heart Disease Paternal Grandmother     Liver Disease No family hx of         Social History     Socioeconomic History    Marital status: Single     Spouse name: Not on file    Number of children: Not on file    Years of education: Not on file    Highest education level: Not on file   Occupational History    Not on file   Tobacco Use    Smoking status: Former     Packs/day: 1.00     Years: 20.00     Additional pack years: 0.00     Total pack years: 20.00     Types: Cigarettes     Quit date:      Years since quittin.9    Smokeless tobacco: Never   Vaping Use    Vaping Use: Never used   Substance and Sexual Activity    Alcohol use: Yes     Comment: occasional    Drug use: Never    Sexual activity: Not on file   Other Topics Concern    Not on file   Social History Narrative    Not on file     Social Determinants of Health     Financial Resource Strain: Low Risk  (10/5/2023)    Financial Resource Strain     Within the past 12 months, have you or your family members you live with been unable to get utilities (heat, electricity) when it was really needed?: No   Food Insecurity: Low Risk  (10/5/2023)    Food Insecurity     Within the past 12 months, did you worry that your food would run out before you got money to buy more?: No     Within the past 12 months, did the food you  "bought just not last and you didn t have money to get more?: No   Transportation Needs: Low Risk  (10/5/2023)    Transportation Needs     Within the past 12 months, has lack of transportation kept you from medical appointments, getting your medicines, non-medical meetings or appointments, work, or from getting things that you need?: No   Physical Activity: Not on file   Stress: Not on file   Social Connections: Not on file   Interpersonal Safety: Low Risk  (10/5/2023)    Interpersonal Safety     Do you feel physically and emotionally safe where you currently live?: Yes     Within the past 12 months, have you been hit, slapped, kicked or otherwise physically hurt by someone?: No     Within the past 12 months, have you been humiliated or emotionally abused in other ways by your partner or ex-partner?: No   Housing Stability: Low Risk  (10/5/2023)    Housing Stability     Do you have housing? : Yes     Are you worried about losing your housing?: No        Review of Systems - Patient denies fever, chills, rash, wound, stiffness, limping, numbness, weakness, heart burn, blood in stool, chest pain with activity, calf pain when walking, shortness of breath with activity, chronic cough, easy bleeding/bruising, swelling of ankles, excessive thirst, fatigue, depression, anxiety.  Patient admits to painful left great toenail.      OBJECTIVE:  Appearance: alert, well appearing, and in no distress.    Pulse 75   Ht 1.715 m (5' 7.52\")   Wt 88.5 kg (195 lb)   SpO2 92%   BMI 30.07 kg/m       Body mass index is 30.07 kg/m .     General appearance: Patient is alert and fully cooperative with history & exam.  No sign of distress is noted during the visit.  Psychiatric: Affect is pleasant & appropriate.  Patient appears motivated to improve health.  Respiratory: Breathing is regular & unlabored while sitting.  HEENT: Hearing is intact to spoken word.  Speech is clear.  No gross evidence of visual impairment that would impact " ambulation.    Vascular: Dorsalis pedis and posterior tibial pulses are palpable. There is no pedal hair growth bilaterally.  CFT < 3 sec from anterior tibial surface to distal digits bilaterally. There is no appreciable edema noted.  Dermatologic: There is some mild erythema along the eponychium of the left great toenail.  Mild drainage noted from underneath the nail plate.  There is some pain to palpation of the left great toenail.  There is no edema noted.  There is a mild odor noted.  Turgor and texture are within normal limits. No coloration or temperature changes. No primary or secondary lesions noted.  Neurologic: All epicritic and proprioceptive sensations are grossly intact bilaterally.  Musculoskeletal: All active and passive ankle, subtalar, midtarsal, and 1st MPJ range of motion are grossly intact without pain or crepitus, with the exception of none. Manual muscle strength is within normal limits bilaterally. All dorsiflexors, plantarflexors, invertors, evertors are intact bilaterally. Tenderness present to the left great toenail on palpation.  No tenderness to the left great toe with range of motion. Calf is soft/non-tender without warmth/induration    Imaging:       No images are attached to the encounter or orders placed in the encounter.     No results found.   No results found.           Sandip Coronado DPM  [facility] Foot & Ankle Surgery/Podiatry

## 2023-12-21 NOTE — NURSING NOTE
Is the patient currently in the state of [place]? YES    Visit mode:VIDEO    If the visit is dropped, the patient can be reconnected by: VIDEO VISIT: Send to e-mail at: [email or web address]    Will anyone else be joining the visit? NO  (If patient encounters technical issues they should call [phone])    How would you like to obtain your AVS? MyChart    Are changes needed to the allergy or medication list? Pt stated no changes to allergies and Pt stated no med changes    Reason for visit: Follow Up    Bren SAMUEL

## 2023-12-21 NOTE — LETTER
"2023       RE: Elicia Calero  [address]  [place] [address]     Dear Colleague,    Thank you for referring your patient, Elicia Calero, to the [facility] at [facility]. Please see a copy of my visit note below.    Video-Visit Details    Type of service:  Video Visit    Video Start Time:   Video End Time:    Originating Location (pt. Location): Home    Distant Location (provider location):  Offsite (providers home) [facility]     Platform used for Video Visit: First Meta      Weight Management Nutrition Consultation    Elicia Calero is a 48 year old female presents today for returnweight management nutrition consultation.  Patient referred by Geovanna Morgan NP on 2023.    Patient with Co-morbidities of obesity includin/19/2023     8:28 AM   --   I have the following health issues associated with obesity Type II Diabetes    High Cholesterol    GERD (Reflux)    Fatty Liver    Stress Incontinence    Hypothyroidism   I have the following symptoms associated with obesity Depression         Anthropometrics:  Estimated body mass index is 34.04 kg/m  as calculated from the following:    Height as of 23: 1.715 m (5' 7.5\").    Weight as of 23: 100.1 kg (220 lb 9.6 oz).    Current:  Estimated body mass index is 30.07 kg/m  as calculated from the following:    Height as of this encounter: 1.715 m (5' 7.52\").    Weight as of this encounter: 88.5 kg (195 lb). (-14 lbs from last month, - 35 lbs from initial)       Medications for Weight Loss:  Mounjaro previously. On Bydureon d/t insurance change.   Metformin and Jardiance for DMII  H/o BED managed with topiramate and vyvanse.    NUTRITION HISTORY  Food allergies: None  Food intolerances: None  Dislikes textures of cottage cheese, tomatoes, hummus, guac, salmon.    Vitamin/Mineral Supplements: Vitamin B, " folic acid     Previous methods of diet modification for weight loss: Had met with RD when first dx with DMII about 10 years ago. More recently, lost 60-70lb with calorie restriction (1200 wilberto/day) and carb counting but this did not feel sustainable and ultimately regained weight.     Working on less snacking/binging at night. Going to bed sooner has helped with this.   Has been limiting her carb intake to <70 gm per day, and tends to save for later meal and evening snacks.   Preferences: casseroles, breads, dairy, potato. Not a huge fan of protein.     9/20/23 HgbA1c: 7.5 - down from 10.8 on 6/28 11/7/23 - Had ankle surgery three weeks ago. Has to stay off her feet for 6 weeks total. Then will start PT. Continues to replace one meal daily with protein shake. Focusing on smaller portions at dinner. Continues to count carbs. Will be returning to work mid-Dec.    12/14/23: HgbA1c: 6    Today -  She has noticed a little more appetite since being on Bydureon from Mounjaro, but has been manageable. Continues to work on limiting junk foods, can be challenging at times to meet protein needs. Will do protein shake, yogurt with protein powder, deli meat, chicken, and adds collagen powder to her Crystal Lite. Aiming to have lunch be her larger meal. Continues to do well with smaller portions of crabs and low carb diet.   Just stared baring weight on ankle. Will start PT in Jan.      Recent Diet Recall:  Adding collagen powder to crystal lite in the morning.   Breakfast: 5-7 am protein shake (Orgain with 2% milk); Kashi high-fiber cereal w/o milk, and apple.    Lunch: 11-12 pm sandwich (4-5 slice ham and sprouted bread); chopped salad with 4-5 thin slices ham with cheese and nuts; protein yogurt; left-overs   Dinner: 5-6 pm 3 chicken tenders, brown 1/2-3/4 c rice/4-5 small potatoes and veggies  Snacks: Less overall snacking. May do string cheese, Kind bars. Yasso bars.   Beverages: Water (52+ oz/day); Crystal lite with  collagen powder (30 gm pro).    Dining Out/take-out: Not often, couple times per month.     Progress Towards Previous Goals:  1) Aim for 20-30 gm protein per meal (total 60-90 gm per day). Limit carbs to 45 gm per meal. - Improved  2) Ensure you are drinking 64+ oz fluid per day - Improving       Physical Activity:  Limited d/t ankle surgery        9/19/2023     8:28 AM   Activity/Exercise History   How much of a typical 12 hour day do you spend sitting? Most of the Day   How much of a typical 12 hour day do you spend lying down? Less Than Half the Day   How much of a typical day do you spend walking/standing? Less Than Half the Day   How many hours (not including work) do you spend on the TV/Video Games/Computer/Tablet/Phone? 6 Hours or More   How many times a week are you active for the purpose of exercise? Never   What keeps you from being more active? Pain    Other   How many total minutes do you spend doing some activity for the purpose of exercising when you exercise? None       Nutrition Prescription  Recommended energy/nutrient modification.    Nutrition Diagnosis  Obesity r/t long history of positive energy balance aeb BMI >30. - Improving    Nutrition Intervention  Materials/education provided on hypocaloric diet for weight loss and low/moderate carb diet for mgmt of DMII.   Reviewed progress towards previous goals.   Praised pt on the many positive changes she has made to diet.  Reviewed protein needs/sources.    Co-developed goals to work towards.   Provided pt with list of goals and resources below via 4 the starst.     Expected Engagement: good  Follow-Up Plans: meal/snack planning        Nutrition Goals  1) Aim for 20-30 gm protein per meal (total 60-90 gm per day). Limit carbs to 45 gm per meal.   2) Ensure you are drinking 64+ oz fluid per day      Follow-Up:  6 weeks, PRN    Time spent with patient: 17 minutes.  Jeri Hernández, RD, LD

## 2023-12-21 NOTE — Clinical Note
"    12/21/2023         RE: Elicia Calero  [address]  [place] [address]        Dear Colleague,    Thank you for referring your patient, Elicia Calero, to the [facility]. Please see a copy of my visit note below.    FOOT AND ANKLE SURGERY/PODIATRY CONSULT NOTE         ASSESSMENT:   ***      TREATMENT:  ***        HPI: I was asked to see Elicia Calero today  ***.  The patient was seen in consultation at the request of Justina Serna PA-C for ***.     {PAST MEDICAL HISTORY:[id number]::\"***\"}    {SOCIAL HISTORY:[id number]::\"***\"}       Allergies   Allergen Reactions     Lithium Other (See Comments) and Dizziness     Other reaction(s): PSYCHOSIS     Sulfa Antibiotics Swelling     lips  Cold sores per pt       Adhesive Tape Rash     Albuterol Nausea and Vomiting     Clarithromycin Nausea and Vomiting     Erythromycin Nausea and Vomiting     Amoxicillin-Pot Clavulanate Nausea and Vomiting     Venlafaxine Other (See Comments)     Other reaction(s): PSYCHOSIS  Psychosis  Psychosis  Per transferred records            Current Outpatient Medications:      Alcohol Swabs PADS, 1 each daily, Disp: 300 each, Rfl: 3     buPROPion (WELLBUTRIN XL) 150 MG 24 hr tablet, Take 1 tablet (150 mg) by mouth every morning, Disp: 90 tablet, Rfl: 3     buPROPion (WELLBUTRIN XL) 300 MG 24 hr tablet, Take 1 tablet (300 mg) by mouth daily, Disp: 90 tablet, Rfl: 3     cephALEXin (KEFLEX) 500 MG capsule, Take 1 capsule (500 mg) by mouth 4 times daily for 7 days, Disp: 28 capsule, Rfl: 0     Continuous Blood Gluc  (DEXCOM G6 ) [place], Use to read blood sugars as per 's instructions., Disp: 1 each, Rfl: 0     Continuous Blood Gluc Sensor (DEXCOM G6 SENSOR) MISC, Change every 10 days., Disp: 9 each, Rfl: 3     Continuous Blood Gluc Transmit (DEXCOM G6 TRANSMITTER) MISC, Change every 3 months., Disp: 1 each, Rfl: 3     diclofenac (VOLTAREN) 1 % topical gel, Apply 4 g topically 4 times daily, " Disp: 350 g, Rfl: 1     diphenhydrAMINE (BENADRYL) 50 MG tablet, Take 50 mg by mouth, Disp: , Rfl:      doxepin (SINEQUAN) 25 MG capsule, Take 2 capsules (50 mg) by mouth At Bedtime, Disp: 30 capsule, Rfl: 3     empagliflozin (JARDIANCE) 25 MG TABS tablet, Take 1 tablet (25 mg) by mouth daily, Disp: 90 tablet, Rfl: 1     exenatide ER (BYDUREON BCISE) 2 MG/0.85ML auto-injector, Inject 2 mg Subcutaneous every 7 days, Disp: 3.4 mL, Rfl: 5     folic acid (FOLVITE) 1 MG tablet, Take 1 tablet (1,000 mcg) by mouth daily, Disp: 90 tablet, Rfl: 3     gabapentin (NEURONTIN) 300 MG capsule, Take 4 capsules (1,200 mg) by mouth 3 times daily, Disp: 1080 capsule, Rfl: 3     hydrochlorothiazide (MICROZIDE) 12.5 MG capsule, Take 1 capsule (12.5 mg) by mouth daily, Disp: 90 capsule, Rfl: 3     hydrOXYzine (VISTARIL) 50 MG capsule, Take 1 capsule (50 mg) by mouth daily, Disp: 90 capsule, Rfl: 3     indomethacin (INDOCIN) 50 MG capsule, Take 1 capsule (50 mg) by mouth 3 times daily, Disp: 270 capsule, Rfl: 3     levothyroxine (SYNTHROID/LEVOTHROID) 125 MCG tablet, Take 1 tablet (125 mcg) by mouth daily, Disp: 90 tablet, Rfl: 3     lidocaine, viscous, (XYLOCAINE) 2 % solution, Swish and spit 5 mLs in mouth 5 times daily, Disp: 100 mL, Rfl: 1     lisdexamfetamine (VYVANSE) 20 MG capsule, Take 20 mg by mouth every morning Takes with 50mg daily every morning -total of 70mg daily, Disp: , Rfl:      lisdexamfetamine (VYVANSE) 50 MG capsule, Take 50 mg by mouth every morning, Disp: , Rfl:      metFORMIN (GLUCOPHAGE XR) 500 MG 24 hr tablet, Take 4 tablets (2,000 mg) by mouth daily, Disp: 360 tablet, Rfl: 3     naloxone (NARCAN) 4 MG/0.1ML nasal spray, Spray 1 spray (4 mg) into one nostril alternating nostrils as needed for opioid reversal every 2-3 minutes until assistance arrives, Disp: 1 each, Rfl: 2     norethindrone (MICRONOR) 0.35 MG tablet, Take 1 tablet (0.35 mg) by mouth daily, Disp: 84 tablet, Rfl: 4     nystatin (MYCOSTATIN) 005872  UNIT/GM external cream, Apply 100,000 g topically 3 times daily, Disp: , Rfl:      omeprazole (PRILOSEC) 20 MG DR capsule, Take 1 capsule (20 mg) by mouth 2 times daily, Disp: 180 capsule, Rfl: 3     ondansetron (ZOFRAN) 4 MG tablet, Take 1 tablet (4 mg) by mouth 3 times daily, Disp: 90 tablet, Rfl: 1     oxyCODONE (OXYCONTIN) 10 MG 12 hr tablet, Take 1 tablet (10 mg) by mouth every 12 hours, Disp: 60 tablet, Rfl: 0     oxyCODONE (ROXICODONE) 5 MG tablet, Take 1-2 tablets (5-10 mg) by mouth every 4 hours as needed for severe pain, Disp: 94 tablet, Rfl: 0     pravastatin (PRAVACHOL) 40 MG tablet, Take 1 tablet (40 mg) by mouth daily, Disp: 90 tablet, Rfl: 3     prochlorperazine (COMPAZINE) 10 MG tablet, Take 1 tablet (10 mg) by mouth 4 times daily as needed for nausea (with migraine), Disp: 120 tablet, Rfl: 5     REXULTI 2 MG tablet, Take 2 mg by mouth daily, Disp: , Rfl:      rizatriptan (MAXALT) 10 MG tablet, Take 1 tablet (10 mg) by mouth 2 times daily, Disp: 60 tablet, Rfl: 3     tiZANidine (ZANAFLEX) 2 MG tablet, Take 2 tablets (4 mg) by mouth 3 times daily, Disp: 180 tablet, Rfl: 3     topiramate (TOPAMAX) 100 MG tablet, Take 1 tablet (100 mg) by mouth every evening, Disp: 90 tablet, Rfl: 1     triamcinolone (KENALOG) 0.1 % external ointment, Apply twice daily on hand for 2-4 weeks., Disp: 15 g, Rfl: 1     lamoTRIgine (LAMICTAL) 200 MG tablet, Take 2 tablets (400 mg) by mouth At Bedtime for 360 days, Disp: 180 tablet, Rfl: 3     Family History   Problem Relation Age of Onset     Hypertension Mother      Diabetes Maternal Grandmother      Heart Disease Paternal Grandmother      Liver Disease No family hx of         Social History     Socioeconomic History     Marital status: Single     Spouse name: Not on file     Number of children: Not on file     Years of education: Not on file     Highest education level: Not on file   Occupational History     Not on file   Tobacco Use     Smoking status: Former      Packs/day: 1.00     Years: 20.00     Additional pack years: 0.00     Total pack years: 20.00     Types: Cigarettes     Quit date: 2018     Years since quittin.9     Smokeless tobacco: Never   Vaping Use     Vaping Use: Never used   Substance and Sexual Activity     Alcohol use: Yes     Comment: occasional     Drug use: Never     Sexual activity: Not on file   Other Topics Concern     Not on file   Social History Narrative     Not on file     Social Determinants of Health     Financial Resource Strain: Low Risk  (10/5/2023)    Financial Resource Strain      Within the past 12 months, have you or your family members you live with been unable to get utilities (heat, electricity) when it was really needed?: No   Food Insecurity: Low Risk  (10/5/2023)    Food Insecurity      Within the past 12 months, did you worry that your food would run out before you got money to buy more?: No      Within the past 12 months, did the food you bought just not last and you didn t have money to get more?: No   Transportation Needs: Low Risk  (10/5/2023)    Transportation Needs      Within the past 12 months, has lack of transportation kept you from medical appointments, getting your medicines, non-medical meetings or appointments, work, or from getting things that you need?: No   Physical Activity: Not on file   Stress: Not on file   Social Connections: Not on file   Interpersonal Safety: Low Risk  (10/5/2023)    Interpersonal Safety      Do you feel physically and emotionally safe where you currently live?: Yes      Within the past 12 months, have you been hit, slapped, kicked or otherwise physically hurt by someone?: No      Within the past 12 months, have you been humiliated or emotionally abused in other ways by your partner or ex-partner?: No   Housing Stability: Low Risk  (10/5/2023)    Housing Stability      Do you have housing? : Yes      Are you worried about losing your housing?: No        Review of Systems - Patient denies  "fever, chills, rash, wound, stiffness, limping, numbness, weakness, heart burn, blood in stool, chest pain with activity, calf pain when walking, shortness of breath with activity, chronic cough, easy bleeding/bruising, swelling of ankles, excessive thirst, fatigue, depression, anxiety.  Patient admits to ***.      OBJECTIVE:  Appearance: {appearance:441060::\"alert, well appearing, and in no distress\"}.    Pulse 75   Ht 1.715 m (5' 7.52\")   Wt 88.5 kg (195 lb)   SpO2 92%   BMI 30.07 kg/m       Body mass index is 30.07 kg/m .     General appearance: Patient is alert and fully cooperative with history & exam.  No sign of distress is noted during the visit.  Psychiatric: Affect is pleasant & appropriate.  Patient appears motivated to improve health.  Respiratory: Breathing is regular & unlabored while sitting.  HEENT: Hearing is intact to spoken word.  Speech is clear.  No gross evidence of visual impairment that would impact ambulation.    Vascular: Dorsalis pedis and posterior tibial pulses are palpable. There is pedal hair growth ***.  CFT < 3 sec from anterior tibial surface to distal digits ***. There is no appreciable edema noted.  Dermatologic: Turgor and texture are within normal limits. No coloration or temperature changes. No primary or secondary lesions noted.  Neurologic: All epicritic and proprioceptive sensations are grossly intact ***.  Musculoskeletal: All active and passive ankle, subtalar, midtarsal, and 1st MPJ range of motion are grossly intact without pain or crepitus, with the exception of ***. Manual muscle strength is ***. All dorsiflexors, plantarflexors, invertors, evertors are intact ***. Tenderness present to *** on palpation. Tenderness to *** with range of motion. Calf is soft/non-tender with/without warmth/induration    Imaging:     {Imaging order/result:61414}  No images are attached to the encounter or orders placed in the encounter.     No results found.   No results found. "       TREATMENT:  ***    Sandip Coronado DPM  [facility] Foot & Ankle Surgery/Podiatry         Again, thank you for allowing me to participate in the care of your patient.        Sincerely,        Sandip Finley DPM

## 2023-12-21 NOTE — PATIENT INSTRUCTIONS
POSTOPERATIVE INSTRUCTIONS AFTER NAIL REMOVAL SURGERY    What to expect after surgery:  Your toe will be numb for around 2-8 hours after your nail procedure due to the shots that were given.  Expect some degree of soreness in your toe later today when the numbness wears off.  Rest, elevation and ice applied at the ankle will help ease the pain. Your bandage was wrapped snug to cut down on bleeding.    This may feel tight when the numbness wears off.  Please remove the bandage tomorrow morning and begin the foot soaks described below.  Warm water will feel good and helps to ease the pain  How to Care for Your Toe After Surgery  One daily foot soak will be necessary to heal properly.   Soaking helps loosen the scab and dried drainage.  Failure to soak leads to a hard scab that blocks drainage.  Back up drainage increases the pain and the scab may need to be removed with another office procedure.  You will heal much quicker and be more comfortable if you are consistent with local wound care and foot soaks.  Soak one time every day for 2 weeks.  Soaks should last 10 minutes.  Soak after taking a shower to get the germs out.  Soak in warm water with hydrogen peroxide 5 parts water to 1 part hydrogen peroxide.  A small amount of bleeding may be noted which is normal.   Clean the surgical site with a Q-tip during each soak.  Dip the Q-tip in the water and gently clean away any crusted drainage.  The area may be tender but you will not harm anything with the Q-tip.  Pat the area dry and allow a few minutes to air dry before applying any bandages.  Flexible fabric style band aids work best.  In general, the area will be wet from drainage.  A small dab of antibiotic ointment is okay but watch out for excessive moisture.  White and wrinkled skin is a sign of too much moisture and that the skin needs to be dried out. It is ok to allow the toe some air by removing the band aid as needed.  Activity  Feel free to do normal  activities as tolerated on the following day.  Wear open toe sandals if regular shoes are not comfortable.  Avoid shoes that are tight on the toe.  Medications   Finish any antibiotics if they have been prescribed.  Tylenol or ibuprofen may be used as needed for pain.  Icing and elevation also help with pain and swelling.  Risks  Watch for signs of infection.   Call the clinic at [phone] if you see red streaks spreading up the toe, foot, or leg.  Fever and chills are also concerning and you should notify the clinic if you are having these symptoms.  If these symptoms occur when the clinic is closed, please go to urgent care.    Please call [phone] with any additional questions.

## 2024-01-03 ENCOUNTER — VIRTUAL VISIT (OUTPATIENT)
Dept: FAMILY MEDICINE | Facility: CLINIC | Age: 49
End: 2024-01-03
Payer: MEDICAID

## 2024-01-03 DIAGNOSIS — E11.9 TYPE 2 DIABETES MELLITUS WITHOUT COMPLICATION, WITHOUT LONG-TERM CURRENT USE OF INSULIN (H): ICD-10-CM

## 2024-01-03 DIAGNOSIS — F11.90 CHRONIC, CONTINUOUS USE OF OPIOIDS: ICD-10-CM

## 2024-01-03 DIAGNOSIS — E03.9 HYPOTHYROIDISM, UNSPECIFIED TYPE: ICD-10-CM

## 2024-01-03 DIAGNOSIS — K21.00 GASTROESOPHAGEAL REFLUX DISEASE WITH ESOPHAGITIS WITHOUT HEMORRHAGE: ICD-10-CM

## 2024-01-03 DIAGNOSIS — F31.60 BIPOLAR AFFECTIVE DISORDER, CURRENT EPISODE MIXED, CURRENT EPISODE SEVERITY UNSPECIFIED (H): ICD-10-CM

## 2024-01-03 DIAGNOSIS — G89.4 CHRONIC PAIN DISORDER: Primary | ICD-10-CM

## 2024-01-03 DIAGNOSIS — M25.571 PAIN IN JOINT INVOLVING ANKLE AND FOOT, RIGHT: ICD-10-CM

## 2024-01-03 DIAGNOSIS — R60.0 LOCALIZED EDEMA: ICD-10-CM

## 2024-01-03 DIAGNOSIS — G47.00 PERSISTENT INSOMNIA: ICD-10-CM

## 2024-01-03 DIAGNOSIS — G43.709 CHRONIC MIGRAINE WITHOUT AURA WITHOUT STATUS MIGRAINOSUS, NOT INTRACTABLE: ICD-10-CM

## 2024-01-03 DIAGNOSIS — Z30.41 ENCOUNTER FOR SURVEILLANCE OF CONTRACEPTIVE PILLS: ICD-10-CM

## 2024-01-03 PROCEDURE — 99214 OFFICE O/P EST MOD 30 MIN: CPT | Mod: 95 | Performed by: STUDENT IN AN ORGANIZED HEALTH CARE EDUCATION/TRAINING PROGRAM

## 2024-01-03 RX ORDER — OXYCODONE HCL 10 MG/1
10 TABLET, FILM COATED, EXTENDED RELEASE ORAL EVERY 12 HOURS
Qty: 60 TABLET | Refills: 0 | Status: SHIPPED | OUTPATIENT
Start: 2024-01-03 | End: 2024-01-31

## 2024-01-03 RX ORDER — GABAPENTIN 300 MG/1
1200 CAPSULE ORAL 3 TIMES DAILY
Qty: 1080 CAPSULE | Refills: 3 | Status: SHIPPED | OUTPATIENT
Start: 2024-01-03 | End: 2024-02-12

## 2024-01-03 RX ORDER — METFORMIN HCL 500 MG
2000 TABLET, EXTENDED RELEASE 24 HR ORAL DAILY
Qty: 360 TABLET | Refills: 1 | Status: SHIPPED | OUTPATIENT
Start: 2024-01-03 | End: 2024-04-29

## 2024-01-03 RX ORDER — TIZANIDINE 2 MG/1
4 TABLET ORAL 3 TIMES DAILY
Qty: 180 TABLET | Refills: 3 | Status: SHIPPED | OUTPATIENT
Start: 2024-01-03 | End: 2024-04-23

## 2024-01-03 RX ORDER — OXYCODONE HYDROCHLORIDE 5 MG/1
5-10 TABLET ORAL EVERY 4 HOURS PRN
Qty: 150 TABLET | Refills: 0 | Status: SHIPPED | OUTPATIENT
Start: 2024-01-03 | End: 2024-01-31

## 2024-01-03 RX ORDER — PROCHLORPERAZINE 25 MG/1
SUPPOSITORY RECTAL
Qty: 1 EACH | Refills: 3 | Status: SHIPPED | OUTPATIENT
Start: 2024-01-03 | End: 2024-04-10

## 2024-01-03 RX ORDER — HYDROXYZINE HYDROCHLORIDE 25 MG/1
TABLET, FILM COATED ORAL
Qty: 30 TABLET | Refills: 3 | Status: SHIPPED | OUTPATIENT
Start: 2024-01-03 | End: 2024-04-23

## 2024-01-03 RX ORDER — LEVOTHYROXINE SODIUM 125 UG/1
125 TABLET ORAL DAILY
Qty: 90 TABLET | Refills: 0 | Status: SHIPPED | OUTPATIENT
Start: 2024-01-03 | End: 2024-04-29

## 2024-01-03 RX ORDER — PROCHLORPERAZINE 25 MG/1
SUPPOSITORY RECTAL
Qty: 1 EACH | Refills: 0 | Status: CANCELLED | OUTPATIENT
Start: 2024-01-03

## 2024-01-03 RX ORDER — ACETAMINOPHEN AND CODEINE PHOSPHATE 120; 12 MG/5ML; MG/5ML
0.35 SOLUTION ORAL DAILY
Qty: 84 TABLET | Refills: 4 | Status: SHIPPED | OUTPATIENT
Start: 2024-01-03 | End: 2024-05-30

## 2024-01-03 RX ORDER — HYDROCHLOROTHIAZIDE 12.5 MG/1
12.5 CAPSULE ORAL DAILY
Qty: 90 CAPSULE | Refills: 0 | Status: SHIPPED | OUTPATIENT
Start: 2024-01-03 | End: 2024-04-01

## 2024-01-03 RX ORDER — HYDROXYZINE PAMOATE 50 MG/1
50 CAPSULE ORAL DAILY
Qty: 90 CAPSULE | Refills: 1 | Status: SHIPPED | OUTPATIENT
Start: 2024-01-03 | End: 2024-04-29

## 2024-01-03 RX ORDER — PROCHLORPERAZINE 25 MG/1
SUPPOSITORY RECTAL
Qty: 9 EACH | Refills: 3 | Status: SHIPPED | OUTPATIENT
Start: 2024-01-03 | End: 2024-04-10

## 2024-01-03 ASSESSMENT — PATIENT HEALTH QUESTIONNAIRE - PHQ9
10. IF YOU CHECKED OFF ANY PROBLEMS, HOW DIFFICULT HAVE THESE PROBLEMS MADE IT FOR YOU TO DO YOUR WORK, TAKE CARE OF THINGS AT HOME, OR GET ALONG WITH OTHER PEOPLE: NOT DIFFICULT AT ALL
SUM OF ALL RESPONSES TO PHQ QUESTIONS 1-9: 3
SUM OF ALL RESPONSES TO PHQ QUESTIONS 1-9: 3

## 2024-01-03 NOTE — PROGRESS NOTES
Elicia is a 48 year old who is being evaluated via a billable video visit.      How would you like to obtain your AVS? MyChart  If the video visit is dropped, the invitation should be resent by: Text to cell phone: [phone]  Will anyone else be joining your video visit? No    Assessment & Plan     Chronic pain disorder  Pain in joint involving ankle and foot, right  Chronic, continuous use of opioids  History of chronic foot pain, has been on continuous opioids for many years. S/P ankle tendon/arch surgery on 10/19 which went well. She is now weight bearing as tolerated in brace, starts PT this week. Following with [facility]. Was receiving  MME (short and long acting oxycodone). Discussed need to wean opioids, due to high MME, risk of tolerance and oversedation. Pt worried due to worsening pain since she just started weight bearing and is starting PT. Will not make adjustments today, but encouraged cutting back on short acting oxycodone.    Plan:  -non-opioid:  -schedule tylenol 1000mg TID  -schedule ibuprofen 800mg TID  -continue tizanidine PRN   -continue gabapentin 1200mg TID  -elevate, ice  -opioid:   -continue oxycontin 10mg BID  -continue oxycodone 5mg q4hr PRN   -currently using 25mg per day    -encouraged to use 20mg or less of short acting oxycodone per day if able (scheduling NSAID/tylenol should help)  -PDMP checked, appropriate  -current MME: 56  -follow up in 1 month    - oxyCODONE (OXYCONTIN) 10 MG 12 hr tablet; Take 1 tablet (10 mg) by mouth every 12 hours  - oxyCODONE (ROXICODONE) 5 MG tablet; Take 1-2 tablets (5-10 mg) by mouth every 4 hours as needed for severe pain (max of 5 tablets per day)  - gabapentin (NEURONTIN) 300 MG capsule; Take 4 capsules (1,200 mg) by mouth 3 times daily      Bipolar affective disorder, current episode mixed, current episode severity unspecified (H)  - hydrOXYzine (VISTARIL) 50 MG capsule; Take 1 capsule (50 mg) by mouth daily    Type 2 diabetes  mellitus without complication, without long-term current use of insulin (H)  Following with MTM. Refilled medications/supplies below.  - Continuous Blood Gluc Sensor (DEXCOM G6 SENSOR) MISC; Change every 10 days.  - Continuous Blood Gluc Transmit (DEXCOM G6 TRANSMITTER) MISC; Change every 3 months.  - empagliflozin (JARDIANCE) 25 MG TABS tablet; Take 1 tablet (25 mg) by mouth daily  - metFORMIN (GLUCOPHAGE XR) 500 MG 24 hr tablet; Take 4 tablets (2,000 mg) by mouth daily    Localized edema  Stable. Recent BMP stable.  - hydrochlorothiazide (MICROZIDE) 12.5 MG capsule; Take 1 capsule (12.5 mg) by mouth daily    Hypothyroidism, unspecified type  Stable. TSH is UTD.  - levothyroxine (SYNTHROID/LEVOTHROID) 125 MCG tablet; Take 1 tablet (125 mcg) by mouth daily    Encounter for surveillance of contraceptive pills  - norethindrone (MICRONOR) 0.35 MG tablet; Take 1 tablet (0.35 mg) by mouth daily    Gastroesophageal reflux disease with esophagitis without hemorrhage  - omeprazole (PRILOSEC) 20 MG DR capsule; Take 1 capsule (20 mg) by mouth 2 times daily    Chronic migraine without aura without status migrainosus, not intractable  - tiZANidine (ZANAFLEX) 2 MG tablet; Take 2 tablets (4 mg) by mouth 3 times daily  - gabapentin (NEURONTIN) 300 MG capsule; Take 4 capsules (1,200 mg) by mouth 3 times daily    Persistent insomnia  - hydrOXYzine (VISTARIL) 50 MG capsule; Take 1 capsule (50 mg) by mouth daily  - hydrOXYzine HCl (ATARAX) 25 MG tablet; Ok to take with 50mg tablet for a total of 75mg daily as needed.      Loc Waggoner [facility]    Emily Rubi is a 48 year old, presenting for the following health issues:  Recheck Medication        1/3/2024     9:29 AM   Additional Questions   Roomed by DAVID Mendieta   Accompanied by self         1/3/2024     9:29 AM   Patient Reported Additional Medications   Patient reports taking the following new medications none       History of Present  "Illness       Reason for visit:  Pain medication renewal    She eats 2-3 servings of fruits and vegetables daily.She consumes 0 sweetened beverage(s) daily.She exercises with enough effort to increase her heart rate 9 or less minutes per day.  She exercises with enough effort to increase her heart rate 3 or less days per week. She is missing 2 dose(s) of medications per week.  She is not taking prescribed medications regularly due to remembering to take.    Chronic pain of ankle/foot. S/P ankle tendon/arch surgery on 10/19 which went well.     Pain follow up  PDMP checked-last filled oxycodone 5mg tablets (94 tablets) on 12/6/23, oxycontin ER 10mg tablets (60 tablets) on 12/6/23  Can you take NSAIDs  Indomethacin causes headaches  Ibuprofen 800mg as needed  Weight bearing-yes  Just in ankle brace  PT-starting this week (will be weekly at first, but may increase)  Opioid  Oxycontin BID  Oxycontin 5x/day 5mg 5x day (25mg/day)  Worse now that she starting putting weight   Non-opioid pain relief   tylenol    Need to wean down  -oxycontin 10mg BID  -oxycodone 5-10mng q4hr PRN  -total MME     Diabetes  -seeing pharmacy on 1/10/24  -using dexcom      Review of Systems   Constitutional, HEENT, cardiovascular, pulmonary, gi and gu systems are negative, except as otherwise noted.      Objective    Vitals - Patient Reported  Weight (Patient Reported): 88.5 kg (195 lb)  Height (Patient Reported): 171.5 cm (5' 7.5\")  BMI (Based on Pt Reported Ht/Wt): 30.09  Pain Score: Moderate Pain (4)  Pain Loc: Ankle    Physical Exam   GENERAL: Healthy, alert and no distress  EYES: Eyes grossly normal to inspection.  No discharge or erythema, or obvious scleral/conjunctival abnormalities.  RESP: No audible wheeze, cough, or visible cyanosis.  No visible retractions or increased work of breathing.    SKIN: Visible skin clear. No significant rash, abnormal pigmentation or lesions.  NEURO: Cranial nerves grossly intact.  Mentation and " speech appropriate for age.  PSYCH: Mentation appears normal, affect normal/bright, judgement and insight intact, normal speech and appearance well-groomed.        Video-Visit Details    Type of service:  Video Visit   Originating Location (pt. Location): Home  Distant Location (provider location):  On-site  Platform used for Video Visit: [employer]

## 2024-01-03 NOTE — PATIENT INSTRUCTIONS
Pain relief:  Schedule tylenol 1000mg every 6-8 hours  Ibuprofen 800mg every 6-8 hours   Continue oxycontin 10mg twice/day  Work hard to decrease oxycodone use. Try to take 4 total tablets (total 20mg) per day.  Continue ice as needed.  Follow up in 1 month.    Dr Waggoner

## 2024-01-05 ENCOUNTER — MYC MEDICAL ADVICE (OUTPATIENT)
Dept: PHARMACY | Facility: CLINIC | Age: 49
End: 2024-01-05
Payer: MEDICAID

## 2024-01-05 DIAGNOSIS — E11.9 TYPE 2 DIABETES MELLITUS WITHOUT COMPLICATION, WITHOUT LONG-TERM CURRENT USE OF INSULIN (H): Primary | ICD-10-CM

## 2024-01-05 NOTE — TELEPHONE ENCOUNTER
Annel Pederson, tried filling my Mounjaro and there weren t any refills on it so if you could send in a new rx for that and also that might not be covered as I now have straight up Medicaid for now. I asked the pharmacy and they do have one pic in stock the 2mg I asked. Any questions give me a call or message me.     Teresa Taveras notes mounjaro not covered [place], ozempic is suppose to be a yes now. Will send rx for 2mg./week ozempic to the [facility].    Dacia Myers [facility].  Medication Therapy Management Provider  [phone]'

## 2024-01-06 ENCOUNTER — TELEPHONE (OUTPATIENT)
Dept: FAMILY MEDICINE | Facility: CLINIC | Age: 49
End: 2024-01-06
Payer: MEDICAID

## 2024-01-06 NOTE — TELEPHONE ENCOUNTER
Prior Authorization Retail Medication Request    Medication/Dose: Ozempic 2mg/dose 8mg/3ml sopn  Diagnosis and ICD code (if different than what is on RX):    New/renewal/insurance change PA/secondary ins. PA:  Previously Tried and Failed:    Rationale:      Insurance   Primary[place]  Insurance ID:  [id number]    Secondary (if applicable):  Insurance ID:      Pharmacy Information (if different than what is on RX)  Name:  [facility]  Phone:  [phone]  Fax:    Rosy Yoo  Pharmacy Technician   [facility]  [phone]

## 2024-01-06 NOTE — TELEPHONE ENCOUNTER
Prior Authorization Retail Medication Request    Medication/Dose: Ozempic 2mg/dose 8mg/3ml sopn requires prior authorization  Diagnosis and ICD code (if different than what is on RX):  New/renewal/insurance change PA/secondary ins. PA:  Previously Tried and Failed:    Rationale:      Insurance   Primary: [place] MEDICAID  Insurance ID:  [id number]    Secondary (if applicable):  Insurance ID:      Pharmacy Information (if different than what is on RX)  Name:  [facility]  Phone:  [phone]  Fax:      Rosy Yoo  Pharmacy Technician   [facility]  [phone]

## 2024-01-07 NOTE — PROGRESS NOTES
Medication Therapy Management (MTM) Encounter    ASSESSMENT:                            Medication Adherence/Access: No issues identified    Type 2 Diabetes/Obesity:    Patient is meeting A1c goal of < 7%(6.0%). Self monitoring of blood glucose is closer to goal of fasting  mg/dL and post prandial < 180 mg/dL. Patient is  meeting average glucose goal of <150mg/dL. Patient is now meeting goal of > 70% (97%)time in target with continuous glucose monitoring.  Patient would benefit from staying on 2mg./week Bydureon dose --but after 1-1-24- she will have new ins. And try to refill her 10mg./week Mounjaro -If that is not covered we heard that Ozempic will [place] formul[place] 1-1-24--we can trial that if needed. Mtm notes 1-10-24 --ozempic/mounjaro both not yet approved.  GLP-1 drugs have worked very well for her --she has lost 35 lbs . And feels great !     Continue daily metformin and jardiance and otc pill glucoredi as well.   Also--patient will work hard on eliminating all late night snacking --ok to drink slim-fast or atkins protein shake to fill her up if needed.   . Due for annual foot exam. Due for annual eye exam.         Hyperlipidemia:   Stable.  Patient is on moderate intensity statin which is indicated based on 2019 ACC/AHA guidelines for lipid management.        PLAN:                                1. Stay on Bydureon bcise weekly , let me know via [employer] when on new insurance in Feb  or march --we can recheck to see if either ozempic or mounjaro will be back on formulary?        Follow-up:  Via [employer] message after new insurance added to system .     SUBJECTIVE/OBJECTIVE:                          Elicia Calero is a 48 year old female called for a follow-up (12-20-23) visit. She was referred to me from Loc Waggoner DO. (PCP).  .    Now has [facility] ma ins. --our clinic does not cover this ins. -she will go to  [facility] clinic until back on regular ins. Thru work.      Reason for  visit:   Mtm notes feb [facility] pmap , then march  employee ins.     Mtm notes PA below denied ozempic --back on Bydureon bcise.         Prior Authorization Retail Medication Request    Medication/Dose: Ozempic 2mg/dose 8mg/3ml sopn requires prior authorization  Diagnosis and ICD code (if different than what is on RX):  New/renewal/insurance change PA/secondary ins. PA:  Previously Tried and Failed:    Rationale:      Insurance   Primary[place]  Insurance ID:  [id number]    Secondary (if applicable):  Insurance ID:      Pharmacy Information (if different than what is on RX)  Name:  [facility]  Phone:  [phone]  Fax:      Rosy Yoo  Pharmacy Technician   [facility]  [phone]      Still on disability post tendon /foot surgery until feb 2024.   .    Allergies/ADRs: Reviewed in chart  Past Medical History: Per patient diagnosed 10-15 years --did lose wt. To 150lbs years ago and reversed her DM . Ate 1200 cals/day , low carb   Tobacco: She reports that she quit smoking about 6 years ago. Her smoking use included cigarettes. She has a 20 pack-year smoking history. She has never used smokeless tobacco.  Alcohol: Less than 1 beverage / month  Other Substance Use: none   E-cigarette Use: none   Caffeine:  1 coffee or crystal light every morning.   Social: off work due to tendon issue (ruptured).  She is a pharmacy tech at  discharge.   Personal Healthcare Goals: fix dm soon   Activity: not much due to ruptured tendon.     Medication Adherence/Access: no issues reported    Type 2 Diabetes/Obesity:    Failed bydureon in the past , fears needles--refuses all injectables before last visit but doing ok with Mounjaro as needle is hidden.   Due to ins. Issues--now on bydureon 2mg./week. now.   Post 1-1-24 --wants Mounjaro back.   Clinic weight last week 195.5lbs --down 35 lbs. since August 2023.     Rybelsus 14mg./day --off this now 8-14-23   Metformin [id number] ER tabs daily  Glipizide  ER 10mg tabs --2 tabs daily --stopped 8-14-23 --on Mounjaro 10mg./week  Losing weight (199lbs. At home , 209lbs in clinic)  and controlling food intake and bs's --she loves it!  Jardiance 25mg./day   Also taking 2 capsules /day otc -Glucoredi herbal with gymnemna and berberi.   Not taking aspirin due to age.  Patient is not experiencing side effects.  Blood sugar monitoring: Continuous Glucose Monitor Average glucose : mtm added remote bs access via dexcom G6 abiodun on her phone :  Am bs's 210--why?  When bedtime 120';s.(Mtm explained jose phenomenon at last visit to her ).       Wt Readings from Last 5 Encounters:   12/21/23 195 lb (88.5 kg)   12/21/23 195 lb (88.5 kg)   12/20/23 195 lb 8 oz (88.7 kg)   11/30/23 199 lb (90.3 kg)   11/07/23 209 lb (94.8 kg)                                           Current diabetes symptoms:none now -Mounjaro has corrected her polyphagia.  Diet/Exercise: needs tendon surgery 10-19-23, can use foot pedals only.   Mounjaro curbing appetite --2 meals/day bfast and dinner , shake at lunch .    Previously:   Bfast /lunch --low carb , eat dinner --after dinner keeps eating after feeling full!  Has low carb slimfast or atkins shakes to use.   Sleep iffy due to intense hunger. Awakes 2-3 am --snacking and can't get back to sleep. She has mental health issues --struggles with depression and unable to move much with ruptured tendon --discouraged.       Eye exam: due  Foot exam: due  Urine Albumin:   Lab Results   Component Value Date    ALCR  12/01/2022      Comment:      Unable to calculate, urine albumin and/or urine creatinine is outside detectable limits.  Microalbuminuria is defined as an albumin:creatinine ratio of 17 to 299 for males and 25 to 299 for females. A ratio of albumin:creatinine of 300 or higher is indicative of overt proteinuria.  Due to biologic variability, positive results should be confirmed by a second, first-morning random or 24-hour timed urine specimen. If there is  discrepancy, a third specimen is recommended. When 2 out of 3 results are in the microalbuminuria range, this is evidence for incipient nephropathy and warrants increased efforts at glucose control, blood pressure control, and institution of therapy with an angiotensin-converting-enzyme (ACE) inhibitor (if the patient can tolerate it).        Lab Results   Component Value Date    A1C 6.0 12/14/2023    A1C 7.5 09/20/2023    A1C 10.8 06/28/2023    A1C 7.9 03/02/2023    A1C 8.6 12/01/2022         Hyperlipidemia:   Pravastatin 40mg daily  Patient reports no significant myalgias or other side effects.  The 10-year ASCVD risk score (Carmita CORREIA, et al., 2019) is: 0.7%    Values used to calculate the score:      Age: 48 years      Sex: Female      Is Non- : No      Diabetic: Yes      Tobacco smoker: No      Systolic Blood Pressure: 101 mmHg      Is BP treated: No      HDL Cholesterol: 52 mg/dL      Total Cholesterol: 133 mg/dL  Recent Labs   Lab Test 06/28/23  0936 12/01/22  1013   CHOL 133 129   HDL 52 46*   LDL 60 61   TRIG 105 109           -------------------------------------------------------------------------------------------------------------------      I spent 30 minutes with this patient today. All changes were made via collaborative practice agreement with Loc Waggnoer DO. A copy of the visit note was provided to the patient's provider(s).    A summary of these recommendations was sent via [employer].    Dacia Myers Rph.  Medication Therapy Management Provider  [phone]      Telemedicine Visit Details  Type of service:  Telephone visit  Start Time: 4 PM  End Time: 4;30 PM     Medication Therapy Recommendations  No medication therapy recommendations to display

## 2024-01-08 NOTE — TELEPHONE ENCOUNTER
Dacia,  No problem I m good for a few more days.  I do have another question are you covered by straight Medicaid for phone visits? If not I ll have to reschedule or we ll have to figure something out.       Thanks,            Elicia           Yes --no problem.    Dacia Myers, [facility].  Medication Therapy Management Provider  [phone]

## 2024-01-08 NOTE — TELEPHONE ENCOUNTER
1-8-24      Mt submitted PA for 2mg. Ozempic today via cover my meds.    Notified patient via mychart -await ins. Answer.    Dacia Myers Rph.  Medication Therapy Management Provider  [phone]

## 2024-01-10 ENCOUNTER — TELEPHONE (OUTPATIENT)
Dept: FAMILY MEDICINE | Facility: CLINIC | Age: 49
End: 2024-01-10
Payer: MEDICAID

## 2024-01-10 ENCOUNTER — VIRTUAL VISIT (OUTPATIENT)
Dept: PHARMACY | Facility: CLINIC | Age: 49
End: 2024-01-10
Payer: MEDICAID

## 2024-01-10 DIAGNOSIS — E11.9 TYPE 2 DIABETES MELLITUS WITHOUT COMPLICATION, WITHOUT LONG-TERM CURRENT USE OF INSULIN (H): Primary | ICD-10-CM

## 2024-01-10 DIAGNOSIS — E78.5 HYPERLIPIDEMIA LDL GOAL <100: ICD-10-CM

## 2024-01-10 PROCEDURE — 99606 MTMS BY PHARM EST 15 MIN: CPT | Mod: 93 | Performed by: PHARMACIST

## 2024-01-10 RX ORDER — EXENATIDE 2 MG/.85ML
2 INJECTION, SUSPENSION, EXTENDED RELEASE SUBCUTANEOUS
COMMUNITY
End: 2024-01-10

## 2024-01-10 RX ORDER — EXENATIDE 2 MG/.85ML
2 INJECTION, SUSPENSION, EXTENDED RELEASE SUBCUTANEOUS
Qty: 3.4 ML | Refills: 3 | Status: SHIPPED | OUTPATIENT
Start: 2024-01-10 | End: 2024-03-01

## 2024-01-10 NOTE — TELEPHONE ENCOUNTER
Retail Pharmacy Prior Authorization Team   Phone: [phone]     Loc Waggoner, DO  [facility] Med6 hours ago (10:15 PM)     AT  Team-can you submit a prior auth for the oxycodone and oxycontin that was ordered on 1/3? It is for chronic pain. Not a new prescription. Thanks!     Loc Waggoner DO6 hours ago (10:13 PM)     AT  Will submit prior auth for oxycodone and oxycontin.     Dr. Waggoner         Note

## 2024-01-10 NOTE — TELEPHONE ENCOUNTER
Prior Authorization Not Needed per Insurance    Medication: OXYCODONE HCL 5 MG PO TABS  Insurance Company: [place] Medicaid ([facility]) - Phone [phone] Fax [phone]  Expected CoPay: $    Pharmacy Filling the Rx: [facility] - [place], [place] - [address] [place] [address]  Pharmacy Notified: Yes  Patient Notified: Yes

## 2024-01-10 NOTE — PATIENT INSTRUCTIONS
"Recommendations from today's MTM visit:                                                         1. Stay on Bydureon bcise weekly , let me know via [employer] when on new insurance in Feb  or march --we can recheck to see if either ozempic or mounjaro will be back on formulary?        Follow-up:  Via [employer] message after new insurance added to system .     It was great speaking with you today.  I value your experience and would be very thankful for your time in providing feedback in our clinic survey. In the next few days, you may receive an email or text message from [employer] with a link to a survey related to your  clinical pharmacist.\"     To schedule another MTM appointment, please call the clinic directly or you may call the MTM scheduling line at [phone] or toll-free at [phone].     My Clinical Pharmacist's contact information:                                                      Please feel free to contact me with any questions or concerns you have.      Dacia Myers Rph.  Medication Therapy Management Provider  [phone]    "

## 2024-01-10 NOTE — Clinical Note
Loc--harriet mackenzie but wants ozempic --[place] yet [place]ary-will retry in feb/march --for now will stay on weekly bydureon --hannah Fontanez

## 2024-01-10 NOTE — TELEPHONE ENCOUNTER
PA Initiation    Medication: OXYCODONE HCL 5 MG PO TABS  Insurance Company: [place] Medicaid ([facility]) - Phone [phone] Fax [phone]  Pharmacy Filling the Rx: [facility] - [place], [place] - [address]  Filling Pharmacy Phone: [phone]  Filling Pharmacy Fax:    Start Date: 1/10/2024

## 2024-01-24 ENCOUNTER — OFFICE VISIT (OUTPATIENT)
Dept: FAMILY MEDICINE | Facility: CLINIC | Age: 49
End: 2024-01-24
Payer: MEDICAID

## 2024-01-24 VITALS
OXYGEN SATURATION: 98 % | SYSTOLIC BLOOD PRESSURE: 97 MMHG | DIASTOLIC BLOOD PRESSURE: 66 MMHG | HEIGHT: 67 IN | HEART RATE: 76 BPM | RESPIRATION RATE: 16 BRPM | TEMPERATURE: 97.7 F | BODY MASS INDEX: 32.52 KG/M2 | WEIGHT: 207.2 LBS

## 2024-01-24 DIAGNOSIS — Z23 IMMUNIZATION DUE: ICD-10-CM

## 2024-01-24 DIAGNOSIS — E11.9 TYPE 2 DIABETES MELLITUS WITHOUT COMPLICATION, WITHOUT LONG-TERM CURRENT USE OF INSULIN (H): ICD-10-CM

## 2024-01-24 DIAGNOSIS — G56.01 CARPAL TUNNEL SYNDROME OF RIGHT WRIST: Primary | ICD-10-CM

## 2024-01-24 PROCEDURE — 99215 OFFICE O/P EST HI 40 MIN: CPT | Mod: 25

## 2024-01-24 PROCEDURE — 91320 SARSCV2 VAC 30MCG TRS-SUC IM: CPT

## 2024-01-24 PROCEDURE — 90480 ADMN SARSCOV2 VAC 1/ONLY CMP: CPT

## 2024-01-24 RX ORDER — PREDNISONE 20 MG/1
40 TABLET ORAL DAILY
Qty: 10 TABLET | Refills: 0 | Status: SHIPPED | OUTPATIENT
Start: 2024-01-24 | End: 2024-01-31

## 2024-01-24 NOTE — PROGRESS NOTES
Assessment & Plan     (G56.01) Carpal tunnel syndrome of right wrist  (primary encounter diagnosis)  Comment: New and worsening.  Positive Phalen's test inducing numbness and tingling in the right upper extremity helps to confirm concern for carpal tunnel and median nerve impingement.  No concern at this time for joint effusion considering HPI and physical exam findings.  Findings are intermittent, also concerns for need for immediate medical attention or surgical repair are not necessary at this time.  No concern for osteoarthritis or rheumatoid arthritis.  Patient does have some very mild discomfort in her fingers, but other signs and symptoms do not align well with a diagnosis of RA, especially considering that this is unilateral concern. If symptoms change to include boggy joint spaces in the morning, and/or bilateral concerns, may consider doing a rheumatoid factor and TIGIST to rule out concerns for RA  No need for immediate imaging.  Justifiable to move forward with more conservative therapy and follow-up in 1 to 2 months.  That 1 to 2 months course of treatment is not helpful to manage symptoms, may consider discussion about steroid injection in the wrist and/or referral for possible surgical release.   Plan: predniSONE (DELTASONE) 20 MG tablet  -Brace affected wrist at all hours of the night, and during hours of the day when increased hand use is necessary  -Can utilize ibuprofen and Tylenol to manage discomfort as needed    (E11.9) Type 2 diabetes mellitus without complication, without long-term current use of insulin (H)  Comment: Well-controlled.  A1c 1 month ago was 6.0.  Patient has no missed days of her triple therapy for type 2 diabetes management.  Wears a CGM and closely monitors her glucoses throughout the day.  Has had no episodes of hyperglycemia or hypoglycemia with current regimen.  Short burst of steroids should be safe for patient, considering how well-controlled her diabetes is.  No concern  at this time for impact or drastically increased blood sugars related to 5-day course of prednisone.  Encourage patient to continue to monitor blood sugars throughout the course, and follow-up in 1 to 2 weeks if sugars remain drastically elevated over 200 consistently despite maintaining her current medication regimen.  Should follow-up at her regular visit for further diabetes assessment and management with an A1c in 2 months.  Plan: Continue to monitor sugars closely and follow-up with primary care provider for further assessment and management of diabetes    (Z23) Immunization due  Comment: No history of adverse reaction to vaccinations  Plan: COVID-19 12+ (2023-24) (PFIZER)      Prescription drug management  I spent a total of 41 minutes on the day of the visit.   Time spent by me doing chart review, history and exam, documentation and further activities per the note      FUTURE APPOINTMENTS:       - Follow-up visit in 1 to 2 months with PCP if symptoms worsen or do not improve       - Follow-up for annual visit or as needed  Patient Instructions   Your concerns are likely related to carpal tunnel.  I have attached some education materials to your after visit summary about what carpal tunnel is as well as some exercises that you can use to help manage some of your symptoms with carpal tunnel.    There are couple things that we can do to help manage her symptoms to begin with.  Considering that your diabetes is extremely well-managed, it should be safe for you to do a very short course of oral steroids to see if we can manage some of the extra inflammation that is causing some of the symptoms of your carpal tunnel.  You will take this once a day for 5 days.  I would like you to monitor your blood sugars very closely throughout this time, and continue to monitor and the week after to ensure that your blood sugars begin to return back to their normal readings.  If you continue to have very high readings  consistently over 200 after this time, please follow-up with your primary care provider for further evaluation and management.    I would also like you to begin using a wrist brace.  This will be most helpful when you wear it for all hours of the night while sleeping, and also during the day at any time when you are using your hands.  Examples of this would be times when you are writing, knitting, cooking, gardening, or even at work.  This will help to reduce the frequency of pinching off your median nerve, which in turn will help to reduce the frequency of sensation such as numbness and tingling in your hands.    I would like you to follow-up after 1 to 2 months of using this treatment plan if symptoms worsen or do not improve.    Please seek immediate medical attention with symptoms including fever, chills, redness or significant swelling in the extremity, open sores or lesions in the extremity, complete loss of sensation that does not resolve, chest pain, shortness of breath, or weakness in the extremities    Emily Rubi is a 49 year old, presenting for the following health issues:  office visit  and Recheck Medication (Pt is here for office visit )      1/24/2024     7:14 AM   Additional Questions   Roomed by valeriano   Elicia is a 49-year-old female with a past medical history significant for migraine, chronic low back pain, myofascial pain syndrome, tension type headaches, GERD, hepatic adenoma, nonalcoholic fatty liver disease, IBS, obesity, hypothyroidism, type 2 diabetes, depression, bipolar disorder, and former tobacco dependence who presents today with concerns for numbness and tingling in the right upper extremity.  Patient reports that approximately 1 month ago she began having episodes where her right hand, wrist, and forearm would become numb, tingling, and painful.  She describes these episodes to be very intermittent, and generally happen when she is sleeping at night, or if she is sitting on  the couch doing an activity with her hands such as writing.  She reports that they seem to have become more frequent as the month is gone on, and she generally wakes up about 1 time each night with this sensation.  She reports that it takes about 1 hour for the feeling to resolve, and describes the feeling as numbness, tingling, and sharp pain.  She rates the pain at a 9 out of 10.  She has periods of complete comfortability without any pain or abnormal sensations in her extremity between these episodes.  She notes that it is localized to her right hand, wrist, and forearm, and declines any concern about her left upper extremity.  She reports that it is most aggravated by sleeping in certain positions at night when her arms curled up under her, or when she uses her hands for activities such as writing or fine motor dexterity needs.  She has tried using a wrist brace once at night, but reports she is unsure how well this worked, she woke up and had remove the brace in her sleep.  She has had a previous history of what was thought to be either carpal tunnel or tendinitis of the wrist and elbows, an EMG was done which was nondiagnostic, and she reports that there was not much follow-up after this time.  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  She declines any other neurological concerns such as weakness in her extremities, shoulder pain, neck pain, blurry or double vision, loss of hearing, loss of sensation in her face, changes in her cognition, word finding difficulty, pain that shoots down her back or into her legs, loss of her bowel or bladder, or changes in her gait.  Eyes any cardiac concerns such as chest pain, shortness of breath, lightheadedness or dizziness, potation's, or fluttering in her heart.  She declines any signs of systemic illness such as nausea, vomiting, diarrhea, fever, chills, weight loss, cough, runny nose, or sinus congestion.  She  declines any signs of joint infection such as swelling, redness, heat, or open sores at the site.  She declines any loss of range of motion.  Patient does note that occasionally her fingers in her right hand feel mildly stiff, and the joints feel a bit uncomfortable.  Given her other musculoskeletal concerns, she takes a number of medications including muscle relaxers to manage these discomforts, but notes she has not tried anything more specific for her hand and wrist pain.    Patient does have a history of type 2 diabetes which she manages with 2000 mg of metformin daily, 25 mg of Jardiance daily, and 2 mg of exenatide ER once weekly.  She wears a CGM, and closely monitors her blood sugars, and notes that since adding on the GLP-1 her sugars have never been greater than 200 or less than 70.  She does her best to abide by a low-carb diet, and feels that she is in a good place with adequately managing her sugars.  Patient reports that in July prior to her scheduled surgery her A1c was noted to be nearly 10.8, and since this time she has been much more diligent about close management of her sugars.  Just 1 month ago her A1c was 6.0.    History of Present Illness       Reason for visit:  Hand/arm falling asleep while sleeping and it waking me up with severe pain  Symptom onset:  1-2 weeks ago  Symptoms include:  Numbness, tingling, pain  Symptom intensity:  Severe  Symptom progression:  Worsening  Had these symptoms before:  No    She eats 2-3 servings of fruits and vegetables daily.She consumes 0 sweetened beverage(s) daily.She exercises with enough effort to increase her heart rate 9 or less minutes per day.  She exercises with enough effort to increase her heart rate 3 or less days per week. She is missing 1 dose(s) of medications per week.  She is not taking prescribed medications regularly due to remembering to take.        Review of Systems  Constitutional, HEENT, cardiovascular, pulmonary, gi and gu systems are  "negative, except as otherwise noted.      Objective    BP 92/61   Pulse 76   Temp 97.7  F (36.5  C) (Oral)   Resp 16   Ht 1.702 m (5' 7\")   Wt 94 kg (207 lb 3.2 oz)   LMP 12/27/2023 (Approximate)   SpO2 98%   BMI 32.45 kg/m    Body mass index is 32.45 kg/m .  Physical Exam   GENERAL: alert and no distress  NECK: no adenopathy, no asymmetry, masses, or scars  RESP: lungs clear to auscultation - no rales, rhonchi or wheezes  CV: regular rate and rhythm, normal S1 S2, no S3 or S4, no murmur, click or rub, no peripheral edema  ABDOMEN: soft, nontender, no hepatosplenomegaly, no masses and bowel sounds normal  MS: normal muscle tone, normal range of motion, no cyanosis, clubbing, or edema, no varicosities, no edema, peripheral pulses normal, no tenderness to palpation gait normal, no ataxia, RUE exam shows normal strength and muscle mass, no deformities, no erythema, induration, or nodules, no evidence of joint effusion, ROM of all joints is normal, no evidence of joint instability, and positive Phalen's test in right upper extremity inducing numbness and tingling after 45 seconds, and LUE exam shows normal strength and muscle mass, no deformities, no erythema, induration, or nodules, no evidence of joint effusion, ROM of all joints is normal, no evidence of joint instability, and no numbness or tingling induced with Phalen test and left upper extremity  SKIN: no suspicious lesions or rashes  NEURO: Normal strength and tone, mentation intact and speech normal  PSYCH: mentation appears normal, affect normal/bright    Anuja Orlando DNP FNP-C  Family Nurse Practitioner - Same Day Provider  [facility] [place]          Signed Electronically by: CANDACE Parada CNP    "

## 2024-01-24 NOTE — PATIENT INSTRUCTIONS
Your concerns are likely related to carpal tunnel.  I have attached some education materials to your after visit summary about what carpal tunnel is as well as some exercises that you can use to help manage some of your symptoms with carpal tunnel.    There are couple things that we can do to help manage her symptoms to begin with.  Considering that your diabetes is extremely well-managed, it should be safe for you to do a very short course of oral steroids to see if we can manage some of the extra inflammation that is causing some of the symptoms of your carpal tunnel.  You will take this once a day for 5 days.  I would like you to monitor your blood sugars very closely throughout this time, and continue to monitor and the week after to ensure that your blood sugars begin to return back to their normal readings.  If you continue to have very high readings consistently over 200 after this time, please follow-up with your primary care provider for further evaluation and management.    I would also like you to begin using a wrist brace.  This will be most helpful when you wear it for all hours of the night while sleeping, and also during the day at any time when you are using your hands.  Examples of this would be times when you are writing, knitting, cooking, gardening, or even at work.  This will help to reduce the frequency of pinching off your median nerve, which in turn will help to reduce the frequency of sensation such as numbness and tingling in your hands.    I would like you to follow-up after 1 to 2 months of using this treatment plan if symptoms worsen or do not improve.    Please seek immediate medical attention with symptoms including fever, chills, redness or significant swelling in the extremity, open sores or lesions in the extremity, complete loss of sensation that does not resolve, chest pain, shortness of breath, or weakness in the extremities

## 2024-01-27 DIAGNOSIS — R60.0 LOCALIZED EDEMA: ICD-10-CM

## 2024-01-29 RX ORDER — HYDROCHLOROTHIAZIDE 12.5 MG/1
1 CAPSULE ORAL DAILY
Qty: 90 CAPSULE | Refills: 0 | OUTPATIENT
Start: 2024-01-29

## 2024-01-30 PROBLEM — K58.9 IRRITABLE BOWEL SYNDROME, UNSPECIFIED TYPE: Status: RESOLVED | Noted: 2022-11-28 | Resolved: 2024-01-30

## 2024-01-31 ENCOUNTER — VIRTUAL VISIT (OUTPATIENT)
Dept: FAMILY MEDICINE | Facility: CLINIC | Age: 49
End: 2024-01-31
Payer: MEDICAID

## 2024-01-31 DIAGNOSIS — G89.4 CHRONIC PAIN DISORDER: Primary | ICD-10-CM

## 2024-01-31 DIAGNOSIS — F11.90 CHRONIC, CONTINUOUS USE OF OPIOIDS: ICD-10-CM

## 2024-01-31 DIAGNOSIS — M25.571 PAIN IN JOINT INVOLVING ANKLE AND FOOT, RIGHT: ICD-10-CM

## 2024-01-31 PROCEDURE — 99214 OFFICE O/P EST MOD 30 MIN: CPT | Mod: 95 | Performed by: STUDENT IN AN ORGANIZED HEALTH CARE EDUCATION/TRAINING PROGRAM

## 2024-01-31 RX ORDER — OXYCODONE HYDROCHLORIDE 5 MG/1
5-10 TABLET ORAL EVERY 4 HOURS PRN
Qty: 150 TABLET | Refills: 0 | Status: SHIPPED | OUTPATIENT
Start: 2024-02-01 | End: 2024-02-16

## 2024-01-31 RX ORDER — OXYCODONE HCL 10 MG/1
10 TABLET, FILM COATED, EXTENDED RELEASE ORAL EVERY 12 HOURS
Qty: 60 TABLET | Refills: 0 | Status: SHIPPED | OUTPATIENT
Start: 2024-02-06 | End: 2024-02-16

## 2024-01-31 NOTE — PROGRESS NOTES
Elicia is a 49 year old who is being evaluated via a billable video visit.      How would you like to obtain your AVS? MyChart  If the video visit is dropped, the invitation should be resent by: Text to cell phone: [phone]  Will anyone else be joining your video visit? No    Assessment & Plan     Chronic pain disorder  Pain in joint involving ankle and foot, right  Chronic, continuous use of opioids  History of chronic foot pain, has been on continuous oxycodone for many years. S/P ankle tendon/arch surgery on 10/19 which went well. She is now weight bearing as tolerated in brace, doing PT. Following with [facility]. Discussed need to wean opioids, due to high MME, risk of tolerance and oversedation. No side effects. Has narcan available at home PRN.    Pain is improving, but worse after PT due to limited movement/mobility of the ankle. Taking ibuprofen BID, tylenol TID, and muscle relaxer. 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 Worried about the pain.    Plan:  -discussed goal is to wean opioids; elected to not make changes today as she is returning to work which we anticipate will flare the pain  -goal is to return to the amount of oxycodone she was taking pre-surgery, which was a total of short acting oxycodone 20mg daily    -non-opioid:  -schedule tylenol 1000mg TID  -schedule ibuprofen 800mg TID (increase from BID to TID)  -continue tizanidine PRN   -continue gabapentin 1200mg TID  -elevate, ice  -opioid:   -continue long acting oxycontin 10mg BID (MME 25)  -continue oxycodone 5mg q4hr PRN (MME 25-31MME)  -encouraged her to use 15-20mg of oxycodone per day  -PDMP checked, appropriate  -current MME: 50-56    Follow up in 1 month    - oxyCODONE (OXYCONTIN) 10 MG 12 hr tablet  Dispense: 60 tablet; Refill: 0  - oxyCODONE (ROXICODONE) 5 MG tablet  Dispense: 150 tablet; Refill: 0        Subjective   Elicia is a 49 year old, presenting for the following health  issues:  Recheck Medication        1/31/2024     1:11 PM   Additional Questions   Roomed by Nichole LOMBARDO     History of Present Illness       Reason for visit:  Hand/arm falling asleep while sleeping and it waking me up with severe pain  Symptom onset:  1-2 weeks ago  Symptoms include:  Numbness, tingling, pain  Symptom intensity:  Severe  Symptom progression:  Worsening  Had these symptoms before:  No    She eats 2-3 servings of fruits and vegetables daily.She consumes 0 sweetened beverage(s) daily.She exercises with enough effort to increase her heart rate 9 or less minutes per day.  She exercises with enough effort to increase her heart rate 3 or less days per week. She is missing 1 dose(s) of medications per week.  She is not taking prescribed medications regularly due to remembering to take.     Today  Pain improving but still there  PT flares it up due to lack of movement  Ibuprofen BID, tylenol TID  oxycontine BID  oxycodone 20mg (somedays will take 25mg daily)  going back to work next week Wednesday (full time)   -work-pharm tech, may be all standing unsure yet  Last filled on 1/11 and 1/6  Prior to worsening was taking 20mg oxycodone        Objective       Vitals:  No vitals were obtained today due to virtual visit.    Physical Exam   GENERAL: alert and no distress  EYES: Eyes grossly normal to inspection.  No discharge or erythema, or obvious scleral/conjunctival abnormalities.  RESP: No audible wheeze, cough, or visible cyanosis.    SKIN: Visible skin clear. No significant rash, abnormal pigmentation or lesions.  NEURO: Cranial nerves grossly intact.  Mentation and speech appropriate for age.  PSYCH: Appropriate affect, tone, and pace of words      Video-Visit Details    Type of service:  Video Visit   Video Start Time:  1:42pm  Video End Time: 2:01pm    Originating Location (pt. Location): Home  Distant Location (provider location):  On-site  Platform used for Video Visit: Diana  Signed Electronically by:  Loc Waggoner, DO

## 2024-02-02 ENCOUNTER — TRANSFERRED RECORDS (OUTPATIENT)
Dept: HEALTH INFORMATION MANAGEMENT | Facility: CLINIC | Age: 49
End: 2024-02-02
Payer: COMMERCIAL

## 2024-02-05 ENCOUNTER — MYC MEDICAL ADVICE (OUTPATIENT)
Dept: FAMILY MEDICINE | Facility: CLINIC | Age: 49
End: 2024-02-05
Payer: COMMERCIAL

## 2024-02-05 ENCOUNTER — TELEPHONE (OUTPATIENT)
Dept: FAMILY MEDICINE | Facility: CLINIC | Age: 49
End: 2024-02-05
Payer: COMMERCIAL

## 2024-02-05 DIAGNOSIS — M25.571 PAIN IN JOINT INVOLVING ANKLE AND FOOT, RIGHT: Primary | ICD-10-CM

## 2024-02-05 DIAGNOSIS — M25.571 PAIN IN JOINT INVOLVING ANKLE AND FOOT, RIGHT: ICD-10-CM

## 2024-02-05 DIAGNOSIS — G43.709 CHRONIC MIGRAINE WITHOUT AURA WITHOUT STATUS MIGRAINOSUS, NOT INTRACTABLE: ICD-10-CM

## 2024-02-05 NOTE — TELEPHONE ENCOUNTER
PA Initiation    Medication: GABAPENTIN 300 MG PO CAPS  Insurance Company: [employer] ([facility]) - Phone [phone] Fax [phone]  Pharmacy Filling the Rx: [facility] - [place], [place] - [address] [facility]  Filling Pharmacy Phone: [phone]  Filling Pharmacy Fax:    Start Date: 2/5/2024

## 2024-02-06 DIAGNOSIS — G56.01 CARPAL TUNNEL SYNDROME OF RIGHT WRIST: Primary | ICD-10-CM

## 2024-02-06 NOTE — TELEPHONE ENCOUNTER
PRIOR AUTHORIZATION DENIED    Medication: GABAPENTIN 300 MG PO CAPS  Insurance Company: [employer] ([facility]) - Phone [phone] Fax [phone]  Denial Date: 2/6/2024  Denial Reason(s): Needs to use a Higher strength capsule or tablet      Appeal Information:   Patient Notified: No

## 2024-02-06 NOTE — TELEPHONE ENCOUNTER
Patient was seen 1/31/24 for joint pain [right ankle/foot]. States discussed pursuing a steroid injection and she would like a referral. Order pended for review. Thank you.    Anju Magdaleno RN, BSN, PHN  [facility]  [phone]

## 2024-02-06 NOTE — PROGRESS NOTES
"Video-Visit Details    Type of service:  Video Visit    Video Start Time: 8:26 AM  Video End Time: 8:40 AM     Originating Location (pt. Location): Home    Distant Location (provider location):  Offsite (providers home) [facility] [place]     Platform used for Video Visit: Well      Weight Management Nutrition Consultation    Elicia Calero is a 49 year old female presents today for returnweight management nutrition consultation.  Patient referred by Geovanna Morgan NP on 2023.    Patient with Co-morbidities of obesity includin/19/2023     8:28 AM   --   I have the following health issues associated with obesity Type II Diabetes    High Cholesterol    GERD (Reflux)    Fatty Liver    Stress Incontinence    Hypothyroidism   I have the following symptoms associated with obesity Depression         Anthropometrics:  Estimated body mass index is 34.04 kg/m  as calculated from the following:    Height as of 23: 1.715 m (5' 7.5\").    Weight as of 23: 100.1 kg (220 lb 9.6 oz).    Current:  Estimated body mass index is 32.1 kg/m  as calculated from the following:    Height as of this encounter: 1.702 m (5' 7.01\").    Weight as of this encounter: 93 kg (205 lb). (+10 lbs from last month, - 25 lbs from initial)       Medications for Weight Loss:  Mounjaro previously. On Bydureon d/t insurance change.   Metformin and Jardiance for DMII  H/o BED managed with topiramate and vyvanse.    NUTRITION HISTORY  Food allergies: None  Food intolerances: None  Dislikes textures of cottage cheese, tomatoes, hummus, guac, salmon.    Vitamin/Mineral Supplements: Vitamin B, folic acid     Previous methods of diet modification for weight loss: Had met with RD when first dx with DMII about 10 years ago. More recently, lost 60-70lb with calorie restriction (1200 wilberto/day) and carb counting but this did not feel sustainable and ultimately regained weight.     Working on less " snacking/binging at night. Going to bed sooner has helped with this.   Has been limiting her carb intake to <70 gm per day, and tends to save for later meal and evening snacks.   Preferences: casseroles, breads, dairy, potato. Not a huge fan of protein.     9/20/23 HgbA1c: 7.5 - down from 10.8 on 6/28 11/7/23 - Had ankle surgery three weeks ago. Has to stay off her feet for 6 weeks total. Then will start PT. Continues to replace one meal daily with protein shake. Focusing on smaller portions at dinner. Continues to count carbs. Will be returning to work mid-Dec.    12/14/23: HgbA1c: 6    12/21/23 -  She has noticed a little more appetite since being on Bydureon from Mounjaro, but has been manageable. Continues to work on limiting junk foods, can be challenging at times to meet protein needs. Will do protein shake, yogurt with protein powder, deli meat, chicken, and adds collagen powder to her Crystal Lite. Aiming to have lunch be her larger meal. Continues to do well with smaller portions of crabs and low carb diet.   Just stared bearing weight on ankle. Will start PT in Jan.      Today - More sweet cravings. Has experienced wt regain since switching from Mounjaro to Bydureon. Finding helpful to have a pre-portioned sweet like Kind bar thins or fruit available.   Ankle fully healed and just started work again. Long work hours at discharge pharmacy.      Recent Diet Recall:  Adding collagen powder to crystal lite in the morning.   Breakfast: 5-7 am protein shake (Orgain with 2% milk); 1 serving Kashi high-fiber cereal w/o milk.  10 am snack: Kind bar   Lunch: 2 pm salad with ham and dried cranberries and nuts   4 pm: Kind bar  Dinner: 6:30 pm hot dog with fries and veggies   Beverages: Water (52+ oz/day); Crystal lite with collagen powder (30 gm pro).    Dining Out/take-out: Not often, couple times per month.     Progress Towards Previous Goals:  1) Aim for 20-30 gm protein per meal (total 60-90 gm per day). Limit  carbs to 45 gm per meal. - Met, continues   2) Ensure you are drinking 64+ oz fluid per day    Physical Activity:  Just starting back at work. Does PT exercises twice daily.     Nutrition Prescription  Recommended energy/nutrient modification.    Nutrition Diagnosis  Obesity r/t long history of positive energy balance aeb BMI >30. - Improving    Nutrition Intervention  Materials/education provided on hypocaloric diet for weight loss and low/moderate carb diet for mgmt of DMII.   Reviewed progress towards previous goals.   Praised pt on the many positive changes she has made to diet.  Reviewed mindful eating, portion control  Co-developed goals to work towards.   Provided pt with list of goals and resources below via [employer].     Expected Engagement: good  Follow-Up Plans: meal/snack planning        Nutrition Goals  1) Continue to plan and portion sweets/snacks.   2) Increase physical activity as able      Follow-Up:  6 weeks, PRN    Time spent with patient: 14 minutes.  Jeri Hernández RD, LD

## 2024-02-08 ENCOUNTER — VIRTUAL VISIT (OUTPATIENT)
Dept: ENDOCRINOLOGY | Facility: CLINIC | Age: 49
End: 2024-02-08
Payer: COMMERCIAL

## 2024-02-08 ENCOUNTER — TELEPHONE (OUTPATIENT)
Dept: FAMILY MEDICINE | Facility: CLINIC | Age: 49
End: 2024-02-08

## 2024-02-08 VITALS — BODY MASS INDEX: 32.18 KG/M2 | HEIGHT: 67 IN | WEIGHT: 205 LBS

## 2024-02-08 DIAGNOSIS — E11.9 TYPE 2 DIABETES MELLITUS WITHOUT COMPLICATION, WITHOUT LONG-TERM CURRENT USE OF INSULIN (H): ICD-10-CM

## 2024-02-08 DIAGNOSIS — Z71.3 NUTRITIONAL COUNSELING: ICD-10-CM

## 2024-02-08 DIAGNOSIS — E66.9 OBESITY (BMI 30-39.9): Primary | ICD-10-CM

## 2024-02-08 PROCEDURE — 97803 MED NUTRITION INDIV SUBSEQ: CPT | Mod: 95 | Performed by: DIETITIAN, REGISTERED

## 2024-02-08 PROCEDURE — 99207 PR NO CHARGE LOS: CPT | Mod: 95 | Performed by: DIETITIAN, REGISTERED

## 2024-02-08 ASSESSMENT — PAIN SCALES - GENERAL: PAINLEVEL: MODERATE PAIN (4)

## 2024-02-08 NOTE — TELEPHONE ENCOUNTER
Prior Authorization Retail Medication Request    Medication/Dose: Oxycodone  Diagnosis and ICD code (if different than what is on RX):    New/renewal/insurance change PA/secondary ins. PA:  Rationale:     Insurance: [facility]   Primary:  Insurance ID: [id number]    Secondary (if applicable)  Insurance ID:     Pharmacy Information (if different than what is on RX)  Name: ANDREI Sandoval  Phone: [phone]  Fax:  **Prior auth needed to exceed plans limits. New insurance sees this as opioid naive Rx.**    Thank You,    Roselia Luna, [facility] ] Department   [facility]  [email or web address]

## 2024-02-08 NOTE — PATIENT INSTRUCTIONS
Carlos Rubi,     Follow-up with RD on 4/4    Thank you,    Jeri Hernández, BLUE, LD  If you would like to schedule or reschedule an appointment with the RD, please call [phone]    Nutrition Goals  1) Continue to plan and portion sweets/snacks.   2) Increase physical activity as able      COMPREHENSIVE WEIGHT MANAGEMENT PROGRAM  VIRTUAL SUPPORT GROUPS    At [facility], our Comprehensive Weight Management program offers on-line support groups for patients who are working on weight loss and considering, preparing for, or have had weight loss surgery.     There is no cost for this opportunity.  You are invited to attend the?Virtual Support Groups?provided by any of the following locations:    [facility] via Microsoft Teams with Viky Venegas RN  2.   [place] via CRMnext with Bradford Peña, PhD, LP  3.   [place] via CRMnext with Joyce Tate RN  4.   [facility] via a Zoom Meeting with HEATH Chacko    The following Support Group information can also be found on our website:  [email or web address]      [facility] Weight Loss Surgery Support Group  The support group is a patient-lead forum that meets monthly to share experiences, encouragement and education. It is open to those who have had weight loss surgery, are scheduled for surgery, or are considering surgery.   WHEN: This group meets on the 3rd Wednesday of each month from 5:00PM - 6:00PM virtually using Microsoft Teams.   FACILITATOR: Led by Viky Gomes RD, MAZIN, RN, the program's Clinical Coordinator.   TO REGISTER: Please contact the clinic via [employer] or call the nurse line directly at [phone] to inform our staff that you would like an invite sent to you and to let us know the email you would like the invite sent to. Prior to the meeting, a link with directions on how to join the meeting will be sent to you.    2023 and 2024  "Meetings   December 20  January 17  February 21  March 20  April 17  May 15  Cristina 19      [facility] Bariatric Care Support Group?  This is open to all pre- and post- operative bariatric surgery patients as well as their support system.   WHEN: This group meets the 3rd Tuesday of each month from 6:30 PM - 8:00 PM virtually using Microsoft Teams.   FACILITATOR: Led by Bradford Peña, Ph.D who is a Licensed Psychologist with the [facility] Comprehensive Weight Management Program.   TO REGISTER: Please send an email to Bradford Peañ, Ph.D.,  [email or web address]?if you would like an invitation to the group. Prior to the meeting, a link with directions on how to join the meeting will be sent to you.    2023 and 2024 Meetings  December 19 January 16: \"Medication Management and Bariatric Surgery\", Iram Csat, PharmD, Pharmacy Resident at [facility]  February 20: \"A Bariatric Surgery Patient's Perspective\", REE Henley, [facility], Behavioral Health Clinician at [facility]  March 19  April 16  May 21  Cristina 18: \"Nutritional Labeling\", Dietitian speaker to be announced.  November 19: \"Holiday Eating\", Dietitian speaker to be announced.    [facility] Post-Operative Bariatric Surgery Support Group  This is a support group for [facility] bariatric patients (and those external to [facility]) who have had bariatric surgery and are at least 3 months post-surgery.  WHEN: This support group meets the 4th Wednesday of the month from 11:00 AM - 12:00 PM virtually using Microsoft Teams.   FACILITATOR: Led by Certified Bariatric Nurse, Joyce Tate RN.   TO REGISTER: Please send an email to Joyce at [email or web address] if you would like an invitation to the group.  Prior to the meeting, a link with directions on how to join the meeting " "will be sent to you.    2023 and 2024 Meetings  December 27  January 24  February 28  March 27  April 24  May 22  Cristina 26    [facility] Healthy Lifestyle Group?  This is a 60 minute virtual coaching group for those who want to lead a healthier lifestyle. Come together to set goals and overcome barriers in a supportive group environment. We will address the four pillars of health: nutrition, exercise, sleep and emotional well-being.  This group is highly recommended for those who are participating in the 24 week Healthy Lifestyle Plan and our Health Coaching sessions.  WHEN: This group meets the 1st Friday of the month, 12:30 PM - 1:30 PM online, via a zoom meeting.    FACILITATOR: Led by National Board Certified Health and , Joyce Dunbar [facility].   TO REGISTER: Please call the Call Center at [phone] to register.  You will get an appointment to attend in [place]. Fifteen minutes prior to the meeting, complete the e-check in and you will get the link to join the meeting.    There is no charge to attend this group and space is limited.     2023 and 2024 Meetings  December 1: \"Let's Talk\" (guided discussion on our wins and challenges)  January 5: \"New Years Vision: Manifest your Best 2024!\" (guided imagery,  journaling and discussion)  February 2: \"Let's Talk\"  March 1: \"10 Percent Happier\" by Kartik Arenas (Book Bites - a guided discussion on the nuggets of wisdom from favorite wellness books, no need to read the book but highly encouraged)  April 5: \"Let's Talk\"  May 3: \"Essentialism: The Disciplined Pursuit of Less\" by Flo Mayes (Book Bites discussion)  June 7: \"Let's Talk\"  July 5: NO MEETING, off for the 4th of July Holiday  August 2: \"The Blue Zones, Secrets for Living a Longer Life\" by Kartik Tyler (Book Bites discussion)                    "

## 2024-02-08 NOTE — NURSING NOTE
Is the patient currently in the state of [place]? YES    Visit mode:VIDEO    If the visit is dropped, the patient can be reconnected by: VIDEO VISIT: Send to e-mail at: [email or web address]    Will anyone else be joining the visit? NO  (If patient encounters technical issues they should call [phone]    How would you like to obtain your AVS? MyChart    Are changes needed to the allergy or medication list? N/A    Reason for visit: RECHECK ([facility] Nutrition)    Nargis SAMUEL

## 2024-02-08 NOTE — LETTER
"2024       RE: Elicia Calero  [address]  [place] [address]     Dear Colleague,    Thank you for referring your patient, Elicia Calero, to the [facility] WEIGHT MANAGEMENT CLINIC [place] at [facility]. Please see a copy of my visit note below.    Video-Visit Details    Type of service:  Video Visit    Video Start Time: 8:26 AM  Video End Time: 8:40 AM     Originating Location (pt. Location): Home    Distant Location (provider location):  Offsite (providers home) [facility] WEIGHT MANAGEMENT CLINIC [place]     Platform used for Video Visit: [employer]te      Weight Management Nutrition Consultation    Elicia Calero is a 49 year old female presents today for returnweight management nutrition consultation.  Patient referred by Geovanna Morgan NP on 2023.    Patient with Co-morbidities of obesity includin/19/2023     8:28 AM   --   I have the following health issues associated with obesity Type II Diabetes    High Cholesterol    GERD (Reflux)    Fatty Liver    Stress Incontinence    Hypothyroidism   I have the following symptoms associated with obesity Depression         Anthropometrics:  Estimated body mass index is 34.04 kg/m  as calculated from the following:    Height as of 23: 1.715 m (5' 7.5\").    Weight as of 23: 100.1 kg (220 lb 9.6 oz).    Current:  Estimated body mass index is 32.1 kg/m  as calculated from the following:    Height as of this encounter: 1.702 m (5' 7.01\").    Weight as of this encounter: 93 kg (205 lb). (+10 lbs from last month, - 25 lbs from initial)       Medications for Weight Loss:  Mounjaro previously. On Bydureon d/t insurance change.   Metformin and Jardiance for DMII  H/o BED managed with topiramate and vyvanse.    NUTRITION HISTORY  Food allergies: None  Food intolerances: None  Dislikes textures of cottage cheese, tomatoes, hummus, guac, salmon.    Vitamin/Mineral Supplements: " Vitamin B, folic acid     Previous methods of diet modification for weight loss: Had met with RD when first dx with DMII about 10 years ago. More recently, lost 60-70lb with calorie restriction (1200 wilberto/day) and carb counting but this did not feel sustainable and ultimately regained weight.     Working on less snacking/binging at night. Going to bed sooner has helped with this.   Has been limiting her carb intake to <70 gm per day, and tends to save for later meal and evening snacks.   Preferences: casseroles, breads, dairy, potato. Not a huge fan of protein.     9/20/23 HgbA1c: 7.5 - down from 10.8 on 6/28 11/7/23 - Had ankle surgery three weeks ago. Has to stay off her feet for 6 weeks total. Then will start PT. Continues to replace one meal daily with protein shake. Focusing on smaller portions at dinner. Continues to count carbs. Will be returning to work mid-Dec.    12/14/23: HgbA1c: 6    12/21/23 -  She has noticed a little more appetite since being on Bydureon from Mounjaro, but has been manageable. Continues to work on limiting junk foods, can be challenging at times to meet protein needs. Will do protein shake, yogurt with protein powder, deli meat, chicken, and adds collagen powder to her Crystal Lite. Aiming to have lunch be her larger meal. Continues to do well with smaller portions of crabs and low carb diet.   Just stared bearing weight on ankle. Will start PT in Jan.      Today - More sweet cravings. Has experienced wt regain since switching from Mounjaro to Bydureon. Finding helpful to have a pre-portioned sweet like Kind bar thins or fruit available.   Ankle fully healed and just started work again. Long work hours at discharge pharmacy.      Recent Diet Recall:  Adding collagen powder to crystal lite in the morning.   Breakfast: 5-7 am protein shake (Orgain with 2% milk); 1 serving Kashi high-fiber cereal w/o milk.  10 am snack: Kind bar   Lunch: 2 pm salad with ham and dried cranberries and  nuts   4 pm: Kind bar  Dinner: 6:30 pm hot dog with fries and veggies   Beverages: Water (52+ oz/day); Crystal lite with collagen powder (30 gm pro).    Dining Out/take-out: Not often, couple times per month.     Progress Towards Previous Goals:  1) Aim for 20-30 gm protein per meal (total 60-90 gm per day). Limit carbs to 45 gm per meal. - Met, continues   2) Ensure you are drinking 64+ oz fluid per day    Physical Activity:  Just starting back at work. Does PT exercises twice daily.     Nutrition Prescription  Recommended energy/nutrient modification.    Nutrition Diagnosis  Obesity r/t long history of positive energy balance aeb BMI >30. - Improving    Nutrition Intervention  Materials/education provided on hypocaloric diet for weight loss and low/moderate carb diet for mgmt of DMII.   Reviewed progress towards previous goals.   Praised pt on the many positive changes she has made to diet.  Reviewed mindful eating, portion control  Co-developed goals to work towards.   Provided pt with list of goals and resources below via [employer].     Expected Engagement: good  Follow-Up Plans: meal/snack planning        Nutrition Goals  1) Continue to plan and portion sweets/snacks.   2) Increase physical activity as able      Follow-Up:  6 weeks, PRN    Time spent with patient: 14 minutes.  Jeri Hernández RD, LD

## 2024-02-11 ENCOUNTER — MYC MEDICAL ADVICE (OUTPATIENT)
Dept: FAMILY MEDICINE | Facility: CLINIC | Age: 49
End: 2024-02-11
Payer: COMMERCIAL

## 2024-02-12 ENCOUNTER — TELEPHONE (OUTPATIENT)
Dept: FAMILY MEDICINE | Facility: CLINIC | Age: 49
End: 2024-02-12
Payer: COMMERCIAL

## 2024-02-12 RX ORDER — GABAPENTIN 600 MG/1
1200 TABLET ORAL 3 TIMES DAILY
Qty: 540 TABLET | Refills: 0 | Status: SHIPPED | OUTPATIENT
Start: 2024-02-12 | End: 2024-04-29

## 2024-02-12 NOTE — TELEPHONE ENCOUNTER
General Call    Contacts         Type Contact Phone/Fax    02/12/2024 03:18 PM CST IB (Incoming) Elicia Calero (Self)           Reason for Call:  PA needed     What are your questions or concerns:  PA needed for Gabapentin per patient

## 2024-02-12 NOTE — TELEPHONE ENCOUNTER
General Call    Contacts         Type Contact Phone/Fax    02/12/2024 03:20 PM CST IB (Incoming) Elicia Calero (Self)           Reason for Call:  PA needed    What are your questions or concerns:  Oxycontin PA needed per patient

## 2024-02-12 NOTE — TELEPHONE ENCOUNTER
Per insurance, must switch from 300mg to 600mg tablets. Please update patient. She will now take 2 tablets three times/day, instead of six tablets three times/day. No change was made to the total daily dose. New prescription sent in.    Dr. Waggoner

## 2024-02-14 NOTE — PROGRESS NOTES
[facility]  Sports Medicine Clinic  Clinics and Surgery Center           SUBJECTIVE       Elicia Calero is a 49 year old female presenting to clinic today for rioght wrist pain. Recently seen by PMD, diagnosed with CTS of r wrist, provided oral prednisone and bracing.     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    ROS:  As noted above otherwise negative.    Patient Active Problem List   Diagnosis    Gastroesophageal reflux disease with esophagitis without hemorrhage    Hypothyroidism, unspecified type    Hepatic adenoma    NAFLD (nonalcoholic fatty liver disease)    Type 2 diabetes mellitus without complication, without long-term current use of insulin (H)    Mild episode of recurrent major depressive disorder (H24)    Chronic migraine without aura without status migrainosus, not intractable    Chronic low back pain without sciatica, unspecified back pain laterality    Bipolar affective disorder, current episode mixed, current episode severity unspecified (H)    Pain in joint involving ankle and foot, right    Persistent insomnia    Localized edema    Facet arthropathy, lumbosacral    Atypical psychosis (H)    Depressive disorder, not elsewhere classified    Former smoker    Lymphedema    Myofascial pain syndrome    Myopia    Obesity (BMI 30-39.9)    Regular astigmatism    Tension type headache    Chronic pain disorder    F11.9 -  Chronic, continuous use of opioids    Class 2 severe obesity due to excess calories with serious comorbidity in adult (H)       Current Outpatient Medications   Medication Sig Dispense Refill    methylPREDNISolone (MEDROL DOSEPAK) 4 MG tablet therapy pack Follow Package Directions 21 tablet 0    Alcohol Swabs PADS 1 each daily 300 each 3    buPROPion (WELLBUTRIN XL) 150 MG 24 hr tablet Take 1 tablet (150 mg) by mouth every morning 90 tablet 3    buPROPion (WELLBUTRIN XL) 300 MG 24 hr tablet Take 1 tablet (300 mg) by mouth daily 90 tablet 3    Continuous Blood Gluc  (DEXCOM G6 ) RALEIGH Use to read blood sugars as per 's instructions. 1 each 0    Continuous Blood Gluc Sensor (DEXCOM G6 SENSOR) MISC Change every 10 days. 9 each 3    Continuous Blood Gluc Transmit (DEXCOM G6 TRANSMITTER) MISC Change every 3 months. 1 each 3    diclofenac (VOLTAREN) 1 % topical gel Apply 4 g topically 4 times daily 350 g 1    diphenhydrAMINE (BENADRYL) 50 MG tablet Take 50 mg by mouth      doxepin (SINEQUAN) 25 MG capsule Take 2 capsules (50 mg) by mouth At Bedtime 30 capsule 3    empagliflozin (JARDIANCE) 25 MG TABS tablet Take 1 tablet (25 mg) by mouth daily 90 tablet 1    exenatide ER (BYDUREON BCISE) 2 MG/0.85ML auto-injector Inject 2 mg Subcutaneous every 7 days 3.4 mL 3    folic acid (FOLVITE) 1 MG tablet Take 1 tablet (1,000 mcg) by mouth daily 90 tablet 3    gabapentin (NEURONTIN) 600 MG tablet Take 2 tablets (1,200 mg) by mouth 3 times daily for 90 days 540 tablet 0    hydrochlorothiazide (MICROZIDE) 12.5 MG capsule Take 1 capsule (12.5 mg) by mouth daily 90 capsule 0    hydrOXYzine (VISTARIL) 50 MG capsule Take 1 capsule (50 mg) by mouth daily 90 capsule 1    hydrOXYzine HCl (ATARAX) 25 MG tablet Ok to take with 50mg tablet for a total of 75mg daily as needed. 30 tablet 3    indomethacin (INDOCIN) 50 MG capsule Take 1 capsule (50 mg) by mouth 3 times daily 270 capsule 3    lamoTRIgine (LAMICTAL) 200 MG  tablet Take 2 tablets (400 mg) by mouth At Bedtime for 360 days 180 tablet 3    levothyroxine (SYNTHROID/LEVOTHROID) 125 MCG tablet Take 1 tablet (125 mcg) by mouth daily 90 tablet 0    lidocaine, viscous, (XYLOCAINE) 2 % solution Swish and spit 5 mLs in mouth 5 times daily 100 mL 1    lisdexamfetamine (VYVANSE) 20 MG capsule Take 20 mg by mouth every morning Takes with 50mg daily every morning -total of 70mg daily      lisdexamfetamine (VYVANSE) 50 MG capsule Take 50 mg by mouth every morning      metFORMIN (GLUCOPHAGE XR) 500 MG 24 hr tablet Take 4 tablets (2,000 mg) by mouth daily 360 tablet 1    naloxone (NARCAN) 4 MG/0.1ML nasal spray Spray 1 spray (4 mg) into one nostril alternating nostrils as needed for opioid reversal every 2-3 minutes until assistance arrives 1 each 2    norethindrone (MICRONOR) 0.35 MG tablet Take 1 tablet (0.35 mg) by mouth daily 84 tablet 4    nystatin (MYCOSTATIN) 636603 UNIT/GM external cream Apply 100,000 g topically 3 times daily      omeprazole (PRILOSEC) 20 MG DR capsule Take 1 capsule (20 mg) by mouth 2 times daily 180 capsule 3    ondansetron (ZOFRAN) 4 MG tablet Take 1 tablet (4 mg) by mouth 3 times daily 90 tablet 1    oxyCODONE (OXYCONTIN) 10 MG 12 hr tablet Take 1 tablet (10 mg) by mouth every 12 hours 60 tablet 0    oxyCODONE (ROXICODONE) 5 MG tablet Take 1-2 tablets (5-10 mg) by mouth every 4 hours as needed for severe pain (max of 5 tablets per day) 150 tablet 0    pravastatin (PRAVACHOL) 40 MG tablet Take 1 tablet (40 mg) by mouth daily 90 tablet 3    prochlorperazine (COMPAZINE) 10 MG tablet Take 1 tablet (10 mg) by mouth 4 times daily as needed for nausea (with migraine) 120 tablet 5    REXULTI 2 MG tablet Take 2 mg by mouth daily      rizatriptan (MAXALT) 10 MG tablet Take 1 tablet (10 mg) by mouth 2 times daily 60 tablet 3    tiZANidine (ZANAFLEX) 2 MG tablet Take 2 tablets (4 mg) by mouth 3 times daily 180 tablet 3    topiramate (TOPAMAX) 100 MG tablet Take 1 tablet  (100 mg) by mouth every evening 90 tablet 1    triamcinolone (KENALOG) 0.1 % external ointment Apply twice daily on hand for 2-4 weeks. 15 g 1            OBJECTIVE:       Vitals: There were no vitals filed for this visit.  BMI: There is no height or weight on file to calculate BMI.    Gen:  Well nourished and in no acute distress  HEENT: Extraocular movement intact  Neck: Supple  Pulm:  Breathing Comfortably. No increased respiratory effort.  Psych: Euthymic. Appropriately answers questions    MSK: Right wrist without evidence of erythema or ecchymosis.  No tenderness to palpation of the dorsal or volar compartment.  However, compression at the transverse carpal ligament does lead to reproducible symptoms in the palmar aspect of the first 3-1/2 digits.  Pulses are intact.  Sensation is grossly intact.  No evidence of thenar hypohenar atrophy.  Full range of motion of the right wrist, with accompanying strength.   strength, pincer , and wide finger abduction strength is appropriate as well.  Negative Finkelstein, Tinel's, positive Phalen at the wrist.  Full range of motion of the elbow with accompanying strength.  Negative Tinel at the wrist elbow.            ASSESSMENT and PLAN:     Elicia was seen today for pain.    Diagnoses and all orders for this visit:    Carpal tunnel syndrome of right wrist  -     Orthopedic  Referral  -     methylPREDNISolone (MEDROL DOSEPAK) 4 MG tablet therapy pack; Follow Package Directions      49-year-old female presenting to clinic today with chronic history of right finger paresthesias, consistent with a diagnosis of carpal tunnel syndrome.  The patient did have an EMG almost 20 years ago which was normal, but has had symptoms starting up as recent as December 2023.  I do think the patient could benefit from an ultrasound-guided corticosteroid injection.  We have assisted her in getting scheduled.  In the interim, I have sent in a Medrol Dosepak for the patient's pain  and paresthesias.  I also believe the patient should continue her chronic medications.  Have advised her to keep an eye on her glucose levels that she has a CGM.  Wrist brace at night, and during the day as needed.  Return to clinic for the injection.    Options for treatment and/or follow-up care were reviewed with the patient was actively involved in the decision making process. Patient verbalized understanding and was in agreement with the plan.    Collin Calero DO  , Sports Medicine  Department of Family Medicine and [facility]

## 2024-02-14 NOTE — TELEPHONE ENCOUNTER
DIAGNOSIS: Carpal tunnel syndrome of right wrist, has tried oral steroids and would like to get an injection, [facility] PMAP, no current imaging     APPOINTMENT DATE: 2.15.24   NOTES STATUS DETAILS   OFFICE NOTE from referring provider Internal 2.6.24, 1.24.24  Darion     MEDICATION LIST Internal

## 2024-02-15 ENCOUNTER — TELEPHONE (OUTPATIENT)
Dept: FAMILY MEDICINE | Facility: CLINIC | Age: 49
End: 2024-02-15

## 2024-02-15 ENCOUNTER — OFFICE VISIT (OUTPATIENT)
Dept: ORTHOPEDICS | Facility: CLINIC | Age: 49
End: 2024-02-15
Payer: COMMERCIAL

## 2024-02-15 ENCOUNTER — PRE VISIT (OUTPATIENT)
Dept: ORTHOPEDICS | Facility: CLINIC | Age: 49
End: 2024-02-15

## 2024-02-15 ENCOUNTER — MYC MEDICAL ADVICE (OUTPATIENT)
Dept: FAMILY MEDICINE | Facility: CLINIC | Age: 49
End: 2024-02-15

## 2024-02-15 DIAGNOSIS — F11.90 CHRONIC, CONTINUOUS USE OF OPIOIDS: ICD-10-CM

## 2024-02-15 DIAGNOSIS — M25.571 PAIN IN JOINT INVOLVING ANKLE AND FOOT, RIGHT: ICD-10-CM

## 2024-02-15 DIAGNOSIS — G56.01 CARPAL TUNNEL SYNDROME OF RIGHT WRIST: ICD-10-CM

## 2024-02-15 DIAGNOSIS — G89.4 CHRONIC PAIN DISORDER: ICD-10-CM

## 2024-02-15 PROCEDURE — 99204 OFFICE O/P NEW MOD 45 MIN: CPT | Performed by: STUDENT IN AN ORGANIZED HEALTH CARE EDUCATION/TRAINING PROGRAM

## 2024-02-15 RX ORDER — OXYCODONE HYDROCHLORIDE 5 MG/1
5-10 TABLET ORAL EVERY 4 HOURS PRN
Qty: 150 TABLET | Refills: 0 | Status: CANCELLED | OUTPATIENT
Start: 2024-02-15

## 2024-02-15 RX ORDER — OXYCODONE HCL 10 MG/1
10 TABLET, FILM COATED, EXTENDED RELEASE ORAL EVERY 12 HOURS
Qty: 60 TABLET | Refills: 0 | Status: CANCELLED | OUTPATIENT
Start: 2024-02-15

## 2024-02-15 RX ORDER — METHYLPREDNISOLONE 4 MG
TABLET, DOSE PACK ORAL
Qty: 21 TABLET | Refills: 0 | Status: SHIPPED | OUTPATIENT
Start: 2024-02-15 | End: 2024-04-10

## 2024-02-15 NOTE — LETTER
2/15/2024      RE: Elicia Calero  [address]  [place] [address]     Dear Colleague,    Thank you for referring your patient, Elicia Calero, to the [facility] [place]. Please see a copy of my visit note below.    [facility]  Sports Medicine Clinic  Clinics and Surgery Center           SUBJECTIVE       Elicia Calero is a 49 year old female presenting to clinic today for rioght wrist pain. Recently seen by PMD, diagnosed with CTS of r wrist, provided oral prednisone and bracing.     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    ROS:  As noted above otherwise negative.    Patient Active Problem List   Diagnosis    Gastroesophageal reflux disease with esophagitis without hemorrhage    Hypothyroidism, unspecified type    Hepatic adenoma    NAFLD (nonalcoholic fatty liver disease)    Type 2 diabetes mellitus without complication, without long-term current use of insulin (H)    Mild episode of recurrent major depressive disorder (H24)    Chronic migraine without aura without status migrainosus, not intractable    Chronic low back pain without sciatica, unspecified back pain laterality    Bipolar affective disorder, current episode mixed, current episode severity unspecified (H)    Pain in joint involving ankle and foot, right    Persistent insomnia    Localized edema    Facet arthropathy,  lumbosacral    Atypical psychosis (H)    Depressive disorder, not elsewhere classified    Former smoker    Lymphedema    Myofascial pain syndrome    Myopia    Obesity (BMI 30-39.9)    Regular astigmatism    Tension type headache    Chronic pain disorder    F11.9 - Chronic, continuous use of opioids    Class 2 severe obesity due to excess calories with serious comorbidity in adult (H)       Current Outpatient Medications   Medication Sig Dispense Refill    methylPREDNISolone (MEDROL DOSEPAK) 4 MG tablet therapy pack Follow Package Directions 21 tablet 0    Alcohol Swabs PADS 1 each daily 300 each 3    buPROPion (WELLBUTRIN XL) 150 MG 24 hr tablet Take 1 tablet (150 mg) by mouth every morning 90 tablet 3    buPROPion (WELLBUTRIN XL) 300 MG 24 hr tablet Take 1 tablet (300 mg) by mouth daily 90 tablet 3    Continuous Blood Gluc  (DEXCOM G6 ) RALEIGH Use to read blood sugars as per 's instructions. 1 each 0    Continuous Blood Gluc Sensor (DEXCOM G6 SENSOR) MISC Change every 10 days. 9 each 3    Continuous Blood Gluc Transmit (DEXCOM G6 TRANSMITTER) MISC Change every 3 months. 1 each 3    diclofenac (VOLTAREN) 1 % topical gel Apply 4 g topically 4 times daily 350 g 1    diphenhydrAMINE (BENADRYL) 50 MG tablet Take 50 mg by mouth      doxepin (SINEQUAN) 25 MG capsule Take 2 capsules (50 mg) by mouth At Bedtime 30 capsule 3    empagliflozin (JARDIANCE) 25 MG TABS tablet Take 1 tablet (25 mg) by mouth daily 90 tablet 1    exenatide ER (BYDUREON BCISE) 2 MG/0.85ML auto-injector Inject 2 mg Subcutaneous every 7 days 3.4 mL 3    folic acid (FOLVITE) 1 MG tablet Take 1 tablet (1,000 mcg) by mouth daily 90 tablet 3    gabapentin (NEURONTIN) 600 MG tablet Take 2 tablets (1,200 mg) by mouth 3 times daily for 90 days 540 tablet 0    hydrochlorothiazide (MICROZIDE) 12.5 MG capsule Take 1 capsule (12.5 mg) by mouth daily 90 capsule 0    hydrOXYzine (VISTARIL) 50 MG capsule Take 1 capsule (50 mg) by mouth  daily 90 capsule 1    hydrOXYzine HCl (ATARAX) 25 MG tablet Ok to take with 50mg tablet for a total of 75mg daily as needed. 30 tablet 3    indomethacin (INDOCIN) 50 MG capsule Take 1 capsule (50 mg) by mouth 3 times daily 270 capsule 3    lamoTRIgine (LAMICTAL) 200 MG tablet Take 2 tablets (400 mg) by mouth At Bedtime for 360 days 180 tablet 3    levothyroxine (SYNTHROID/LEVOTHROID) 125 MCG tablet Take 1 tablet (125 mcg) by mouth daily 90 tablet 0    lidocaine, viscous, (XYLOCAINE) 2 % solution Swish and spit 5 mLs in mouth 5 times daily 100 mL 1    lisdexamfetamine (VYVANSE) 20 MG capsule Take 20 mg by mouth every morning Takes with 50mg daily every morning -total of 70mg daily      lisdexamfetamine (VYVANSE) 50 MG capsule Take 50 mg by mouth every morning      metFORMIN (GLUCOPHAGE XR) 500 MG 24 hr tablet Take 4 tablets (2,000 mg) by mouth daily 360 tablet 1    naloxone (NARCAN) 4 MG/0.1ML nasal spray Spray 1 spray (4 mg) into one nostril alternating nostrils as needed for opioid reversal every 2-3 minutes until assistance arrives 1 each 2    norethindrone (MICRONOR) 0.35 MG tablet Take 1 tablet (0.35 mg) by mouth daily 84 tablet 4    nystatin (MYCOSTATIN) 904961 UNIT/GM external cream Apply 100,000 g topically 3 times daily      omeprazole (PRILOSEC) 20 MG DR capsule Take 1 capsule (20 mg) by mouth 2 times daily 180 capsule 3    ondansetron (ZOFRAN) 4 MG tablet Take 1 tablet (4 mg) by mouth 3 times daily 90 tablet 1    oxyCODONE (OXYCONTIN) 10 MG 12 hr tablet Take 1 tablet (10 mg) by mouth every 12 hours 60 tablet 0    oxyCODONE (ROXICODONE) 5 MG tablet Take 1-2 tablets (5-10 mg) by mouth every 4 hours as needed for severe pain (max of 5 tablets per day) 150 tablet 0    pravastatin (PRAVACHOL) 40 MG tablet Take 1 tablet (40 mg) by mouth daily 90 tablet 3    prochlorperazine (COMPAZINE) 10 MG tablet Take 1 tablet (10 mg) by mouth 4 times daily as needed for nausea (with migraine) 120 tablet 5    REXULTI 2 MG  tablet Take 2 mg by mouth daily      rizatriptan (MAXALT) 10 MG tablet Take 1 tablet (10 mg) by mouth 2 times daily 60 tablet 3    tiZANidine (ZANAFLEX) 2 MG tablet Take 2 tablets (4 mg) by mouth 3 times daily 180 tablet 3    topiramate (TOPAMAX) 100 MG tablet Take 1 tablet (100 mg) by mouth every evening 90 tablet 1    triamcinolone (KENALOG) 0.1 % external ointment Apply twice daily on hand for 2-4 weeks. 15 g 1            OBJECTIVE:       Vitals: There were no vitals filed for this visit.  BMI: There is no height or weight on file to calculate BMI.    Gen:  Well nourished and in no acute distress  HEENT: Extraocular movement intact  Neck: Supple  Pulm:  Breathing Comfortably. No increased respiratory effort.  Psych: Euthymic. Appropriately answers questions    MSK: Right wrist without evidence of erythema or ecchymosis.  No tenderness to palpation of the dorsal or volar compartment.  However, compression at the transverse carpal ligament does lead to reproducible symptoms in the palmar aspect of the first 3-1/2 digits.  Pulses are intact.  Sensation is grossly intact.  No evidence of thenar hypohenar atrophy.  Full range of motion of the right wrist, with accompanying strength.   strength, pincer , and wide finger abduction strength is appropriate as well.  Negative Finkelstein, Tinel's, positive Phalen at the wrist.  Full range of motion of the elbow with accompanying strength.  Negative Tinel at the wrist elbow.            ASSESSMENT and PLAN:     Elicia was seen today for pain.    Diagnoses and all orders for this visit:    Carpal tunnel syndrome of right wrist  -     Orthopedic  Referral  -     methylPREDNISolone (MEDROL DOSEPAK) 4 MG tablet therapy pack; Follow Package Directions      49-year-old female presenting to clinic today with chronic history of right finger paresthesias, consistent with a diagnosis of carpal tunnel syndrome.  The patient did have an EMG almost 20 years ago which was  normal, but has had symptoms starting up as recent as December 2023.  I do think the patient could benefit from an ultrasound-guided corticosteroid injection.  We have assisted her in getting scheduled.  In the interim, I have sent in a Medrol Dosepak for the patient's pain and paresthesias.  I also believe the patient should continue her chronic medications.  Have advised her to keep an eye on her glucose levels that she has a CGM.  Wrist brace at night, and during the day as needed.  Return to clinic for the injection.    Options for treatment and/or follow-up care were reviewed with the patient was actively involved in the decision making process. Patient verbalized understanding and was in agreement with the plan.    Collin Calero DO  , Sports Medicine  Department of Family Medicine and [facility]      Again, thank you for allowing me to participate in the care of your patient.      Sincerely,    Collin Calero DO

## 2024-02-15 NOTE — TELEPHONE ENCOUNTER
Central Prior Authorization Team   Phone: [phone]    PA Initiation    Medication: oxyCODONE HCl ER 10 MG Oral Tablet ER 12 Hour Abuse-Deterrent (oxyCONTIN)  Insurance Company: [employer] ([facility]) - Phone [phone] Fax [phone]  Pharmacy Filling the Rx: [facility] - [place], [place] - [address] [place] [address]  Filling Pharmacy Phone: [phone]  Filling Pharmacy Fax:    Start Date: 2/15/2024

## 2024-02-15 NOTE — TELEPHONE ENCOUNTER
Please submit urgent prior authorization for oxyCODONE HCl 5 MG Oral Tablet (ROXICODONE). Patient is out of this medication and stated she had to leave work early today due to pain.    Thank you!  MARCOS KateN, RN-[facility]

## 2024-02-15 NOTE — TELEPHONE ENCOUNTER
Dr. Waggoner: pended opioids from earlier in the month for fill at a new pharmacy; please see message thread.    SYLVIA Ward [facility]

## 2024-02-15 NOTE — TELEPHONE ENCOUNTER
PA Team-Please submit urgent prior authorization for oxyCODONE HCl ER 10 MG Oral Tablet ER 12 Hour Abuse-Deterrent (oxyCONTIN).      Call received from patient:  Got off phone with insurance company wondering about prior authorization for her pain medication  2. Does not have 2 weeks to wait for prior authorization to be completed  3. Had to leave work  4. Has no pain medication    Informed patient writer will send high priority message asking for prior authorization team to submit prior authorization urgently; they receive about 900 prior authorization requests per day.    Patient verbalized understanding and in agreement with plan.    See 2/8/24 telephone encounter for PA for oxyCODONE HCl 5 MG Oral Tablet (ROXICODONE).    MARCOS KateN, RN-BC  [facility]

## 2024-02-15 NOTE — TELEPHONE ENCOUNTER
Patients insurance is requiring PA for Oxycontin, Oxycodone and Gabapentin.     PA was completed for Oxycodone 1/10/24 with response from insurance PA not needed      PA was completed for Gabapentin 2/6/24 with response of denial. At this time PCP updated dosing/rx and it was resent. That ALSO is not covered.    PA was sent to PA team for Oxycodone 2/8/24 no update at this time    PA was sent to PA team 2/12/24 for Gabapentin  PA was sent to PA team 2/12/24 for Oxycontin    Same date 2/12/24 patient was advised PA team behind about 11 days per them via MyC message    Writer does not believe resending to new pharmacy will be helpful.      Call received from [facility] rep to check status on PA's. Was advised that they are behind and that it is a shame insurance is requiring a PA for every medication known to man and advised rep our PA team is about two weeks behind

## 2024-02-15 NOTE — TELEPHONE ENCOUNTER
Central Prior Authorization Team  Phone: [phone]    PA Initiation    Medication: OXYCODONE HCL 5 MG PO TABS  Insurance Company: [employer] ([facility]) - Phone [phone] Fax [phone]  Pharmacy Filling the Rx: [facility] - [place], [place] - [address] [facility]  Filling Pharmacy Phone: [phone]  Filling Pharmacy Fax:    Start Date: 2/15/2024

## 2024-02-16 ENCOUNTER — ANCILLARY PROCEDURE (OUTPATIENT)
Dept: MAMMOGRAPHY | Facility: CLINIC | Age: 49
End: 2024-02-16
Payer: COMMERCIAL

## 2024-02-16 DIAGNOSIS — R92.8 ABNORMAL MAMMOGRAM OF LEFT BREAST: ICD-10-CM

## 2024-02-16 DIAGNOSIS — R92.8 CATEGORY 3 MAMMOGRAPHY RESULT WITH SHORT FOLLOW-UP INTERVAL SUGGESTED FOR PROBABLY BENIGN FINDING: ICD-10-CM

## 2024-02-16 PROCEDURE — 76642 ULTRASOUND BREAST LIMITED: CPT | Mod: LT | Performed by: STUDENT IN AN ORGANIZED HEALTH CARE EDUCATION/TRAINING PROGRAM

## 2024-02-16 PROCEDURE — G0279 TOMOSYNTHESIS, MAMMO: HCPCS | Mod: LT | Performed by: STUDENT IN AN ORGANIZED HEALTH CARE EDUCATION/TRAINING PROGRAM

## 2024-02-16 PROCEDURE — 77065 DX MAMMO INCL CAD UNI: CPT | Mod: LT | Performed by: STUDENT IN AN ORGANIZED HEALTH CARE EDUCATION/TRAINING PROGRAM

## 2024-02-16 RX ORDER — OXYCODONE HYDROCHLORIDE 5 MG/1
5-10 TABLET ORAL EVERY 4 HOURS PRN
Qty: 210 TABLET | Refills: 0 | Status: SHIPPED | OUTPATIENT
Start: 2024-02-16 | End: 2024-02-16

## 2024-02-16 RX ORDER — OXYCODONE HYDROCHLORIDE 10 MG/1
5-10 TABLET ORAL EVERY 4 HOURS PRN
Qty: 105 TABLET | Refills: 0 | Status: SHIPPED | OUTPATIENT
Start: 2024-02-16 | End: 2024-02-29

## 2024-02-16 NOTE — TELEPHONE ENCOUNTER
Team, please relay information in blue below to patient.    -long acting oxycontin not covered by insurance  -Prior MMEs: 56   -PDMP checked-appropriate  -new MME 43    Plan:  -STOP long acting oxycontin  -INCREASE short acting oxycodone   -take 5-10mg oxycodone every 4 hours as needed (no more than 35mg total per day).   -do not take more than 10mg at one time         Dr. Waggoner

## 2024-02-16 NOTE — TELEPHONE ENCOUNTER
Oxycodone PA addressed in telephone encounter of 2/15/24    Anju Magdaleno, RN, BSN, PHN  [facility]

## 2024-02-16 NOTE — TELEPHONE ENCOUNTER
Patient aware. Rx sent telephone encounter of 2/15/24.    Anju Magdaleno, RN, BSN, PHN  [facility]

## 2024-02-16 NOTE — TELEPHONE ENCOUNTER
"Can you call patient and ask her preference?  Options are below.    \"Oxycontin (long acting) was denied. Insurance wants you to try or have tried extended release morphine first (which I don't want to switch you to). We can try a letter of medical necessity, I am not sure how long this will take or if it will get approved.    If you don't want to do that, let's continue with just the short acting oxycodone and I can send in new script so you can take a few more tabs per day. It appears short acting oxycodone was approved?    Dr. Waggoner        "

## 2024-02-16 NOTE — TELEPHONE ENCOUNTER
PRIOR AUTHORIZATION DENIED    Medication: oxyCODONE HCl ER 10 MG Oral Tablet ER 12 Hour Abuse-Deterrent (oxyCONTIN)    Denial Date: 2/16/2024    Denial Rational: Patient needs to have tried/failed morphine ER.  No documentation found in patient's chart that she has tried morphine ER, is allergic to one of its components, or has had a documented side effect.     If patient has tried/fail morphine ER I may be able to resubmit request.  Please document side effect or adverse event or shown to be ineffective          Appeal Information: This medication was denied. If physician would like to appeal because patient has contraindication or allergy to covered medication please write letter of medical necessity and route back to PA team to initiate.  If no further action is needed please close encounter thank you.

## 2024-02-16 NOTE — TELEPHONE ENCOUNTER
Central Prior Authorization Team  Phone: [phone]    Prior Authorization Approval    Medication: OXYCODONE HCL 5 MG PO TABS  Authorization Effective Date: 2/15/2024  Authorization Expiration Date: 2/15/2025  Approved Dose/Quantity:   Reference #:     Insurance Company: [employer] ([facility]) - Phone [phone] Fax [phone]  Expected CoPay: $    CoPay Card Available:      Financial Assistance Needed:   Which Pharmacy is filling the prescription: [facility] - [place], [place] - [address] [place] [address]  Pharmacy Notified: yes  Patient Notified: PHARMACY WILL NOTIFY PT WHEN READY

## 2024-02-16 NOTE — TELEPHONE ENCOUNTER
Rx sent - see telephone encounter of 2/15/24.    Anju Magdaleno, RN, BSN, PHN  [facility]

## 2024-02-16 NOTE — TELEPHONE ENCOUNTER
Patient advised and she would prefer to stay with the short acting oxycodone. She will have new [[place]] insurance beginning of next month and may request a PA/appeal at that time.    Pharm noted. Thank you    Anju Magdaleno RN, BSN, PHN  [facility]

## 2024-02-16 NOTE — TELEPHONE ENCOUNTER
Insurance needs prior auth to approve more than 7 tablets per day. Will switch to oxycodone 10mg tablet. Do not take more than 35mg total per day.    Dr. Waggoner

## 2024-02-16 NOTE — TELEPHONE ENCOUNTER
Spoke with patient and relayed 's plan. Patient verbalized agreement.     Plan:  -STOP long acting oxycontin  -INCREASE short acting oxycodone              -take 5-10mg oxycodone every 4 hours as needed (no more than 35mg total per day).              -do not take more than 10mg at one time    Kelly Aponte RN  [facility]

## 2024-02-20 DIAGNOSIS — E66.01 CLASS 2 SEVERE OBESITY DUE TO EXCESS CALORIES WITH SERIOUS COMORBIDITY AND BODY MASS INDEX (BMI) OF 37.0 TO 37.9 IN ADULT (H): ICD-10-CM

## 2024-02-20 DIAGNOSIS — E66.812 CLASS 2 SEVERE OBESITY DUE TO EXCESS CALORIES WITH SERIOUS COMORBIDITY AND BODY MASS INDEX (BMI) OF 37.0 TO 37.9 IN ADULT (H): ICD-10-CM

## 2024-02-21 RX ORDER — TOPIRAMATE 100 MG/1
100 TABLET, FILM COATED ORAL EVERY MORNING
Qty: 90 TABLET | Refills: 1 | Status: SHIPPED | OUTPATIENT
Start: 2024-02-21 | End: 2024-04-29

## 2024-02-26 ENCOUNTER — MYC MEDICAL ADVICE (OUTPATIENT)
Dept: PHARMACY | Facility: CLINIC | Age: 49
End: 2024-02-26
Payer: COMMERCIAL

## 2024-02-26 DIAGNOSIS — E11.9 TYPE 2 DIABETES MELLITUS WITHOUT COMPLICATION, WITHOUT LONG-TERM CURRENT USE OF INSULIN (H): Primary | ICD-10-CM

## 2024-02-26 NOTE — TELEPHONE ENCOUNTER
PA Initiation    Medication: GABAPENTIN 600 MG PO TABS  Insurance Company: [employer] ([facility]) - Phone [phone] Fax [phone]  Pharmacy Filling the Rx: [facility] - [place], [place] - [address]  Filling Pharmacy Phone: [phone]  Filling Pharmacy Fax: [phone]  Start Date: 2/25/2024

## 2024-02-26 NOTE — TELEPHONE ENCOUNTER
Prior Authorization Not Needed per Insurance    Medication: GABAPENTIN 600 MG PO TABS  Insurance Company: [employer] ([facility]) - Phone [phone] Fax [phone]  Expected CoPay: $    Pharmacy Filling the Rx: [facility] - [place], [place] - [address] [place] [address]  Pharmacy Notified: YES  Patient Notified: **Instructed pharmacy to notify patient when script is ready to /ship.**

## 2024-02-26 NOTE — TELEPHONE ENCOUNTER
Annel Pederson!            I m just writing you to tell you I will have the [place] [employer] insurance starting 3/1. I m wondering if I can restart the mounjaro? I m pretty sure it requires a prior authorization. My ID number is [id number]. I m not sure if this can be done now because it looks like I m eligible now already. If you have any questions feel free to call me or message me.     Thanks,         Elicia Taveras will try PA for 3-1-24.    Dacia Myers [facility].  Medication Therapy Management Provider  [phone]

## 2024-02-28 NOTE — PROGRESS NOTES
"PROCEDURE ENCOUNTER    [facility]  Collin Calero DO  2024     Name: Elicia Calero  MRN: [id number]  Age: 49 year old  : 1975    Referring provider:   Diagnosis: R CTS    Carpal Tunnel Injection - Ultrasound Guided  The patient was informed of the risks and the benefits of the procedure and a written consent was signed.  The patient s right wrist was prepped with chlorhexidine in sterile fashion.   40 mg of triamcinolone suspension was drawn up into a 3 mL syringe with 1.0 mL of 1% lidocaine.  Injection was performed using sterile technique.  Under ultrasound guidance a 1.5\" 22-gauge needle was used to enter the [facility] wrist at the distal palmar crease medial to the median nerve.  Needle placement was visualized and documented with ultrasound.  Ultrasound visualization required to ensure injection material enters the perineural sheath and not a vessel or nerve itself.  Injection performed long axis to the probe.  Injection solution visualized surrounding the perineurium.  Images were permanently stored for the patient's record.  There were no complications. The patient tolerated the procedure well. There was negligible bleeding.   The patient was instructed to ice the wrist upon leaving clinic and refrain from overuse over the next 3 days.   The patient was instructed to call or go to the emergency room with any unusual pain, swelling, redness, or if otherwise concerned.    Collin Calero D.O., [facility]  , Sports Medicine  Department of Family [facility] and [facility]    Hand / Upper Extremity Injection: R carpal tunnel    Date/Time: 3/1/2024 8:23 AM    Performed by: Collin Calero DO  Authorized by: Collin Calero DO    Indications:  Pain  Needle Size:  25 G  Guidance: ultrasound    Approach:  Volar  Condition: carpal tunnel      Site:  R carpal tunnel  Medications:  40 mg triamcinolone 40 MG/ML; 1 mL lidocaine (PF) 1 %  Outcome:  Tolerated " well, no immediate complications  Procedure discussed: discussed risks, benefits, and alternatives    Consent Given by:  Patient  Timeout: timeout called immediately prior to procedure    Prep: patient was prepped and draped in usual sterile fashion

## 2024-02-29 ENCOUNTER — VIRTUAL VISIT (OUTPATIENT)
Dept: FAMILY MEDICINE | Facility: CLINIC | Age: 49
End: 2024-02-29
Payer: COMMERCIAL

## 2024-02-29 DIAGNOSIS — F11.90 CHRONIC, CONTINUOUS USE OF OPIOIDS: ICD-10-CM

## 2024-02-29 DIAGNOSIS — G89.4 CHRONIC PAIN DISORDER: ICD-10-CM

## 2024-02-29 DIAGNOSIS — M25.571 PAIN IN JOINT INVOLVING ANKLE AND FOOT, RIGHT: ICD-10-CM

## 2024-02-29 PROCEDURE — 99214 OFFICE O/P EST MOD 30 MIN: CPT | Mod: 95 | Performed by: STUDENT IN AN ORGANIZED HEALTH CARE EDUCATION/TRAINING PROGRAM

## 2024-02-29 RX ORDER — OXYCODONE HYDROCHLORIDE 5 MG/1
5 TABLET ORAL EVERY 4 HOURS PRN
Qty: 120 TABLET | Refills: 0 | Status: SHIPPED | OUTPATIENT
Start: 2024-03-13 | End: 2024-03-29

## 2024-02-29 RX ORDER — OXYCODONE HCL 10 MG/1
10 TABLET, FILM COATED, EXTENDED RELEASE ORAL EVERY 12 HOURS
Qty: 60 TABLET | Refills: 0 | Status: SHIPPED | OUTPATIENT
Start: 2024-02-29 | End: 2024-03-29

## 2024-02-29 NOTE — PROGRESS NOTES
Elicia is a 49 year old who is being evaluated via a billable video visit.      How would you like to obtain your AVS? MyChart  If the video visit is dropped, the invitation should be resent by: Send to e-mail at: [email or web address]  Will anyone else be joining your video visit? No      Assessment & Plan     Chronic pain disorder  Pain in joint involving ankle and foot, right  Chronic, continuous use of opioids  History of chronic foot pain, has been on continuous oxycodone for many years. S/P ankle tendon/arch surgery on 10/19 which went well. She is now weight bearing as tolerated in brace, doing PT. [facility] Discussed need to wean opioids, due to high MME, risk of tolerance and oversedation. No side effects. Has narcan available at home PRN.     Pain is stable. Slightly worse since returning back to work full time. PT has finished, since they cannot regain mobility in her foot. Taking ibuprofen BID, tylenol TID, and muscle relaxer.     Had insurance issues so had to stop the long acting oxycontin last month. Due to this, we switched to only short acting oxycodone, total of 35mg per day. (Of note, MME calculations in prior notes are incorrect due to inaccuracy in MME abiodun I was using). This was a reduction from 67-->52MME. Pt feels the long acting oxycontin works the best. Wanting to restart long acting oxycodone and will reduce the short acting oxycodone.       Plan:  -discussed goal is to wean opioids  -goal is to return to the amount of oxycodone she was taking pre-surgery, which was a total of short acting oxycodone 20mg daily (MME 30)     -opioid:   -restart long acting oxycontin 10mg BID (MME 30)  -reduce short acting oxycodone to a max of 20mg per day (MME 30), 120 tablets given  -PDMP checked, appropriate  -current MME:60 (reduction from 67)    MME were 30 ~8/2023   Current MME are double what she was taking prior to worsening symptoms ~8/2023    -non-opioid:  -schedule tylenol  "1000mg TID  -schedule ibuprofen 800mg TID (increase from BID to TID)  -continue tizanidine PRN   -continue gabapentin 1200mg TID  -elevate, ice    Follow up in 1 month    Emily Rubi is a 49 year old, presenting for the following health issues:  Medication Follow-up (Pain Medications )        2/29/2024     7:27 AM   Additional Questions   Roomed by Amada Samano   Accompanied by Self     History of Present Illness       Reason for visit:  Pain medication review    She eats 2-3 servings of fruits and vegetables daily.She consumes 0 sweetened beverage(s) daily.She exercises with enough effort to increase her heart rate 9 or less minutes per day.  She exercises with enough effort to increase her heart rate 3 or less days per week. She is missing 2 dose(s) of medications per week.  She is not taking prescribed medications regularly due to remembering to take.     Insurance did not cover oxycontin or more than 7 tabs/day of oxycodone  So, switched to oxycodone max 35mg per day (~44 MME)  Prior to this switch was taking ~56MME/day  AM 5mg(6am)  Wstarts work 10am, will take 10mg then  2pm, 10mg daily  Bedtime 10mg (doesn't always take this)    Feels extended release makes a difference        Objective    Vitals - Patient Reported  Weight (Patient Reported): 93 kg (205 lb)  Height (Patient Reported): 171.5 cm (5' 7.5\")  BMI (Based on Pt Reported Ht/Wt): 31.63  Pain Score: Mild Pain (3)  Pain Loc: Wrist (ankle)    Vitals:  No vitals were obtained today due to virtual visit.    Physical Exam   GENERAL: alert and no distress  EYES: Eyes grossly normal to inspection.  No discharge or erythema, or obvious scleral/conjunctival abnormalities.  RESP: No audible wheeze, cough, or visible cyanosis.    SKIN: Visible skin clear. No significant rash, abnormal pigmentation or lesions.  NEURO: Cranial nerves grossly intact.  Mentation and speech appropriate for age.  PSYCH: Appropriate affect, tone, and pace of words      Video-Visit " Details    Type of service:  Video Visit   Video Start Time:  732am  Video End Time: 750am    Originating Location (pt. Location): Home  Distant Location (provider location):  On-site  Platform used for Video Visit: Diana  Signed Electronically by: Loc Waggoner DO

## 2024-02-29 NOTE — PATIENT INSTRUCTIONS
-Restart long acting oxycodone 10mg twice/day  -Reduce short acting oxycodone to 5mg every 4 hours as needed (no more than 20mg total per day). If you can use less than this, even better!    To put things in perspective, you are taking DOUBLE the amount of opioids compared to what you were taking in August 2023. The long acting oxycontin ALONE is equal to what you were taking back in August. I really want to reduce the amount of opioids to get you closer to what you were taking to reduce your risk of oversedation, overdose, and tolerance.       Dr. Waggoner

## 2024-03-01 ENCOUNTER — OFFICE VISIT (OUTPATIENT)
Dept: ORTHOPEDICS | Facility: CLINIC | Age: 49
End: 2024-03-01
Payer: COMMERCIAL

## 2024-03-01 DIAGNOSIS — M25.532 LEFT WRIST PAIN: Primary | ICD-10-CM

## 2024-03-01 DIAGNOSIS — G56.01 CARPAL TUNNEL SYNDROME OF RIGHT WRIST: Primary | ICD-10-CM

## 2024-03-01 PROCEDURE — 20526 THER INJECTION CARP TUNNEL: CPT | Mod: RT | Performed by: STUDENT IN AN ORGANIZED HEALTH CARE EDUCATION/TRAINING PROGRAM

## 2024-03-01 PROCEDURE — 76942 ECHO GUIDE FOR BIOPSY: CPT | Performed by: STUDENT IN AN ORGANIZED HEALTH CARE EDUCATION/TRAINING PROGRAM

## 2024-03-01 PROCEDURE — 99207 PR DROP WITH A PROCEDURE: CPT | Performed by: STUDENT IN AN ORGANIZED HEALTH CARE EDUCATION/TRAINING PROGRAM

## 2024-03-01 RX ORDER — ACYCLOVIR 400 MG/1
1 TABLET ORAL
Qty: 3 EACH | Refills: 5 | Status: SHIPPED | OUTPATIENT
Start: 2024-03-01 | End: 2024-04-29

## 2024-03-01 RX ORDER — LIDOCAINE HYDROCHLORIDE 10 MG/ML
1 INJECTION, SOLUTION EPIDURAL; INFILTRATION; INTRACAUDAL; PERINEURAL
Status: SHIPPED | OUTPATIENT
Start: 2024-03-01

## 2024-03-01 RX ORDER — TRIAMCINOLONE ACETONIDE 40 MG/ML
40 INJECTION, SUSPENSION INTRA-ARTICULAR; INTRAMUSCULAR
Status: SHIPPED | OUTPATIENT
Start: 2024-03-01

## 2024-03-01 RX ORDER — TIRZEPATIDE 5 MG/.5ML
5 INJECTION, SOLUTION SUBCUTANEOUS
Qty: 2 ML | Refills: 1 | Status: SHIPPED | OUTPATIENT
Start: 2024-03-01 | End: 2024-03-21 | Stop reason: DRUGHIGH

## 2024-03-01 RX ADMIN — LIDOCAINE HYDROCHLORIDE 1 ML: 10 INJECTION, SOLUTION EPIDURAL; INFILTRATION; INTRACAUDAL; PERINEURAL at 08:23

## 2024-03-01 RX ADMIN — TRIAMCINOLONE ACETONIDE 40 MG: 40 INJECTION, SUSPENSION INTRA-ARTICULAR; INTRAMUSCULAR at 08:23

## 2024-03-01 NOTE — NURSING NOTE
[address] [address]  Dept: [phone]  ______________________________________________________________________________    Patient: Elicia Calero   : 1975   MRN: [id number]   2024    INVASIVE PROCEDURE SAFETY CHECKLIST    Date: 2024   Procedure:Right wrist carpal tunnel injection with ultrasound guidance  Patient Name: Elicia Calero  MRN: [id number]  YOB: 1975    Action: Complete sections as appropriate. Any discrepancy results in a HARD COPY until resolved.     PRE PROCEDURE:  Patient ID verified with 2 identifiers (name and  or MRN): Yes  Procedure and site verified with patient/designee (when able): Yes  Accurate consent documentation in medical record: Yes  H&P (or appropriate assessment) documented in medical record: Yes  H&P must be up to 20 days prior to procedure and updates within 24 hours of procedure as applicable: NA  Relevant diagnostic and radiology test results appropriately labeled and displayed as applicable: Yes  Procedure site(s) marked with provider initials: NA    TIMEOUT:  Time-Out performed immediately prior to starting procedure, including verbal and active participation of all team members addressing the following:Yes  * Correct patient identify  * Confirmed that the correct side and site are marked  * An accurate procedure consent form  * Agreement on the procedure to be done  * Correct patient position  * Relevant images and results are properly labeled and appropriately displayed  * The need to administer antibiotics or fluids for irrigation purposes during the procedure as applicable   * Safety precautions based on patient history or medication use    DURING PROCEDURE: Verification of correct person, site, and procedures any time the responsibility for care of the patient is transferred to another member of the care team.       Prior to injection, verified patient identity using  patient's name and date of birth.  Due to injection administration, patient instructed to remain in clinic for 15 minutes  afterwards, and to report any adverse reaction to me immediately.    Carpal tunnel    Drug Amount Wasted:  Yes: 4 mg/ml   Vial/Syringe: Single dose vial  Expiration Date:  05/2027      Marylou Ragland, ATC  March 1, 2024

## 2024-03-01 NOTE — TELEPHONE ENCOUNTER
Annel Pederson      Here is the info:     BIN:[id number]  PCN:[id number]  ID:[id number]  GROUP:     Also I found out that the Dexcom G7 is covered by my insurance now so I was wondering about if I can get a prescription for that. If you have any questions feel free to give me a call. Oh also please send the Rx s to the [facility] on [place] [facility].      Thanks!       Elicia Taveras notes per cover my meds.     Information regarding your request  Member should be able to get the drug/product without a PA at this time.    [facility] will send in rx's for her. 5mg. Rx for mounjaro and dex com g7 sensors.     Dacia Myers Rph.  Medication Therapy Management Provider  [phone]

## 2024-03-01 NOTE — TELEPHONE ENCOUNTER
The G7 wasn t covered because no history of insulin, a prior auth would need to be done and the mounjaro went through!!!!       Mt submitted G7 PA today --will await answer.    Dacia Myers Rph.  Medication Therapy Management Provider  [phone]

## 2024-03-01 NOTE — LETTER
"  3/1/2024      RE: Elicia Calero  [address]  [place] [address]     Dear Colleague,    Thank you for referring your patient, Elicia Calero, to the [facility] [place]. Please see a copy of my visit note below.    PROCEDURE ENCOUNTER    [facility]  Collin Calero, DO  2024     Name: Elicia Calero  MRN: [id number]  Age: 49 year old  : 1975    Referring provider:   Diagnosis: R CTS    Carpal Tunnel Injection - Ultrasound Guided  The patient was informed of the risks and the benefits of the procedure and a written consent was signed.  The patient s right wrist was prepped with chlorhexidine in sterile fashion.   40 mg of methylprednisolone suspension was drawn up into a 3 mL syringe with 1.0 mL of 1% lidocaine.  Injection was performed using sterile technique.  Under ultrasound guidance a 1.5\" 22-gauge needle was used to enter the righ wrist at the distal palmar crease medial to the median nerve.  Needle placement was visualized and documented with ultrasound.  Ultrasound visualization required to ensure injection material enters the perineural sheath and not a vessel or nerve itself.  Injection performed long axis to the probe.  Injection solution visualized surrounding the perineurium.  Images were permanently stored for the patient's record.  There were no complications. The patient tolerated the procedure well. There was negligible bleeding.   The patient was instructed to ice the wrist upon leaving clinic and refrain from overuse over the next 3 days.   The patient was instructed to call or go to the emergency room with any unusual pain, swelling, redness, or if otherwise concerned.    Collin Calero D.O., [facility]  , Sports Medicine  Department of Family Medicine and [facility]    Hand / Upper Extremity Injection: R carpal tunnel    Date/Time: 3/1/2024 8:23 AM    Performed by: Collin Calero, "   Authorized by: Collin Calero DO    Indications:  Pain  Needle Size:  25 G  Guidance: ultrasound    Approach:  Volar  Condition: carpal tunnel      Site:  R carpal tunnel  Medications:  40 mg triamcinolone 40 MG/ML; 1 mL lidocaine (PF) 1 %  Outcome:  Tolerated well, no immediate complications  Procedure discussed: discussed risks, benefits, and alternatives    Consent Given by:  Patient  Timeout: timeout called immediately prior to procedure    Prep: patient was prepped and draped in usual sterile fashion          Again, thank you for allowing me to participate in the care of your patient.      Sincerely,    Collin Calero DO

## 2024-03-04 NOTE — TELEPHONE ENCOUNTER
DIAGNOSIS: I saw you about my R wrist and my L was bothering me at the time and with what we did you said it was tendinitis and to brace it. I ve been doing this but it is not getting any better. I really cannot go without the brace at all unless I want pain. I just want a better look at it.    APPOINTMENT DATE: 3/9/2024   NOTES STATUS DETAILS   OFFICE NOTE from referring provider     OFFICE NOTE from other specialist Internal    OPERATIVE REPORT Internal Hand/Upper Extremity Injection    MEDICATION LIST Internal    XRAYS (IMAGES & REPORTS) Order Xray Wrist Left 3/9/2024

## 2024-03-06 NOTE — PROGRESS NOTES
[facility]  Sports Medicine Clinic  [facility]           SUBJECTIVE       Elicia Calero is a 49 year old female presenting to clinic today for left wrist pain.  Pain is located on the base of the thumb, in the first dorsal compartment of the wrist.  No numbness or tingling.  No swelling.  Patient has been wearing a brace without any particular benefit.    Background:   Occupation: Technician at the [email or web address]  Hand Dominance (If pertinent): Right    Injury (Y/N): None  Work Comp (Y/N): No  Date of injury: None  Mechanism of Injury: None    Duration of symptoms: 2 months   Intensity (1-10): 7/10   Aggravating factors: Gripping, pulling, general use of the thumb   Relieving Factors: ibuprofen, tylenol, ice   Prior Evaluation: None   Previous Surgery on the area (Y/N): None   Physical Therapy (Previous/Current/None): None    Physical Activity/Exercise (What, How Often): None      PMH, Medications and Allergies were reviewed and updated as needed.    ROS:  As noted above otherwise negative.    Patient Active Problem List   Diagnosis    Gastroesophageal reflux disease with esophagitis without hemorrhage    Hypothyroidism, unspecified type    Hepatic adenoma    NAFLD (nonalcoholic fatty liver disease)    Type 2 diabetes mellitus without complication, without long-term current use of insulin (H)    Mild episode of recurrent major depressive disorder (H24)    Chronic migraine without aura without status migrainosus, not intractable    Chronic low back pain without sciatica, unspecified back pain laterality    Bipolar affective disorder, current episode mixed, current episode severity unspecified (H)    Pain in joint involving ankle and foot, right    Persistent insomnia    Localized edema    Facet arthropathy, lumbosacral    Atypical psychosis (H)    Depressive disorder, not elsewhere classified    Former smoker    Lymphedema    Myofascial pain syndrome    Myopia    Obesity (BMI  30-39.9)    Regular astigmatism    Tension type headache    Chronic pain disorder    F11.9 - Chronic, continuous use of opioids    Class 2 severe obesity due to excess calories with serious comorbidity in adult (H)       Current Outpatient Medications   Medication Sig Dispense Refill    Alcohol Swabs PADS 1 each daily 300 each 3    buPROPion (WELLBUTRIN XL) 150 MG 24 hr tablet Take 1 tablet (150 mg) by mouth every morning 90 tablet 3    buPROPion (WELLBUTRIN XL) 300 MG 24 hr tablet Take 1 tablet (300 mg) by mouth daily 90 tablet 3    Continuous Blood Gluc  (DEXCOM G6 ) RALEIGH Use to read blood sugars as per 's instructions. 1 each 0    Continuous Blood Gluc Sensor (DEXCOM G6 SENSOR) MISC Change every 10 days. 9 each 3    Continuous Blood Gluc Sensor (DEXCOM G7 SENSOR) MISC 1 Device every 10 days 3 each 5    Continuous Blood Gluc Transmit (DEXCOM G6 TRANSMITTER) MISC Change every 3 months. 1 each 3    diclofenac (VOLTAREN) 1 % topical gel Apply 4 g topically 4 times daily 350 g 1    diphenhydrAMINE (BENADRYL) 50 MG tablet Take 50 mg by mouth      doxepin (SINEQUAN) 25 MG capsule Take 2 capsules (50 mg) by mouth At Bedtime 30 capsule 3    empagliflozin (JARDIANCE) 25 MG TABS tablet Take 1 tablet (25 mg) by mouth daily 90 tablet 1    folic acid (FOLVITE) 1 MG tablet Take 1 tablet (1,000 mcg) by mouth daily 90 tablet 3    gabapentin (NEURONTIN) 600 MG tablet Take 2 tablets (1,200 mg) by mouth 3 times daily for 90 days 540 tablet 0    hydrochlorothiazide (MICROZIDE) 12.5 MG capsule Take 1 capsule (12.5 mg) by mouth daily 90 capsule 0    hydrOXYzine (VISTARIL) 50 MG capsule Take 1 capsule (50 mg) by mouth daily 90 capsule 1    hydrOXYzine HCl (ATARAX) 25 MG tablet Ok to take with 50mg tablet for a total of 75mg daily as needed. 30 tablet 3    indomethacin (INDOCIN) 50 MG capsule Take 1 capsule (50 mg) by mouth 3 times daily 270 capsule 3    lamoTRIgine (LAMICTAL) 200 MG tablet Take 2 tablets (400  mg) by mouth At Bedtime for 360 days 180 tablet 3    levothyroxine (SYNTHROID/LEVOTHROID) 125 MCG tablet Take 1 tablet (125 mcg) by mouth daily 90 tablet 0    lidocaine, viscous, (XYLOCAINE) 2 % solution Swish and spit 5 mLs in mouth 5 times daily 100 mL 1    lisdexamfetamine (VYVANSE) 20 MG capsule Take 20 mg by mouth every morning Takes with 50mg daily every morning -total of 70mg daily      lisdexamfetamine (VYVANSE) 50 MG capsule Take 50 mg by mouth every morning      metFORMIN (GLUCOPHAGE XR) 500 MG 24 hr tablet Take 4 tablets (2,000 mg) by mouth daily 360 tablet 1    methylPREDNISolone (MEDROL DOSEPAK) 4 MG tablet therapy pack Follow Package Directions 21 tablet 0    naloxone (NARCAN) 4 MG/0.1ML nasal spray Spray 1 spray (4 mg) into one nostril alternating nostrils as needed for opioid reversal every 2-3 minutes until assistance arrives 1 each 2    norethindrone (MICRONOR) 0.35 MG tablet Take 1 tablet (0.35 mg) by mouth daily 84 tablet 4    nystatin (MYCOSTATIN) 002132 UNIT/GM external cream Apply 100,000 g topically 3 times daily      omeprazole (PRILOSEC) 20 MG DR capsule Take 1 capsule (20 mg) by mouth 2 times daily 180 capsule 3    ondansetron (ZOFRAN) 4 MG tablet Take 1 tablet (4 mg) by mouth 3 times daily 90 tablet 1    oxyCODONE (OXYCONTIN) 10 MG 12 hr tablet Take 1 tablet (10 mg) by mouth every 12 hours for 30 days 60 tablet 0    [START ON 3/13/2024] oxyCODONE IR (ROXICODONE) 5 MG tablet Take 1 tablet (5 mg) by mouth every 4 hours as needed for moderate pain Do not take more than 20mg total per day 120 tablet 0    pravastatin (PRAVACHOL) 40 MG tablet Take 1 tablet (40 mg) by mouth daily 90 tablet 3    prochlorperazine (COMPAZINE) 10 MG tablet Take 1 tablet (10 mg) by mouth 4 times daily as needed for nausea (with migraine) 120 tablet 5    REXULTI 2 MG tablet Take 2 mg by mouth daily      rizatriptan (MAXALT) 10 MG tablet Take 1 tablet (10 mg) by mouth 2 times daily 60 tablet 3    tirzepatide (MOUNJARO)  5 MG/0.5ML pen Inject 5 mg Subcutaneous every 7 days 2 mL 1    tiZANidine (ZANAFLEX) 2 MG tablet Take 2 tablets (4 mg) by mouth 3 times daily 180 tablet 3    topiramate (TOPAMAX) 100 MG tablet TAKE ONE TABLET BY MOUTH EVERY EVENING 90 tablet 1    triamcinolone (KENALOG) 0.1 % external ointment Apply twice daily on hand for 2-4 weeks. 15 g 1            OBJECTIVE:       Vitals: There were no vitals filed for this visit.  BMI: There is no height or weight on file to calculate BMI.    Gen:  Well nourished and in no acute distress  HEENT: Extraocular movement intact  Neck: Supple  Pulm:  Breathing Comfortably. No increased respiratory effort.  Psych: Euthymic. Appropriately answers questions    MSK: Left wrist without areas of edema or erythema.  Tenderness to palpation noted in the first dorsal compartment at the APL/EPB tendons.  Pain is worsened with resisted radial deviation, and passive ulnar deviation.  Full range of motion however.  Strength testing is full and intact with the exception of the pain with the radial and ulnar deviation.  Thumb mechanics are full and intact.  No IP or CMC pain.  No MCP pain.  Positive Finkelstein testing of the left wrist.      XRAY : Pending evaluation of the left wrist x-rays is without evidence of acute osseous abnormality.  No significant degenerative changes seen.    Ultrasound examination: Appears to be some fluid surrounding the first dorsal compartment EPB/APL tendons.  Evidence of what appears to be a mild longitudinal split tear of the EPL tendon when evaluating on short axis view.  This is an uofficial read.  Can be further reviewed with MRI if needed.          ASSESSMENT and PLAN:     Elicia was seen today for pain.    Diagnoses and all orders for this visit:    Left wrist pain  -     POC US GUIDANCE NEEDLE PLACEMENT; Future      49-year-old female presenting to clinic today with left wrist pain that is chronic.  Her presentation is consistent with de Quervain's  "tenosynovitis.  Ultrasound examination as above, unofficial read in office while doing the procedure did show what appeared to be a mild longitudinal intrasubstance tear of the EPL tendon.  We have discussed options for management.  At this time given the fact the patient has failed oral medications and bracing, I do think an injection would be reasonable.  Patient is amenable to this.  I would like her to brace for roughly 3 to 5 days after the injection.  After that, she can resume a gradual return to her normal level of activities.  Hand therapy order has been placed.  If no improvement at all with the above injection in hand therapy, would consider advanced imaging given the unofficial findings on ultrasound examination as above.  All of this has been discussed with the patient in full detail.  Return to clinic as needed if no improvement.  See below procedure note for full details on the injection from today.    Wrist Injection, Dorsal First Compartment - Ultrasound Guided  The patient was informed of the risks and the benefits of the procedure and a written consent was signed.  The patient s left wrist was prepped with chlorhexidine in sterile fashion.   40 mg of triamcinolone suspension was drawn up into a 3 mL syringe with 0.5 mL of 1% lidocaine.  Injection was performed using sterile technique.  Under ultrasound guidance a 1.5\" 25-gauge needle was used to enter the left wrist at the dorsal first compartment.  Needle placement was visualized and documented with ultrasound.  Ultrasound visualization required to ensure injection material enters the tendon sheath and not the tendon itself.  Injection performed long axis to the probe.  Injection solution visualized within the tendon sheath.  Images were not permanently stored for the patient's record given technical issues with the Tubaloo machine today in terms of loading the patient info into the machine .  There were no complications. The patient " tolerated the procedure well. There was negligible bleeding.   The patient was instructed to ice the wrist upon leaving clinic and refrain from overuse over the next 3 days.   The patient was instructed to call or go to the emergency room with any unusual pain, swelling, redness, or if otherwise concerned.        Options for treatment and/or follow-up care were reviewed with the patient was actively involved in the decision making process. Patient verbalized understanding and was in agreement with the plan.    Collin Calero DO  , Sports Medicine  Department of Family Medicine and [facility]

## 2024-03-09 ENCOUNTER — ANCILLARY PROCEDURE (OUTPATIENT)
Dept: GENERAL RADIOLOGY | Facility: CLINIC | Age: 49
End: 2024-03-09
Attending: STUDENT IN AN ORGANIZED HEALTH CARE EDUCATION/TRAINING PROGRAM
Payer: COMMERCIAL

## 2024-03-09 ENCOUNTER — PRE VISIT (OUTPATIENT)
Dept: ORTHOPEDICS | Facility: CLINIC | Age: 49
End: 2024-03-09

## 2024-03-09 ENCOUNTER — OFFICE VISIT (OUTPATIENT)
Dept: ORTHOPEDICS | Facility: CLINIC | Age: 49
End: 2024-03-09
Payer: COMMERCIAL

## 2024-03-09 DIAGNOSIS — M65.4 DE QUERVAIN'S TENOSYNOVITIS, LEFT: ICD-10-CM

## 2024-03-09 DIAGNOSIS — M25.532 LEFT WRIST PAIN: ICD-10-CM

## 2024-03-09 DIAGNOSIS — M25.532 LEFT WRIST PAIN: Primary | ICD-10-CM

## 2024-03-09 PROCEDURE — 20550 NJX 1 TENDON SHEATH/LIGAMENT: CPT | Mod: LT | Performed by: STUDENT IN AN ORGANIZED HEALTH CARE EDUCATION/TRAINING PROGRAM

## 2024-03-09 PROCEDURE — 99214 OFFICE O/P EST MOD 30 MIN: CPT | Mod: 25 | Performed by: STUDENT IN AN ORGANIZED HEALTH CARE EDUCATION/TRAINING PROGRAM

## 2024-03-09 PROCEDURE — 73110 X-RAY EXAM OF WRIST: CPT | Mod: LT | Performed by: RADIOLOGY

## 2024-03-09 PROCEDURE — 76942 ECHO GUIDE FOR BIOPSY: CPT | Performed by: STUDENT IN AN ORGANIZED HEALTH CARE EDUCATION/TRAINING PROGRAM

## 2024-03-09 RX ORDER — LIDOCAINE HYDROCHLORIDE 10 MG/ML
0.5 INJECTION, SOLUTION EPIDURAL; INFILTRATION; INTRACAUDAL; PERINEURAL
Status: SHIPPED | OUTPATIENT
Start: 2024-03-09

## 2024-03-09 RX ORDER — TRIAMCINOLONE ACETONIDE 40 MG/ML
40 INJECTION, SUSPENSION INTRA-ARTICULAR; INTRAMUSCULAR
Status: SHIPPED | OUTPATIENT
Start: 2024-03-09

## 2024-03-09 RX ADMIN — TRIAMCINOLONE ACETONIDE 40 MG: 40 INJECTION, SUSPENSION INTRA-ARTICULAR; INTRAMUSCULAR at 08:38

## 2024-03-09 RX ADMIN — LIDOCAINE HYDROCHLORIDE 0.5 ML: 10 INJECTION, SOLUTION EPIDURAL; INFILTRATION; INTRACAUDAL; PERINEURAL at 08:38

## 2024-03-09 NOTE — NURSING NOTE
[address] [address]  Dept: [phone]  ______________________________________________________________________________    Patient: Elicia Calero   : 1975   MRN: [id number]   2024    INVASIVE PROCEDURE SAFETY CHECKLIST    Date: 3/9/24   Procedure: left De Quervains USG CSI   Patient Name: Elicia Calero  MRN: [id number]  YOB: 1975    Action: Complete sections as appropriate. Any discrepancy results in a HARD COPY until resolved.     PRE PROCEDURE:  Patient ID verified with 2 identifiers (name and  or MRN): Yes  Procedure and site verified with patient/designee (when able): Yes  Accurate consent documentation in medical record: Yes  H&P (or appropriate assessment) documented in medical record: Yes  H&P must be up to 20 days prior to procedure and updates within 24 hours of procedure as applicable: NA  Relevant diagnostic and radiology test results appropriately labeled and displayed as applicable: Yes  Procedure site(s) marked with provider initials: NA    TIMEOUT:  Time-Out performed immediately prior to starting procedure, including verbal and active participation of all team members addressing the following:Yes  * Correct patient identify  * Confirmed that the correct side and site are marked  * An accurate procedure consent form  * Agreement on the procedure to be done  * Correct patient position  * Relevant images and results are properly labeled and appropriately displayed  * The need to administer antibiotics or fluids for irrigation purposes during the procedure as applicable   * Safety precautions based on patient history or medication use    DURING PROCEDURE: Verification of correct person, site, and procedures any time the responsibility for care of the patient is transferred to another member of the care team.       Prior to injection, verified patient identity using patient's name and date of birth.  Due to  injection administration, patient instructed to remain in clinic for 15 minutes  afterwards, and to report any adverse reaction to me immediately.    Tendon sheath injection was performed.     Drug Amount Wasted:  None.  Vial/Syringe: Single dose vial  Expiration Date:  11/1/25      Duran Salguero, ATC  March 9, 2024

## 2024-03-09 NOTE — PROGRESS NOTES
Hand / Upper Extremity Injection: L extensor compartment 1    Date/Time: 3/9/2024 8:38 AM    Performed by: Collin Calero DO  Authorized by: Collin Calero DO    Indications:  Pain and tendon swelling  Needle Size:  25 G  Guidance: ultrasound    Approach:  Lateral  Condition: de Quervain's      Site:  L extensor compartment 1  Medications:  40 mg triamcinolone 40 MG/ML; 0.5 mL lidocaine (PF) 1 %  Outcome:  Tolerated well, no immediate complications  Procedure discussed: discussed risks, benefits, and alternatives    Consent Given by:  Patient  Timeout: timeout called immediately prior to procedure    Prep: patient was prepped and draped in usual sterile fashion       Duran Salguero M.A., LAT, ATC  Certified Athletic Trainer

## 2024-03-09 NOTE — LETTER
3/9/2024      RE: Elicia Calero  [address]  [place] [address]     Dear Colleague,    Thank you for referring your patient, Elicia Calero, to the [facility] [place]. Please see a copy of my visit note below.    [facility]  Sports Medicine Clinic  Clinics and Surgery Center           SUBJECTIVE       Elicia Calero is a 49 year old female presenting to clinic today for left wrist pain.  Pain is located on the base of the thumb, in the first dorsal compartment of the wrist.  No numbness or tingling.  No swelling.  Patient has been wearing a brace without any particular benefit.    Background:   Occupation: Technician at the [employer]  Hand Dominance (If pertinent): Right    Injury (Y/N): None  Work Comp (Y/N): No  Date of injury: None  Mechanism of Injury: None    Duration of symptoms: 2 months   Intensity (1-10): 7/10   Aggravating factors: Gripping, pulling, general use of the thumb   Relieving Factors: ibuprofen, tylenol, ice   Prior Evaluation: None   Previous Surgery on the area (Y/N): None   Physical Therapy (Previous/Current/None): None    Physical Activity/Exercise (What, How Often): None      PMH, Medications and Allergies were reviewed and updated as needed.    ROS:  As noted above otherwise negative.    Patient Active Problem List   Diagnosis    Gastroesophageal reflux disease with esophagitis without hemorrhage    Hypothyroidism, unspecified type    Hepatic adenoma    NAFLD (nonalcoholic fatty liver disease)    Type 2 diabetes mellitus without complication, without long-term current use of insulin (H)    Mild episode of recurrent major depressive disorder (H24)    Chronic migraine without aura without status migrainosus, not intractable    Chronic low back pain without sciatica, unspecified back pain laterality    Bipolar affective disorder, current episode mixed, current episode severity unspecified (H)    Pain in joint involving  ankle and foot, right    Persistent insomnia    Localized edema    Facet arthropathy, lumbosacral    Atypical psychosis (H)    Depressive disorder, not elsewhere classified    Former smoker    Lymphedema    Myofascial pain syndrome    Myopia    Obesity (BMI 30-39.9)    Regular astigmatism    Tension type headache    Chronic pain disorder    F11.9 - Chronic, continuous use of opioids    Class 2 severe obesity due to excess calories with serious comorbidity in adult (H)       Current Outpatient Medications   Medication Sig Dispense Refill    Alcohol Swabs PADS 1 each daily 300 each 3    buPROPion (WELLBUTRIN XL) 150 MG 24 hr tablet Take 1 tablet (150 mg) by mouth every morning 90 tablet 3    buPROPion (WELLBUTRIN XL) 300 MG 24 hr tablet Take 1 tablet (300 mg) by mouth daily 90 tablet 3    Continuous Blood Gluc  (DEXCOM G6 ) RALEIGH Use to read blood sugars as per 's instructions. 1 each 0    Continuous Blood Gluc Sensor (DEXCOM G6 SENSOR) MISC Change every 10 days. 9 each 3    Continuous Blood Gluc Sensor (DEXCOM G7 SENSOR) MISC 1 Device every 10 days 3 each 5    Continuous Blood Gluc Transmit (DEXCOM G6 TRANSMITTER) MISC Change every 3 months. 1 each 3    diclofenac (VOLTAREN) 1 % topical gel Apply 4 g topically 4 times daily 350 g 1    diphenhydrAMINE (BENADRYL) 50 MG tablet Take 50 mg by mouth      doxepin (SINEQUAN) 25 MG capsule Take 2 capsules (50 mg) by mouth At Bedtime 30 capsule 3    empagliflozin (JARDIANCE) 25 MG TABS tablet Take 1 tablet (25 mg) by mouth daily 90 tablet 1    folic acid (FOLVITE) 1 MG tablet Take 1 tablet (1,000 mcg) by mouth daily 90 tablet 3    gabapentin (NEURONTIN) 600 MG tablet Take 2 tablets (1,200 mg) by mouth 3 times daily for 90 days 540 tablet 0    hydrochlorothiazide (MICROZIDE) 12.5 MG capsule Take 1 capsule (12.5 mg) by mouth daily 90 capsule 0    hydrOXYzine (VISTARIL) 50 MG capsule Take 1 capsule (50 mg) by mouth daily 90 capsule 1    hydrOXYzine HCl  (ATARAX) 25 MG tablet Ok to take with 50mg tablet for a total of 75mg daily as needed. 30 tablet 3    indomethacin (INDOCIN) 50 MG capsule Take 1 capsule (50 mg) by mouth 3 times daily 270 capsule 3    lamoTRIgine (LAMICTAL) 200 MG tablet Take 2 tablets (400 mg) by mouth At Bedtime for 360 days 180 tablet 3    levothyroxine (SYNTHROID/LEVOTHROID) 125 MCG tablet Take 1 tablet (125 mcg) by mouth daily 90 tablet 0    lidocaine, viscous, (XYLOCAINE) 2 % solution Swish and spit 5 mLs in mouth 5 times daily 100 mL 1    lisdexamfetamine (VYVANSE) 20 MG capsule Take 20 mg by mouth every morning Takes with 50mg daily every morning -total of 70mg daily      lisdexamfetamine (VYVANSE) 50 MG capsule Take 50 mg by mouth every morning      metFORMIN (GLUCOPHAGE XR) 500 MG 24 hr tablet Take 4 tablets (2,000 mg) by mouth daily 360 tablet 1    methylPREDNISolone (MEDROL DOSEPAK) 4 MG tablet therapy pack Follow Package Directions 21 tablet 0    naloxone (NARCAN) 4 MG/0.1ML nasal spray Spray 1 spray (4 mg) into one nostril alternating nostrils as needed for opioid reversal every 2-3 minutes until assistance arrives 1 each 2    norethindrone (MICRONOR) 0.35 MG tablet Take 1 tablet (0.35 mg) by mouth daily 84 tablet 4    nystatin (MYCOSTATIN) 568996 UNIT/GM external cream Apply 100,000 g topically 3 times daily      omeprazole (PRILOSEC) 20 MG DR capsule Take 1 capsule (20 mg) by mouth 2 times daily 180 capsule 3    ondansetron (ZOFRAN) 4 MG tablet Take 1 tablet (4 mg) by mouth 3 times daily 90 tablet 1    oxyCODONE (OXYCONTIN) 10 MG 12 hr tablet Take 1 tablet (10 mg) by mouth every 12 hours for 30 days 60 tablet 0    [START ON 3/13/2024] oxyCODONE IR (ROXICODONE) 5 MG tablet Take 1 tablet (5 mg) by mouth every 4 hours as needed for moderate pain Do not take more than 20mg total per day 120 tablet 0    pravastatin (PRAVACHOL) 40 MG tablet Take 1 tablet (40 mg) by mouth daily 90 tablet 3    prochlorperazine (COMPAZINE) 10 MG tablet Take  1 tablet (10 mg) by mouth 4 times daily as needed for nausea (with migraine) 120 tablet 5    REXULTI 2 MG tablet Take 2 mg by mouth daily      rizatriptan (MAXALT) 10 MG tablet Take 1 tablet (10 mg) by mouth 2 times daily 60 tablet 3    tirzepatide (MOUNJARO) 5 MG/0.5ML pen Inject 5 mg Subcutaneous every 7 days 2 mL 1    tiZANidine (ZANAFLEX) 2 MG tablet Take 2 tablets (4 mg) by mouth 3 times daily 180 tablet 3    topiramate (TOPAMAX) 100 MG tablet TAKE ONE TABLET BY MOUTH EVERY EVENING 90 tablet 1    triamcinolone (KENALOG) 0.1 % external ointment Apply twice daily on hand for 2-4 weeks. 15 g 1            OBJECTIVE:       Vitals: There were no vitals filed for this visit.  BMI: There is no height or weight on file to calculate BMI.    Gen:  Well nourished and in no acute distress  HEENT: Extraocular movement intact  Neck: Supple  Pulm:  Breathing Comfortably. No increased respiratory effort.  Psych: Euthymic. Appropriately answers questions    MSK: Left wrist without areas of edema or erythema.  Tenderness to palpation noted in the first dorsal compartment at the APL/EPB tendons.  Pain is worsened with resisted radial deviation, and passive ulnar deviation.  Full range of motion however.  Strength testing is full and intact with the exception of the pain with the radial and ulnar deviation.  Thumb mechanics are full and intact.  No IP or CMC pain.  No MCP pain.  Positive Finkelstein testing of the left wrist.      XRAY : Pending evaluation of the left wrist x-rays is without evidence of acute osseous abnormality.  No significant degenerative changes seen.    Ultrasound examination: Appears to be some fluid surrounding the first dorsal compartment EPB/APL tendons.  Evidence of what appears to be a mild longitudinal split tear of the EPL tendon when evaluating on short axis view.  This is an uofficial read.  Can be further reviewed with MRI if needed.          ASSESSMENT and PLAN:     Elicia was seen today for  "pain.    Diagnoses and all orders for this visit:    Left wrist pain  -     POC US GUIDANCE NEEDLE PLACEMENT; Future      49-year-old female presenting to clinic today with left wrist pain that is chronic.  Her presentation is consistent with de Quervain's tenosynovitis.  Ultrasound examination as above, unofficial read in office while doing the procedure did show what appeared to be a mild longitudinal intrasubstance tear of the EPL tendon.  We have discussed options for management.  At this time given the fact the patient has failed oral medications and bracing, I do think an injection would be reasonable.  Patient is amenable to this.  I would like her to brace for roughly 3 to 5 days after the injection.  After that, she can resume a gradual return to her normal level of activities.  Hand therapy order has been placed.  If no improvement at all with the above injection in hand therapy, would consider advanced imaging given the unofficial findings on ultrasound examination as above.  All of this has been discussed with the patient in full detail.  Return to clinic as needed if no improvement.  See below procedure note for full details on the injection from today.    Wrist Injection, Dorsal First Compartment - Ultrasound Guided  The patient was informed of the risks and the benefits of the procedure and a written consent was signed.  The patient s left wrist was prepped with chlorhexidine in sterile fashion.   40 mg of triamcinolone suspension was drawn up into a 3 mL syringe with 0.5 mL of 1% lidocaine.  Injection was performed using sterile technique.  Under ultrasound guidance a 1.5\" 25-gauge needle was used to enter the left wrist at the dorsal first compartment.  Needle placement was visualized and documented with ultrasound.  Ultrasound visualization required to ensure injection material enters the tendon sheath and not the tendon itself.  Injection performed long axis to the probe.  Injection solution " visualized within the tendon sheath.  Images were not permanently stored for the patient's record given technical issues with the [employer] U/S machine today in terms of loading the patient info into the machine .  There were no complications. The patient tolerated the procedure well. There was negligible bleeding.   The patient was instructed to ice the wrist upon leaving clinic and refrain from overuse over the next 3 days.   The patient was instructed to call or go to the emergency room with any unusual pain, swelling, redness, or if otherwise concerned.        Options for treatment and/or follow-up care were reviewed with the patient was actively involved in the decision making process. Patient verbalized understanding and was in agreement with the plan.    Collin Calero DO  , Sports Medicine  Department of [facility]      Hand / Upper Extremity Injection: L extensor compartment 1    Date/Time: 3/9/2024 8:38 AM    Performed by: Collin Calero DO  Authorized by: Collin Calero DO    Indications:  Pain and tendon swelling  Needle Size:  25 G  Guidance: ultrasound    Approach:  Lateral  Condition: de Quervain's      Site:  L extensor compartment 1  Medications:  40 mg triamcinolone 40 MG/ML; 0.5 mL lidocaine (PF) 1 %  Outcome:  Tolerated well, no immediate complications  Procedure discussed: discussed risks, benefits, and alternatives    Consent Given by:  Patient  Timeout: timeout called immediately prior to procedure    Prep: patient was prepped and draped in usual sterile fashion       Duran Salguero M.A., LAT, ATC  Certified Athletic Trainer            Again, thank you for allowing me to participate in the care of your patient.      Sincerely,    Collin Calero DO

## 2024-03-14 ENCOUNTER — TELEPHONE (OUTPATIENT)
Dept: FAMILY MEDICINE | Facility: CLINIC | Age: 49
End: 2024-03-14
Payer: COMMERCIAL

## 2024-03-14 NOTE — TELEPHONE ENCOUNTER
General Call    Contacts         Type Contact Phone/Fax    03/14/2024 08:43 AM CDT Phone (Incoming) [facility] - [place], [place] - [address] (Pharmacy) [phone]          Reason for Call:  Karina, pharmacist at [facility] pharm reached out regarding patients Oxycodone     What are your questions or concerns: 2/16/24 Oxycodone 10MG were prescribed to patient. 2/29/24 Oxycodone 5MG was prescribed with earliest fill date of 3/13/24. Karina says patient would not be in need of medication for two more weeks with how scripts are written and regarding taper plan. Patient advised to Karina that she is tapering slowly than what the doctor is documenting. Karina is hoping this can be looked into and a conversation can be had with patient. From writers understanding of call, they may think it is not being taken as it should be.      Date of last appointment with provider: 2/29/24

## 2024-03-14 NOTE — TELEPHONE ENCOUNTER
Dr. Waggoner --    Pharmacist I spoke with was Karina at [phone]. [facility] - [place], [place] - [address] [place] [address] (Pharmacy).    Thank you,   Reshma Alvarez, BSN RN  [facility]

## 2024-03-14 NOTE — TELEPHONE ENCOUNTER
We restarted long acting oxycontin on 2/29 as her insurance changed.   Reduced short acting oxycodone to 5mg tablets (max 20mg per day)  We have stopped the short acting 10mg oxycodone tablets.    ___________    Her last prescription was written to take oxycodone 10mg as needed (max of 3.5 tablets, or 35mg per day). This equates to 105 tablets/month which is what was dispensed. Therefore, she will complete this prescription ~3/16.     This prescription has since been stopped, and she can return to taking the oxycodone IR 5mg prescription which is available for pickup.    Can you pass this information on to the pharmacist?  Let me know if they have any other concerns?      Dr. Waggoner

## 2024-03-14 NOTE — TELEPHONE ENCOUNTER
Discussed with pharmacist Chance.     Oxycodone 10mg short acting prescription should be running out over the next few days, and will be discontinued after that.   Ok to fill oxycodone 5mg prescription a few days early.  Continue oxycontin 10mg long acting BID.    Dr. Waggoner

## 2024-03-20 ENCOUNTER — TELEPHONE (OUTPATIENT)
Dept: GASTROENTEROLOGY | Facility: CLINIC | Age: 49
End: 2024-03-20
Payer: COMMERCIAL

## 2024-03-21 NOTE — PROGRESS NOTES
"OCCUPATIONAL THERAPY EVALUATION  Type of Visit: Evaluation    See electronic medical record for Abuse and Falls Screening details.    Subjective   Presenting condition or subjective complaint:  Left De Quervain's tenosynovitis  Date of onset: Two months  Procedure: Left 1st dorsal compartment cortisone injection  Procedure date: 03/09/24    Relevant medical history:  Past Medical History:   Diagnosis Date    Diabetes mellitus (H)     Hepatic adenoma     HTN (hypertension)     Hyperlipidemia     Hypothyroidism     Irregular menstrual cycle 03/25/2009    Irritable bowel syndrome, unspecified type 11/28/2022    Migraine     NAFLD (nonalcoholic fatty liver disease)      Dates & types of surgery:  Past Surgical History:   Procedure Laterality Date    CHOLECYSTECTOMY      teen years    COLONOSCOPY N/A 5/18/2023    Procedure: Colonoscopy;  Surgeon: Chance Chan MD;  Location: [facility] OR    ESOPHAGOSCOPY, GASTROSCOPY, DUODENOSCOPY (EGD), COMBINED N/A 5/18/2023    Procedure: ESOPHAGOGASTRODUODENOSCOPY, WITH BIOPSY;  Surgeon: Chance Chan MD;  Location: [facility] OR    RADIO FREQUENCY ABLATION / DESTRUCTION OF SACROILOAC JOINT LATERAL BRANCHES (S1/S2/S3) Bilateral 6/15/2023    Procedure: Bilateral Lumbar 5 medial branch block and Sacral 1,2,3 lateral branch block;  Surgeon: Layne Weber MD;  Location: [facility] OR     Prior diagnostic imaging/testing results: X-ray. Per report, \"Mild degenerative changes of the first carpometacarpal and triscaphe joints.\" Per MD note from 3/9/24, \"Ultrasound examination: Appears to be some fluid surrounding the first dorsal compartment EPB/APL tendons. Evidence of what appears to be a mild longitudinal split tear of the EPL tendon when evaluating on short axis view. This is an unofficial read.\"  Prior therapy history for the same diagnosis, illness or injury: None    Prior level of function:  Transfers: Independent  Ambulation: Independent  ADL: Independent  IADL: " Independent    Living environment: Patient is independent at home and there are no concerns about accessibility and mobility in the home.    Employment: Pharmacy technician   Hobbies/Interests: Baking, chase painting, cross stitching    Patient goals for therapy: To use left wrist/hand without pain     Objective   Right hand dominant  Patient reports symptoms of pain, weakness/loss of strength, and edema    Pain Level (Scale 0-10):   3/21/2024   At Rest 2/10   With Use Up to 8/10     Pain Description:  Date 3/21/2024   Location 1st dorsal compartment, radiates distally   Pain Quality Aching, Shooting, Stabbing, and Tender   Frequency intermittent     Pain is worst daytime   Exacerbated by Moving the thumb and wrist (extension/flexion especially)   Relieved by Wearing brace   Progression Gradually improving     Sensation   WNL throughout all nerve distributions per patient report.    Edema   - none  + mild    ++ moderate    +++ severe    3/22/2024   1st DC Mild   Radial Styloid Mild      ROM  Pain Report: - none  + mild    ++ moderate    +++ severe   Thumb 3/21/2024 3/23/2024   AROM (PROM) Right Left   MP 0/45 0+/45+   IP 0/60 0+/60+   RABD 60 55+   PABD 55 55+   Opposition       Wrist 3/21/2024 3/22/2024   AROM (PROM) Right Left   Extension 65 55+   Flexion 70 60+   RD 20 15+   UD 45 35+   UD with Th Flex NT NT     Resisted Testing  Pain Report: - none  + mild    ++ moderate    +++ severe    3/22/2024   APL +   EPB +   EPL +     Strength   (Measured in pounds, pain-free)  Pain Report: - none  + mild    ++ moderate    +++ severe    3/22/2024 3/22/2024   Trials Right Left   1 50 11     Lat Pinch 3/22/2024 3/22/2024   Trials Right Left   1 11 6     3 Pt Pinch 3/22/2024 3/22/2024   Trials Right Left   1 10 3     Special Tests   Pain Report:  - none    + mild    ++ moderate    +++ severe    3/22/2024   Finkelsteins ++   WHAT test NT     Palpation  Pain Report:  - none    + mild    ++ moderate    +++ severe     3/22/2024   Radial Styloid +   1st DC +   FCR -   Thumb CMC -         Assessment & Plan   CLINICAL IMPRESSIONS  Medical Diagnosis: Left De Quervain's tenosynovitis    Treatment Diagnosis: Left wrist pain    Impression/Assessment: Pt is a 49 year old female presenting to Occupational Therapy due to the above condition.  The following significant findings have been identified: Impaired activity tolerance, Impaired ROM, Impaired strength, and Pain.  These identified deficits interfere with their ability to perform work tasks, recreational activities, household chores, driving , and meal planning and preparation as compared to previous level of function.   Patient's limitations or Problem List includes: Pain, Decreased ROM/motion, Increased edema, Decreased , and Decreased pinch of the left wrist which interferes with the patient's ability to perform Self Care Tasks (dressing), Work Tasks, Recreational Activities, Household Chores, and Driving  as compared to previous level of function.    Clinical Decision Making (Complexity):  Assessment of Occupational Performance: 5 or more Performance Deficits  Occupational Performance Limitations: dressing, hygiene and grooming, driving and community mobility, home establishment and management, meal preparation and cleanup, shopping, work, and leisure activities  Clinical Decision Making (Complexity): Low complexity    PLAN OF CARE  Treatment Interventions:  Modalities:  US  Therapeutic Exercise:  AROM, PROM, Tendon Gliding, Isotonics, and Isometrics  Manual Techniques:  Friction massage, Myofascial release, and Manual edema mobilization  Orthotic Fabrication:  Forearm-based thumb spica orthosis  Self Care:  Ergonomic Considerations    Long Term Goals   OT Goal 1  Goal Identifier: ADL  Goal Description: Patient able to pull up pants with 1/10 wrist pain or less.  Rationale: In order to maximize safety and independence with ADL/IADLs  Target Date: 05/03/24      Frequency of  Treatment: 1x/week  Duration of Treatment: 6 weeks     Recommended Referrals to Other Professionals:  N/A  Education Assessment: Learner/Method: Patient;No Barriers to Learning     Risks and benefits of evaluation/treatment have been explained.   Patient/Family/caregiver agrees with Plan of Care.     Evaluation Time:    OT Eval, Low Complexity Minutes (26446): 30    Signing Clinician: MARVEL Schroeder [facility]                                                                                   OUTPATIENT OCCUPATIONAL THERAPY      PLAN OF TREATMENT FOR OUTPATIENT REHABILITATION   Patient's Last Name, First Name, Elicia Haro YOB: 1975   Provider's Name   [facility]   Medical Record No.  [id number]     Onset Date: 03/09/24 Start of Care Date: 03/22/24     Medical Diagnosis:  Left De Quervain's tenosynovitis      OT Treatment Diagnosis:  Left wrist pain Plan of Treatment  Frequency/Duration:1x/week/6 weeks    Certification date from 03/22/24   To 05/03/24        See note for plan of treatment details and functional goals     Shawna Alatorre OT                         I CERTIFY THE NEED FOR THESE SERVICES FURNISHED UNDER        THIS PLAN OF TREATMENT AND WHILE UNDER MY CARE     (Physician attestation of this document indicates review and certification of the therapy plan).              Referring Provider:  Collin Calero, DO    Initial Assessment  See Epic Evaluation- 03/22/24

## 2024-03-22 ENCOUNTER — THERAPY VISIT (OUTPATIENT)
Dept: OCCUPATIONAL THERAPY | Facility: CLINIC | Age: 49
End: 2024-03-22
Attending: STUDENT IN AN ORGANIZED HEALTH CARE EDUCATION/TRAINING PROGRAM
Payer: COMMERCIAL

## 2024-03-22 DIAGNOSIS — M65.4 DE QUERVAIN'S TENOSYNOVITIS, LEFT: ICD-10-CM

## 2024-03-22 DIAGNOSIS — M25.532 LEFT WRIST PAIN: ICD-10-CM

## 2024-03-22 PROCEDURE — 97110 THERAPEUTIC EXERCISES: CPT | Mod: GO | Performed by: OCCUPATIONAL THERAPIST

## 2024-03-22 PROCEDURE — 97760 ORTHOTIC MGMT&TRAING 1ST ENC: CPT | Mod: GO | Performed by: OCCUPATIONAL THERAPIST

## 2024-03-22 PROCEDURE — 97165 OT EVAL LOW COMPLEX 30 MIN: CPT | Mod: GO | Performed by: OCCUPATIONAL THERAPIST

## 2024-03-28 ENCOUNTER — TRANSFERRED RECORDS (OUTPATIENT)
Dept: HEALTH INFORMATION MANAGEMENT | Facility: CLINIC | Age: 49
End: 2024-03-28
Payer: COMMERCIAL

## 2024-03-28 NOTE — PROGRESS NOTES
Elicia is a 49 year old who is being evaluated via a billable video visit.      How would you like to obtain your AVS? MyChart  If the video visit is dropped, the invitation should be resent by: Send to e-mail at: [email or web address]  Will anyone else be joining your video visit? No      Assessment & Plan     Easy bruising  Noticing bruises on her thighs and lower legs. Does wear compression stockings. Denies blood in stool, hematuria, or vomiting blood. Suspect this is due to fragile skin and daily NSAID use. Will check cbc, ptt, INR, and CMP to further evaluate.    Chronic pain disorder  Pain in joint involving ankle and foot, right  Chronic, continuous use of opioids  History of chronic foot pain, has been on continuous oxycodone for many years. S/P ankle tendon/arch surgery on 10/19 which went well. She is now weight bearing as tolerated in brace, doing PT. Following with [facility]. Discussed need to wean opioids, due to high MME, risk of tolerance and oversedation. No side effects. Has narcan available at home PRN.     Today-pain is slightly improved today. Working full time, going well. Work is accommodating if she needs to take a break or sit down. PT has finished, since they cannot regain mobility in her foot. Taking ibuprofen BID, tylenol TID, and muscle relaxer.       Plan:  -discussed goal is to wean opioids  -goal is to return to the amount of oxycodone she was taking pre-surgery, which was a total of short acting oxycodone 20mg daily (MME 30)     -opioid:   -continue long acting oxycontin 10mg BID (MME 30)  -continue short acting oxycodone to a max of 20mg (4 tablets) per day (MME 30), 120 tablets given  -encouraged her to cut short acting oxycodone down to 3 tablets per day if able  -PDMP checked, appropriate  -current MME:60     MME were 30 ~8/2023   Current MME are double what she was taking prior to worsening symptoms ~8/2023    -non-opioid:  -schedule tylenol 1000mg TID  -schedule ibuprofen  "800mg TID (increase from BID to TID)  -continue tizanidine PRN   -continue gabapentin 1200mg TID  -elevate, ice    Follow up in 1 month     Subjective   Elicia is a 49 year old, presenting for the following health issues:  Follow Up (Pain)        2/29/2024     7:27 AM   Additional Questions   Roomed by Amada Samano   Accompanied by Self     History of Present Illness       Reason for visit:  Pain medication review    She eats 2-3 servings of fruits and vegetables daily.She consumes 0 sweetened beverage(s) daily.She exercises with enough effort to increase her heart rate 9 or less minutes per day.  She exercises with enough effort to increase her heart rate 3 or less days per week. She is missing 2 dose(s) of medications per week.  She is not taking prescribed medications regularly due to remembering to take.    Last filled 120 tablets of oxycodone 5mg on 3/14/24.  Last filled 60 tablets of long acting oxycodone 10mg on 2/29/24    Today feels a little better  Using 4 tablets of oxycodone per day, 2 long acting per day  Long acting works the best  Work is going well, steady, trying not to stand as much as possible (work is accomodating)    Did injection in wrists for carpal tunnel  Hand therapy  Made a brace  Has a torn tendon    Wears compression stockings  Getting bruising by knees/anterior thighs        Objective    Vitals - Patient Reported  Weight (Patient Reported): 93 kg (205 lb)  Height (Patient Reported): 171.5 cm (5' 7.5\")  BMI (Based on Pt Reported Ht/Wt): 31.63  Pain Score: Mild Pain (3)  Pain Loc: Ankle    Vitals:  No vitals were obtained today due to virtual visit.    Physical Exam   GENERAL: alert and no distress  EYES: Eyes grossly normal to inspection.  No discharge or erythema, or obvious scleral/conjunctival abnormalities.  RESP: No audible wheeze, cough, or visible cyanosis.    SKIN: Visible skin clear. No significant rash, abnormal pigmentation or lesions.  NEURO: Cranial nerves grossly intact.  " Mentation and speech appropriate for age.  PSYCH: Appropriate affect, tone, and pace of words      Video-Visit Details    Type of service:  Video Visit   Video Start Time:  7:24am  Video End Time: 7:36am    Originating Location (pt. Location): Home  Distant Location (provider location):  On-site  Platform used for Video Visit: Diana  Signed Electronically by: Loc Waggoner DO

## 2024-03-29 ENCOUNTER — TELEPHONE (OUTPATIENT)
Dept: FAMILY MEDICINE | Facility: CLINIC | Age: 49
End: 2024-03-29

## 2024-03-29 ENCOUNTER — VIRTUAL VISIT (OUTPATIENT)
Dept: FAMILY MEDICINE | Facility: CLINIC | Age: 49
End: 2024-03-29
Payer: COMMERCIAL

## 2024-03-29 DIAGNOSIS — F11.90 CHRONIC, CONTINUOUS USE OF OPIOIDS: ICD-10-CM

## 2024-03-29 DIAGNOSIS — R23.3 EASY BRUISING: ICD-10-CM

## 2024-03-29 DIAGNOSIS — G89.4 CHRONIC PAIN DISORDER: Primary | ICD-10-CM

## 2024-03-29 DIAGNOSIS — M25.571 PAIN IN JOINT INVOLVING ANKLE AND FOOT, RIGHT: ICD-10-CM

## 2024-03-29 PROCEDURE — 99214 OFFICE O/P EST MOD 30 MIN: CPT | Mod: 95 | Performed by: STUDENT IN AN ORGANIZED HEALTH CARE EDUCATION/TRAINING PROGRAM

## 2024-03-29 RX ORDER — OXYCODONE HYDROCHLORIDE 5 MG/1
5 TABLET ORAL EVERY 4 HOURS PRN
Qty: 120 TABLET | Refills: 0 | Status: SHIPPED | OUTPATIENT
Start: 2024-04-11 | End: 2024-04-29

## 2024-03-29 RX ORDER — OXYCODONE HCL 10 MG/1
10 TABLET, FILM COATED, EXTENDED RELEASE ORAL EVERY 12 HOURS
Qty: 60 TABLET | Refills: 0 | Status: SHIPPED | OUTPATIENT
Start: 2024-03-29 | End: 2024-04-29

## 2024-03-29 NOTE — TELEPHONE ENCOUNTER
Prior Authorization Retail Medication Request    Medication/Dose: Oxycontin 10mg  Diagnosis and ICD code (if different than what is on RX):    New/renewal/insurance change PA/secondary ins. PA:  Previously Tried and Failed:    Rationale:      Insurance   Primary: [employer]  Insurance ID:  [id number]    Secondary (if applicable):  Insurance ID:      Pharmacy Information (if different than what is on RX)  Name:  [facility]  Phone:  [phone]  Fax: [phone]

## 2024-03-29 NOTE — TELEPHONE ENCOUNTER
PA Initiation    Medication: OXYCONTIN 10 MG PO T12A  Insurance Company: [employer] - Phone [phone] Fax [phone]  Pharmacy Filling the Rx: [facility] [place], [place] - [address] [place] [address] [phone]  Filling Pharmacy Phone: [phone]  Filling Pharmacy Fax:    Start Date: 3/29/2024

## 2024-04-01 DIAGNOSIS — R60.0 LOCALIZED EDEMA: ICD-10-CM

## 2024-04-01 RX ORDER — HYDROCHLOROTHIAZIDE 12.5 MG/1
1 CAPSULE ORAL DAILY
Qty: 90 CAPSULE | Refills: 2 | Status: SHIPPED | OUTPATIENT
Start: 2024-04-01 | End: 2024-07-10

## 2024-04-01 NOTE — TELEPHONE ENCOUNTER
Prior Authorization Approval    Medication: OXYCONTIN 10 MG PO T12A  Authorization Effective Date: 3/29/2024  Authorization Expiration Date: 3/28/2025  Approved Dose/Quantity: 60/30  Reference #: [id number]   Insurance Company: [employer] [phone] Fax [phone]  Expected CoPay: $    CoPay Card Available:      Financial Assistance Needed:   Which Pharmacy is filling the prescription: [facility] [address] [phone]  Pharmacy Notified: Yes  Patient Notified: Yes

## 2024-04-02 NOTE — PROGRESS NOTES
"Video-Visit Details    Type of service:  Video Visit    Video Start Time: 7:25 AM  Video End Time: 7:33 AM     Originating Location (pt. Location): Home    Distant Location (provider location):  Offsite (providers home) [facility] [place]     Platform used for Video Visit: DigitalTangible      Weight Management Nutrition Consultation    Elicia Calero is a 49 year old female presents today for returnweight management nutrition consultation.  Patient referred by Geovanna Morgan NP on 2023.    Patient with Co-morbidities of obesity includin/19/2023     8:28 AM   --   I have the following health issues associated with obesity Type II Diabetes    High Cholesterol    GERD (Reflux)    Fatty Liver    Stress Incontinence    Hypothyroidism   I have the following symptoms associated with obesity Depression         Anthropometrics:  Estimated body mass index is 34.04 kg/m  as calculated from the following:    Height as of 23: 1.715 m (5' 7.5\").    Weight as of 23: 100.1 kg (220 lb 9.6 oz).    Current:  Estimated body mass index is 31.94 kg/m  as calculated from the following:    Height as of 24: 1.702 m (5' 7.01\").    Weight as of this encounter: 92.5 kg (204 lb). (-1 lbs from last month, - 26 lbs from initial)       Medications for Weight Loss:  Mounjaro previously. On Bydureon d/t insurance change.   Metformin and Jardiance for DMII  H/o BED managed with topiramate and vyvanse.    NUTRITION HISTORY  Food allergies: None  Food intolerances: None  Dislikes textures of cottage cheese, tomatoes, hummus, guac, salmon.    Vitamin/Mineral Supplements: Vitamin B, folic acid     Previous methods of diet modification for weight loss: Had met with RD when first dx with DMII about 10 years ago. More recently, lost 60-70lb with calorie restriction (1200 wilberto/day) and carb counting but this did not feel sustainable and ultimately regained weight.     Working on less snacking/binging " at night. Going to bed sooner has helped with this.   Has been limiting her carb intake to <70 gm per day, and tends to save for later meal and evening snacks.   Preferences: casseroles, breads, dairy, potato. Not a huge fan of protein.     9/20/23 HgbA1c: 7.5 - down from 10.8 on 6/28 12/14/23: HgbA1c: 6    12/21/23 -  She has noticed a little more appetite since being on Bydureon from Mounjaro, but has been manageable. Continues to work on limiting junk foods, can be challenging at times to meet protein needs. Will do protein shake, yogurt with protein powder, deli meat, chicken, and adds collagen powder to her Crystal Lite. Aiming to have lunch be her larger meal. Continues to do well with smaller portions of crabs and low carb diet.   Just stared bearing weight on ankle. Will start PT in Jan.      2/8/24 - More sweet cravings. Has experienced wt regain since switching from Mounjaro to Bydureon. Finding helpful to have a pre-portioned sweet like Kind bar thins or fruit available.   Ankle fully healed and just started work again. Long work hours at discharge pharmacy.      Today - Completed PT, continues to do home ankle exercises. Switched back to Mounjaro, up to 7.5 mg dose. Has been going well, weight trending back down.  Less snacks and sweet cravings lately. She has not been hungry for a full dinner, so instead may have a snack at this time.     Recent Diet Recall:  Adding collagen powder to crystal lite in the morning.   Breakfast: 5-7 am protein shake (Orgain with 2% milk); 1 serving Kashi high-fiber cereal w/o milk.  10 am snack: Kind bar   Lunch: 2 pm salad with hard boiled egg, yogurt   Dinner: 6:30 pm may have a snack at this time  Beverages: Water (40+ oz/day); Crystal lite with collagen powder (30 gm pro).    Dining Out/take-out: Not often, couple times per month.     Progress Towards Previous Goals:  1) Continue to plan and portion sweets/snacks. - Met, has been going better since switching back to  "Mounjaro   2) Increase physical activity as able - Met, continues     Physical Activity:  Since being back to work, is getting 0845-8971 steps daily. Additional exercise limited by sore ankle at this time.     Nutrition Prescription  Recommended energy/nutrient modification.    Nutrition Diagnosis  Obesity r/t long history of positive energy balance aeb BMI >30. - Improving    Nutrition Intervention  Materials/education provided on hypocaloric diet for weight loss and low/moderate carb diet for mgmt of DMII.   Reviewed progress towards previous goals.   Praised pt on the many positive changes she has made to diet.  Reviewed protein needs and sources.  Co-developed goals to work towards.   Provided pt with list of goals and resources below via Spendji.     Expected Engagement: good  Follow-Up Plans: meal/snack planning        Nutrition Goals  1) Ensure you are consuming at least 60 gm protein daily    Quick/Easy Protein Sources:  Hard boiled eggs  Part-skim cheese sticks  Baby Bell cheese rounds  Low-fat/low-sugar Greek yogurt  Low-fat cottage cheese  Lean deli meat (chicken/turkey/ham)  Roasted chickpeas or lentils  Nuts   Turkey meat stick  Protein shake/bar  \"P3\" snack (cheese, nuts, deli meat)  Aldi's \"Protein Bread\"   \"Egglife\" egg white wrap    Tuna/salmon can/packet       Protein Sources for Weight Loss  [email or web address]         Follow-Up:  3 months, PRN    Time spent with patient: 8 minutes.  Jeri Hernández, BLUE, LD      "

## 2024-04-04 ENCOUNTER — VIRTUAL VISIT (OUTPATIENT)
Dept: ENDOCRINOLOGY | Facility: CLINIC | Age: 49
End: 2024-04-04
Payer: COMMERCIAL

## 2024-04-04 VITALS — WEIGHT: 204 LBS | BODY MASS INDEX: 31.94 KG/M2

## 2024-04-04 DIAGNOSIS — E66.9 OBESITY (BMI 30-39.9): ICD-10-CM

## 2024-04-04 DIAGNOSIS — Z71.3 NUTRITIONAL COUNSELING: ICD-10-CM

## 2024-04-04 DIAGNOSIS — E11.9 TYPE 2 DIABETES MELLITUS WITHOUT COMPLICATION, WITHOUT LONG-TERM CURRENT USE OF INSULIN (H): Primary | ICD-10-CM

## 2024-04-04 PROCEDURE — 97803 MED NUTRITION INDIV SUBSEQ: CPT | Mod: 95 | Performed by: DIETITIAN, REGISTERED

## 2024-04-04 PROCEDURE — 99207 PR NO CHARGE LOS: CPT | Mod: 95 | Performed by: DIETITIAN, REGISTERED

## 2024-04-04 ASSESSMENT — PAIN SCALES - GENERAL: PAINLEVEL: MODERATE PAIN (4)

## 2024-04-04 NOTE — NURSING NOTE
Is the patient currently in the state of [place]? YES    Visit mode:VIDEO    If the visit is dropped, the patient can be reconnected by: VIDEO VISIT: Text to cell phone:   Telephone Information:   Mobile [phone]    and VIDEO VISIT: Send to e-mail at: [email or web address]    Will anyone else be joining the visit? NO  (If patient encounters technical issues they should call [phone]    How would you like to obtain your AVS? MyChart    Are changes needed to the allergy or medication list? No    Reason for visit: ARIANA SAMUEL

## 2024-04-04 NOTE — LETTER
"2024       RE: Elicia Calero  [address]  [place] [address]     Dear Colleague,    Thank you for referring your patient, Elicia Calero, to the [facility] [place] at [facility]. Please see a copy of my visit note below.    Video-Visit Details    Type of service:  Video Visit    Video Start Time: 7:25 AM  Video End Time: 7:33 AM     Originating Location (pt. Location): Home    Distant Location (provider location):  Offsite (providers home) [facility] [place]     Platform used for Video Visit: Exhibia      Weight Management Nutrition Consultation    Elicia Calero is a 49 year old female presents today for returnweight management nutrition consultation.  Patient referred by Geovanna Morgan NP on 2023.    Patient with Co-morbidities of obesity includin/19/2023     8:28 AM   --   I have the following health issues associated with obesity Type II Diabetes    High Cholesterol    GERD (Reflux)    Fatty Liver    Stress Incontinence    Hypothyroidism   I have the following symptoms associated with obesity Depression         Anthropometrics:  Estimated body mass index is 34.04 kg/m  as calculated from the following:    Height as of 23: 1.715 m (5' 7.5\").    Weight as of 23: 100.1 kg (220 lb 9.6 oz).    Current:  Estimated body mass index is 31.94 kg/m  as calculated from the following:    Height as of 24: 1.702 m (5' 7.01\").    Weight as of this encounter: 92.5 kg (204 lb). (-1 lbs from last month, - 26 lbs from initial)       Medications for Weight Loss:  Mounjaro previously. On Bydureon d/t insurance change.   Metformin and Jardiance for DMII  H/o BED managed with topiramate and vyvanse.    NUTRITION HISTORY  Food allergies: None  Food intolerances: None  Dislikes textures of cottage cheese, tomatoes, hummus, guac, salmon.    Vitamin/Mineral Supplements: " Vitamin B, folic acid     Previous methods of diet modification for weight loss: Had met with RD when first dx with DMII about 10 years ago. More recently, lost 60-70lb with calorie restriction (1200 wilberto/day) and carb counting but this did not feel sustainable and ultimately regained weight.     Working on less snacking/binging at night. Going to bed sooner has helped with this.   Has been limiting her carb intake to <70 gm per day, and tends to save for later meal and evening snacks.   Preferences: casseroles, breads, dairy, potato. Not a huge fan of protein.     9/20/23 HgbA1c: 7.5 - down from 10.8 on 6/28 12/14/23: HgbA1c: 6    12/21/23 -  She has noticed a little more appetite since being on Bydureon from Mounjaro, but has been manageable. Continues to work on limiting junk foods, can be challenging at times to meet protein needs. Will do protein shake, yogurt with protein powder, deli meat, chicken, and adds collagen powder to her Crystal Lite. Aiming to have lunch be her larger meal. Continues to do well with smaller portions of crabs and low carb diet.   Just stared bearing weight on ankle. Will start PT in Jan.      2/8/24 - More sweet cravings. Has experienced wt regain since switching from Mounjaro to Bydureon. Finding helpful to have a pre-portioned sweet like Kind bar thins or fruit available.   Ankle fully healed and just started work again. Long work hours at discharge pharmacy.      Today - Completed PT, continues to do home ankle exercises. Switched back to Mounjaro, up to 7.5 mg dose. Has been going well, weight trending back down.  Less snacks and sweet cravings lately. She has not been hungry for a full dinner, so instead may have a snack at this time.     Recent Diet Recall:  Adding collagen powder to crystal lite in the morning.   Breakfast: 5-7 am protein shake (Orgain with 2% milk); 1 serving Kashi high-fiber cereal w/o milk.  10 am snack: Kind bar   Lunch: 2 pm salad with hard boiled egg,  "yogurt   Dinner: 6:30 pm may have a snack at this time  Beverages: Water (40+ oz/day); Crystal lite with collagen powder (30 gm pro).    Dining Out/take-out: Not often, couple times per month.     Progress Towards Previous Goals:  1) Continue to plan and portion sweets/snacks. - Met, has been going better since switching back to Mounjaro   2) Increase physical activity as able - Met, continues     Physical Activity:  Since being back to work, is getting 9271-9533 steps daily. Additional exercise limited by sore ankle at this time.     Nutrition Prescription  Recommended energy/nutrient modification.    Nutrition Diagnosis  Obesity r/t long history of positive energy balance aeb BMI >30. - Improving    Nutrition Intervention  Materials/education provided on hypocaloric diet for weight loss and low/moderate carb diet for mgmt of DMII.   Reviewed progress towards previous goals.   Praised pt on the many positive changes she has made to diet.  Reviewed protein needs and sources.  Co-developed goals to work towards.   Provided pt with list of goals and resources below via docplanner.     Expected Engagement: good  Follow-Up Plans: meal/snack planning        Nutrition Goals  1) Ensure you are consuming at least 60 gm protein daily    Quick/Easy Protein Sources:  Hard boiled eggs  Part-skim cheese sticks  Baby Bell cheese rounds  Low-fat/low-sugar Greek yogurt  Low-fat cottage cheese  Lean deli meat (chicken/turkey/ham)  Roasted chickpeas or lentils  Nuts   Turkey meat stick  Protein shake/bar  \"P3\" snack (cheese, nuts, deli meat)  Aldi's \"Protein Bread\"   \"Egglife\" egg white wrap    Tuna/salmon can/packet       Protein Sources for Weight Loss  [email or web address]         Follow-Up:  3 months, PRN    Time spent with patient: 8 minutes.  Jeri Hernández, BLUE, LD    "

## 2024-04-04 NOTE — PATIENT INSTRUCTIONS
"Carlos Rubi,     Follow-up with RD in 3 months.    Thank you,    Jeri Hernández, RD, LD  If you would like to schedule or reschedule an appointment with the RD, please call [phone]    Nutrition Goals  1) Ensure you are consuming at least 60 gm protein daily    Quick/Easy Protein Sources:  Hard boiled eggs  Part-skim cheese sticks  Baby Bell cheese rounds  Low-fat/low-sugar Greek yogurt  Low-fat cottage cheese  Lean deli meat (chicken/turkey/ham)  Roasted chickpeas or lentils  Nuts   Turkey meat stick  Protein shake/bar  \"P3\" snack (cheese, nuts, deli meat)  Aldi's \"Protein Bread\"   \"Egglife\" egg white wrap    Tuna/salmon can/packet       Protein Sources for Weight Loss  [email or web address]       COMPREHENSIVE WEIGHT MANAGEMENT PROGRAM  VIRTUAL SUPPORT GROUPS    At [facility], our Comprehensive Weight Management program offers on-line support groups for patients who are working on weight loss and considering, preparing for, or have had weight loss surgery.     There is no cost for this opportunity.  You are invited to attend the?Virtual Support Groups?provided by any of the following locations:    [facility] via Coinsetter Teams with Viky Venegas RN  2.   [place] via Coinsetter Teams with Bradford Peña, PhD, LP  3.   [place] via G2 Web Services with Joyce Tate RN  4.   [facility] via a Zoom Meeting with HEATH Chacko    The following Support Group information can also be found on our website:  [email or web address]      [facility] Weight Loss Surgery Support Group  The support group is a patient-lead forum that meets monthly to share experiences, encouragement and education. It is open to those who have had weight loss surgery, are scheduled for surgery, or are considering surgery.   WHEN: This group meets on the 3rd Wednesday of each month from 5:00PM - 6:00PM virtually using Coinsetter " "Teams.   FACILITATOR: Led by Viky Gomes RD, LD, RN, the program's Clinical Coordinator.   TO REGISTER: Please contact the clinic via Moneysoftt or call the nurse line directly at [phone] to inform our staff that you would like an invite sent to you and to let us know the email you would like the invite sent to. Prior to the meeting, a link with directions on how to join the meeting will be sent to you.    2023 and 2024 Meetings   December 20  January 17  February 21  March 20  April 17  May 15  Cristina 19      [facility] Bariatric Care Support Group?  This is open to all pre- and post- operative bariatric surgery patients as well as their support system.   WHEN: This group meets the 3rd Tuesday of each month from 6:30 PM - 8:00 PM virtually using Microsoft Teams.   FACILITATOR: Led by Bradford Peña, Ph.D who is a Licensed Psychologist with the [facility] Comprehensive Weight Management Program.   TO REGISTER: Please send an email to Bradford Peña, Ph.D., LP at?[email or web address] you would like an invitation to the group. Prior to the meeting, a link with directions on how to join the meeting will be sent to you.    2023 and 2024 Meetings  December 19 January 16: \"Medication Management and Bariatric Surgery\", Iram Cast, PharmD, Pharmacy Resident at [facility]  February 20: \"A Bariatric Surgery Patient's Perspective\", REE Henley, [facility], Behavioral Health Clinician at [facility]  March 19  April 16  May 21  [place]ristina 18: \"Nutritional Labeling\", Dietitian speaker to be announced.  November 19: \"Holiday Eating\", Dietitian speaker to be announced.    [facility] Post-Operative Bariatric Surgery Support Group  This is a support group for [facility] bariatric patients (and those external to [facility]) who have had bariatric " "surgery and are at least 3 months post-surgery.  WHEN: This support group meets the 4th Wednesday of the month from 11:00 AM - 12:00 PM virtually using Microsoft Teams.   FACILITATOR: Led by Certified Bariatric Nurse, Joyce Tate RN.   TO REGISTER: Please send an email to Joyce at [email or web address] if you would like an invitation to the group.  Prior to the meeting, a link with directions on how to join the meeting will be sent to you.    2023 and 2024 Meetings  December 27  January 24  February 28  March 27  April 24  May 22  Cristina 26    [facility] Healthy Lifestyle Group?  This is a 60 minute virtual coaching group for those who want to lead a healthier lifestyle. Come together to set goals and overcome barriers in a supportive group environment. We will address the four pillars of health: nutrition, exercise, sleep and emotional well-being.  This group is highly recommended for those who are participating in the 24 week Healthy Lifestyle Plan and our Health Coaching sessions.  WHEN: This group meets the 1st Friday of the month, 12:30 PM - 1:30 PM online, via a zoom meeting.    FACILITATOR: Led by National Board Certified Health and , Joyce Dunbar [facility].   TO REGISTER: Please call the Call Center at [phone] to register.  You will get an appointment to attend in [facility]. Fifteen minutes prior to the meeting, complete the e-check in and you will get the link to join the meeting.    There is no charge to attend this group and space is limited.     2023 and 2024 Meetings  December 1: \"Let's Talk\" (guided discussion on our wins and challenges)  January 5: \"New Years Vision: Manifest your Best 2024!\" (guided imagery,  journaling and discussion)  February 2: \"Let's Talk\"  March 1: \"10 Percent Happier\" by Kartik Arenas (Book Bites - a guided discussion on the nuggets of wisdom from favorite wellness books, no need to read the book but highly " "encouraged)  April 5: \"Let's Talk\"  May 3: \"Essentialism: The Disciplined Pursuit of Less\" by Flo Mayes (Book Bites discussion)  June 7: \"Let's Talk\"  July 5: NO MEETING, off for the 4th of July Holiday  August 2: \"The Blue Zones, Secrets for Living a Longer Life\" by Kartik Tyler (Book Bites discussion)                    "

## 2024-04-05 ENCOUNTER — THERAPY VISIT (OUTPATIENT)
Dept: OCCUPATIONAL THERAPY | Facility: CLINIC | Age: 49
End: 2024-04-05
Payer: COMMERCIAL

## 2024-04-05 DIAGNOSIS — M25.532 LEFT WRIST PAIN: Primary | ICD-10-CM

## 2024-04-05 PROCEDURE — 97110 THERAPEUTIC EXERCISES: CPT | Mod: GO | Performed by: OCCUPATIONAL THERAPIST

## 2024-04-05 PROCEDURE — 97140 MANUAL THERAPY 1/> REGIONS: CPT | Mod: GO | Performed by: OCCUPATIONAL THERAPIST

## 2024-04-07 NOTE — PROGRESS NOTES
Medication Therapy Management (MTM) Encounter    ASSESSMENT:                            Medication Adherence/Access: No issues identified    Type 2 Diabetes/Obesity:    Patient is meeting A1c goal of < 7%(6.0%). Self monitoring of blood glucose is closer to goal of fasting  mg/dL and post prandial < 180 mg/dL. Patient is  meeting average glucose goal of <150mg/dL. Patient is now meeting goal of > 70% (95%)time in target with continuous glucose monitoring.  Patient would benefit from increasing weekly Mounjaro dose now to 10mg.   GLP-1 drugs have worked very well for her --she has lost 35 lbs . And feels great !     Continue daily metformin and jardiance .  Also--patient will work hard on eliminating all late night snacking --ok to drink slim-fast or atkins protein shake to fill her up if needed.   . Due for annual foot exam. Due for annual eye exam.         Hyperlipidemia:   Stable.  Patient is on moderate intensity statin which is indicated based on 2019 ACC/AHA guidelines for lipid management.        PLAN:                                1. Congrats --blood sugars looking great on dex-com G7 sensor data --glad you like the G7 --due to hunger feeling coming back and past use of higher dose Mounjaro --lets increase your weekly Mounjaro dose to 10mg./week now .    I also added some fasting labs for A1c, cholesterol and urine albumin to Dr. Thurman labs --have those done whenever you'd like in next month or so.     Keep up great work your doing !    Follow-up: Return in about 3 months (around 7/10/2024), or @ 8 AM via telephone, for Medication Therapy Management Visit, Lab Work, Blood sugar meter review, Weight loss.      SUBJECTIVE/OBJECTIVE:                          Elicia Calero is a 49 year old female called for a follow-up (1-10-24) visit. She was referred to me from Loc Waggoner DO. (PCP).  .    Now has [facility] care ma ins. --our clinic does not cover this ins. -she will go to  midway clinic until back  on regular ins. Thru work.      Reason for visit:   Mounjaro f/up.    Loves dexcom G7 sensor.      .    Allergies/ADRs: Reviewed in chart  Past Medical History: Per patient diagnosed 10-15 years --did lose wt. To 150lbs years ago and reversed her DM . Ate 1200 cals/day , low carb   Tobacco: She reports that she quit smoking about 6 years ago. Her smoking use included cigarettes. She started smoking about 26 years ago. She has a 20 pack-year smoking history. She has never used smokeless tobacco.  Alcohol: Less than 1 beverage / month  Other Substance Use: none   E-cigarette Use: none   Caffeine:  1 coffee or crystal light every morning.   Social: off work due to tendon issue (ruptured).  She is a pharmacy tech at [employer].   Personal Healthcare Goals: fix dm soon   Activity: not much due to ruptured tendon.     Medication Adherence/Access: no issues reported    Type 2 Diabetes/Obesity:    Failed bydureon in the past , fears needles--refuses all injectables before last visit but doing ok with Mounjaro as needle is hidden.   Back on Mounjaro 7.5mg. since late march 2024.--feels hungry would like higher dose.  Clinic weight 204lbs from 4-4-24.  Metformin 7k042on ER tabs daily  Losing weight (199lbs. At home , 204lbs in clinic)  and controlling food intake and bs's --she loves it!  Jardiance 25mg./day   Also taking 2 capsules /day otc -Glucoredi herbal with gymnemna and berberi.   Not taking aspirin due to age.  Patient is not experiencing side effects.  SMBG: mtm changed her last month to Dexcom G7--she loves it !                  Current diabetes symptoms:none now -Mounjaro has corrected her polyphagia.  Diet/Exercise: off walking boot from tendon surgery --recovered well.  Mounjaro curbing appetite --2 meals/day bfast and dinner , shake at lunch .    Previously:   Bfast /lunch --low carb , eat dinner --after dinner keeps eating after feeling full!  Has low carb slimfast or atkins shakes to use.   Sleep iffy due to  intense hunger. Awakes 2-3 am --snacking and can't get back to sleep. She has mental health issues --struggles with depression and unable to move much with ruptured tendon --discouraged.       Eye exam: due  Foot exam: due  Urine Albumin:   Lab Results   Component Value Date    UMALCR  12/01/2022      Comment:      Unable to calculate, urine albumin and/or urine creatinine is outside detectable limits.  Microalbuminuria is defined as an albumin:creatinine ratio of 17 to 299 for males and 25 to 299 for females. A ratio of albumin:creatinine of 300 or higher is indicative of overt proteinuria.  Due to biologic variability, positive results should be confirmed by a second, first-morning random or 24-hour timed urine specimen. If there is discrepancy, a third specimen is recommended. When 2 out of 3 results are in the microalbuminuria range, this is evidence for incipient nephropathy and warrants increased efforts at glucose control, blood pressure control, and institution of therapy with an angiotensin-converting-enzyme (ACE) inhibitor (if the patient can tolerate it).        Lab Results   Component Value Date    A1C 6.0 12/14/2023    A1C 7.5 09/20/2023    A1C 10.8 06/28/2023    A1C 7.9 03/02/2023    A1C 8.6 12/01/2022           Hyperlipidemia:   Pravastatin 40mg daily  Patient reports no significant myalgias or other side effects.  The 10-year ASCVD risk score (Carmita CORREIA, et al., 2019) is: 0.8%    Values used to calculate the score:      Age: 49 years      Sex: Female      Is Non- : No      Diabetic: Yes      Tobacco smoker: No      Systolic Blood Pressure: 97 mmHg      Is BP treated: No      HDL Cholesterol: 52 mg/dL      Total Cholesterol: 133 mg/dL  Recent Labs   Lab Test 06/28/23  0936 12/01/22  1013   CHOL 133 129   HDL 52 46*   LDL 60 61   TRIG 105 109           ---------------------------------------------------------------------------------------------------------------      I spent 30  minutes with this patient today. All changes were made via collaborative practice agreement with Loc Waggoner DO. A copy of the visit note was provided to the patient's provider(s).    A summary of these recommendations was sent via [employer].    Dacia Myers Rph.  Medication Therapy Management Provider  [phone]      Telemedicine Visit Details  Type of service:  Telephone visit  Start Time: 8 AM  End Time: 8:30 AM     Medication Therapy Recommendations  Type 2 diabetes mellitus without complication, without long-term current use of insulin (H)    Current Medication: tirzepatide (MOUNJARO) 7.5 MG/0.5ML pen (Discontinued)   Rationale: Dose too low - Dosage too low - Effectiveness   Recommendation: Increase Dose - Mounjaro 10 MG/0.5ML Sopn   Status: Accepted per CPA

## 2024-04-10 ENCOUNTER — VIRTUAL VISIT (OUTPATIENT)
Dept: PHARMACY | Facility: CLINIC | Age: 49
End: 2024-04-10
Payer: COMMERCIAL

## 2024-04-10 ENCOUNTER — TELEPHONE (OUTPATIENT)
Dept: ENDOCRINOLOGY | Facility: CLINIC | Age: 49
End: 2024-04-10
Payer: COMMERCIAL

## 2024-04-10 DIAGNOSIS — E78.5 HYPERLIPIDEMIA LDL GOAL <100: ICD-10-CM

## 2024-04-10 DIAGNOSIS — E11.9 TYPE 2 DIABETES MELLITUS WITHOUT COMPLICATION, WITHOUT LONG-TERM CURRENT USE OF INSULIN (H): Primary | ICD-10-CM

## 2024-04-10 PROCEDURE — 99606 MTMS BY PHARM EST 15 MIN: CPT | Mod: 93 | Performed by: PHARMACIST

## 2024-04-10 PROCEDURE — 99607 MTMS BY PHARM ADDL 15 MIN: CPT | Mod: 93 | Performed by: PHARMACIST

## 2024-04-10 RX ORDER — TIRZEPATIDE 10 MG/.5ML
10 INJECTION, SOLUTION SUBCUTANEOUS
Qty: 6 ML | Refills: 1 | Status: SHIPPED | OUTPATIENT
Start: 2024-04-10 | End: 2024-09-18 | Stop reason: DRUGHIGH

## 2024-04-10 RX ORDER — LISDEXAMFETAMINE DIMESYLATE 70 MG/1
70 CAPSULE ORAL EVERY MORNING
COMMUNITY

## 2024-04-10 NOTE — Clinical Note
Loc---harriet doing well--I did increase her mounjaro weekly dose back to 10mg now , added a few more fasting labs to other labs you ordered . She will do them soon.  Koffi

## 2024-04-10 NOTE — PATIENT INSTRUCTIONS
"Recommendations from today's MTM visit:                                                         1. Congrats --blood sugars looking great on dex-com G7 sensor data --glad you like the G7 --due to hunger feeling coming back and past use of higher dose Mounjaro --lets increase your weekly Mounjaro dose to 10mg./week now .    I also added some fasting labs for A1c, cholesterol and urine albumin to Dr. Thurman labs --have those done whenever you'd like in next month or so.     Keep up great work your doing !    Follow-up: Return in about 3 months (around 7/10/2024), or @ 8 AM via telephone, for Medication Therapy Management Visit, Lab Work, Blood sugar meter review, Weight loss.    It was great speaking with you today.  I value your experience and would be very thankful for your time in providing feedback in our clinic survey. In the next few days, you may receive an email or text message from [email or web address] with a link to a survey related to your  clinical pharmacist.\"     To schedule another MTM appointment, please call the clinic directly or you may call the MTM scheduling line at [phone] or toll-free at [phone].     My Clinical Pharmacist's contact information:                                                      Please feel free to contact me with any questions or concerns you have.      Dacia Myers Rph.  Medication Therapy Management Provider  [phone]    "

## 2024-04-10 NOTE — TELEPHONE ENCOUNTER
Left Voicemail (1st Attempt) and Sent Mychart (1st Attempt) for the patient to call back and schedule the following:    Appointment type: RET [facility]  Provider: Jeri Hernández  Return date: around 7/4/24  Specialty phone number: [phone]    Additional appointment(s) needed:     Appointment type: [facility]  Provider: Geovanna Morgan  Return date: first available  Specialty phone number: [phone]

## 2024-04-19 ENCOUNTER — THERAPY VISIT (OUTPATIENT)
Dept: OCCUPATIONAL THERAPY | Facility: CLINIC | Age: 49
End: 2024-04-19
Payer: COMMERCIAL

## 2024-04-19 DIAGNOSIS — M25.532 LEFT WRIST PAIN: Primary | ICD-10-CM

## 2024-04-19 PROCEDURE — 97763 ORTHC/PROSTC MGMT SBSQ ENC: CPT | Mod: GO | Performed by: OCCUPATIONAL THERAPIST

## 2024-04-22 DIAGNOSIS — G43.709 CHRONIC MIGRAINE WITHOUT AURA WITHOUT STATUS MIGRAINOSUS, NOT INTRACTABLE: ICD-10-CM

## 2024-04-22 DIAGNOSIS — G47.00 PERSISTENT INSOMNIA: ICD-10-CM

## 2024-04-23 ENCOUNTER — MYC REFILL (OUTPATIENT)
Dept: FAMILY MEDICINE | Facility: CLINIC | Age: 49
End: 2024-04-23
Payer: COMMERCIAL

## 2024-04-23 DIAGNOSIS — K21.00 GASTROESOPHAGEAL REFLUX DISEASE WITH ESOPHAGITIS WITHOUT HEMORRHAGE: ICD-10-CM

## 2024-04-23 RX ORDER — TIZANIDINE 2 MG/1
4 TABLET ORAL 3 TIMES DAILY
Qty: 180 TABLET | Refills: 0 | Status: SHIPPED | OUTPATIENT
Start: 2024-04-23 | End: 2024-04-29

## 2024-04-23 RX ORDER — HYDROXYZINE HYDROCHLORIDE 25 MG/1
TABLET, FILM COATED ORAL
Qty: 30 TABLET | Refills: 1 | Status: SHIPPED | OUTPATIENT
Start: 2024-04-23 | End: 2024-04-29

## 2024-04-23 RX ORDER — LIDOCAINE HYDROCHLORIDE 20 MG/ML
5 SOLUTION OROPHARYNGEAL
Qty: 100 ML | Refills: 1 | Status: SHIPPED | OUTPATIENT
Start: 2024-04-23

## 2024-04-26 ENCOUNTER — LAB (OUTPATIENT)
Dept: LAB | Facility: CLINIC | Age: 49
End: 2024-04-26
Payer: COMMERCIAL

## 2024-04-26 DIAGNOSIS — R23.3 EASY BRUISING: ICD-10-CM

## 2024-04-26 DIAGNOSIS — E11.9 TYPE 2 DIABETES MELLITUS WITHOUT COMPLICATION, WITHOUT LONG-TERM CURRENT USE OF INSULIN (H): ICD-10-CM

## 2024-04-26 DIAGNOSIS — E78.5 HYPERLIPIDEMIA LDL GOAL <100: ICD-10-CM

## 2024-04-26 DIAGNOSIS — M25.571 PAIN IN JOINT INVOLVING ANKLE AND FOOT, RIGHT: ICD-10-CM

## 2024-04-26 DIAGNOSIS — N17.9 AKI (ACUTE KIDNEY INJURY) (H): ICD-10-CM

## 2024-04-26 DIAGNOSIS — F11.90 CHRONIC, CONTINUOUS USE OF OPIOIDS: ICD-10-CM

## 2024-04-26 DIAGNOSIS — G89.4 CHRONIC PAIN DISORDER: ICD-10-CM

## 2024-04-26 LAB
ALBUMIN SERPL BCG-MCNC: 4.5 G/DL (ref 3.5–5.2)
ALP SERPL-CCNC: 84 U/L (ref 40–150)
ALT SERPL W P-5'-P-CCNC: 14 U/L (ref 0–50)
ANION GAP SERPL CALCULATED.3IONS-SCNC: 10 MMOL/L (ref 7–15)
APTT PPP: 34 SECONDS (ref 22–38)
AST SERPL W P-5'-P-CCNC: 15 U/L (ref 0–45)
BILIRUB SERPL-MCNC: 0.2 MG/DL
BUN SERPL-MCNC: 18.6 MG/DL (ref 6–20)
CALCIUM SERPL-MCNC: 9.2 MG/DL (ref 8.6–10)
CHLORIDE SERPL-SCNC: 102 MMOL/L (ref 98–107)
CHOLEST SERPL-MCNC: 118 MG/DL
CREAT SERPL-MCNC: 0.81 MG/DL (ref 0.51–0.95)
DEPRECATED HCO3 PLAS-SCNC: 29 MMOL/L (ref 22–29)
EGFRCR SERPLBLD CKD-EPI 2021: 88 ML/MIN/1.73M2
ERYTHROCYTE [DISTWIDTH] IN BLOOD BY AUTOMATED COUNT: 17.1 % (ref 10–15)
FASTING STATUS PATIENT QL REPORTED: YES
GLUCOSE SERPL-MCNC: 109 MG/DL (ref 70–99)
HBA1C MFR BLD: 6.3 %
HCT VFR BLD AUTO: 41.9 % (ref 35–47)
HDLC SERPL-MCNC: 48 MG/DL
HGB BLD-MCNC: 13.1 G/DL (ref 11.7–15.7)
INR PPP: 0.95 (ref 0.85–1.15)
LDLC SERPL CALC-MCNC: 55 MG/DL
MCH RBC QN AUTO: 24 PG (ref 26.5–33)
MCHC RBC AUTO-ENTMCNC: 31.3 G/DL (ref 31.5–36.5)
MCV RBC AUTO: 77 FL (ref 78–100)
NONHDLC SERPL-MCNC: 70 MG/DL
PLATELET # BLD AUTO: 391 10E3/UL (ref 150–450)
POTASSIUM SERPL-SCNC: 4.6 MMOL/L (ref 3.4–5.3)
PROT SERPL-MCNC: 6.8 G/DL (ref 6.4–8.3)
RBC # BLD AUTO: 5.46 10E6/UL (ref 3.8–5.2)
SODIUM SERPL-SCNC: 141 MMOL/L (ref 135–145)
TRIGL SERPL-MCNC: 73 MG/DL
WBC # BLD AUTO: 6.3 10E3/UL (ref 4–11)

## 2024-04-26 PROCEDURE — 82570 ASSAY OF URINE CREATININE: CPT | Performed by: STUDENT IN AN ORGANIZED HEALTH CARE EDUCATION/TRAINING PROGRAM

## 2024-04-26 PROCEDURE — 85730 THROMBOPLASTIN TIME PARTIAL: CPT | Performed by: PATHOLOGY

## 2024-04-26 PROCEDURE — 80061 LIPID PANEL: CPT | Performed by: PATHOLOGY

## 2024-04-26 PROCEDURE — 83036 HEMOGLOBIN GLYCOSYLATED A1C: CPT | Performed by: STUDENT IN AN ORGANIZED HEALTH CARE EDUCATION/TRAINING PROGRAM

## 2024-04-26 PROCEDURE — 85610 PROTHROMBIN TIME: CPT | Performed by: PATHOLOGY

## 2024-04-26 PROCEDURE — 36415 COLL VENOUS BLD VENIPUNCTURE: CPT | Performed by: PATHOLOGY

## 2024-04-26 PROCEDURE — 99000 SPECIMEN HANDLING OFFICE-LAB: CPT | Performed by: PATHOLOGY

## 2024-04-26 PROCEDURE — G0480 DRUG TEST DEF 1-7 CLASSES: HCPCS | Mod: 90 | Performed by: PATHOLOGY

## 2024-04-26 PROCEDURE — 80053 COMPREHEN METABOLIC PANEL: CPT | Performed by: PATHOLOGY

## 2024-04-26 PROCEDURE — 85027 COMPLETE CBC AUTOMATED: CPT | Performed by: PATHOLOGY

## 2024-04-26 PROCEDURE — 80307 DRUG TEST PRSMV CHEM ANLYZR: CPT | Performed by: STUDENT IN AN ORGANIZED HEALTH CARE EDUCATION/TRAINING PROGRAM

## 2024-04-27 LAB
AMPHETAMINES UR QL SCN: ABNORMAL
BARBITURATES UR QL SCN: ABNORMAL
BENZODIAZ UR QL SCN: ABNORMAL
BZE UR QL SCN: ABNORMAL
CANNABINOIDS UR QL SCN: ABNORMAL
CREAT UR-MCNC: 116 MG/DL
FENTANYL UR QL: ABNORMAL
MICROALBUMIN UR-MCNC: <12 MG/L
MICROALBUMIN/CREAT UR: NORMAL MG/G{CREAT}
OPIATES UR QL SCN: ABNORMAL
PCP QUAL URINE (ROCHE): ABNORMAL

## 2024-04-29 ENCOUNTER — VIRTUAL VISIT (OUTPATIENT)
Dept: FAMILY MEDICINE | Facility: CLINIC | Age: 49
End: 2024-04-29
Payer: COMMERCIAL

## 2024-04-29 DIAGNOSIS — G89.4 CHRONIC PAIN DISORDER: Primary | ICD-10-CM

## 2024-04-29 DIAGNOSIS — M25.571 PAIN IN JOINT INVOLVING ANKLE AND FOOT, RIGHT: ICD-10-CM

## 2024-04-29 DIAGNOSIS — E11.9 TYPE 2 DIABETES MELLITUS WITHOUT COMPLICATION, WITHOUT LONG-TERM CURRENT USE OF INSULIN (H): ICD-10-CM

## 2024-04-29 DIAGNOSIS — G47.00 PERSISTENT INSOMNIA: ICD-10-CM

## 2024-04-29 DIAGNOSIS — E66.01 CLASS 2 SEVERE OBESITY DUE TO EXCESS CALORIES WITH SERIOUS COMORBIDITY AND BODY MASS INDEX (BMI) OF 37.0 TO 37.9 IN ADULT (H): ICD-10-CM

## 2024-04-29 DIAGNOSIS — E66.812 CLASS 2 SEVERE OBESITY DUE TO EXCESS CALORIES WITH SERIOUS COMORBIDITY AND BODY MASS INDEX (BMI) OF 37.0 TO 37.9 IN ADULT (H): ICD-10-CM

## 2024-04-29 DIAGNOSIS — E03.9 HYPOTHYROIDISM, UNSPECIFIED TYPE: ICD-10-CM

## 2024-04-29 DIAGNOSIS — F31.60 BIPOLAR AFFECTIVE DISORDER, CURRENT EPISODE MIXED, CURRENT EPISODE SEVERITY UNSPECIFIED (H): ICD-10-CM

## 2024-04-29 DIAGNOSIS — N95.1 PERIMENOPAUSE: ICD-10-CM

## 2024-04-29 DIAGNOSIS — F11.90 CHRONIC, CONTINUOUS USE OF OPIOIDS: ICD-10-CM

## 2024-04-29 DIAGNOSIS — G43.709 CHRONIC MIGRAINE WITHOUT AURA WITHOUT STATUS MIGRAINOSUS, NOT INTRACTABLE: ICD-10-CM

## 2024-04-29 LAB
AMPHET UR-MCNC: 2659 NG/ML
MDA UR-MCNC: <200 NG/ML
MDEA UR-MCNC: <200 NG/ML
MDMA UR-MCNC: <200 NG/ML
METHAMPHET UR-MCNC: <200 NG/ML
PHENTERMINE UR CFM-MCNC: <200 NG/ML

## 2024-04-29 PROCEDURE — 99214 OFFICE O/P EST MOD 30 MIN: CPT | Mod: 95 | Performed by: STUDENT IN AN ORGANIZED HEALTH CARE EDUCATION/TRAINING PROGRAM

## 2024-04-29 RX ORDER — RIZATRIPTAN BENZOATE 10 MG/1
10 TABLET ORAL
Qty: 18 TABLET | Refills: 3 | Status: SHIPPED | OUTPATIENT
Start: 2024-04-29 | End: 2024-04-29

## 2024-04-29 RX ORDER — TIZANIDINE 2 MG/1
4 TABLET ORAL 3 TIMES DAILY
Qty: 540 TABLET | Refills: 1 | Status: SHIPPED | OUTPATIENT
Start: 2024-04-29 | End: 2024-09-13

## 2024-04-29 RX ORDER — LEVOTHYROXINE SODIUM 125 UG/1
125 TABLET ORAL DAILY
Qty: 90 TABLET | Refills: 0 | Status: SHIPPED | OUTPATIENT
Start: 2024-04-29 | End: 2024-07-18

## 2024-04-29 RX ORDER — METFORMIN HCL 500 MG
2000 TABLET, EXTENDED RELEASE 24 HR ORAL DAILY
Qty: 360 TABLET | Refills: 3 | Status: SHIPPED | OUTPATIENT
Start: 2024-04-29 | End: 2024-07-18

## 2024-04-29 RX ORDER — OXYCODONE HCL 10 MG/1
10 TABLET, FILM COATED, EXTENDED RELEASE ORAL EVERY 12 HOURS
Qty: 60 TABLET | Refills: 0 | Status: SHIPPED | OUTPATIENT
Start: 2024-04-29 | End: 2024-05-24

## 2024-04-29 RX ORDER — TOPIRAMATE 100 MG/1
100 TABLET, FILM COATED ORAL EVERY MORNING
Qty: 90 TABLET | Refills: 2 | Status: SHIPPED | OUTPATIENT
Start: 2024-04-29 | End: 2024-07-10

## 2024-04-29 RX ORDER — RIZATRIPTAN BENZOATE 10 MG/1
10 TABLET ORAL
Qty: 18 TABLET | Refills: 3 | Status: SHIPPED | OUTPATIENT
Start: 2024-04-29

## 2024-04-29 RX ORDER — PRAVASTATIN SODIUM 40 MG
40 TABLET ORAL DAILY
Qty: 90 TABLET | Refills: 3 | Status: SHIPPED | OUTPATIENT
Start: 2024-04-29 | End: 2024-07-18

## 2024-04-29 RX ORDER — ACYCLOVIR 400 MG/1
1 TABLET ORAL
Qty: 9 EACH | Refills: 11 | Status: SHIPPED | OUTPATIENT
Start: 2024-04-29

## 2024-04-29 RX ORDER — HYDROXYZINE HYDROCHLORIDE 25 MG/1
TABLET, FILM COATED ORAL
Qty: 90 TABLET | Refills: 3 | Status: SHIPPED | OUTPATIENT
Start: 2024-04-29

## 2024-04-29 RX ORDER — OXYCODONE HYDROCHLORIDE 5 MG/1
5 TABLET ORAL EVERY 4 HOURS PRN
Qty: 100 TABLET | Refills: 0 | Status: SHIPPED | OUTPATIENT
Start: 2024-05-21 | End: 2024-06-14

## 2024-04-29 RX ORDER — HYDROXYZINE PAMOATE 50 MG/1
50 CAPSULE ORAL DAILY
Qty: 90 CAPSULE | Refills: 3 | Status: SHIPPED | OUTPATIENT
Start: 2024-04-29

## 2024-04-29 RX ORDER — GABAPENTIN 600 MG/1
1200 TABLET ORAL 3 TIMES DAILY
Qty: 540 TABLET | Refills: 2 | Status: SHIPPED | OUTPATIENT
Start: 2024-04-29 | End: 2024-07-18

## 2024-04-29 NOTE — PROGRESS NOTES
Elicia is a 49 year old who is being evaluated via a billable video visit.      How would you like to obtain your AVS? MyChart  If the video visit is dropped, the invitation should be resent by: Send to e-mail at: [email or web address]  Will anyone else be joining your video visit? No    Assessment & Plan     Perimenopause  Noticing irregular periods and hot flashes and changes with nails.  Likely entering perimenopause. Already on micronor. Advised to follow up with ob/gyn.    Chronic pain disorder  Pain in joint involving ankle and foot, right  Chronic, continuous use of opioids  History of chronic foot pain, has been on continuous oxycodone for many years. S/P ankle tendon/arch surgery on 10/19 which went well. She is now weight bearing as tolerated in brace, doing PT. [facility] Discussed need to wean opioids, due to high MME, risk of tolerance and oversedation. No side effects. Has narcan available at home PRN.     Today-pain is stable. Using oxycontin BID and short acting oxycodone 2-4 tablets per day. Work is going well. Agreeable to decreasing total number of oxycodone pills per month. Of note, recent UDS did NOT show opioids which is unusual. Pt is adamant she has been taking it. She did miss 1-2 days of short acting oxycodone on 4/21-4/22 as she thought she was out of refills, however restarted the medication 4/23 after getting it refilled. Was still taking oxycontin though, and UDS was done on 4/26. Could be lab error. Low concern for diversion at this time. Will repeat UDS.    Plan:  -discussed goal is to wean opioids  -goal is to return to the amount of oxycodone she was taking pre-surgery, which was a total of short acting oxycodone 20mg daily (MME 30)     MME were 30 ~8/2023   Current MME are near double what she was taking prior to worsening symptoms ~8/2023      Opioid:   continue long acting oxycontin 10mg BID (MME 30)  DECREASE short acting oxycodone from 120 to 100 tablets per  month (2-4 tablets/day; MME 15 to 30)  PDMP checked, appropriate  Current MME: 45-60    Non-Opioid:  schedule tylenol 1000mg TID  schedule ibuprofen 800mg TID (increase from BID to TID)  continue tizanidine PRN   continue gabapentin 1200mg TID  elevate, ice    Follow up in 1 month       Emily Rubi is a 49 year old, presenting for the following health issues:  RECHECK        2/29/2024     7:27 AM   Additional Questions   Roomed by Amada Samano   Accompanied by Self     History of Present Illness       Reason for visit:  Pain medication review    She eats 2-3 servings of fruits and vegetables daily.She consumes 0 sweetened beverage(s) daily.She exercises with enough effort to increase her heart rate 9 or less minutes per day.  She exercises with enough effort to increase her heart rate 3 or less days per week. She is missing 2 dose(s) of medications per week.  She is not taking prescribed medications regularly due to remembering to take.    Last filled 120 tablets of oxycodone 5mg on 4/23  Last filled 60 tablets of long acting oxycodone 10mg on 4/1    Today feels  Thought she was out of med refills (was out of short acting oxycodone for 1 day); was still able to take long acting. Restarted on 4/23.   Unsure why UDS was negative  Long acting-2x/day  Short acting:  up to 4 tablets/day; 1-2 days per week would do 2-3 tablets  Will cut back to 100 tablets of short acting oxycodone    Has had 3 small periods  Hot flashes    UDS was neg for opioids        Objective         Vitals:  No vitals were obtained today due to virtual visit.    Physical Exam   GENERAL: alert and no distress  EYES: Eyes grossly normal to inspection.  No discharge or erythema, or obvious scleral/conjunctival abnormalities.  RESP: No audible wheeze, cough, or visible cyanosis.    SKIN: Visible skin clear. No significant rash, abnormal pigmentation or lesions.  NEURO: Cranial nerves grossly intact.  Mentation and speech appropriate for age.  PSYCH:  Appropriate affect, tone, and pace of words      Video-Visit Details    Type of service:  Video Visit   Video Start Time: 737am  Video End Time:8 am    Originating Location (pt. Location): Home  Distant Location (provider location):  On-site  Platform used for Video Visit: Diana  Signed Electronically by: Loc Waggoner DO

## 2024-05-01 ENCOUNTER — THERAPY VISIT (OUTPATIENT)
Dept: OCCUPATIONAL THERAPY | Facility: CLINIC | Age: 49
End: 2024-05-01
Payer: COMMERCIAL

## 2024-05-01 DIAGNOSIS — M25.532 LEFT WRIST PAIN: Primary | ICD-10-CM

## 2024-05-01 PROCEDURE — 97140 MANUAL THERAPY 1/> REGIONS: CPT | Mod: GO | Performed by: OCCUPATIONAL THERAPIST

## 2024-05-01 PROCEDURE — 97110 THERAPEUTIC EXERCISES: CPT | Mod: GO | Performed by: OCCUPATIONAL THERAPIST

## 2024-05-07 ENCOUNTER — MYC MEDICAL ADVICE (OUTPATIENT)
Dept: FAMILY MEDICINE | Facility: CLINIC | Age: 49
End: 2024-05-07
Payer: COMMERCIAL

## 2024-05-08 ENCOUNTER — LAB (OUTPATIENT)
Dept: LAB | Facility: CLINIC | Age: 49
End: 2024-05-08
Payer: COMMERCIAL

## 2024-05-08 ENCOUNTER — MYC MEDICAL ADVICE (OUTPATIENT)
Dept: FAMILY MEDICINE | Facility: CLINIC | Age: 49
End: 2024-05-08

## 2024-05-08 DIAGNOSIS — F11.90 CHRONIC, CONTINUOUS USE OF OPIOIDS: ICD-10-CM

## 2024-05-08 DIAGNOSIS — G89.4 CHRONIC PAIN DISORDER: ICD-10-CM

## 2024-05-08 LAB
AMPHETAMINES UR QL SCN: ABNORMAL
BARBITURATES UR QL SCN: ABNORMAL
BENZODIAZ UR QL SCN: ABNORMAL
BZE UR QL SCN: ABNORMAL
CANNABINOIDS UR QL SCN: ABNORMAL
CREAT UR-MCNC: 144 MG/DL
FENTANYL UR QL: ABNORMAL
OPIATES UR QL SCN: ABNORMAL
OXYCODONE UR QL: ABNORMAL
PCP QUAL URINE (ROCHE): ABNORMAL

## 2024-05-08 PROCEDURE — 80375 DRUG/SUBSTANCE NOS 1-3: CPT | Performed by: STUDENT IN AN ORGANIZED HEALTH CARE EDUCATION/TRAINING PROGRAM

## 2024-05-08 PROCEDURE — G0480 DRUG TEST DEF 1-7 CLASSES: HCPCS | Performed by: STUDENT IN AN ORGANIZED HEALTH CARE EDUCATION/TRAINING PROGRAM

## 2024-05-08 PROCEDURE — 80365 DRUG SCREENING OXYCODONE: CPT | Performed by: STUDENT IN AN ORGANIZED HEALTH CARE EDUCATION/TRAINING PROGRAM

## 2024-05-08 PROCEDURE — 80346 BENZODIAZEPINES1-12: CPT | Performed by: STUDENT IN AN ORGANIZED HEALTH CARE EDUCATION/TRAINING PROGRAM

## 2024-05-08 PROCEDURE — 99000 SPECIMEN HANDLING OFFICE-LAB: CPT | Performed by: PATHOLOGY

## 2024-05-08 PROCEDURE — 80307 DRUG TEST PRSMV CHEM ANLYZR: CPT | Performed by: STUDENT IN AN ORGANIZED HEALTH CARE EDUCATION/TRAINING PROGRAM

## 2024-05-09 ENCOUNTER — OFFICE VISIT (OUTPATIENT)
Dept: INTERNAL MEDICINE | Facility: CLINIC | Age: 49
End: 2024-05-09
Payer: COMMERCIAL

## 2024-05-09 ENCOUNTER — NURSE TRIAGE (OUTPATIENT)
Dept: FAMILY MEDICINE | Facility: CLINIC | Age: 49
End: 2024-05-09
Payer: COMMERCIAL

## 2024-05-09 VITALS
HEIGHT: 67 IN | RESPIRATION RATE: 16 BRPM | DIASTOLIC BLOOD PRESSURE: 64 MMHG | HEART RATE: 83 BPM | SYSTOLIC BLOOD PRESSURE: 95 MMHG | OXYGEN SATURATION: 98 % | WEIGHT: 198.3 LBS | BODY MASS INDEX: 31.12 KG/M2 | TEMPERATURE: 98.5 F

## 2024-05-09 DIAGNOSIS — L03.031 PARONYCHIA OF TOE, RIGHT: Primary | ICD-10-CM

## 2024-05-09 PROBLEM — M25.532 LEFT WRIST PAIN: Status: RESOLVED | Noted: 2024-03-22 | Resolved: 2024-05-09

## 2024-05-09 PROBLEM — F33.0 MILD EPISODE OF RECURRENT MAJOR DEPRESSIVE DISORDER (H): Status: RESOLVED | Noted: 2022-11-28 | Resolved: 2024-05-09

## 2024-05-09 PROBLEM — E11.9 TYPE 2 DIABETES, HBA1C GOAL < 8% (H): Status: ACTIVE | Noted: 2022-11-28

## 2024-05-09 PROBLEM — E11.9 TYPE 2 DIABETES MELLITUS WITHOUT COMPLICATION, WITHOUT LONG-TERM CURRENT USE OF INSULIN (H): Status: RESOLVED | Noted: 2022-11-28 | Resolved: 2024-05-09

## 2024-05-09 PROCEDURE — 99213 OFFICE O/P EST LOW 20 MIN: CPT | Performed by: INTERNAL MEDICINE

## 2024-05-09 RX ORDER — CEPHALEXIN 500 MG/1
500 CAPSULE ORAL 2 TIMES DAILY
Qty: 10 CAPSULE | Refills: 0 | Status: SHIPPED | OUTPATIENT
Start: 2024-05-09 | End: 2024-05-14

## 2024-05-09 NOTE — PROGRESS NOTES
ASSESSMENT AND PLAN:    1. Paronychia of toe, right  No overt cellulitis.  Will treat.    - cephALEXin (KEFLEX) 500 MG capsule; Take 1 capsule (500 mg) by mouth 2 times daily for 5 days  Dispense: 10 capsule; Refill: 0    2. Skin lesion  Right inner nuchal buttock fold, a 1 cm redness area, unclear area, looks to be a friction area wound that is healing. Reassured.     CHIEF COMPLAINT:  Paronychia, and skin lesion    HISTORY OF PRESENT ILLNESS:  Elicia Calero is a 49 year old female with right second toe paronychia.  No overt cellulitis.  Skin lesion of inner nuchal buttock fold, 1 cm, only mildly painful.  No history of HSV 1.  She feels well otherwise. Losing weight using GLP - 1 agonist therapy.      Past Medical History:   Diagnosis Date    Hepatic adenoma     HTN (hypertension)     Hyperlipidemia     Hypothyroidism     Irregular menstrual cycle 03/25/2009    Irritable bowel syndrome, unspecified type 11/28/2022    Migraine     NAFLD (nonalcoholic fatty liver disease)     Type 2 diabetes, HbA1C goal < 8% (H)      History   Smoking Status    Former    Packs/day: 1.00    Years: 20.00    Types: Cigarettes    Quit date: 2018   Smokeless Tobacco    Never     Family History   Problem Relation Age of Onset    Hypertension Mother     Diabetes Maternal Grandmother     Heart Disease Paternal Grandmother     Liver Disease No family hx of      Past Surgical History:   Procedure Laterality Date    CHOLECYSTECTOMY      teen years    COLONOSCOPY N/A 5/18/2023    Procedure: Colonoscopy;  Surgeon: Chance Chan MD;  Location: [facility] OR    ESOPHAGOSCOPY, GASTROSCOPY, DUODENOSCOPY (EGD), COMBINED N/A 5/18/2023    Procedure: ESOPHAGOGASTRODUODENOSCOPY, WITH BIOPSY;  Surgeon: Chance Chan MD;  Location: [facility] OR    RADIO FREQUENCY ABLATION / DESTRUCTION OF SACROILOAC JOINT LATERAL BRANCHES (S1/S2/S3) Bilateral 6/15/2023    Procedure: Bilateral Lumbar 5 medial branch block and Sacral 1,2,3 lateral branch  "block;  Surgeon: Layne Weber MD;  Location: [facility] OR     Allergies   Allergen Reactions    Lithium Dizziness and Other (See Comments)     Other reaction(s): PSYCHOSIS    Other Reaction(s): Altered Mental Status    Sulfa Antibiotics Swelling     lips    Cold sores per pt    Adhesive Tape Rash    Albuterol Nausea and Vomiting    Clarithromycin Nausea and Vomiting    Erythromycin Nausea and Vomiting    Amoxicillin-Pot Clavulanate Nausea and Vomiting    Venlafaxine Other (See Comments)     Other reaction(s): PSYCHOSIS    Psychosis    Per transferred records     VITALS:  Vitals:    05/09/24 1243   BP: 95/64   BP Location: Right arm   Patient Position: Sitting   Cuff Size: Adult Regular   Pulse: 83   Resp: 16   Temp: 98.5  F (36.9  C)   TempSrc: Oral   SpO2: 98%   Weight: 89.9 kg (198 lb 4.8 oz)   Height: 1.71 m (5' 7.32\")     Estimated body mass index is 30.76 kg/m  as calculated from the following:    Height as of this encounter: 1.71 m (5' 7.32\").    Weight as of this encounter: 89.9 kg (198 lb 4.8 oz).  Wt Readings from Last 3 Encounters:   05/09/24 89.9 kg (198 lb 4.8 oz)   04/04/24 92.5 kg (204 lb)   02/08/24 93 kg (205 lb)     PHYSICAL EXAM:  Constitutional:  In NAD, alert and oriented  SKIN: small 1 cm round red area right inner buttock fold, no purulence, or surrounding cellulitis  Extremities: right second toe, paronychia.  No purulence, or cellulitis, is painful    Current Outpatient Medications   Medication Sig Dispense Refill    Alcohol Swabs PADS 1 each daily 300 each 3    buPROPion (WELLBUTRIN XL) 150 MG 24 hr tablet Take 1 tablet (150 mg) by mouth every morning 90 tablet 3    buPROPion (WELLBUTRIN XL) 300 MG 24 hr tablet Take 1 tablet (300 mg) by mouth daily 90 tablet 3    cephALEXin (KEFLEX) 500 MG capsule Take 1 capsule (500 mg) by mouth 2 times daily for 5 days 10 capsule 0    Continuous Glucose Sensor (DEXCOM G7 SENSOR) MISC 1 Device every 10 days 9 each 11    diphenhydrAMINE (BENADRYL) 50 MG tablet " Take 50 mg by mouth      doxepin (SINEQUAN) 25 MG capsule Take 2 capsules (50 mg) by mouth At Bedtime 30 capsule 3    empagliflozin (JARDIANCE) 25 MG TABS tablet Take 1 tablet (25 mg) by mouth daily 90 tablet 3    folic acid (FOLVITE) 1 MG tablet Take 1 tablet (1,000 mcg) by mouth daily 90 tablet 3    gabapentin (NEURONTIN) 600 MG tablet Take 2 tablets (1,200 mg) by mouth 3 times daily 540 tablet 2    hydrochlorothiazide (MICROZIDE) 12.5 MG capsule TAKE ONE CAPSULE BY MOUTH ONCE DAILY 90 capsule 2    hydrOXYzine (VISTARIL) 50 MG capsule Take 1 capsule (50 mg) by mouth daily 90 capsule 3    hydrOXYzine HCl (ATARAX) 25 MG tablet TAKE ONE TABLET BY MOUTH ONCE DAILY ALONG WITH 50 MG TABLET FOR A TOTAL DAILY DOSE OF 75 MG AS NEEDED 90 tablet 3    levothyroxine (SYNTHROID/LEVOTHROID) 125 MCG tablet Take 1 tablet (125 mcg) by mouth daily 90 tablet 0    lidocaine, viscous, (XYLOCAINE) 2 % solution Swish and spit 5 mLs in mouth 5 times daily 100 mL 1    lisdexamfetamine (VYVANSE) 70 MG capsule Take 70 mg by mouth every morning      metFORMIN (GLUCOPHAGE XR) 500 MG 24 hr tablet Take 4 tablets (2,000 mg) by mouth daily 360 tablet 3    naloxone (NARCAN) 4 MG/0.1ML nasal spray Spray 1 spray (4 mg) into one nostril alternating nostrils as needed for opioid reversal every 2-3 minutes until assistance arrives 1 each 2    norethindrone (MICRONOR) 0.35 MG tablet Take 1 tablet (0.35 mg) by mouth daily 84 tablet 4    nystatin (MYCOSTATIN) 027112 UNIT/GM external cream Apply 100,000 g topically 3 times daily      omeprazole (PRILOSEC) 20 MG DR capsule Take 1 capsule (20 mg) by mouth 2 times daily 180 capsule 3    ondansetron (ZOFRAN) 4 MG tablet Take 1 tablet (4 mg) by mouth 3 times daily 90 tablet 1    oxyCODONE (OXYCONTIN) 10 MG 12 hr tablet Take 1 tablet (10 mg) by mouth every 12 hours 60 tablet 0    [START ON 5/21/2024] oxyCODONE (ROXICODONE) 5 MG tablet Take 1 tablet (5 mg) by mouth every 4 hours as needed for moderate pain Do not  take more than 20mg total per day 100 tablet 0    pravastatin (PRAVACHOL) 40 MG tablet Take 1 tablet (40 mg) by mouth daily 90 tablet 3    prochlorperazine (COMPAZINE) 10 MG tablet Take 1 tablet (10 mg) by mouth 4 times daily as needed for nausea (with migraine) 120 tablet 5    REXULTI 2 MG tablet Take 2 mg by mouth daily      rizatriptan (MAXALT) 10 MG tablet Take 1 tablet (10 mg) by mouth at onset of headache for migraine May repeat in 2 hours. Max 3 tablets/24 hours. 18 tablet 3    tirzepatide (MOUNJARO) 10 MG/0.5ML pen Inject 10 mg Subcutaneous every 7 days 6 mL 1    tiZANidine (ZANAFLEX) 2 MG tablet Take 2 tablets (4 mg) by mouth 3 times daily 540 tablet 1    topiramate (TOPAMAX) 100 MG tablet Take 1 tablet (100 mg) by mouth every morning 90 tablet 2    triamcinolone (KENALOG) 0.1 % external ointment Apply twice daily on hand for 2-4 weeks. 15 g 1    lamoTRIgine (LAMICTAL) 200 MG tablet Take 2 tablets (400 mg) by mouth At Bedtime for 360 days 180 tablet 3     Du Huizar MD  Internal Medicine  [facility]

## 2024-05-09 NOTE — TELEPHONE ENCOUNTER
Call received from patient.    Per patient:  Was cleaning underneath toenail and thinks maybe caught and tore it  Happened at least 1 week ago  Covered with bandages and pink/yellow drainage  Places ointment on it  A lot of friction with wearing socks/shoes  Currently pain is mild to moderate  Color of injured toe is red  Redness might be spreading  Afebrile   Skin of toe hurts to palpation     History of DM Type II.    Writer recommended office visit and informed patient no appts available at [facility].  Offered to look for appts at [facility] or [facility] and patient declined.  Discussed [facility] as an option; opens at 1000 today.    Patient verbalized understanding and in agreement with plan.    Patient requested [facility] appt; patient's call transferred to Central Scheduling to search for appt at requested clinic.      MARCOS KateN, RN-BC  [facility]    Reason for Disposition   Looks infected (e.g., spreading redness, red streak, pus)    Additional Information   Negative: Major bleeding (actively dripping or spurting) that can't be stopped   Negative: Amputation of toe   Negative: Sounds like a life-threatening emergency to the triager   Negative: Looks infected (e.g., spreading redness, red streak, pus)   Negative: High pressure injection injury (e.g., from paint gun, usually work-related   Negative: Looks like a broken bone or dislocated joint (e.g., crooked or deformed)   Negative: Skin is split open or gaping (length > 1/2 inch or 12 mm)   Negative: Bleeding won't stop after 10 minutes of direct pressure (using correct technique)   Negative: Dirt in the wound and not removed after 15 minutes of scrubbing   Negative: Sounds like a serious injury to the triager    Protocols used: Toe Injury-A-[place]

## 2024-05-10 ENCOUNTER — VIRTUAL VISIT (OUTPATIENT)
Dept: ENDOCRINOLOGY | Facility: CLINIC | Age: 49
End: 2024-05-10
Payer: COMMERCIAL

## 2024-05-10 VITALS — WEIGHT: 198.6 LBS | BODY MASS INDEX: 31.17 KG/M2 | HEIGHT: 67 IN

## 2024-05-10 DIAGNOSIS — E66.01 CLASS 2 SEVERE OBESITY DUE TO EXCESS CALORIES WITH SERIOUS COMORBIDITY AND BODY MASS INDEX (BMI) OF 35.0 TO 35.9 IN ADULT (H): Primary | ICD-10-CM

## 2024-05-10 DIAGNOSIS — E11.9 TYPE 2 DIABETES MELLITUS WITHOUT COMPLICATION, WITHOUT LONG-TERM CURRENT USE OF INSULIN (H): ICD-10-CM

## 2024-05-10 DIAGNOSIS — E66.812 CLASS 2 SEVERE OBESITY DUE TO EXCESS CALORIES WITH SERIOUS COMORBIDITY AND BODY MASS INDEX (BMI) OF 35.0 TO 35.9 IN ADULT (H): Primary | ICD-10-CM

## 2024-05-10 DIAGNOSIS — K76.0 NAFLD (NONALCOHOLIC FATTY LIVER DISEASE): ICD-10-CM

## 2024-05-10 LAB
AMPHET UR CFM-MCNC: 4920 NG/ML
AMPHET/CREAT UR: 3417 NG/MG {CREAT}
GABAPENTIN UR QL CFM: PRESENT
OXYCODONE UR CFM-MCNC: 3180 NG/ML
OXYCODONE/CREAT UR: 2208 NG/MG {CREAT}
OXYMORPHONE UR CFM-MCNC: 699 NG/ML
OXYMORPHONE/CREAT UR: 485 NG/MG {CREAT}

## 2024-05-10 PROCEDURE — 99214 OFFICE O/P EST MOD 30 MIN: CPT | Mod: 95 | Performed by: NURSE PRACTITIONER

## 2024-05-10 ASSESSMENT — PAIN SCALES - GENERAL: PAINLEVEL: MODERATE PAIN (4)

## 2024-05-10 NOTE — NURSING NOTE
Is the patient currently in the state of [place]? YES    Visit mode:VIDEO    If the visit is dropped, the patient can be reconnected by: VIDEO VISIT: Send to e-mail at: [email or web address]    Will anyone else be joining the visit? NO  (If patient encounters technical issues they should call [phone]    How would you like to obtain your AVS? MyChart    Are changes needed to the allergy or medication list? Pt stated no changes to allergies and Pt stated no med changes    Reason for visit: Follow Up    Bren SAMUEL

## 2024-05-10 NOTE — PROGRESS NOTES
Return Medical Weight Management Note     Elicia Calero  MRN:  [id number]  :  1975  JALEEL:  5/10/2024    Dear Loc Waggoner, ,    I had the pleasure of seeing your patient Elicia Calero. She is a 49 year old female who I am continuing to see for treatment of obesity related to:        2023     8:28 AM   --   I have the following health issues associated with obesity Type II Diabetes    High Cholesterol    GERD (Reflux)    Fatty Liver    Stress Incontinence    Hypothyroidism   I have the following symptoms associated with obesity Depression       Assessment & Plan   Problem List Items Addressed This Visit        Digestive    NAFLD (nonalcoholic fatty liver disease)    Class 2 severe obesity due to excess calories with serious comorbidity in adult (H) - Primary     Some weight regain when she had to switch to bydureon due to insurance (more cravings and food noise). Back on mounjaro since March with improved appetite, starting to see weight loss again. Suspect insufficient protein intake impacting weight loss, discussed strategies to improve protein intake- minimum goal is 60g daily. Suspect increasing protein at dinner would help with hypoglycemia in the PMs and over night that have been occurring (rare). Continues to wear dexcom but sensor has been difficult to callibrate recently.     Increase protein- especially in the evening   Continue to pay attention to hydration   Great work finding time on the weekends to walk   Continue mounjaro 10mg   Continue metformin   Follow up 3 months           Other Visit Diagnoses     Type 2 diabetes mellitus without complication, without long-term current use of insulin (H)                 INTERVAL HISTORY:  New MWM 2023 BMI 35 needing A1C between 6 and 7 for upcoming ortho surgery, most recent A1C was 11. Was already taking metformin and jardiance. Was also taking mounjaro but had availability issues. Referred from hepatology for MAFLD without fibrosis.  Uses CGM. Last seen 11/30/2023. S/p ortho surgery. Needing to switch to bydureon due to insurance changes.        Anti-obesity medication history    Current:   Metformin  jardiance  Switched back to mounjaro per note 4/2024 with Vinay Hernández RD - had been on bydureon for insurance reasons and was seeing more cravings and weight regain   mounjaro 10mg     Past/Failed/contraindicated:   Naltrexone- chronic opioid use     Recent diet changes:   Smaller portions   Less hungry   Larger lunch   Smaller dinner   Snacks- string cheese, yogurt  Vital protein drink in the AM     Recent exercise/activity changes:   walking on the weekends  Busy at work- walking there   Ankle sore at end of week but going well   Done with PT     Recent stressors:   Feeling better back at work   Less isolated   Mood feels better    Recent sleep changes:   Some low blood sugars at night   Sleeps better if having a cheese stick or small protein before bed     Vitamins/Labs:   A1C 6.3 4/2024   cmp wnl     Current dexcom has been finicky - false alarms - lows- has changed the sensor     CURRENT WEIGHT:   198 lbs 9.6 oz    Initial Weight (lbs): 230 lbs  Last Visits Weight: 89.9 kg (198 lb 4.7 oz)  Cumulative weight loss (lbs): 31.4  Weight Loss Percentage: 13.65%      MEDICATIONS:   Current Outpatient Medications   Medication Sig Dispense Refill     Alcohol Swabs PADS 1 each daily 300 each 3     buPROPion (WELLBUTRIN XL) 150 MG 24 hr tablet Take 1 tablet (150 mg) by mouth every morning 90 tablet 3     buPROPion (WELLBUTRIN XL) 300 MG 24 hr tablet Take 1 tablet (300 mg) by mouth daily 90 tablet 3     cephALEXin (KEFLEX) 500 MG capsule Take 1 capsule (500 mg) by mouth 2 times daily for 5 days 10 capsule 0     Continuous Glucose Sensor (DEXCOM G7 SENSOR) MISC 1 Device every 10 days 9 each 11     diphenhydrAMINE (BENADRYL) 50 MG tablet Take 50 mg by mouth       doxepin (SINEQUAN) 25 MG capsule Take 2 capsules (50 mg) by mouth At Bedtime 30 capsule 3      empagliflozin (JARDIANCE) 25 MG TABS tablet Take 1 tablet (25 mg) by mouth daily 90 tablet 3     folic acid (FOLVITE) 1 MG tablet Take 1 tablet (1,000 mcg) by mouth daily 90 tablet 3     gabapentin (NEURONTIN) 600 MG tablet Take 2 tablets (1,200 mg) by mouth 3 times daily 540 tablet 2     hydrochlorothiazide (MICROZIDE) 12.5 MG capsule TAKE ONE CAPSULE BY MOUTH ONCE DAILY 90 capsule 2     hydrOXYzine (VISTARIL) 50 MG capsule Take 1 capsule (50 mg) by mouth daily 90 capsule 3     hydrOXYzine HCl (ATARAX) 25 MG tablet TAKE ONE TABLET BY MOUTH ONCE DAILY ALONG WITH 50 MG TABLET FOR A TOTAL DAILY DOSE OF 75 MG AS NEEDED 90 tablet 3     lamoTRIgine (LAMICTAL) 200 MG tablet Take 2 tablets (400 mg) by mouth At Bedtime for 360 days 180 tablet 3     levothyroxine (SYNTHROID/LEVOTHROID) 125 MCG tablet Take 1 tablet (125 mcg) by mouth daily 90 tablet 0     lidocaine, viscous, (XYLOCAINE) 2 % solution Swish and spit 5 mLs in mouth 5 times daily 100 mL 1     lisdexamfetamine (VYVANSE) 70 MG capsule Take 70 mg by mouth every morning       metFORMIN (GLUCOPHAGE XR) 500 MG 24 hr tablet Take 4 tablets (2,000 mg) by mouth daily 360 tablet 3     naloxone (NARCAN) 4 MG/0.1ML nasal spray Spray 1 spray (4 mg) into one nostril alternating nostrils as needed for opioid reversal every 2-3 minutes until assistance arrives 1 each 2     norethindrone (MICRONOR) 0.35 MG tablet Take 1 tablet (0.35 mg) by mouth daily 84 tablet 4     nystatin (MYCOSTATIN) 663101 UNIT/GM external cream Apply 100,000 g topically 3 times daily       omeprazole (PRILOSEC) 20 MG DR capsule Take 1 capsule (20 mg) by mouth 2 times daily 180 capsule 3     ondansetron (ZOFRAN) 4 MG tablet Take 1 tablet (4 mg) by mouth 3 times daily 90 tablet 1     oxyCODONE (OXYCONTIN) 10 MG 12 hr tablet Take 1 tablet (10 mg) by mouth every 12 hours 60 tablet 0     [START ON 5/21/2024] oxyCODONE (ROXICODONE) 5 MG tablet Take 1 tablet (5 mg) by mouth every 4 hours as needed for moderate  pain Do not take more than 20mg total per day 100 tablet 0     pravastatin (PRAVACHOL) 40 MG tablet Take 1 tablet (40 mg) by mouth daily 90 tablet 3     prochlorperazine (COMPAZINE) 10 MG tablet Take 1 tablet (10 mg) by mouth 4 times daily as needed for nausea (with migraine) 120 tablet 5     REXULTI 2 MG tablet Take 2 mg by mouth daily       rizatriptan (MAXALT) 10 MG tablet Take 1 tablet (10 mg) by mouth at onset of headache for migraine May repeat in 2 hours. Max 3 tablets/24 hours. 18 tablet 3     tirzepatide (MOUNJARO) 10 MG/0.5ML pen Inject 10 mg Subcutaneous every 7 days 6 mL 1     tiZANidine (ZANAFLEX) 2 MG tablet Take 2 tablets (4 mg) by mouth 3 times daily 540 tablet 1     topiramate (TOPAMAX) 100 MG tablet Take 1 tablet (100 mg) by mouth every morning 90 tablet 2     triamcinolone (KENALOG) 0.1 % external ointment Apply twice daily on hand for 2-4 weeks. 15 g 1           5/9/2024     7:27 AM   Weight Loss Medication History Reviewed With Patient   Which weight loss medications are you currently taking on a regular basis? Topamax (topiramate)    Vyvanse    Metformin   Are you having any side effects from the weight loss medication that we have prescribed you? No       Last Comprehensive Metabolic Panel:  Sodium   Date Value Ref Range Status   04/26/2024 141 135 - 145 mmol/L Final     Comment:     Reference intervals for this test were updated on 09/26/2023 to more accurately reflect our healthy population. There may be differences in the flagging of prior results with similar values performed with this method. Interpretation of those prior results can be made in the context of the updated reference intervals.      Potassium   Date Value Ref Range Status   04/26/2024 4.6 3.4 - 5.3 mmol/L Final     Chloride   Date Value Ref Range Status   04/26/2024 102 98 - 107 mmol/L Final     Carbon Dioxide (CO2)   Date Value Ref Range Status   04/26/2024 29 22 - 29 mmol/L Final     Anion Gap   Date Value Ref Range Status    04/26/2024 10 7 - 15 mmol/L Final     Glucose   Date Value Ref Range Status   04/26/2024 109 (H) 70 - 99 mg/dL Final     GLUCOSE BY METER POCT   Date Value Ref Range Status   06/15/2023 331 (H) 70 - 99 mg/dL Final     Urea Nitrogen   Date Value Ref Range Status   04/26/2024 18.6 6.0 - 20.0 mg/dL Final     Creatinine   Date Value Ref Range Status   04/26/2024 0.81 0.51 - 0.95 mg/dL Final     GFR Estimate   Date Value Ref Range Status   04/26/2024 88 >60 mL/min/1.73m2 Final     Calcium   Date Value Ref Range Status   04/26/2024 9.2 8.6 - 10.0 mg/dL Final     Bilirubin Total   Date Value Ref Range Status   04/26/2024 0.2 <=1.2 mg/dL Final     Alkaline Phosphatase   Date Value Ref Range Status   04/26/2024 84 40 - 150 U/L Final     Comment:     Reference intervals for this test were updated on 11/14/2023 to more accurately reflect our healthy population. There may be differences in the flagging of prior results with similar values performed with this method. Interpretation of those prior results can be made in the context of the updated reference intervals.     ALT   Date Value Ref Range Status   04/26/2024 14 0 - 50 U/L Final     Comment:     Reference intervals for this test were updated on 6/12/2023 to more accurately reflect our healthy population. There may be differences in the flagging of prior results with similar values performed with this method. Interpretation of those prior results can be made in the context of the updated reference intervals.       AST   Date Value Ref Range Status   04/26/2024 15 0 - 45 U/L Final     Comment:     Reference intervals for this test were updated on 6/12/2023 to more accurately reflect our healthy population. There may be differences in the flagging of prior results with similar values performed with this method. Interpretation of those prior results can be made in the context of the updated reference intervals.                   9/20/2023     6:36 AM   KAREN Score (Last Two)  "  KAREN Raw Score 32   Activation Score 62.6   KAREN Level 3         PHYSICAL EXAM:  Objective    Ht 1.71 m (5' 7.32\")   Wt 90.1 kg (198 lb 9.6 oz)   LMP  (LMP Unknown)   BMI 30.81 kg/m      Vitals - Patient Reported  Pain Score: Moderate Pain (4)  Pain Loc: Ankle (toe & shoulder blades)      Vitals:  No vitals were obtained today due to virtual visit.    GENERAL: alert and no distress  EYES: Eyes grossly normal to inspection.  No discharge or erythema, or obvious scleral/conjunctival abnormalities.  RESP: No audible wheeze, cough, or visible cyanosis.    SKIN: Visible skin clear. No significant rash, abnormal pigmentation or lesions.  NEURO: Cranial nerves grossly intact.  Mentation and speech appropriate for age.  PSYCH: Appropriate affect, tone, and pace of words        Sincerely,    Geovanna Morgan NP      31 minutes spent by me on the date of the encounter doing chart review, history and exam, documentation and further activities per the note  "

## 2024-05-10 NOTE — PATIENT INSTRUCTIONS
"Thank you for allowing us the privilege of caring for you. We hope we provided you with the excellent service you deserve.   Please let us know if there is anything else we can do for you so that we can be sure you are completely satisfied with your care experience.    To ensure the quality of our services you may be receiving a patient satisfaction survey from an independent patient satisfaction monitoring company.    The greatest compliment you can give is a \"Likely to Recommend\"    Your visit was with Geovanna Morgan NP today.    Instructions per today's visit:     Carlos Calero, it was great to visit with you today.  Here is a review of our visit.  If our clinic scheduler is not able to reach you please call [phone] to schedule your next appointments.    Increase protein- especially in the evening   Continue to pay attention to hydration   Great work finding time on the weekends to walk   Continue mounjaro 10mg   Continue metformin   Follow up 3 months       Information about Video Visits with [facility]: video visit information  _________________________________________________________________________________________________________________________________________________________  If you are asked by your clinic team to have your blood pressure checked:  [facility] do offer several locations for blood pressure checks. Please follow the below link to schedule an appointment. Scheduling an appointment at the pharmacy for a blood pressure check is now preferred.    Appointment Plus (appointment-plus.51 Auto)  _________________________________________________________________________________________________________________________________________________________  Important contact and scheduling information:  Please call our contact center at [phone] to schedule your next appointments.  To find a lab location near you, please call [phone].  For any nursing questions or concerns call Salena " Danuta BARRAZAN at [phone] or Rosy Lomeli RN at [phone]  Please call during clinic hours Monday through Friday 8:00a - 4:00p if you have questions or you can contact us via [employer] at anytime and we will reply during clinic hours.    Lab results will be communicated through My Chart or letter (if My Chart not used). Please call the clinic if you have not received communication after 1 week or if you have any questions.?  Clinic Fax: [phone]    _________________________________________________________________________________________________________________________________________________________  Meal Replacement Products:    Here is the link to our new e-store where you can purchase our meal replacement products    [facility] E-Store  [email or web address]    The one week starter kit is a great way to sample a variety of products and see what works for you.    If you want more information about the product go to: More [employer]    If you are an employee or [facility] or  [employer] please contact your care team for a 10% estore discount    Free Shipping for orders over $75     Benefits of meal replacements products:    Portion and calorie control  Improved nutrition  Structured eating  Simplified food choices  Avoid contact with trigger foods  _________________________________________________________________________________________________________________________________________________________  Interested in working with a health ?  Health coaches work with you to improve your overall health and wellbeing.  They look at the whole person, and may involve discussion of different areas of life, including, but not limited to the four pillars of health (sleep, exercise, nutrition, and stress management). Discuss with your care team if you would like to start working a health .  Health Coaching-3 Pack: Schedule by calling [phone]     $99 for three health coaching visits    Visits may be done in person or via phone    Coaching is a partnership between the  and the client; Coaches do not prescribe or diagnose    Coaching helps inspire the client to reach his/her personal goals   _________________________________________________________________________________________________________________________________________________________  24 Week Healthy Lifestyle Plan:    Our mission in the 24-week Healthy Lifestyle Plan is to provide you with individualized care by giving you the tools, education and support you need to lose weight and maintain a healthy lifestyle. In your 24-week journey, you ll be supported by a dedicated weight loss team that includes registered dietitians, medical weight management providers, health coaches, and nurses -- all with special expertise in weight loss -- to help you every step of the way.     Monthly meetings with your registered dietician or medical weight management provider help to review your progress, update your care plan, and make any adjustments needed to ensure success. Between these visits, weekly and bi-weekly health  visits will help you focus on the four pillars of weight loss -- stress, sleep, nutrition, and exercise -- and how you can best adapt each to achieve sustainable weight loss results.    In addition, you will be given exclusive access to online wellbeing classes through [employer].  Your initial visit will be with a medical weight management provider who will help to understand your weight loss goals and ensure this program is the right fit for you. Please let our team know if you are interested in the 24 week plan by sending a message to your care team or calling [phone] to schedule.  _________________________________________________________________________________________________________________________________________________________  __________  [facility] Get  Moving Program  Our team of physical therapists is trained to help you understand and take control of your condition. They will perform a thorough evaluation to determine your ability for activity and develop a customized plan to fit your goals and physical ability.  Scheduling: Unsure if the Get Moving program is right for you? Discuss the program with your medical provider or diabetes educator. You can also call us at [phone] to ask questions or schedule an appointment.   [facility] Get Moving Program  ____________________________________________________________________________________________________________________________________________________________________________  [facility] Diabetes Prevention Program (DPP)  If you have prediabetes and Medicare please contact us via [facility]hart to learn more about the Diabetes Prevention Program (DPP)  Program Details:  [facility] offers the year-long Diabetes Prevention Program (DPP). The program helps you to make lifestyle changes that prevent or delay type 2 diabetes by supporting healthy eating, increased physical activity, stress reduction and use of coping skills.   On average, previous [facility] DPP cohorts have lost and maintained at least 5% of their starting weight throughout the program and averaged more than 150 minutes of physical activity per week.  Participants meet weekly for one-hour group sessions over sixteen weeks, every other week for the next 8 weeks, and monthly for the last six months.   A year-long maintenance program is also available for participants who complete the first year.   Location & Cost:   During the COVID-19 Public Health Emergency, the program is offered virtually. When in-person classes can resume, they will be held at [facility].  For people with Medicare, the program is covered in full. A self-pay option will also be available for those with non-Medicare insurance plans.    ______________________________________________________________________________________________________________________________________________________________________________________________________________________________    To work with a Behavioral Health Psychologist:    Call to schedule:    Figueroa Cardoso - [phone]  Desire Corea - [phone]  Portia White - [phone]  Herminia Crane - [phone]   Marcie Trevino PhD (cannot accept Medicare) [phone]        Thank MAIK smiley [facility] Comprehensive Weight Management Team

## 2024-05-10 NOTE — PROGRESS NOTES
Virtual Visit Details    Type of service:  Video Visit   Video Start Time: 0740  Video End Time 0805    Originating Location (pt. Location): Home    Distant Location (provider location):  Off-site  Platform used for Video Visit: [employer]

## 2024-05-10 NOTE — ASSESSMENT & PLAN NOTE
Some weight regain when she had to switch to bydureon due to insurance (more cravings and food noise). Back on mounjaro since March with improved appetite, starting to see weight loss again. Suspect insufficient protein intake impacting weight loss, discussed strategies to improve protein intake- minimum goal is 60g daily. Suspect increasing protein at dinner would help with hypoglycemia in the PMs and over night that have been occurring (rare). Continues to wear dexcom but sensor has been difficult to callibrate recently.     Increase protein- especially in the evening   Continue to pay attention to hydration   Great work finding time on the weekends to walk   Continue mounjaro 10mg   Continue metformin   Follow up 3 months

## 2024-05-10 NOTE — LETTER
5/10/2024       RE: Elicia Calero  [address]  [place] [address]     Dear Colleague,    Thank you for referring your patient, Elicia Calero, to the [facility] [place] [facility]. Please see a copy of my visit note below.      Return Medical Weight Management Note     Elicia Calero  MRN:  [id number]  :  1975  JALEEL:  5/10/2024    Dear Loc Waggoner DO,    I had the pleasure of seeing your patient Elicia Calero. She is a 49 year old female who I am continuing to see for treatment of obesity related to:        2023     8:28 AM   --   I have the following health issues associated with obesity Type II Diabetes    High Cholesterol    GERD (Reflux)    Fatty Liver    Stress Incontinence    Hypothyroidism   I have the following symptoms associated with obesity Depression       Assessment & Plan   Problem List Items Addressed This Visit          Digestive    NAFLD (nonalcoholic fatty liver disease)    Class 2 severe obesity due to excess calories with serious comorbidity in adult (H) - Primary     Some weight regain when she had to switch to bydureon due to insurance (more cravings and food noise). Back on mounjaro since March with improved appetite, starting to see weight loss again. Suspect insufficient protein intake impacting weight loss, discussed strategies to improve protein intake- minimum goal is 60g daily. Suspect increasing protein at dinner would help with hypoglycemia in the PMs and over night that have been occurring (rare). Continues to wear dexcom but sensor has been difficult to callibrate recently.     Increase protein- especially in the evening   Continue to pay attention to hydration   Great work finding time on the weekends to walk   Continue mounjaro 10mg   Continue metformin   Follow up 3 months             Other Visit Diagnoses       Type 2 diabetes mellitus without complication, without  long-term current use of insulin (H)                   INTERVAL HISTORY:  New MWM 9/20/2023 BMI 35 needing A1C between 6 and 7 for upcoming ortho surgery, most recent A1C was 11. Was already taking metformin and jardiance. Was also taking mounjaro but had availability issues. Referred from hepatology for MAFLD without fibrosis. Uses CGM. Last seen 11/30/2023. S/p ortho surgery. Needing to switch to bydureon due to insurance changes.        Anti-obesity medication history    Current:   Metformin  jardiance  Switched back to mounjaro per note 4/2024 with Vinay Hernández RD - had been on bydureon for insurance reasons and was seeing more cravings and weight regain   mounjaro 10mg     Past/Failed/contraindicated:   Naltrexone- chronic opioid use     Recent diet changes:   Smaller portions   Less hungry   Larger lunch   Smaller dinner   Snacks- string cheese, yogurt  Vital protein drink in the AM     Recent exercise/activity changes:   walking on the weekends  Busy at work- walking there   Ankle sore at end of week but going well   Done with PT     Recent stressors:   Feeling better back at work   Less isolated   Mood feels better    Recent sleep changes:   Some low blood sugars at night   Sleeps better if having a cheese stick or small protein before bed     Vitamins/Labs:   A1C 6.3 4/2024   cmp wnl     Current dexcom has been finicky - false alarms - lows- has changed the sensor     CURRENT WEIGHT:   198 lbs 9.6 oz    Initial Weight (lbs): 230 lbs  Last Visits Weight: 89.9 kg (198 lb 4.7 oz)  Cumulative weight loss (lbs): 31.4  Weight Loss Percentage: 13.65%      MEDICATIONS:   Current Outpatient Medications   Medication Sig Dispense Refill    Alcohol Swabs PADS 1 each daily 300 each 3    buPROPion (WELLBUTRIN XL) 150 MG 24 hr tablet Take 1 tablet (150 mg) by mouth every morning 90 tablet 3    buPROPion (WELLBUTRIN XL) 300 MG 24 hr tablet Take 1 tablet (300 mg) by mouth daily 90 tablet 3    cephALEXin (KEFLEX) 500 MG  capsule Take 1 capsule (500 mg) by mouth 2 times daily for 5 days 10 capsule 0    Continuous Glucose Sensor (DEXCOM G7 SENSOR) MISC 1 Device every 10 days 9 each 11    diphenhydrAMINE (BENADRYL) 50 MG tablet Take 50 mg by mouth      doxepin (SINEQUAN) 25 MG capsule Take 2 capsules (50 mg) by mouth At Bedtime 30 capsule 3    empagliflozin (JARDIANCE) 25 MG TABS tablet Take 1 tablet (25 mg) by mouth daily 90 tablet 3    folic acid (FOLVITE) 1 MG tablet Take 1 tablet (1,000 mcg) by mouth daily 90 tablet 3    gabapentin (NEURONTIN) 600 MG tablet Take 2 tablets (1,200 mg) by mouth 3 times daily 540 tablet 2    hydrochlorothiazide (MICROZIDE) 12.5 MG capsule TAKE ONE CAPSULE BY MOUTH ONCE DAILY 90 capsule 2    hydrOXYzine (VISTARIL) 50 MG capsule Take 1 capsule (50 mg) by mouth daily 90 capsule 3    hydrOXYzine HCl (ATARAX) 25 MG tablet TAKE ONE TABLET BY MOUTH ONCE DAILY ALONG WITH 50 MG TABLET FOR A TOTAL DAILY DOSE OF 75 MG AS NEEDED 90 tablet 3    lamoTRIgine (LAMICTAL) 200 MG tablet Take 2 tablets (400 mg) by mouth At Bedtime for 360 days 180 tablet 3    levothyroxine (SYNTHROID/LEVOTHROID) 125 MCG tablet Take 1 tablet (125 mcg) by mouth daily 90 tablet 0    lidocaine, viscous, (XYLOCAINE) 2 % solution Swish and spit 5 mLs in mouth 5 times daily 100 mL 1    lisdexamfetamine (VYVANSE) 70 MG capsule Take 70 mg by mouth every morning      metFORMIN (GLUCOPHAGE XR) 500 MG 24 hr tablet Take 4 tablets (2,000 mg) by mouth daily 360 tablet 3    naloxone (NARCAN) 4 MG/0.1ML nasal spray Spray 1 spray (4 mg) into one nostril alternating nostrils as needed for opioid reversal every 2-3 minutes until assistance arrives 1 each 2    norethindrone (MICRONOR) 0.35 MG tablet Take 1 tablet (0.35 mg) by mouth daily 84 tablet 4    nystatin (MYCOSTATIN) 474914 UNIT/GM external cream Apply 100,000 g topically 3 times daily      omeprazole (PRILOSEC) 20 MG DR capsule Take 1 capsule (20 mg) by mouth 2 times daily 180 capsule 3    ondansetron  (ZOFRAN) 4 MG tablet Take 1 tablet (4 mg) by mouth 3 times daily 90 tablet 1    oxyCODONE (OXYCONTIN) 10 MG 12 hr tablet Take 1 tablet (10 mg) by mouth every 12 hours 60 tablet 0    [START ON 5/21/2024] oxyCODONE (ROXICODONE) 5 MG tablet Take 1 tablet (5 mg) by mouth every 4 hours as needed for moderate pain Do not take more than 20mg total per day 100 tablet 0    pravastatin (PRAVACHOL) 40 MG tablet Take 1 tablet (40 mg) by mouth daily 90 tablet 3    prochlorperazine (COMPAZINE) 10 MG tablet Take 1 tablet (10 mg) by mouth 4 times daily as needed for nausea (with migraine) 120 tablet 5    REXULTI 2 MG tablet Take 2 mg by mouth daily      rizatriptan (MAXALT) 10 MG tablet Take 1 tablet (10 mg) by mouth at onset of headache for migraine May repeat in 2 hours. Max 3 tablets/24 hours. 18 tablet 3    tirzepatide (MOUNJARO) 10 MG/0.5ML pen Inject 10 mg Subcutaneous every 7 days 6 mL 1    tiZANidine (ZANAFLEX) 2 MG tablet Take 2 tablets (4 mg) by mouth 3 times daily 540 tablet 1    topiramate (TOPAMAX) 100 MG tablet Take 1 tablet (100 mg) by mouth every morning 90 tablet 2    triamcinolone (KENALOG) 0.1 % external ointment Apply twice daily on hand for 2-4 weeks. 15 g 1           5/9/2024     7:27 AM   Weight Loss Medication History Reviewed With Patient   Which weight loss medications are you currently taking on a regular basis? Topamax (topiramate)    Vyvanse    Metformin   Are you having any side effects from the weight loss medication that we have prescribed you? No       Last Comprehensive Metabolic Panel:  Sodium   Date Value Ref Range Status   04/26/2024 141 135 - 145 mmol/L Final     Comment:     Reference intervals for this test were updated on 09/26/2023 to more accurately reflect our healthy population. There may be differences in the flagging of prior results with similar values performed with this method. Interpretation of those prior results can be made in the context of the updated reference intervals.       Potassium   Date Value Ref Range Status   04/26/2024 4.6 3.4 - 5.3 mmol/L Final     Chloride   Date Value Ref Range Status   04/26/2024 102 98 - 107 mmol/L Final     Carbon Dioxide (CO2)   Date Value Ref Range Status   04/26/2024 29 22 - 29 mmol/L Final     Anion Gap   Date Value Ref Range Status   04/26/2024 10 7 - 15 mmol/L Final     Glucose   Date Value Ref Range Status   04/26/2024 109 (H) 70 - 99 mg/dL Final     GLUCOSE BY METER POCT   Date Value Ref Range Status   06/15/2023 331 (H) 70 - 99 mg/dL Final     Urea Nitrogen   Date Value Ref Range Status   04/26/2024 18.6 6.0 - 20.0 mg/dL Final     Creatinine   Date Value Ref Range Status   04/26/2024 0.81 0.51 - 0.95 mg/dL Final     GFR Estimate   Date Value Ref Range Status   04/26/2024 88 >60 mL/min/1.73m2 Final     Calcium   Date Value Ref Range Status   04/26/2024 9.2 8.6 - 10.0 mg/dL Final     Bilirubin Total   Date Value Ref Range Status   04/26/2024 0.2 <=1.2 mg/dL Final     Alkaline Phosphatase   Date Value Ref Range Status   04/26/2024 84 40 - 150 U/L Final     Comment:     Reference intervals for this test were updated on 11/14/2023 to more accurately reflect our healthy population. There may be differences in the flagging of prior results with similar values performed with this method. Interpretation of those prior results can be made in the context of the updated reference intervals.     ALT   Date Value Ref Range Status   04/26/2024 14 0 - 50 U/L Final     Comment:     Reference intervals for this test were updated on 6/12/2023 to more accurately reflect our healthy population. There may be differences in the flagging of prior results with similar values performed with this method. Interpretation of those prior results can be made in the context of the updated reference intervals.       AST   Date Value Ref Range Status   04/26/2024 15 0 - 45 U/L Final     Comment:     Reference intervals for this test were updated on 6/12/2023 to more accurately  "reflect our healthy population. There may be differences in the flagging of prior results with similar values performed with this method. Interpretation of those prior results can be made in the context of the updated reference intervals.                   9/20/2023     6:36 AM   KAREN Score (Last Two)   KAREN Raw Score 32   Activation Score 62.6   KAREN Level 3         PHYSICAL EXAM:  Objective    Ht 1.71 m (5' 7.32\")   Wt 90.1 kg (198 lb 9.6 oz)   LMP  (LMP Unknown)   BMI 30.81 kg/m      Vitals - Patient Reported  Pain Score: Moderate Pain (4)  Pain Loc: Ankle (toe & shoulder blades)      Vitals:  No vitals were obtained today due to virtual visit.    GENERAL: alert and no distress  EYES: Eyes grossly normal to inspection.  No discharge or erythema, or obvious scleral/conjunctival abnormalities.  RESP: No audible wheeze, cough, or visible cyanosis.    SKIN: Visible skin clear. No significant rash, abnormal pigmentation or lesions.  NEURO: Cranial nerves grossly intact.  Mentation and speech appropriate for age.  PSYCH: Appropriate affect, tone, and pace of words        Sincerely,    Geovanna Morgan NP      31 minutes spent by me on the date of the encounter doing chart review, history and exam, documentation and further activities per the note    Virtual Visit Details    Type of service:  Video Visit   Video Start Time: 0740  Video End Time 0805    Originating Location (pt. Location): Home    Distant Location (provider location):  Off-site  Platform used for Video Visit: Diana  "

## 2024-05-14 ENCOUNTER — TELEPHONE (OUTPATIENT)
Dept: ENDOCRINOLOGY | Facility: CLINIC | Age: 49
End: 2024-05-14
Payer: COMMERCIAL

## 2024-05-14 DIAGNOSIS — L03.032 PARONYCHIA OF TOE, LEFT: Primary | ICD-10-CM

## 2024-05-14 LAB
NOROXYCODONE UR CFM-MCNC: 5982 NG/MG CREAT
NOROXYMORPHONE/CREAT UR CFM: 315 NG/MG CREAT
OXYCODONE UR CFM-MCNC: 1864 NG/MG CREAT
OXYCODONE UR QL CFM: NORMAL
OXYMORPHONE UR CFM-MCNC: 177 NG/MG CREAT

## 2024-05-14 RX ORDER — CLINDAMYCIN HCL 150 MG
150 CAPSULE ORAL 4 TIMES DAILY
Qty: 20 CAPSULE | Refills: 0 | Status: SHIPPED | OUTPATIENT
Start: 2024-05-14 | End: 2024-05-19

## 2024-05-14 NOTE — TELEPHONE ENCOUNTER
Left Voicemail (1st Attempt) and Sent Mychart (1st Attempt) for the patient to call back and schedule the following:    Appointment type: [facility]  Provider: Geovanna Morgan  Return date: around 8/10/24  Specialty phone number: [phone]  Additional appointment(s) needed: n/a  Additonal Notes: n/a

## 2024-05-17 ENCOUNTER — OFFICE VISIT (OUTPATIENT)
Dept: PODIATRY | Facility: CLINIC | Age: 49
End: 2024-05-17
Payer: COMMERCIAL

## 2024-05-17 VITALS — DIASTOLIC BLOOD PRESSURE: 62 MMHG | HEART RATE: 77 BPM | SYSTOLIC BLOOD PRESSURE: 107 MMHG

## 2024-05-17 DIAGNOSIS — E11.9 TYPE 2 DIABETES, HBA1C GOAL < 8% (H): ICD-10-CM

## 2024-05-17 DIAGNOSIS — L60.0 INGROWING NAIL: Primary | ICD-10-CM

## 2024-05-17 PROCEDURE — 99213 OFFICE O/P EST LOW 20 MIN: CPT | Mod: 25 | Performed by: PODIATRIST

## 2024-05-17 PROCEDURE — 11750 EXCISION NAIL&NAIL MATRIX: CPT | Mod: T6 | Performed by: PODIATRIST

## 2024-05-17 NOTE — PATIENT INSTRUCTIONS
We wish you continued good healing. If you have any questions or concerns, please do not hesitate to contact us at  [phone]    [employer] (secure e-mail communication and access to your chart) to send a message or to make an appointment.    Please remember to call and schedule a follow up appointment if one was recommended at your earliest convenience.     PODIATRY CLINIC HOURS  TELEPHONE NUMBER    Dr. Hitesh JOLLEYPELVIRA FACFAS        Clinics:  [facility]  Tuesday 1PM-6PM  [place]  Wednesday 745AM-330PM  [place]  Monday 2nd,4th  830AM-4PM  Thursday/Friday 745AM-230PM     [facility] APPOINTMENTS  [phone]    [place] APPOINTMENTS  [phone]          If you need a medication refill, please contact us you may need lab work and/or a follow up visit prior to your refill (i.e. Antifungal medications).  If MRI needed please call Imaging at [phone]   HOW DO I GET MY KNEE SCOOTER? Knee scooters can be picked up at ANY Medical Supply stores with your knee scooter Prescription.  OR  Bring your signed prescription to an [facility] showroom.   Set up an appointment for your custom Orthotics. Call any Orthotics locations call [phone]         [place] TOENAIL REMOVAL AFTERCARE ~PERMANENT NAIL REMOVAL    Go directly home and elevate the affected foot for the remainder of the day/evening if possible. Your toe may stay numb anywhere from 2-8 hours.   For pain take Tylenol, ibuprofen or another anti-inflammatory as needed for pain. You may apply ice on top of your foot behind the base of the toes, BUT, NOT ON IT.  That evening of the procedure, or morning after procedure.  you may remove the bandage. Began soaking foot three times a day (10-15 min each time) in lukewarm water with a pinch of salt. Epson or table salt   You may have  to do this for 6-8 weeks if Phenol was used during the procedure.  After soaks,  pat the area dry. Apply antibiotic ointment to the area and cover with a band-aid.  The following day. Find a shoe that is comfortable and minimizes the amount of rubbing on your toe, as this increases pain.   Dress with band-aids until no longer draining. Those that have had the phenol procedure, the toe will drain longer and will look like it is infected because it is a chemical burn.    Watch for any signs and symptoms of infection such as: redness, red streaks going up the foot/leg, swelling, pus or foul odor. Fevers > 101   Please call with questions.    Dr. Hitesh JOLLEYPELVIRA FAC FAS

## 2024-05-17 NOTE — LETTER
May 17, 2024      Elicia Calero  [address]   [place] [address]        To Whom It May Concern:    Elicia Calero was seen in our clinic. No  work today due to the following: Procedure done today.  She may return to work 05/18/24 with no restictions.          Sincerely,      Dr. Hitesh Hemphill D.P.M FAC FAS/lld    (Electronically Signed)

## 2024-05-17 NOTE — LETTER
5/17/2024         RE: Elicia Calero  [address]  [place] [address]        Dear Colleague,    Thank you for referring your patient, Elicia Calero, to the [facility]. Please see a copy of my visit note below.    Subjective:    Pt is seen today  w/ the c/c of a painful ingrown right second nail lateral border.  This has been chronic problem and recently worse.  Aggravated by activity and relieved by rest.  Has tried soaking which has not helped.   denies history of trauma to the area.  Has had permanent nail removal of both borders right hallux in past.  Patient has type 2 diabetes mellitus.  Quit smoking in 2018.  History of right ankle fusion.    ROS:  see above         Allergies   Allergen Reactions     Lithium Dizziness and Other (See Comments)     Other reaction(s): PSYCHOSIS    Other Reaction(s): Altered Mental Status     Sulfa Antibiotics Swelling     lips    Cold sores per pt     Adhesive Tape Rash     Albuterol Nausea and Vomiting     Clarithromycin Nausea and Vomiting     Erythromycin Nausea and Vomiting     Amoxicillin-Pot Clavulanate Nausea and Vomiting     Venlafaxine Other (See Comments)     Other reaction(s): PSYCHOSIS    Psychosis    Per transferred records       Current Outpatient Medications   Medication Sig Dispense Refill     Alcohol Swabs PADS 1 each daily 300 each 3     buPROPion (WELLBUTRIN XL) 150 MG 24 hr tablet Take 1 tablet (150 mg) by mouth every morning 90 tablet 3     buPROPion (WELLBUTRIN XL) 300 MG 24 hr tablet Take 1 tablet (300 mg) by mouth daily 90 tablet 3     clindamycin (CLEOCIN) 150 MG capsule Take 1 capsule (150 mg) by mouth 4 times daily for 5 days 20 capsule 0     Continuous Glucose Sensor (DEXCOM G7 SENSOR) MISC 1 Device every 10 days 9 each 11     diphenhydrAMINE (BENADRYL) 50 MG tablet Take 50 mg by mouth       doxepin (SINEQUAN) 25 MG capsule Take 2 capsules (50 mg) by mouth At Bedtime 30 capsule 3     empagliflozin (JARDIANCE) 25 MG  TABS tablet Take 1 tablet (25 mg) by mouth daily 90 tablet 3     folic acid (FOLVITE) 1 MG tablet Take 1 tablet (1,000 mcg) by mouth daily 90 tablet 3     gabapentin (NEURONTIN) 600 MG tablet Take 2 tablets (1,200 mg) by mouth 3 times daily 540 tablet 2     hydrochlorothiazide (MICROZIDE) 12.5 MG capsule TAKE ONE CAPSULE BY MOUTH ONCE DAILY 90 capsule 2     hydrOXYzine (VISTARIL) 50 MG capsule Take 1 capsule (50 mg) by mouth daily 90 capsule 3     hydrOXYzine HCl (ATARAX) 25 MG tablet TAKE ONE TABLET BY MOUTH ONCE DAILY ALONG WITH 50 MG TABLET FOR A TOTAL DAILY DOSE OF 75 MG AS NEEDED 90 tablet 3     lamoTRIgine (LAMICTAL) 200 MG tablet Take 2 tablets (400 mg) by mouth At Bedtime for 360 days 180 tablet 3     levothyroxine (SYNTHROID/LEVOTHROID) 125 MCG tablet Take 1 tablet (125 mcg) by mouth daily 90 tablet 0     lidocaine, viscous, (XYLOCAINE) 2 % solution Swish and spit 5 mLs in mouth 5 times daily 100 mL 1     lisdexamfetamine (VYVANSE) 70 MG capsule Take 70 mg by mouth every morning       metFORMIN (GLUCOPHAGE XR) 500 MG 24 hr tablet Take 4 tablets (2,000 mg) by mouth daily 360 tablet 3     naloxone (NARCAN) 4 MG/0.1ML nasal spray Spray 1 spray (4 mg) into one nostril alternating nostrils as needed for opioid reversal every 2-3 minutes until assistance arrives 1 each 2     norethindrone (MICRONOR) 0.35 MG tablet Take 1 tablet (0.35 mg) by mouth daily 84 tablet 4     nystatin (MYCOSTATIN) 717865 UNIT/GM external cream Apply 100,000 g topically 3 times daily       omeprazole (PRILOSEC) 20 MG DR capsule Take 1 capsule (20 mg) by mouth 2 times daily 180 capsule 3     ondansetron (ZOFRAN) 4 MG tablet Take 1 tablet (4 mg) by mouth 3 times daily 90 tablet 1     oxyCODONE (OXYCONTIN) 10 MG 12 hr tablet Take 1 tablet (10 mg) by mouth every 12 hours 60 tablet 0     [START ON 5/21/2024] oxyCODONE (ROXICODONE) 5 MG tablet Take 1 tablet (5 mg) by mouth every 4 hours as needed for moderate pain Do not take more than 20mg  total per day 100 tablet 0     pravastatin (PRAVACHOL) 40 MG tablet Take 1 tablet (40 mg) by mouth daily 90 tablet 3     prochlorperazine (COMPAZINE) 10 MG tablet Take 1 tablet (10 mg) by mouth 4 times daily as needed for nausea (with migraine) 120 tablet 5     REXULTI 2 MG tablet Take 2 mg by mouth daily       rizatriptan (MAXALT) 10 MG tablet Take 1 tablet (10 mg) by mouth at onset of headache for migraine May repeat in 2 hours. Max 3 tablets/24 hours. 18 tablet 3     tirzepatide (MOUNJARO) 10 MG/0.5ML pen Inject 10 mg Subcutaneous every 7 days 6 mL 1     tiZANidine (ZANAFLEX) 2 MG tablet Take 2 tablets (4 mg) by mouth 3 times daily 540 tablet 1     topiramate (TOPAMAX) 100 MG tablet Take 1 tablet (100 mg) by mouth every morning 90 tablet 2     triamcinolone (KENALOG) 0.1 % external ointment Apply twice daily on hand for 2-4 weeks. 15 g 1       Patient Active Problem List   Diagnosis     Gastroesophageal reflux disease with esophagitis without hemorrhage     Hypothyroidism, unspecified type     Hepatic adenoma     NAFLD (nonalcoholic fatty liver disease)     Type 2 diabetes, HbA1C goal < 8% (H)     Chronic migraine without aura without status migrainosus, not intractable     Chronic low back pain without sciatica, unspecified back pain laterality     Bipolar affective disorder, current episode mixed, current episode severity unspecified (H)     Pain in joint involving ankle and foot, right     Persistent insomnia     Localized edema     Facet arthropathy, lumbosacral     Atypical psychosis (H)     Depressive disorder, not elsewhere classified     Former smoker     Lymphedema     Myofascial pain syndrome     Myopia     Obesity (BMI 30-39.9)     Regular astigmatism     Tension type headache     Chronic pain disorder     F11.9 - Chronic, continuous use of opioids     Class 2 severe obesity due to excess calories with serious comorbidity in adult (H)       Past Medical History:   Diagnosis Date     Hepatic adenoma       HTN (hypertension)      Hyperlipidemia      Hypothyroidism      Irregular menstrual cycle 2009     Irritable bowel syndrome, unspecified type 2022     Migraine      NAFLD (nonalcoholic fatty liver disease)      Type 2 diabetes, HbA1C goal < 8% (H)        Past Surgical History:   Procedure Laterality Date     CHOLECYSTECTOMY      teen years     COLONOSCOPY N/A 2023    Procedure: Colonoscopy;  Surgeon: Chance Chan MD;  Location: [facility] OR     ESOPHAGOSCOPY, GASTROSCOPY, DUODENOSCOPY (EGD), COMBINED N/A 2023    Procedure: ESOPHAGOGASTRODUODENOSCOPY, WITH BIOPSY;  Surgeon: Chance Chan MD;  Location: [facility] OR     RADIO FREQUENCY ABLATION / DESTRUCTION OF SACROILOAC JOINT LATERAL BRANCHES (S1/S2/S3) Bilateral 6/15/2023    Procedure: Bilateral Lumbar 5 medial branch block and Sacral 1,2,3 lateral branch block;  Surgeon: Layne Weber MD;  [place] [facility] [place]       Family History   Problem Relation Age of Onset     Hypertension Mother      Diabetes Maternal Grandmother      Heart Disease Paternal Grandmother      Liver Disease No family hx of        Social History     Tobacco Use     Smoking status: Former     Current packs/day: 0.00     Average packs/day: 1 pack/day for 20.0 years (20.0 ttl pk-yrs)     Types: Cigarettes     Start date:      Quit date: 2018     Years since quittin.3     Smokeless tobacco: Never   Substance Use Topics     Alcohol use: Yes     Comment: occasional         Exam:    Vitals: /62   Pulse 77   LMP  (LMP Unknown)   BMI: There is no height or weight on file to calculate BMI.  Height: Data Unavailable    Constitutional/ general:  Pt is in no apparent distress, appears well-nourished.  Cooperative with history and physical exam.     Psych:  The patient answered questions appropriately.  Normal affect.  Seems to have reasonable expectations, in terms of treatment.     Lungs:  Non labored breathing, non labored speech. No cough.   No audible wheezing. Even, quiet breathing.       Vascular:  positive pedal pulses bilaterally for both the DP and PT arteries.  CFT < 3 sec.  negative ankle edema.  positive pedal hair growth.    Neuro:  Alert and oriented x 3. Coordinated gait.  Light touch sensation is intact     Derm: Normal texture and turgor.  No erythema, ecchymosis, or cyanosis.      Musculoskeletal:    right second toe nail lateral border shows soft tissue impingement with localized erythema.   negative active drainage/purulence at this time.  No sinus tracts.  No nailbed masses or exostosis.  No pain with range of motion of IPJ or MTPJ.  No ascending cellulitis.                Component  Ref Range & Units 3 wk ago  (4/26/24) 5 mo ago  (12/14/23) 8 mo ago  (9/20/23) 10 mo ago  (6/28/23) 1 yr ago  (3/2/23) 1 yr ago  (12/1/22)    Hemoglobin A1C  <5.7 % 6.3 High  6.0 High  R, CM 7.5 High  CM 10.8 High  R, CM 7.9 High  CM 8.6 High  R, C          ASSESSMENT:    Onychocryptosis with paronychia right second lateral border.  Diabetes mellitus    Discussed etiology and treatment options in detail w/ the pt.  The potential causes and nature of an ingrown toenail were discussed with the patient.  We reviewed the natural history/prognosis of the condition and potential risks if no treatment is provided.      After thorough discussion and answering all questions, the patient elected to have permanent removal of affected nail border.  Risk complications and efficacy discussed with patient.  Obtained consent, used 3cc of 1% lidocaine plain to block right second toe.  Sterile prep, then avulsed the affected border.  No evidence of deep abscess noted.  Phenol was applied times 3 at 3o second intervals with curettage in between and then alcohol rinse.  Pt tolerated procedure well.  Sterile bandage placed, gave wound care instruction.  Return to clinic prn    Hitesh Hemphill DPM, OSVALDO        Again, thank you for allowing me to participate in the care of your  patient.        Sincerely,        Hitesh Hemphill DPM

## 2024-05-17 NOTE — PROGRESS NOTES
Subjective:    Pt is seen today  w/ the c/c of a painful ingrown right second nail lateral border.  This has been chronic problem and recently worse.  Aggravated by activity and relieved by rest.  Has tried soaking which has not helped.   denies history of trauma to the area.  Has had permanent nail removal of both borders right hallux in past.  Patient has type 2 diabetes mellitus.  Quit smoking in 2018.  History of right ankle fusion.    ROS:  see above         Allergies   Allergen Reactions    Lithium Dizziness and Other (See Comments)     Other reaction(s): PSYCHOSIS    Other Reaction(s): Altered Mental Status    Sulfa Antibiotics Swelling     lips    Cold sores per pt    Adhesive Tape Rash    Albuterol Nausea and Vomiting    Clarithromycin Nausea and Vomiting    Erythromycin Nausea and Vomiting    Amoxicillin-Pot Clavulanate Nausea and Vomiting    Venlafaxine Other (See Comments)     Other reaction(s): PSYCHOSIS    Psychosis    Per transferred records       Current Outpatient Medications   Medication Sig Dispense Refill    Alcohol Swabs PADS 1 each daily 300 each 3    buPROPion (WELLBUTRIN XL) 150 MG 24 hr tablet Take 1 tablet (150 mg) by mouth every morning 90 tablet 3    buPROPion (WELLBUTRIN XL) 300 MG 24 hr tablet Take 1 tablet (300 mg) by mouth daily 90 tablet 3    clindamycin (CLEOCIN) 150 MG capsule Take 1 capsule (150 mg) by mouth 4 times daily for 5 days 20 capsule 0    Continuous Glucose Sensor (DEXCOM G7 SENSOR) MISC 1 Device every 10 days 9 each 11    diphenhydrAMINE (BENADRYL) 50 MG tablet Take 50 mg by mouth      doxepin (SINEQUAN) 25 MG capsule Take 2 capsules (50 mg) by mouth At Bedtime 30 capsule 3    empagliflozin (JARDIANCE) 25 MG TABS tablet Take 1 tablet (25 mg) by mouth daily 90 tablet 3    folic acid (FOLVITE) 1 MG tablet Take 1 tablet (1,000 mcg) by mouth daily 90 tablet 3    gabapentin (NEURONTIN) 600 MG tablet Take 2 tablets (1,200 mg) by mouth 3 times daily 540 tablet 2     hydrochlorothiazide (MICROZIDE) 12.5 MG capsule TAKE ONE CAPSULE BY MOUTH ONCE DAILY 90 capsule 2    hydrOXYzine (VISTARIL) 50 MG capsule Take 1 capsule (50 mg) by mouth daily 90 capsule 3    hydrOXYzine HCl (ATARAX) 25 MG tablet TAKE ONE TABLET BY MOUTH ONCE DAILY ALONG WITH 50 MG TABLET FOR A TOTAL DAILY DOSE OF 75 MG AS NEEDED 90 tablet 3    lamoTRIgine (LAMICTAL) 200 MG tablet Take 2 tablets (400 mg) by mouth At Bedtime for 360 days 180 tablet 3    levothyroxine (SYNTHROID/LEVOTHROID) 125 MCG tablet Take 1 tablet (125 mcg) by mouth daily 90 tablet 0    lidocaine, viscous, (XYLOCAINE) 2 % solution Swish and spit 5 mLs in mouth 5 times daily 100 mL 1    lisdexamfetamine (VYVANSE) 70 MG capsule Take 70 mg by mouth every morning      metFORMIN (GLUCOPHAGE XR) 500 MG 24 hr tablet Take 4 tablets (2,000 mg) by mouth daily 360 tablet 3    naloxone (NARCAN) 4 MG/0.1ML nasal spray Spray 1 spray (4 mg) into one nostril alternating nostrils as needed for opioid reversal every 2-3 minutes until assistance arrives 1 each 2    norethindrone (MICRONOR) 0.35 MG tablet Take 1 tablet (0.35 mg) by mouth daily 84 tablet 4    nystatin (MYCOSTATIN) 438555 UNIT/GM external cream Apply 100,000 g topically 3 times daily      omeprazole (PRILOSEC) 20 MG DR capsule Take 1 capsule (20 mg) by mouth 2 times daily 180 capsule 3    ondansetron (ZOFRAN) 4 MG tablet Take 1 tablet (4 mg) by mouth 3 times daily 90 tablet 1    oxyCODONE (OXYCONTIN) 10 MG 12 hr tablet Take 1 tablet (10 mg) by mouth every 12 hours 60 tablet 0    [START ON 5/21/2024] oxyCODONE (ROXICODONE) 5 MG tablet Take 1 tablet (5 mg) by mouth every 4 hours as needed for moderate pain Do not take more than 20mg total per day 100 tablet 0    pravastatin (PRAVACHOL) 40 MG tablet Take 1 tablet (40 mg) by mouth daily 90 tablet 3    prochlorperazine (COMPAZINE) 10 MG tablet Take 1 tablet (10 mg) by mouth 4 times daily as needed for nausea (with migraine) 120 tablet 5    REXULTI 2 MG  tablet Take 2 mg by mouth daily      rizatriptan (MAXALT) 10 MG tablet Take 1 tablet (10 mg) by mouth at onset of headache for migraine May repeat in 2 hours. Max 3 tablets/24 hours. 18 tablet 3    tirzepatide (MOUNJARO) 10 MG/0.5ML pen Inject 10 mg Subcutaneous every 7 days 6 mL 1    tiZANidine (ZANAFLEX) 2 MG tablet Take 2 tablets (4 mg) by mouth 3 times daily 540 tablet 1    topiramate (TOPAMAX) 100 MG tablet Take 1 tablet (100 mg) by mouth every morning 90 tablet 2    triamcinolone (KENALOG) 0.1 % external ointment Apply twice daily on hand for 2-4 weeks. 15 g 1       Patient Active Problem List   Diagnosis    Gastroesophageal reflux disease with esophagitis without hemorrhage    Hypothyroidism, unspecified type    Hepatic adenoma    NAFLD (nonalcoholic fatty liver disease)    Type 2 diabetes, HbA1C goal < 8% (H)    Chronic migraine without aura without status migrainosus, not intractable    Chronic low back pain without sciatica, unspecified back pain laterality    Bipolar affective disorder, current episode mixed, current episode severity unspecified (H)    Pain in joint involving ankle and foot, right    Persistent insomnia    Localized edema    Facet arthropathy, lumbosacral    Atypical psychosis (H)    Depressive disorder, not elsewhere classified    Former smoker    Lymphedema    Myofascial pain syndrome    Myopia    Obesity (BMI 30-39.9)    Regular astigmatism    Tension type headache    Chronic pain disorder    F11.9 - Chronic, continuous use of opioids    Class 2 severe obesity due to excess calories with serious comorbidity in adult (H)       Past Medical History:   Diagnosis Date    Hepatic adenoma     HTN (hypertension)     Hyperlipidemia     Hypothyroidism     Irregular menstrual cycle 03/25/2009    Irritable bowel syndrome, unspecified type 11/28/2022    Migraine     NAFLD (nonalcoholic fatty liver disease)     Type 2 diabetes, HbA1C goal < 8% (H)        Past Surgical History:   Procedure  Laterality Date    CHOLECYSTECTOMY      teen years    COLONOSCOPY N/A 2023    Procedure: Colonoscopy;  Surgeon: Chance Chan MD;  [place] [facility] [place]    ESOPHAGOSCOPY, GASTROSCOPY, DUODENOSCOPY (EGD), COMBINED N/A 2023    Procedure: ESOPHAGOGASTRODUODENOSCOPY, WITH BIOPSY;  Surgeon: Chance Chan MD;  Location: [facility] OR    RADIO FREQUENCY ABLATION / DESTRUCTION OF SACROILOAC JOINT LATERAL BRANCHES (S1/S2/S3) Bilateral 6/15/2023    Procedure: Bilateral Lumbar 5 medial branch block and Sacral 1,2,3 lateral branch block;  Surgeon: Layne Weber MD;  Location: [facility] OR       Family History   Problem Relation Age of Onset    Hypertension Mother     Diabetes Maternal Grandmother     Heart Disease Paternal Grandmother     Liver Disease No family hx of        Social History     Tobacco Use    Smoking status: Former     Current packs/day: 0.00     Average packs/day: 1 pack/day for 20.0 years (20.0 ttl pk-yrs)     Types: Cigarettes     Start date:      Quit date:      Years since quittin.3    Smokeless tobacco: Never   Substance Use Topics    Alcohol use: Yes     Comment: occasional         Exam:    Vitals: /62   Pulse 77   LMP  (LMP Unknown)   BMI: There is no height or weight on file to calculate BMI.  Height: Data Unavailable    Constitutional/ general:  Pt is in no apparent distress, appears well-nourished.  Cooperative with history and physical exam.     Psych:  The patient answered questions appropriately.  Normal affect.  Seems to have reasonable expectations, in terms of treatment.     Lungs:  Non labored breathing, non labored speech. No cough.  No audible wheezing. Even, quiet breathing.       Vascular:  positive pedal pulses bilaterally for both the DP and PT arteries.  CFT < 3 sec.  negative ankle edema.  positive pedal hair growth.    Neuro:  Alert and oriented x 3. Coordinated gait.  Light touch sensation is intact     Derm: Normal texture and turgor.   No erythema, ecchymosis, or cyanosis.      Musculoskeletal:    right second toe nail lateral border shows soft tissue impingement with localized erythema.   negative active drainage/purulence at this time.  No sinus tracts.  No nailbed masses or exostosis.  No pain with range of motion of IPJ or MTPJ.  No ascending cellulitis.                Component  Ref Range & Units 3 wk ago  (4/26/24) 5 mo ago  (12/14/23) 8 mo ago  (9/20/23) 10 mo ago  (6/28/23) 1 yr ago  (3/2/23) 1 yr ago  (12/1/22)    Hemoglobin A1C  <5.7 % 6.3 High  6.0 High  R, CM 7.5 High  CM 10.8 High  R, CM 7.9 High  CM 8.6 High  R, C          ASSESSMENT:    Onychocryptosis with paronychia right second lateral border.  Diabetes mellitus    Discussed etiology and treatment options in detail w/ the pt.  The potential causes and nature of an ingrown toenail were discussed with the patient.  We reviewed the natural history/prognosis of the condition and potential risks if no treatment is provided.      After thorough discussion and answering all questions, the patient elected to have permanent removal of affected nail border.  Risk complications and efficacy discussed with patient.  Obtained consent, used 3cc of 1% lidocaine plain to block right second toe.  Sterile prep, then avulsed the affected border.  No evidence of deep abscess noted.  Phenol was applied times 3 at 3o second intervals with curettage in between and then alcohol rinse.  Pt tolerated procedure well.  Sterile bandage placed, gave wound care instruction.  Return to clinic prn    Hitesh Hemphill DPM, FACFAS

## 2024-05-23 NOTE — PROGRESS NOTES
Elicia is a 49 year old who is being evaluated via a billable video visit.      How would you like to obtain your AVS? MyChart  If the video visit is dropped, the invitation should be resent by: Send to e-mail at: [email or web address]  Will anyone else be joining your video visit? No    Assessment & Plan     Chronic pain disorder  Pain in joint involving ankle and foot, right  Chronic, continuous use of opioids  History of chronic foot pain, has been on continuous oxycodone for many years. S/P ankle tendon/arch surgery on 10/19 which went well. She is now weight bearing as tolerated in brace, doing PT. Following with [place] [facility]. Discussed need to wean opioids, due to high MME, risk of tolerance and oversedation. No side effects. Has narcan available at home PRN.     Today, pain is the same. Using oxycontin BID and short acting oxycodone 2-3 tablets per day. Work is going well. Working to wean down on the short acting oxycodone.    Plan:  -discussed goal is to wean opioids  -goal is to return to the amount of oxycodone she was taking pre-surgery, which was a total of short acting oxycodone 20mg daily (MME 30)   -MME were 30 ~8/2023   -Current MME are slightly less than double what she was taking prior to worsening symptoms ~8/2023      Opioid:   continue long acting oxycontin 10mg BID (MME 30)  DECREASE short acting oxycodone to 100 tablets per month (2-3 tablets/day; MME 15 to 22)  PDMP checked, appropriate  Current MME: 45-52    Non-Opioid:  schedule tylenol 1000mg TID  schedule ibuprofen 800mg TID (increase from BID to TID)  continue tizanidine PRN   continue gabapentin 1200mg TID  elevate, ice    Follow up in 1 month       # hypotension  She noted that her BP has been low at prior medical visits. Her dentis said it was in the 80s systolic. She feels ok just tired. I advised her to hold her hydrochlorothiazide and get a BP cuff, check her BP daily for 1-2 weeks. Message me with the results.        Subjective    Elicia is a 49 year old, presenting for the following health issues:  No chief complaint on file.        2/29/2024     7:27 AM   Additional Questions   Roomed by Amada Samano   Accompanied by Self     History of Present Illness       Reason for visit:  Pain medication review    She eats 2-3 servings of fruits and vegetables daily.She consumes 0 sweetened beverage(s) daily.She exercises with enough effort to increase her heart rate 9 or less minutes per day.  She exercises with enough effort to increase her heart rate 3 or less days per week. She is missing 2 dose(s) of medications per week.  She is not taking prescribed medications regularly due to remembering to take.    Last filled 100 tablets of oxycodone 5mg on 5/21  Last filled 60 tablets of long acting oxycodone 10mg on 4/30    Today :  Pain same  Going ok  Had toe infection-ingrown toenail; removed part of toenail  Work going well  Reduced oxycodone last month (just got new prescription)  3-4 per day  Arthritis in back        Objective         Vitals:  No vitals were obtained today due to virtual visit.    Physical Exam   GENERAL: alert and no distress  EYES: Eyes grossly normal to inspection.  No discharge or erythema, or obvious scleral/conjunctival abnormalities.  RESP: No audible wheeze, cough, or visible cyanosis.    SKIN: Visible skin clear. No significant rash, abnormal pigmentation or lesions.  NEURO: Cranial nerves grossly intact.  Mentation and speech appropriate for age.  PSYCH: Appropriate affect, tone, and pace of words      Video-Visit Details    Type of service:  Video Visit   Video Start Time: 715am  Video End Time: 726am    Originating Location (pt. Location): Home  Distant Location (provider location):  On-site  Platform used for Video Visit: Diana  Signed Electronically by: Loc Waggoner DO

## 2024-05-24 ENCOUNTER — VIRTUAL VISIT (OUTPATIENT)
Dept: FAMILY MEDICINE | Facility: CLINIC | Age: 49
End: 2024-05-24
Payer: COMMERCIAL

## 2024-05-24 DIAGNOSIS — F11.90 CHRONIC, CONTINUOUS USE OF OPIOIDS: ICD-10-CM

## 2024-05-24 DIAGNOSIS — G89.4 CHRONIC PAIN DISORDER: Primary | ICD-10-CM

## 2024-05-24 DIAGNOSIS — M25.571 PAIN IN JOINT INVOLVING ANKLE AND FOOT, RIGHT: ICD-10-CM

## 2024-05-24 DIAGNOSIS — I95.9 HYPOTENSION, UNSPECIFIED HYPOTENSION TYPE: ICD-10-CM

## 2024-05-24 PROBLEM — M19.90 ARTHRITIS: Status: ACTIVE | Noted: 2023-06-24

## 2024-05-24 PROCEDURE — 99214 OFFICE O/P EST MOD 30 MIN: CPT | Mod: 95 | Performed by: STUDENT IN AN ORGANIZED HEALTH CARE EDUCATION/TRAINING PROGRAM

## 2024-05-24 RX ORDER — OXYCODONE HCL 10 MG/1
10 TABLET, FILM COATED, EXTENDED RELEASE ORAL EVERY 12 HOURS
Qty: 60 TABLET | Refills: 0 | Status: SHIPPED | OUTPATIENT
Start: 2024-05-28 | End: 2024-06-14

## 2024-05-24 NOTE — PATIENT INSTRUCTIONS
Hold hydrochlorothiazide  Check BP daily  Send me a mychart in 1-2 weeks with your BP      Dr. Waggoner      For your blood pressure:  Check your blood pressure - goal is 6 times in next 1-2 weeks.  Best time is first thing in the morning, after you've gone to the bathroom and before you've eaten, after sitting quietly 5 minutes (no talking, drinking coffee).  2nd best time is before dinner - also after sitting quietly for 5-10 minutes  Note your blood pressure on a paper log or on your phone  In ~2 weeks, start an E-visit with me with with your results.  Your goal is <140/90, even better is <130/80.  Low sodium, high potassium (DASH) diet.

## 2024-05-29 ENCOUNTER — OFFICE VISIT (OUTPATIENT)
Dept: OBGYN | Facility: CLINIC | Age: 49
End: 2024-05-29
Attending: STUDENT IN AN ORGANIZED HEALTH CARE EDUCATION/TRAINING PROGRAM
Payer: COMMERCIAL

## 2024-05-29 VITALS
HEART RATE: 77 BPM | DIASTOLIC BLOOD PRESSURE: 78 MMHG | OXYGEN SATURATION: 100 % | BODY MASS INDEX: 31.02 KG/M2 | WEIGHT: 200 LBS | SYSTOLIC BLOOD PRESSURE: 108 MMHG

## 2024-05-29 DIAGNOSIS — N93.9 ABNORMAL UTERINE BLEEDING (AUB): Primary | ICD-10-CM

## 2024-05-29 DIAGNOSIS — D13.4 HEPATIC ADENOMA: ICD-10-CM

## 2024-05-29 DIAGNOSIS — N95.1 PERIMENOPAUSE: ICD-10-CM

## 2024-05-29 PROCEDURE — 99204 OFFICE O/P NEW MOD 45 MIN: CPT

## 2024-05-29 PROCEDURE — G2211 COMPLEX E/M VISIT ADD ON: HCPCS

## 2024-05-29 NOTE — PROGRESS NOTES
CC:perimenopause sx, abnormal uterine bleeding  S: Elicia is a 48 yo  here today to discuss changes in period and perimenopause sx  -periods historically regular and normal  -change a few years ago started GIULIANA had success in period regulation, had to discontinue GIULIANA after finding many hepatic adenomas  -currently on micronor POP- had initially some success in period regulation, however now acute changes  -had a 2 mo gap of no period  -then had 3 cycles basically back to back over a 6 week time period  -the cycles are light lasting appx 4 days, sometimes there is 1-2 days that are heavier in flow, some cycles have lasted as long as 7 days  -also noting changes to her hair and nails, report hair falling out, nails being more brittle, she is also experiencing temperature fluctuations being cold then hot  -with discovery of hepatic adenoma recc to not use estrogen containing agents, noted reduction of size and volume of adenoma when switching to POP  -also briefly on 12.5 mg hydrochlorothiazide for transient htn, noted that her bp became dangerously low 80/40 and the med was discontinued and bp normal 108/78 today    O:/78   Pulse 77   Wt 90.7 kg (200 lb)   LMP 2024   SpO2 100%   BMI 31.02 kg/m    Past Medical History:   Diagnosis Date    Degenerative arthritis of spine     Hepatic adenoma     HTN (hypertension)     Hypothyroidism     Irregular menstrual cycle 2009    Migraine     NAFLD (nonalcoholic fatty liver disease)     Type 2 diabetes, HbA1C goal < 8% (H)      Past Surgical History:   Procedure Laterality Date    CHOLECYSTECTOMY      teen years    COLONOSCOPY N/A 2023    Procedure: Colonoscopy;  Surgeon: Chance Chan MD;  Location: [facility] [place] OR    ESOPHAGOSCOPY, GASTROSCOPY, DUODENOSCOPY (EGD), COMBINED N/A 2023    Procedure: ESOPHAGOGASTRODUODENOSCOPY, WITH BIOPSY;  Surgeon: Chance Chan MD;  Location: [facility] [place] OR    RADIO FREQUENCY ABLATION /  DESTRUCTION OF SACROILOAC JOINT LATERAL BRANCHES (S1/S2/S3) Bilateral 6/15/2023    Procedure: Bilateral Lumbar 5 medial branch block and Sacral 1,2,3 lateral branch block;  Surgeon: Layne Weber MD;  Location: [facility] OR     Current Outpatient Medications   Medication Sig Dispense Refill    Alcohol Swabs PADS 1 each daily 300 each 3    buPROPion (WELLBUTRIN XL) 150 MG 24 hr tablet Take 1 tablet (150 mg) by mouth every morning 90 tablet 3    buPROPion (WELLBUTRIN XL) 300 MG 24 hr tablet Take 1 tablet (300 mg) by mouth daily 90 tablet 3    Continuous Glucose Sensor (DEXCOM G7 SENSOR) MISC 1 Device every 10 days 9 each 11    diphenhydrAMINE (BENADRYL) 50 MG tablet Take 50 mg by mouth      doxepin (SINEQUAN) 25 MG capsule Take 2 capsules (50 mg) by mouth At Bedtime 30 capsule 3    drospirenone (SLYND) 4 MG TABS tablet Take 1 tablet (4 mg) by mouth daily 72 tablet 4    empagliflozin (JARDIANCE) 25 MG TABS tablet Take 1 tablet (25 mg) by mouth daily 90 tablet 3    folic acid (FOLVITE) 1 MG tablet Take 1 tablet (1,000 mcg) by mouth daily 90 tablet 3    gabapentin (NEURONTIN) 600 MG tablet Take 2 tablets (1,200 mg) by mouth 3 times daily 540 tablet 2    hydrochlorothiazide (MICROZIDE) 12.5 MG capsule TAKE ONE CAPSULE BY MOUTH ONCE DAILY 90 capsule 2    hydrOXYzine (VISTARIL) 50 MG capsule Take 1 capsule (50 mg) by mouth daily 90 capsule 3    hydrOXYzine HCl (ATARAX) 25 MG tablet TAKE ONE TABLET BY MOUTH ONCE DAILY ALONG WITH 50 MG TABLET FOR A TOTAL DAILY DOSE OF 75 MG AS NEEDED 90 tablet 3    levothyroxine (SYNTHROID/LEVOTHROID) 125 MCG tablet Take 1 tablet (125 mcg) by mouth daily 90 tablet 0    lidocaine, viscous, (XYLOCAINE) 2 % solution Swish and spit 5 mLs in mouth 5 times daily 100 mL 1    lisdexamfetamine (VYVANSE) 70 MG capsule Take 70 mg by mouth every morning      metFORMIN (GLUCOPHAGE XR) 500 MG 24 hr tablet Take 4 tablets (2,000 mg) by mouth daily 360 tablet 3    naloxone (NARCAN) 4 MG/0.1ML nasal spray Spray 1  spray (4 mg) into one nostril alternating nostrils as needed for opioid reversal every 2-3 minutes until assistance arrives 1 each 2    norethindrone (MICRONOR) 0.35 MG tablet Take 1 tablet (0.35 mg) by mouth daily 84 tablet 4    nystatin (MYCOSTATIN) 757471 UNIT/GM external cream Apply 100,000 g topically 3 times daily      omeprazole (PRILOSEC) 20 MG DR capsule Take 1 capsule (20 mg) by mouth 2 times daily 180 capsule 3    ondansetron (ZOFRAN) 4 MG tablet Take 1 tablet (4 mg) by mouth 3 times daily 90 tablet 1    pravastatin (PRAVACHOL) 40 MG tablet Take 1 tablet (40 mg) by mouth daily 90 tablet 3    prochlorperazine (COMPAZINE) 10 MG tablet Take 1 tablet (10 mg) by mouth 4 times daily as needed for nausea (with migraine) 120 tablet 5    REXULTI 2 MG tablet Take 2 mg by mouth daily      rizatriptan (MAXALT) 10 MG tablet Take 1 tablet (10 mg) by mouth at onset of headache for migraine May repeat in 2 hours. Max 3 tablets/24 hours. 18 tablet 3    tirzepatide (MOUNJARO) 10 MG/0.5ML pen Inject 10 mg Subcutaneous every 7 days 6 mL 1    tiZANidine (ZANAFLEX) 2 MG tablet Take 2 tablets (4 mg) by mouth 3 times daily 540 tablet 1    topiramate (TOPAMAX) 100 MG tablet Take 1 tablet (100 mg) by mouth every morning 90 tablet 2    triamcinolone (KENALOG) 0.1 % external ointment Apply twice daily on hand for 2-4 weeks. 15 g 1    lamoTRIgine (LAMICTAL) 200 MG tablet Take 2 tablets (400 mg) by mouth At Bedtime for 360 days 180 tablet 3    oxyCODONE (OXYCONTIN) 10 MG 12 hr tablet Take 1 tablet (10 mg) by mouth every 12 hours (Patient not taking: Reported on 5/29/2024) 60 tablet 0    oxyCODONE (ROXICODONE) 5 MG tablet Take 1 tablet (5 mg) by mouth every 4 hours as needed for moderate pain Do not take more than 20mg total per day (Patient not taking: Reported on 5/29/2024) 100 tablet 0     Current Facility-Administered Medications   Medication Dose Route Frequency Provider Last Rate Last Admin    lidocaine (PF) (XYLOCAINE) 1 %  injection 0.5 mL  0.5 mL   Collin Calero, DO   0.5 mL at 03/09/24 0838    lidocaine (PF) (XYLOCAINE) 1 % injection 1 mL  1 mL   Collin Calero, DO   1 mL at 03/01/24 0823    triamcinolone (KENALOG-40) injection 40 mg  40 mg   Collin Calero, DO   40 mg at 03/09/24 0838    triamcinolone (KENALOG-40) injection 40 mg  40 mg   Collin Calero, DO   40 mg at 03/01/24 0823        Allergies   Allergen Reactions    Lithium Dizziness and Other (See Comments)     Other reaction(s): PSYCHOSIS    Other Reaction(s): Altered Mental Status    Sulfa Antibiotics Swelling     lips    Cold sores per pt    Adhesive Tape Rash    Albuterol Nausea and Vomiting    Clarithromycin Nausea and Vomiting    Erythromycin Nausea and Vomiting    Amoxicillin-Pot Clavulanate Nausea and Vomiting    Venlafaxine Other (See Comments)     Other reaction(s): PSYCHOSIS    Psychosis    Per transferred records     Alert very pleasant female NAD   RRR  Normal bp and pulse   Component      Latest Ref Rng 6/28/2023  9:36 AM 12/14/2023  9:47 AM 4/26/2024  6:34 AM   Sodium      135 - 145 mmol/L  141  141    Potassium      3.4 - 5.3 mmol/L  4.2  4.6    Carbon Dioxide (CO2)      22 - 29 mmol/L  26  29    Anion Gap      7 - 15 mmol/L  13  10    Urea Nitrogen      6.0 - 20.0 mg/dL  18.1  18.6    Creatinine      0.51 - 0.95 mg/dL  0.94  0.81    GFR Estimate      >60 mL/min/1.73m2  74  88    Calcium      8.6 - 10.0 mg/dL  9.6  9.2    Chloride      98 - 107 mmol/L  102  102    Glucose      70 - 99 mg/dL  103 (H)  109 (H)    Alkaline Phosphatase      40 - 150 U/L   84    AST      0 - 45 U/L   15    ALT      0 - 50 U/L   14    Protein Total      6.4 - 8.3 g/dL   6.8    Albumin      3.5 - 5.2 g/dL   4.5    Bilirubin Total      <=1.2 mg/dL   0.2    WBC      4.0 - 11.0 10e3/uL   6.3    RBC Count      3.80 - 5.20 10e6/uL   5.46 (H)    Hemoglobin      11.7 - 15.7 g/dL   13.1    Hematocrit      35.0 - 47.0 %   41.9    MCV      78 - 100 fL   77 (L)    MCH      26.5 - 33.0 pg    24.0 (L)    MCHC      31.5 - 36.5 g/dL   31.3 (L)    RDW      10.0 - 15.0 %   17.1 (H)    Platelet Count      150 - 450 10e3/uL   391    Cholesterol      <200 mg/dL   118    Triglycerides      <150 mg/dL   73    HDL Cholesterol      >=50 mg/dL   48 (L)    LDL Cholesterol Calculated      <=100 mg/dL   55    Non HDL Cholesterol      <130 mg/dL   70    Patient Fasting?   Yes    TSH      0.30 - 4.20 uIU/mL 0.94      Hemoglobin A1C      <5.7 %  6.0 (H)  6.3 (H)       Legend:  (H) High  (L) Low    1. Abnormal uterine bleeding (AUB)  -discussed change in cycles are recent and not her normal cycle  -currently taking micronor POP to attempt to control cycle-reliably taking  -discussed tvus to r/o any structural causes of AUB such as fibroids, or polyps  -reports periods light, normal hgb, discussed warning sx of heavy bleeding and seeking emergency care if too much bleeding or S&S of anemia  -recent lab eval normal cbc, cmp, tsh, A1c stable  -discussed in ruling out other metabolic causes and structural causes its highly suspicious the changes in bleeding are r/t perimenopause changes  -discussed average age of menopause 51 in [place]  -straw 10 aging chart Elicia fits in between early and late menopause transition  -discussed in ruling out other causes - treatment options of progesterone only agents include PO meds, implant, injection, or IUD, with additional consideration for definitve surgical management   -declines IUD- discussed it would be a good consideration for AUB and low interaction with other meds/liver as it is local to uterus  -discussed depo/implant - not recc for her situation specifically depo due to weight gain/bone density concerns, implant due to AUB profile  -would prefer to use SLYND, discussed PA has tried micronor without satisfactory results   -no personal hx of abn pap smear  -utd on pap, mammo, colonoscopy screening   - US Transvaginal Pelvic Non-OB; Future  - drospirenone (SLYND) 4 MG TABS tablet; Take  1 tablet (4 mg) by mouth daily  Dispense: 72 tablet; Refill: 4    2. Perimenopause  -discussed she is experiencing vms- however with limitation of not using estrogen discussed off label meds, gabapentin, klonopin, paroxetine, veozah- would have to look into compatibility with hepatic adenoma    -discussed that topical vaginal estrogen for genitourinary sx of menopause sx  would likely still be a safe option as it is LOCAL not systemic  - Ob/Gyn  Referral    3. Hepatic adenoma  ###contradiction to estrogen containing agents  - drospirenone (SLYND) 4 MG TABS tablet; Take 1 tablet (4 mg) by mouth daily  Dispense: 72 tablet; Refill: 4    Rtc for tvus and review of results  CANDACE Schuler CNP

## 2024-05-30 ENCOUNTER — TELEPHONE (OUTPATIENT)
Dept: OBGYN | Facility: CLINIC | Age: 49
End: 2024-05-30
Payer: COMMERCIAL

## 2024-05-30 DIAGNOSIS — D13.4 HEPATIC ADENOMA: ICD-10-CM

## 2024-05-30 DIAGNOSIS — N93.9 ABNORMAL UTERINE BLEEDING (AUB): ICD-10-CM

## 2024-05-30 NOTE — TELEPHONE ENCOUNTER
Received call from pharmacy regarding slynd script  Dispense number is 72 for daily medication    Pharmacist would like to clarify since slynd comes in 28 count packs (24 active, 4 placebo)  If taking placebos, just order at 84  If taking active, mention taking continuously in sig and can leave 72 or 84     Adrianne Weiss RN on 5/30/2024 at 2:59 PM

## 2024-05-30 NOTE — ADDENDUM NOTE
Addended by: FATOUMATA SCHUMACHER on: 5/30/2024 02:55 PM     Modules accepted: Orders, Level of Service

## 2024-06-14 ENCOUNTER — VIRTUAL VISIT (OUTPATIENT)
Dept: FAMILY MEDICINE | Facility: CLINIC | Age: 49
End: 2024-06-14
Payer: COMMERCIAL

## 2024-06-14 DIAGNOSIS — G89.4 CHRONIC PAIN DISORDER: ICD-10-CM

## 2024-06-14 DIAGNOSIS — M25.571 PAIN IN JOINT INVOLVING ANKLE AND FOOT, RIGHT: ICD-10-CM

## 2024-06-14 DIAGNOSIS — F11.90 CHRONIC, CONTINUOUS USE OF OPIOIDS: ICD-10-CM

## 2024-06-14 PROCEDURE — 99214 OFFICE O/P EST MOD 30 MIN: CPT | Mod: 95 | Performed by: STUDENT IN AN ORGANIZED HEALTH CARE EDUCATION/TRAINING PROGRAM

## 2024-06-14 RX ORDER — OXYCODONE HCL 10 MG/1
10 TABLET, FILM COATED, EXTENDED RELEASE ORAL EVERY 12 HOURS
Qty: 60 TABLET | Refills: 0 | Status: SHIPPED | OUTPATIENT
Start: 2024-06-25 | End: 2024-07-18

## 2024-06-14 RX ORDER — OXYCODONE HYDROCHLORIDE 5 MG/1
5 TABLET ORAL EVERY 4 HOURS PRN
Qty: 90 TABLET | Refills: 0 | Status: SHIPPED | OUTPATIENT
Start: 2024-06-18 | End: 2024-07-18

## 2024-06-14 NOTE — PATIENT INSTRUCTIONS
Physical is due in July. Let's have you schedule a physical next month. We can check in on pain at that time too. Hopefully we can space out our visits after this.      Thank you for coming to see me today! Here are a couple of pieces of information about my schedule and communication practices.     I am not in the clinic on Tuesdays. Non-urgent calls and messages received on Tuesdays will be addressed as soon as I am able when I am back in the office on the next business day. Urgent calls will be addressed by a covering clinician.       If lab work was done today as part of your evaluation you will generally be contacted via [employer], mail, or phone with the results within 7-10 days.  If there is an alarming/concerning result we will contact you by phone. Lab results come back at varying times, I generally wait until all labs are resulted before making comments on results. Please note, labs are automatically released to [employer] once available, but it may take a couple of days for my interpretation note to appear.      I try very hard to respond to medical messages with 2 business days of receiving them. Occasionally it takes me longer if I am trying to figure out the best way to respond and need to seek guidance, do some research or dig deeper into your medical history to come up with a helpful response.      If you need refills please contact your pharmacist. They will send a refill request to me to review. Please allow 3-5 business days for us to respond to all refill requests.      Please call or send a medical message with any questions or concerns. Thank you for trusting me to be part of your healthcare team!        Dr. Loc Waggoner

## 2024-06-14 NOTE — PROGRESS NOTES
Elicia is a 49 year old who is being evaluated via a billable video visit.      How would you like to obtain your AVS? MyChart  If the video visit is dropped, the invitation should be resent by: Send to e-mail at: [email or web address]  Will anyone else be joining your video visit? No    Assessment & Plan     Chronic pain disorder  Pain in joint involving ankle and foot, right  Chronic, continuous use of opioids  History of chronic foot pain, has been on continuous oxycodone for many years. S/P ankle tendon/arch surgery on 10/19 which went well. She is now weight bearing as tolerated in brace, doing PT. Following with [facility]. Discussed need to wean opioids, due to high MME, risk of tolerance and oversedation. No side effects. Has narcan available at home PRN.     Today pain is stable.. Using oxycontin BID and short acting oxycodone 2-3 tablets per day. Work is going well. Working to wean down on the short acting oxycodone.    Plan:  -discussed goal is to wean opioids  -goal is to return to the amount of oxycodone she was taking pre-surgery, which was a total of short acting oxycodone 20mg daily (MME 30)   -MME were 30 ~8/2023   -Current MME are slightly less than double what she was taking prior to worsening symptoms ~8/2023      Opioid:   continue long acting oxycontin 10mg BID (MME 30)  DECREASE short acting oxycodone from 100 to 90 tablets per month (2-3 tablets/day; MME 15 to 22)   PDMP checked, appropriate  Current MME: 45-52    Non-Opioid:  schedule tylenol 1000mg TID  schedule ibuprofen 800mg TID (increase from BID to TID)  continue tizanidine PRN   continue gabapentin 1200mg TID  elevate, ice    Follow up in 1 month for physical + pain follow up  Due for CSA  -will discuss spacing out visits and weaning further on pain meds (consider 75 tabs/month)      # hypotension  She states home BP have been in 100s systolic (improved from 80s) while holding hydrochlorothiazide. Leg swelling has not returned.  Encouraged her to continue home checks. Will follow up in person in 1 month.      Emily Rubi is a 49 year old, presenting for the following health issues:  Follow Up (pain)        2/29/2024     7:27 AM   Additional Questions   Roomed by Amada Samano   Accompanied by Self     History of Present Illness       Reason for visit:  Pain medication review    She eats 2-3 servings of fruits and vegetables daily.She consumes 0 sweetened beverage(s) daily.She exercises with enough effort to increase her heart rate 9 or less minutes per day.  She exercises with enough effort to increase her heart rate 3 or less days per week. She is missing 2 dose(s) of medications per week.  She is not taking prescribed medications regularly due to remembering to take.    Hypotension  -holding hydrochlorothiazide-swelling has not returned  -home BP readings-forgetting to take BP  -when she has checked-states normal ~100's  -symptoms-no dizziness    Pain:  -going well  -no changes  -long acting BID (takes at 8am, 8pm)  -short acting 2-3 tablets per day   -did well with 100 tabs per month  -work going well    Last filled 100 tablets of oxycodone 5mg on 5/21  Last filled 60 tablets of long acting oxycodone 10mg on 5/28/24        Objective    Vitals - Patient Reported  Pain Score: Moderate Pain (4)  Pain Loc: Ankle    Vitals:  No vitals were obtained today due to virtual visit.    Physical Exam   GENERAL: alert and no distress  EYES: Eyes grossly normal to inspection.  No discharge or erythema, or obvious scleral/conjunctival abnormalities.  RESP: No audible wheeze, cough, or visible cyanosis.    SKIN: Visible skin clear. No significant rash, abnormal pigmentation or lesions.  NEURO: Cranial nerves grossly intact.  Mentation and speech appropriate for age.  PSYCH: Appropriate affect, tone, and pace of words      Video-Visit Details    Type of service:  Video Visit   Video Start Time: 707am  Video End Time: 717am    Originating Location (pt.  Location): Home  Distant Location (provider location):  On-site  Platform used for Video Visit: Diana  Signed Electronically by: Loc Waggoner DO

## 2024-06-20 ENCOUNTER — ANCILLARY PROCEDURE (OUTPATIENT)
Dept: ULTRASOUND IMAGING | Facility: CLINIC | Age: 49
End: 2024-06-20
Payer: COMMERCIAL

## 2024-06-20 DIAGNOSIS — N93.9 ABNORMAL UTERINE BLEEDING (AUB): ICD-10-CM

## 2024-06-20 PROCEDURE — 76830 TRANSVAGINAL US NON-OB: CPT | Mod: GC | Performed by: STUDENT IN AN ORGANIZED HEALTH CARE EDUCATION/TRAINING PROGRAM

## 2024-06-20 PROCEDURE — 76856 US EXAM PELVIC COMPLETE: CPT | Mod: GC | Performed by: STUDENT IN AN ORGANIZED HEALTH CARE EDUCATION/TRAINING PROGRAM

## 2024-06-21 ENCOUNTER — TELEPHONE (OUTPATIENT)
Dept: OBGYN | Facility: CLINIC | Age: 49
End: 2024-06-21
Payer: COMMERCIAL

## 2024-06-21 NOTE — TELEPHONE ENCOUNTER
----- Message from Marylou Sequeira sent at 6/20/2024  9:17 PM CDT -----  Patient was supposed to have appointment with Ariadna to review her US results, but that is not scheduled.  Please call her to assist with making appointment.    Routing to  for awareness of another scheduling issue.    Thanks all!  Marylou Sequeira MD

## 2024-06-27 ENCOUNTER — MYC MEDICAL ADVICE (OUTPATIENT)
Dept: FAMILY MEDICINE | Facility: CLINIC | Age: 49
End: 2024-06-27
Payer: COMMERCIAL

## 2024-06-27 DIAGNOSIS — F11.90 CHRONIC, CONTINUOUS USE OF OPIOIDS: ICD-10-CM

## 2024-06-27 DIAGNOSIS — G89.4 CHRONIC PAIN DISORDER: ICD-10-CM

## 2024-06-27 DIAGNOSIS — M25.571 PAIN IN JOINT INVOLVING ANKLE AND FOOT, RIGHT: ICD-10-CM

## 2024-06-28 NOTE — TELEPHONE ENCOUNTER
Writer responded via [email or web address].  Juany VILLATORO, BSN, PHN, RN  [facility]  [phone]

## 2024-07-03 NOTE — PROGRESS NOTES
Medication Therapy Management (MTM) Encounter    ASSESSMENT:                            Medication Adherence/Access: No issues identified    Type 2 Diabetes/Obesity:    Patient is meeting A1c goal of < 7%(6.3%). Self monitoring of blood glucose is closer to goal of fasting  mg/dL and post prandial < 180 mg/dL. Patient is  meeting average glucose goal of <150mg/dL. Patient is now meeting goal of > 70% (93%)time in target with continuous glucose monitoring.  Patient would benefit from : Continue weekly Mounjaro dose  10mg.   GLP-1 drugs have worked very well for her --she has lost >40 lbs . And feels great !     Continue daily metformin and jardiance .    . Due for annual foot exam. Due for annual eye exam.         Hyperlipidemia:   Stable.  Patient is on moderate intensity statin which is indicated based on 2019 ACC/AHA guidelines for lipid management.        PLAN:                              1. Congrats Elicia--home weight <200lbs. Now and blood sugars look great --see Dr. Waggoner next week for A1c lab recheck etc. Lets continue last 2-3 months of Mounjaro 10mg./week dose --then we can discuss in September if you want to try a higher dose or not ?  2. I updated your EPIC med list to reflect all our current medications.    3. Continue all meds as is , continue excellent diet and walk daily for exercise.     Follow-up: Return in about 10 weeks (around 9/18/2024), or @8:30 AM via telephone, for Medication Therapy Management Visit, Weight loss, Blood sugar meter review.        SUBJECTIVE/OBJECTIVE:                          Elicia Calero is a 49 year old female called for a follow-up (4-10-24) visit. She was referred to me from Loc Waggoner DO. (PCP).  .        Reason for visit:   Mounjaro f/up.  Loves dexcom G7 sensor.      .    Allergies/ADRs: Reviewed in chart  Past Medical History: Per patient diagnosed 10-15 years --did lose wt. To 150lbs years ago and reversed her DM . Ate 1200 cals/day , low carb    Tobacco: She reports that she quit smoking about 6 years ago. Her smoking use included cigarettes. She started smoking about 26 years ago. She has a 20 pack-year smoking history. She has never used smokeless tobacco.  Alcohol: Less than 1 beverage / month  Other Substance Use: none   E-cigarette Use: none   Caffeine:  1 coffee or crystal light every morning.   Social: off work due to tendon issue (ruptured).  She is a pharmacy tech at [employer].   Personal Healthcare Goals: fix dm soon   Activity: ruptured tendon repaired --back to walking again.    Medication Adherence/Access: no issues reported    Type 2 Diabetes/Obesity:    Failed bydureon in the past , fears needles--refuses all injectables before last visit but doing ok with Mounjaro as needle is hidden.   Back on Mounjaro 10.0mg. --doing great.  Home weight is 194lbs 7-2024.  Metformin [id number] ER tabs daily  Losing weight (194bs. At home , 200lbs in clinic)  and controlling food intake and bs's --she loves it!  Jardiance 25mg./day   She stopped otc glucoredi tabs --$$$.  Not taking aspirin due to age.  Patient is not experiencing side effects.  SMBG: mtm changed her last month to Dexcom G7--she loves it !                        Current diabetes symptoms:none now -Mounjaro has corrected her polyphagia.  Diet/Exercise: off walking boot from tendon surgery --recovered well.  Mounjaro curbing appetite --2 meals/day bfast and dinner , shake at lunch .    Previously:   Bfast /lunch --low carb , eat dinner --after dinner keeps eating after feeling full!  Has low carb slimfast or atkins shakes to use.   Sleep iffy due to intense hunger. Awakes 2-3 am --snacking and can't get back to sleep. She has mental health issues --struggles with depression and unable to move much with ruptured tendon --discouraged.       Eye exam: due  Foot exam: due  Urine Albumin:   Lab Results   Component Value Date    UMALCR  04/26/2024      Comment:      Unable to calculate, urine  albumin and/or urine creatinine is outside detectable limits.  Microalbuminuria is defined as an albumin:creatinine ratio of 17 to 299 for males and 25 to 299 for females. A ratio of albumin:creatinine of 300 or higher is indicative of overt proteinuria.  Due to biologic variability, positive results should be confirmed by a second, first-morning random or 24-hour timed urine specimen. If there is discrepancy, a third specimen is recommended. When 2 out of 3 results are in the microalbuminuria range, this is evidence for incipient nephropathy and warrants increased efforts at glucose control, blood pressure control, and institution of therapy with an angiotensin-converting-enzyme (ACE) inhibitor (if the patient can tolerate it).        Lab Results   Component Value Date    A1C 6.3 04/26/2024    A1C 6.0 12/14/2023    A1C 7.5 09/20/2023    A1C 10.8 06/28/2023    A1C 7.9 03/02/2023             Hyperlipidemia:   Pravastatin 40mg daily  Patient reports no significant myalgias or other side effects.  The ASCVD Risk score (Carmita CORREIA, et al., 2019) failed to calculate for the following reasons:    The valid total cholesterol range is 130 to 320 mg/dL  Recent Labs   Lab Test 04/26/24  0634 06/28/23  0936   CHOL 118 133   HDL 48* 52   LDL 55 60   TRIG 73 105         BP Readings from Last 3 Encounters:   05/29/24 108/78   05/17/24 107/62   05/09/24 95/64     TSH   Date Value Ref Range Status   06/28/2023 0.94 0.30 - 4.20 uIU/mL Final         ---------------------------------------------------------------------------------------------------------------      I spent 30 minutes with this patient today. All changes were made via collaborative practice agreement with Loc Waggoner DO. A copy of the visit note was provided to the patient's provider(s).    A summary of these recommendations was sent via [employer].    Dacia Myers Rph.  Medication Therapy Management Provider  [phone]      Telemedicine Visit Details  Type  of service:  Telephone visit  Start Time: 8 AM  End Time: 8:20 AM     Medication Therapy Recommendations  No medication therapy recommendations to display

## 2024-07-10 ENCOUNTER — VIRTUAL VISIT (OUTPATIENT)
Dept: PHARMACY | Facility: CLINIC | Age: 49
End: 2024-07-10
Payer: COMMERCIAL

## 2024-07-10 DIAGNOSIS — E11.9 TYPE 2 DIABETES MELLITUS WITHOUT COMPLICATION, WITHOUT LONG-TERM CURRENT USE OF INSULIN (H): Primary | ICD-10-CM

## 2024-07-10 DIAGNOSIS — E66.812 CLASS 2 SEVERE OBESITY DUE TO EXCESS CALORIES WITH SERIOUS COMORBIDITY AND BODY MASS INDEX (BMI) OF 37.0 TO 37.9 IN ADULT (H): ICD-10-CM

## 2024-07-10 DIAGNOSIS — E78.5 HYPERLIPIDEMIA LDL GOAL <100: ICD-10-CM

## 2024-07-10 DIAGNOSIS — E66.01 CLASS 2 SEVERE OBESITY DUE TO EXCESS CALORIES WITH SERIOUS COMORBIDITY AND BODY MASS INDEX (BMI) OF 37.0 TO 37.9 IN ADULT (H): ICD-10-CM

## 2024-07-10 PROCEDURE — 99606 MTMS BY PHARM EST 15 MIN: CPT | Performed by: PHARMACIST

## 2024-07-10 PROCEDURE — 99607 MTMS BY PHARM ADDL 15 MIN: CPT | Performed by: PHARMACIST

## 2024-07-10 RX ORDER — TOPIRAMATE 100 MG/1
100 TABLET, FILM COATED ORAL EVERY MORNING
COMMUNITY
Start: 2024-07-10

## 2024-07-10 NOTE — Clinical Note
Loc--I spoke with harriet today and updated her epic med list , bs's look great --she will see you next week for A1c lab etc. F/up with me in [id number]-paull

## 2024-07-10 NOTE — PATIENT INSTRUCTIONS
"Recommendations from today's MTM visit:                                                         1. Congrats Elicia--home weight <200lbs. Now and blood sugars look great --see Dr. Waggoner next week for A1c lab recheck etc. Lets continue last 2-3 months of Mounjaro 10mg./week dose --then we can discuss in September if you want to try a higher dose or not ?  2. I updated your EPIC med list to reflect all our current medications.    3. Continue all meds as is , continue excellent diet and walk daily for exercise.     Follow-up: Return in about 10 weeks (around 9/18/2024), or @8:30 AM via telephone, for Medication Therapy Management Visit, Weight loss, Blood sugar meter review.    It was great speaking with you today.  I value your experience and would be very thankful for your time in providing feedback in our clinic survey. In the next few days, you may receive an email or text message from [employer] with a link to a survey related to your  clinical pharmacist.\"     To schedule another MTM appointment, please call the clinic directly or you may call the MTM scheduling line at [phone] or toll-free at [phone].     My Clinical Pharmacist's contact information:                                                      Please feel free to contact me with any questions or concerns you have.      Dacia Myers Rph.  Medication Therapy Management Provider  [phone]    "

## 2024-07-11 DIAGNOSIS — E11.9 TYPE 2 DIABETES MELLITUS WITHOUT COMPLICATION, WITHOUT LONG-TERM CURRENT USE OF INSULIN (H): ICD-10-CM

## 2024-07-12 RX ORDER — EMPAGLIFLOZIN 25 MG/1
25 TABLET, FILM COATED ORAL DAILY
Qty: 90 TABLET | Refills: 0 | OUTPATIENT
Start: 2024-07-12

## 2024-07-17 SDOH — HEALTH STABILITY: PHYSICAL HEALTH: ON AVERAGE, HOW MANY MINUTES DO YOU ENGAGE IN EXERCISE AT THIS LEVEL?: 0 MIN

## 2024-07-17 SDOH — HEALTH STABILITY: PHYSICAL HEALTH: ON AVERAGE, HOW MANY DAYS PER WEEK DO YOU ENGAGE IN MODERATE TO STRENUOUS EXERCISE (LIKE A BRISK WALK)?: 0 DAYS

## 2024-07-17 ASSESSMENT — SOCIAL DETERMINANTS OF HEALTH (SDOH): HOW OFTEN DO YOU GET TOGETHER WITH FRIENDS OR RELATIVES?: ONCE A WEEK

## 2024-07-18 ENCOUNTER — OFFICE VISIT (OUTPATIENT)
Dept: FAMILY MEDICINE | Facility: CLINIC | Age: 49
End: 2024-07-18
Payer: COMMERCIAL

## 2024-07-18 VITALS
HEART RATE: 75 BPM | BODY MASS INDEX: 30.31 KG/M2 | RESPIRATION RATE: 16 BRPM | SYSTOLIC BLOOD PRESSURE: 100 MMHG | DIASTOLIC BLOOD PRESSURE: 68 MMHG | WEIGHT: 193.1 LBS | TEMPERATURE: 97.3 F | OXYGEN SATURATION: 97 % | HEIGHT: 67 IN

## 2024-07-18 DIAGNOSIS — Z02.89 ENCOUNTER FOR COMPLETION OF FORM WITH PATIENT: ICD-10-CM

## 2024-07-18 DIAGNOSIS — G43.709 CHRONIC MIGRAINE WITHOUT AURA WITHOUT STATUS MIGRAINOSUS, NOT INTRACTABLE: ICD-10-CM

## 2024-07-18 DIAGNOSIS — Z00.00 ROUTINE GENERAL MEDICAL EXAMINATION AT A HEALTH CARE FACILITY: Primary | ICD-10-CM

## 2024-07-18 DIAGNOSIS — G89.4 CHRONIC PAIN DISORDER: ICD-10-CM

## 2024-07-18 DIAGNOSIS — E11.9 TYPE 2 DIABETES MELLITUS WITHOUT COMPLICATION, WITHOUT LONG-TERM CURRENT USE OF INSULIN (H): ICD-10-CM

## 2024-07-18 DIAGNOSIS — F11.90 CHRONIC, CONTINUOUS USE OF OPIOIDS: ICD-10-CM

## 2024-07-18 DIAGNOSIS — M25.571 PAIN IN JOINT INVOLVING ANKLE AND FOOT, RIGHT: ICD-10-CM

## 2024-07-18 DIAGNOSIS — E03.9 HYPOTHYROIDISM, UNSPECIFIED TYPE: ICD-10-CM

## 2024-07-18 LAB
HBA1C MFR BLD: 5.9 % (ref 0–5.6)
TSH SERPL DL<=0.005 MIU/L-ACNC: 1.03 UIU/ML (ref 0.3–4.2)

## 2024-07-18 PROCEDURE — 99214 OFFICE O/P EST MOD 30 MIN: CPT | Mod: 25 | Performed by: STUDENT IN AN ORGANIZED HEALTH CARE EDUCATION/TRAINING PROGRAM

## 2024-07-18 PROCEDURE — 99396 PREV VISIT EST AGE 40-64: CPT | Performed by: STUDENT IN AN ORGANIZED HEALTH CARE EDUCATION/TRAINING PROGRAM

## 2024-07-18 PROCEDURE — 36415 COLL VENOUS BLD VENIPUNCTURE: CPT | Performed by: STUDENT IN AN ORGANIZED HEALTH CARE EDUCATION/TRAINING PROGRAM

## 2024-07-18 PROCEDURE — 84443 ASSAY THYROID STIM HORMONE: CPT | Performed by: STUDENT IN AN ORGANIZED HEALTH CARE EDUCATION/TRAINING PROGRAM

## 2024-07-18 PROCEDURE — 83036 HEMOGLOBIN GLYCOSYLATED A1C: CPT | Performed by: STUDENT IN AN ORGANIZED HEALTH CARE EDUCATION/TRAINING PROGRAM

## 2024-07-18 RX ORDER — LEVOTHYROXINE SODIUM 125 UG/1
125 TABLET ORAL DAILY
Qty: 90 TABLET | Refills: 3 | Status: SHIPPED | OUTPATIENT
Start: 2024-07-18

## 2024-07-18 RX ORDER — OXYCODONE HCL 10 MG/1
10 TABLET, FILM COATED, EXTENDED RELEASE ORAL EVERY 12 HOURS
Qty: 60 TABLET | Refills: 0 | Status: SHIPPED | OUTPATIENT
Start: 2024-07-24 | End: 2024-09-13

## 2024-07-18 RX ORDER — METFORMIN HCL 500 MG
2000 TABLET, EXTENDED RELEASE 24 HR ORAL DAILY
Qty: 360 TABLET | Refills: 3 | Status: SHIPPED | OUTPATIENT
Start: 2024-07-18

## 2024-07-18 RX ORDER — PRAVASTATIN SODIUM 40 MG
40 TABLET ORAL DAILY
Qty: 90 TABLET | Refills: 3 | Status: SHIPPED | OUTPATIENT
Start: 2024-07-18

## 2024-07-18 RX ORDER — OXYCODONE HYDROCHLORIDE 5 MG/1
5 TABLET ORAL EVERY 4 HOURS PRN
Qty: 90 TABLET | Refills: 0 | Status: CANCELLED | OUTPATIENT
Start: 2024-07-18

## 2024-07-18 RX ORDER — OXYCODONE HYDROCHLORIDE 5 MG/1
5 TABLET ORAL EVERY 4 HOURS PRN
Qty: 90 TABLET | Refills: 0 | Status: SHIPPED | OUTPATIENT
Start: 2024-08-15 | End: 2024-08-09

## 2024-07-18 RX ORDER — OXYCODONE HYDROCHLORIDE 5 MG/1
5 TABLET ORAL EVERY 4 HOURS PRN
Qty: 90 TABLET | Refills: 0 | Status: SHIPPED | OUTPATIENT
Start: 2024-07-18 | End: 2024-09-13

## 2024-07-18 RX ORDER — OXYCODONE HCL 10 MG/1
10 TABLET, FILM COATED, EXTENDED RELEASE ORAL EVERY 12 HOURS
Qty: 60 TABLET | Refills: 0 | Status: SHIPPED | OUTPATIENT
Start: 2024-08-21 | End: 2024-09-13

## 2024-07-18 RX ORDER — OXYCODONE HCL 10 MG/1
10 TABLET, FILM COATED, EXTENDED RELEASE ORAL EVERY 12 HOURS
Qty: 60 TABLET | Refills: 0 | Status: CANCELLED | OUTPATIENT
Start: 2024-07-18

## 2024-07-18 RX ORDER — GABAPENTIN 600 MG/1
1200 TABLET ORAL 3 TIMES DAILY
Qty: 540 TABLET | Refills: 3 | Status: SHIPPED | OUTPATIENT
Start: 2024-07-18

## 2024-07-18 ASSESSMENT — PAIN SCALES - GENERAL: PAINLEVEL: SEVERE PAIN (6)

## 2024-07-18 ASSESSMENT — PATIENT HEALTH QUESTIONNAIRE - PHQ9: SUM OF ALL RESPONSES TO PHQ QUESTIONS 1-9: 6

## 2024-07-18 NOTE — PATIENT INSTRUCTIONS
Patient Education     I will have oxycodone scripts available for July and August.  Lets follow up in 2 months. Virtual is ok  Labs today.      Dr. Waggoner        Thank you for coming to see me today! Here are a couple of pieces of information about my schedule and communication practices.     I am not in the clinic on Tuesdays. Non-urgent calls and messages received on Tuesdays will be addressed as soon as I am able when I am back in the office on the next business day. Urgent calls will be addressed by a covering clinician.       If lab work was done today as part of your evaluation you will generally be contacted via [employer], mail, or phone with the results within 7-10 days.  If there is an alarming/concerning result we will contact you by phone. Lab results come back at varying times, I generally wait until all labs are resulted before making comments on results. Please note, labs are automatically released to [employer] once available, but it may take a couple of days for my interpretation note to appear.      I try very hard to respond to medical messages with 2 business days of receiving them. Occasionally it takes me longer if I am trying to figure out the best way to respond and need to seek guidance, do some research or dig deeper into your medical history to come up with a helpful response.      If you need refills please contact your pharmacist. They will send a refill request to me to review. Please allow 3-5 business days for us to respond to all refill requests.      Please call or send a medical message with any questions or concerns. Thank you for trusting me to be part of your healthcare team!        Dr. Loc Waggoner    Preventive Care Advice   This is general advice given by our system to help you stay healthy. However, your care team may have specific advice just for you. Please talk to your care team about your preventive care needs.  Nutrition  Eat 5 or more servings of fruits and vegetables each  day.  Try wheat bread, brown rice and whole grain pasta (instead of white bread, rice, and pasta).  Get enough calcium and vitamin D. Check the label on foods and aim for 100% of the RDA (recommended daily allowance).  Lifestyle  Exercise at least 150 minutes each week  (30 minutes a day, 5 days a week).  Do muscle strengthening activities 2 days a week. These help control your weight and prevent disease.  No smoking.  Wear sunscreen to prevent skin cancer.  Have a dental exam and cleaning every 6 months.  Yearly exams  See your health care team every year to talk about:  Any changes in your health.  Any medicines your care team has prescribed.  Preventive care, family planning, and ways to prevent chronic diseases.  Shots (vaccines)   HPV shots (up to age 26), if you've never had them before.  Hepatitis B shots (up to age 59), if you've never had them before.  COVID-19 shot: Get this shot when it's due.  Flu shot: Get a flu shot every year.  Tetanus shot: Get a tetanus shot every 10 years.  Pneumococcal, hepatitis A, and RSV shots: Ask your care team if you need these based on your risk.  Shingles shot (for age 50 and up)  General health tests  Diabetes screening:  Starting at age 35, Get screened for diabetes at least every 3 years.  If you are younger than age 35, ask your care team if you should be screened for diabetes.  Cholesterol test: At age 39, start having a cholesterol test every 5 years, or more often if advised.  Bone density scan (DEXA): At age 50, ask your care team if you should have this scan for osteoporosis (brittle bones).  Hepatitis C: Get tested at least once in your life.  STIs (sexually transmitted infections)  Before age 24: Ask your care team if you should be screened for STIs.  After age 24: Get screened for STIs if you're at risk. You are at risk for STIs (including HIV) if:  You are sexually active with more than one person.  You don't use condoms every time.  You or a partner was  diagnosed with a sexually transmitted infection.  If you are at risk for HIV, ask about PrEP medicine to prevent HIV.  Get tested for HIV at least once in your life, whether you are at risk for HIV or not.  Cancer screening tests  Cervical cancer screening: If you have a cervix, begin getting regular cervical cancer screening tests starting at age 21.  Breast cancer scan (mammogram): If you've ever had breasts, begin having regular mammograms starting at age 40. This is a scan to check for breast cancer.  Colon cancer screening: It is important to start screening for colon cancer at age 45.  Have a colonoscopy test every 10 years (or more often if you're at risk) Or, ask your provider about stool tests like a FIT test every year or Cologuard test every 3 years.  To learn more about your testing options, visit:   .  For help making a decision, visit:   https://bit.ly/vz65567.  Prostate cancer screening test: If you have a prostate, ask your care team if a prostate cancer screening test (PSA) at age 55 is right for you.  Lung cancer screening: If you are a current or former smoker ages 50 to 80, ask your care team if ongoing lung cancer screenings are right for you.  For informational purposes only. Not to replace the advice of your health care provider. Copyright   2023 [facility] [employer]. All rights reserved. Clinically reviewed by the [facility]. [employer] [id number] - REV 01/24.  Learning About Depression Screening  What is depression screening?  Depression screening is a way to see if you have depression symptoms. It may be done by a doctor or counselor. It's often part of a routine checkup. That's because your mental health is just as important as your physical health.  Depression is a mental health condition that affects how you feel, think, and act. You may:  Have less energy.  Lose interest in your daily activities.  Feel sad and grouchy for a long time.  Depression is very  "common. It affects people of all ages.  Many things can lead to depression. Some people become depressed after they have a stroke or find out they have a major illness like cancer or heart disease. The death of a loved one or a breakup may lead to depression. It can run in families. Most experts believe that a combination of inherited genes and stressful life events can cause it.  What happens during screening?  You may be asked to fill out a form about your depression symptoms. You and the doctor will discuss your answers. The doctor may ask you more questions to learn more about how you think, act, and feel.  What happens after screening?  If you have symptoms of depression, your doctor will talk to you about your options.  Doctors usually treat depression with medicines or counseling. Often, combining the two works best. Many people don't get help because they think that they'll get over the depression on their own. But people with depression may not get better unless they get treatment.  The cause of depression is not well understood. There may be many factors involved. But if you have depression, it's not your fault.  A serious symptom of depression is thinking about death or suicide. If you or someone you care about talks about this or about feeling hopeless, get help right away.  It's important to know that depression can be treated. Medicine, counseling, and self-care may help.  Where can you learn more?  Go to https://www.[employer].net/patiented  Enter T185 in the search box to learn more about \"Learning About Depression Screening.\"  Current as of: June 24, 2023               Content Version: 14.0    2871-7107 [employer].   Care instructions adapted under license by your healthcare professional. If you have questions about a medical condition or this instruction, always ask your healthcare professional. [employer] disclaims any warranty or liability for your use of this " information.

## 2024-07-18 NOTE — LETTER

## 2024-07-18 NOTE — PROGRESS NOTES
Preventive Care Visit  [facility]  Loc Waggoner DO, Family Medicine  Jul 18, 2024    Assessment & Plan     Routine general medical examination at a health care facility  -Vitals: WNL  -Mammo: UTD (6 month follow up due 8/2024)  -Pap: due 2025  -Immunizations: UTD  -Labs: TSH, A1C  -Colon Cancer screening: due 2033    Hypothyroidism, unspecified type  - TSH WITH FREE T4 REFLEX  - levothyroxine (SYNTHROID/LEVOTHROID) 125 MCG tablet  Dispense: 90 tablet; Refill: 3    Chronic pain disorder  Chronic, continuous use of opioids  Pain in joint involving ankle and foot, right  History of chronic foot pain, has been on continuous oxycodone for many years. S/P ankle tendon/arch surgery on 10/19 which went well. She is now weight bearing as tolerated in brace, doing PT. Following with [facility]. Discussed need to wean opioids, due to high MME, risk of tolerance and oversedation. No side effects. Has narcan available at home PRN.     Today pain is worsened. She is having some numbness/tingling in surgical foot. Seeing [facility]o at [facility] soon. Using oxycontin BID and short acting oxycodone 3 tablets per day. Work is going well. Working to wean down on the short acting oxycodone.     Plan:  -discussed goal is to wean opioids  -goal is to return to the amount of oxycodone she was taking pre-surgery, which was a total of short acting oxycodone 20mg daily (MME 30)   -MME were 30 ~8/2023   -Current MME are slightly less than double what she was taking prior to worsening symptoms ~8/2023       Opioid:   Continue long acting oxycontin 10mg BID (MME 30)  Continue short acting oxycodone 5mg TID prn; 90 tablets per month (2-3 tablets/day; MME 15 to 22)   PDMP checked, appropriate  Current MME: 45-52     Non-Opioid:  schedule tylenol 1000mg TID  schedule ibuprofen 800mg TID  continue tizanidine PRN   continue gabapentin 1200mg TID  elevate, ice     -CSA done today, will scan into chart  -yearly UDS  "is up to date  -will continue to try and wean opioids, pt declines today  -follow up in 2 months    - gabapentin (NEURONTIN) 600 MG tablet  Dispense: 540 tablet; Refill: 3  - oxyCODONE (ROXICODONE) 5 MG tablet  Dispense: 90 tablet; Refill: 0  - oxyCODONE (OXYCONTIN) 10 MG 12 hr tablet  Dispense: 60 tablet; Refill: 0  - oxyCODONE (ROXICODONE) 5 MG tablet  Dispense: 90 tablet; Refill: 0  - oxyCODONE (OXYCONTIN) 10 MG 12 hr tablet  Dispense: 60 tablet; Refill: 0    Chronic migraine without aura without status migrainosus, not intractable  - gabapentin (NEURONTIN) 600 MG tablet  Dispense: 540 tablet; Refill: 3    Type 2 diabetes mellitus without complication, without long-term current use of insulin (H)  Stable, due for A1C. Managed by MTM. Refilled medications below.   - Hemoglobin A1c  - empagliflozin (JARDIANCE) 25 MG TABS tablet  Dispense: 90 tablet; Refill: 3  - metFORMIN (GLUCOPHAGE XR) 500 MG 24 hr tablet  Dispense: 360 tablet; Refill: 3  - pravastatin (PRAVACHOL) 40 MG tablet  Dispense: 90 tablet; Refill: 3    Encounter for form  Form filled out for handicap parking pass. Will scan into chart.      BMI  Estimated body mass index is 30.06 kg/m  as calculated from the following:    Height as of this encounter: 1.707 m (5' 7.2\").    Weight as of this encounter: 87.6 kg (193 lb 1.6 oz).   Weight management plan: Discussed healthy diet and exercise guidelines    Counseling  Appropriate preventive services were addressed with this patient via screening, questionnaire, or discussion as appropriate for fall prevention, nutrition, physical activity, Tobacco-use cessation, weight loss and cognition.  Checklist reviewing preventive services available has been given to the patient.  Reviewed patient's diet, addressing concerns and/or questions.   She is at risk for psychosocial distress and has been provided with information to reduce risk.   The patient's PHQ-9 score is consistent with mild depression. She was provided " with information regarding depression.       Emily Rubi is a 49 year old, presenting for the following:  Physical (Pain follow up)        7/18/2024     7:48 AM   Additional Questions   Roomed by Michelle ALBERTS   Accompanied by self        Health Care Directive  Patient does not have a Health Care Directive or Living Will: Discussed advance care planning with patient; however, patient declined at this time.    HPI    Pain follow up  -oxycodone 10mg BID (8am, 8pm)  -oxycodone 5mg TID prn (3x/day)  -DUE for CSA  -UDS is UTD  -last filled oxycontin ER 6/27/24  -last filled oxycodone IR 6/18/24  -seeing surgeon next week    Work-pharmacy  Diet-  Exercise-    Pap/hpv-due 2025  Mammo-UTD  Colon cancer screening-due 2033    Mood  -wellbutrin   -lamictal  -vyvanse    DM2/weight  -seeing MTM  -CGM  -jardiance 25mg  -metformin  -mounjaro  -topiramate  -eye exam due 9/2024     Insomnia  -doxepin  -hydroxyzine    Hypothyroidism  -synthriod 125mcg    HLD  -pravastatin 40mg            7/17/2024   General Health   How would you rate your overall physical health? Good   Feel stress (tense, anxious, or unable to sleep) Only a little      (!) STRESS CONCERN      7/17/2024   Nutrition   Three or more servings of calcium each day? Yes   Diet: Regular (no restrictions)    Carbohydrate counting   How many servings of fruit and vegetables per day? (!) 2-3   How many sweetened beverages each day? 0-1       Multiple values from one day are sorted in reverse-chronological order         7/17/2024   Exercise   Days per week of moderate/strenous exercise 0 days   Average minutes spent exercising at this level 0 min      (!) EXERCISE CONCERN      7/17/2024   Social Factors   Frequency of gathering with friends or relatives Once a week   Worry food won't last until get money to buy more No   Food not last or not have enough money for food? No   Do you have housing? (Housing is defined as stable permanent housing and does not include staying ouside  in a car, in a tent, in an abandoned building, in an overnight shelter, or couch-surfing.) Yes   Are you worried about losing your housing? No   Lack of transportation? No   Unable to get utilities (heat,electricity)? No            2024   Dental   Dentist two times every year? Yes            2024   TB Screening   Were you born outside of the [place]? No          Today's PHQ-9 Score:       2024     7:51 AM   PHQ-9 SCORE   PHQ-9 Total Score 6         2024   Substance Use   Alcohol more than 3/day or more than 7/wk No   Do you use any other substances recreationally? No        Social History     Tobacco Use    Smoking status: Former     Current packs/day: 0.00     Average packs/day: 1 pack/day for 20.0 years (20.0 ttl pk-yrs)     Types: Cigarettes     Start date:      Quit date:      Years since quittin.5    Smokeless tobacco: Never   Vaping Use    Vaping status: Never Used   Substance Use Topics    Alcohol use: Yes     Comment: occasional    Drug use: Never           2024   LAST FHS-7 RESULTS   1st degree relative breast or ovarian cancer No   Any relative bilateral breast cancer No   Any male have breast cancer No   Any ONE woman have BOTH breast AND ovarian cancer No   Any woman with breast cancer before 50yrs No   2 or more relatives with breast AND/OR ovarian cancer No   2 or more relatives with breast AND/OR bowel cancer No          2024   STI Screening   New sexual partner(s) since last STI/HIV test? No        History of abnormal Pap smear: No - age 30- 64 PAP with HPV every 5 years recommended       ASCVD Risk   The ASCVD Risk score (Carmita CORREIA, et al., 2019) failed to calculate for the following reasons:    The valid total cholesterol range is 130 to 320 mg/dL        2024   Contraception/Family Planning   Questions about contraception or family planning No      Reviewed and updated as needed this visit by Provider   Tobacco   Meds   Med Hx  Surg Hx  Fam Hx  "            Objective    Exam  /68 (BP Location: Right arm, Patient Position: Sitting, Cuff Size: Adult Regular)   Pulse 75   Temp 97.3  F (36.3  C) (Temporal)   Resp 16   Ht 1.707 m (5' 7.2\")   Wt 87.6 kg (193 lb 1.6 oz)   LMP 06/17/2024   SpO2 97%   BMI 30.06 kg/m     Estimated body mass index is 30.06 kg/m  as calculated from the following:    Height as of this encounter: 1.707 m (5' 7.2\").    Weight as of this encounter: 87.6 kg (193 lb 1.6 oz).    Physical Exam  Constitutional:       General: She is not in acute distress.     Appearance: She is well-developed.   HENT:      Head: Normocephalic and atraumatic.      Right Ear: Tympanic membrane, ear canal and external ear normal.      Left Ear: Tympanic membrane, ear canal and external ear normal.      Nose: Nose normal.      Mouth/Throat:      Mouth: Mucous membranes are moist.      Pharynx: No oropharyngeal exudate.   Eyes:      Extraocular Movements: Extraocular movements intact.      Conjunctiva/sclera: Conjunctivae normal.      Pupils: Pupils are equal, round, and reactive to light.   Cardiovascular:      Rate and Rhythm: Normal rate and regular rhythm.      Heart sounds: Normal heart sounds. No murmur heard.  Pulmonary:      Effort: Pulmonary effort is normal.      Breath sounds: No wheezing or rales.   Abdominal:      General: Bowel sounds are normal.      Palpations: Abdomen is soft.      Tenderness: There is no abdominal tenderness.   Musculoskeletal:      Cervical back: Normal range of motion and neck supple. No rigidity.      Comments: Mild swelling of top of R foot.     Lymphadenopathy:      Cervical: No cervical adenopathy.   Skin:     General: Skin is warm and dry.      Findings: No rash.   Neurological:      General: No focal deficit present.      Mental Status: She is alert and oriented to person, place, and time.   Psychiatric:         Mood and Affect: Mood normal.         Behavior: Behavior normal.       Diabetic foot exam: normal " DP and PT pulses, no trophic changes or ulcerative lesions, normal sensory exam, and normal monofilament exam        Signed Electronically by: Loc Waggoner DO

## 2024-07-19 ENCOUNTER — ANCILLARY PROCEDURE (OUTPATIENT)
Dept: MRI IMAGING | Facility: CLINIC | Age: 49
End: 2024-07-19
Attending: INTERNAL MEDICINE
Payer: COMMERCIAL

## 2024-07-19 DIAGNOSIS — D13.4 HEPATIC ADENOMA: ICD-10-CM

## 2024-07-19 DIAGNOSIS — K76.89 FOCAL NODULAR HYPERPLASIA OF LIVER: ICD-10-CM

## 2024-07-19 PROCEDURE — A9585 GADOBUTROL INJECTION: HCPCS | Performed by: RADIOLOGY

## 2024-07-19 PROCEDURE — 74183 MRI ABD W/O CNTR FLWD CNTR: CPT | Performed by: RADIOLOGY

## 2024-07-19 RX ORDER — GADOBUTROL 604.72 MG/ML
10 INJECTION INTRAVENOUS ONCE
Status: COMPLETED | OUTPATIENT
Start: 2024-07-19 | End: 2024-07-19

## 2024-07-19 RX ADMIN — GADOBUTROL 8.5 ML: 604.72 INJECTION INTRAVENOUS at 07:45

## 2024-07-19 NOTE — DISCHARGE INSTRUCTIONS
MRI Contrast Discharge Instructions    The IV contrast you received today will pass out of your body in your  urine. This will happen in the next 24 hours. You will not feel this process.  Your urine will not change color.    Drink at least 4 extra glasses of water or juice today (unless your doctor  has restricted your fluids). This reduces the stress on your kidneys.  You may take your regular medicines.    If you are on dialysis: It is best to have dialysis today.    If you have a reaction: Most reactions happen right away. If you have  any new symptoms after leaving the hospital (such as hives or swelling),  call your hospital at the correct number below. Or call your family doctor.  If you have breathing distress or wheezing, call 911.    Special instructions: ***    I have read and understand the above information.    Signature:______________________________________ Date:___________    Staff:__________________________________________ Date:___________     Time:__________    [facility] Departments:    ___[place]: [phone]  ___[facility]: [phone]  ___[place]: [phone] ___[facility]: [phone]  ___[facility]: [phone]  ___[facility]: [phone]  ___[phone]  ___[facility]: [phone]  ___[phone]

## 2024-07-29 DIAGNOSIS — K76.89 FOCAL NODULAR HYPERPLASIA OF LIVER: ICD-10-CM

## 2024-07-29 DIAGNOSIS — K76.0 NAFLD (NONALCOHOLIC FATTY LIVER DISEASE): Primary | ICD-10-CM

## 2024-07-31 ENCOUNTER — TRANSFERRED RECORDS (OUTPATIENT)
Dept: HEALTH INFORMATION MANAGEMENT | Facility: CLINIC | Age: 49
End: 2024-07-31
Payer: COMMERCIAL

## 2024-08-01 ENCOUNTER — TELEPHONE (OUTPATIENT)
Dept: FAMILY MEDICINE | Facility: CLINIC | Age: 49
End: 2024-08-01
Payer: COMMERCIAL

## 2024-08-01 NOTE — TELEPHONE ENCOUNTER
General Call    Contacts       Contact Date/Time Type Contact Phone/Fax    08/01/2024 09:52 AM CDT Phone (Incoming) [facility] - [place], [place] - [address] (Pharmacy) [phone]          Reason for Call:  Medication information regarding Gabapentin    What are your questions or concerns:      Kaylynn Collins is calling from [facility] is calling because they recently received another prescription for Gabapentin from another clinician (Dr. Hilda Chu with [facility]). Dr. Chu wrote Gabapentin 300 mg for three times a day on 7/31/2024.    Karina will try calling Dr. Chu's office to inform clinician that Dr. Waggoner is also prescribing Gabapentin for patient as well. Pharmacist just wanted to inform patient's PCP of this.    At this time, Pharmacy does not need anything, but will call back if they do.    Date of last appointment with provider: 7/18/2024    Routing message as FYI to Dr. Waggoner.    Amisha Foote, BSN, RN   [facility]

## 2024-08-02 DIAGNOSIS — G43.709 CHRONIC MIGRAINE WITHOUT AURA WITHOUT STATUS MIGRAINOSUS, NOT INTRACTABLE: ICD-10-CM

## 2024-08-02 DIAGNOSIS — E03.9 HYPOTHYROIDISM, UNSPECIFIED TYPE: ICD-10-CM

## 2024-08-02 DIAGNOSIS — M25.571 PAIN IN JOINT INVOLVING ANKLE AND FOOT, RIGHT: ICD-10-CM

## 2024-08-05 RX ORDER — GABAPENTIN 600 MG/1
1200 TABLET ORAL 3 TIMES DAILY
Qty: 540 TABLET | Refills: 3 | OUTPATIENT
Start: 2024-08-05

## 2024-08-07 ENCOUNTER — MYC MEDICAL ADVICE (OUTPATIENT)
Dept: FAMILY MEDICINE | Facility: CLINIC | Age: 49
End: 2024-08-07

## 2024-08-08 ENCOUNTER — NURSE TRIAGE (OUTPATIENT)
Dept: FAMILY MEDICINE | Facility: CLINIC | Age: 49
End: 2024-08-08

## 2024-08-08 ENCOUNTER — OFFICE VISIT (OUTPATIENT)
Dept: OBGYN | Facility: CLINIC | Age: 49
End: 2024-08-08
Payer: COMMERCIAL

## 2024-08-08 VITALS
BODY MASS INDEX: 29.35 KG/M2 | WEIGHT: 188.5 LBS | HEART RATE: 86 BPM | DIASTOLIC BLOOD PRESSURE: 65 MMHG | SYSTOLIC BLOOD PRESSURE: 101 MMHG | OXYGEN SATURATION: 95 %

## 2024-08-08 DIAGNOSIS — D13.4 HEPATIC ADENOMA: ICD-10-CM

## 2024-08-08 DIAGNOSIS — N93.9 ABNORMAL UTERINE BLEEDING (AUB): ICD-10-CM

## 2024-08-08 PROCEDURE — 99213 OFFICE O/P EST LOW 20 MIN: CPT

## 2024-08-08 RX ORDER — METHOCARBAMOL 500 MG/1
TABLET, FILM COATED ORAL
COMMUNITY
Start: 2024-08-07 | End: 2024-09-18

## 2024-08-08 NOTE — TELEPHONE ENCOUNTER
Relayed to pt in MyChart thread.    Juany VILLATORO, BSN, PHN, RN  [facility]  [phone]

## 2024-08-08 NOTE — TELEPHONE ENCOUNTER
Writer responded via [employer].  Juany VILLATORO, BSN, PHN, RN  [facility]  [phone]

## 2024-08-08 NOTE — PROGRESS NOTES
CC: US Follow-up, sx Follow-up  S: Elicia is a 50 yo  here today in Follow-up for AUB and review US  -since switching from norethindrone to slynd- bleeding patterns have improved, having periods on the placebo pills, had one instance of inter-menses spotting  -first day period heavy- rest much lighter  -symptoms manageable  -discussed can try to take continuously to see if skipping periods feels improved  -has had recent uptick in headaches, deals with headaches a lot- not currently seeing neuro  -has recent back injury  -feels ok with perimenopause sx to not seek solutions at this point    O:/65   Pulse 86   Wt 85.5 kg (188 lb 8 oz)   LMP 2024   SpO2 95%   BMI 29.35 kg/m    Past Medical History:   Diagnosis Date    Degenerative arthritis of spine     Depressive disorder     Hepatic adenoma     HTN (hypertension)     Hypothyroidism     Irregular menstrual cycle 2009    Migraine     NAFLD (nonalcoholic fatty liver disease)     Type 2 diabetes, HbA1C goal < 8% (H)      Past Surgical History:   Procedure Laterality Date    CHOLECYSTECTOMY      teen years    COLONOSCOPY N/A 2023    Procedure: Colonoscopy;  Surgeon: Chance Chan MD;  [place] [facility] [place]GERY      ESOPHAGOSCOPY, GASTROSCOPY, DUODENOSCOPY (EGD), COMBINED N/A 2023    Procedure: ESOPHAGOGASTRODUODENOSCOPY, WITH BIOPSY;  Surgeon: Chance Chan MD;  [place] [facility] [place] [facility] [place]    ORTHOPEDIC SURGERY  ,,,23    Have had ankle surgery jw year due to a breakage    RADIO FREQUENCY ABLATION / DESTRUCTION OF SACROILOAC JOINT LATERAL BRANCHES (S1/S2/S3) Bilateral 06/15/2023    Procedure: Bilateral Lumbar 5 medial branch block and Sacral 1,2,3 lateral branch block;  Surgeon: Layne Weber MD;  [place] [facility] [place] [facility] [place]    SOFT TISSUE SURGERY  10/19/23    Ruptured right ankle tendon     Current Outpatient Medications   Medication Sig Dispense Refill    drospirenone (SLYND) 4 MG TABS tablet Take 1  tablet (4 mg) by mouth daily Take 1 tablet daily continuously 84 tablet 4    Alcohol Swabs PADS 1 each daily 300 each 3    buPROPion (WELLBUTRIN XL) 150 MG 24 hr tablet Take 1 tablet (150 mg) by mouth every morning 90 tablet 3    buPROPion (WELLBUTRIN XL) 300 MG 24 hr tablet Take 1 tablet (300 mg) by mouth daily 90 tablet 3    Continuous Glucose Sensor (DEXCOM G7 SENSOR) MISC 1 Device every 10 days 9 each 11    diphenhydrAMINE (BENADRYL) 50 MG tablet Take 50 mg by mouth      doxepin (SINEQUAN) 25 MG capsule Take 2 capsules (50 mg) by mouth At Bedtime 30 capsule 3    empagliflozin (JARDIANCE) 25 MG TABS tablet Take 1 tablet (25 mg) by mouth daily 90 tablet 3    folic acid (FOLVITE) 1 MG tablet Take 1 tablet (1,000 mcg) by mouth daily 90 tablet 3    gabapentin (NEURONTIN) 600 MG tablet Take 2 tablets (1,200 mg) by mouth 3 times daily 540 tablet 3    hydrOXYzine (VISTARIL) 50 MG capsule Take 1 capsule (50 mg) by mouth daily 90 capsule 3    hydrOXYzine HCl (ATARAX) 25 MG tablet TAKE ONE TABLET BY MOUTH ONCE DAILY ALONG WITH 50 MG TABLET FOR A TOTAL DAILY DOSE OF 75 MG AS NEEDED 90 tablet 3    lamoTRIgine (LAMICTAL) 200 MG tablet Take 2 tablets (400 mg) by mouth At Bedtime for 360 days 180 tablet 3    levothyroxine (SYNTHROID/LEVOTHROID) 125 MCG tablet Take 1 tablet (125 mcg) by mouth daily 90 tablet 3    lidocaine, viscous, (XYLOCAINE) 2 % solution Swish and spit 5 mLs in mouth 5 times daily 100 mL 1    lisdexamfetamine (VYVANSE) 70 MG capsule Take 70 mg by mouth every morning      metFORMIN (GLUCOPHAGE XR) 500 MG 24 hr tablet Take 4 tablets (2,000 mg) by mouth daily 360 tablet 3    naloxone (NARCAN) 4 MG/0.1ML nasal spray Spray 1 spray (4 mg) into one nostril alternating nostrils as needed for opioid reversal every 2-3 minutes until assistance arrives 1 each 2    nystatin (MYCOSTATIN) 626414 UNIT/GM external cream Apply 100,000 g topically 3 times daily      omeprazole (PRILOSEC) 20 MG DR capsule Take 1 capsule (20 mg)  by mouth 2 times daily 180 capsule 3    ondansetron (ZOFRAN) 4 MG tablet Take 1 tablet (4 mg) by mouth 3 times daily 90 tablet 1    oxyCODONE (OXYCONTIN) 10 MG 12 hr tablet Take 1 tablet (10 mg) by mouth every 12 hours 60 tablet 0    [START ON 8/21/2024] oxyCODONE (OXYCONTIN) 10 MG 12 hr tablet Take 1 tablet (10 mg) by mouth every 12 hours for 30 days 60 tablet 0    oxyCODONE (ROXICODONE) 5 MG tablet Take 1 tablet (5 mg) by mouth every 4 hours as needed for moderate pain Do not take more than 15mg total per day 90 tablet 0    [START ON 8/15/2024] oxyCODONE (ROXICODONE) 5 MG tablet Take 1 tablet (5 mg) by mouth every 4 hours as needed for pain Do not take more than 15mg total per day 90 tablet 0    pravastatin (PRAVACHOL) 40 MG tablet Take 1 tablet (40 mg) by mouth daily 90 tablet 3    prochlorperazine (COMPAZINE) 10 MG tablet Take 1 tablet (10 mg) by mouth 4 times daily as needed for nausea (with migraine) 120 tablet 5    REXULTI 2 MG tablet Take 2 mg by mouth daily      rizatriptan (MAXALT) 10 MG tablet Take 1 tablet (10 mg) by mouth at onset of headache for migraine May repeat in 2 hours. Max 3 tablets/24 hours. 18 tablet 3    tirzepatide (MOUNJARO) 10 MG/0.5ML pen Inject 10 mg Subcutaneous every 7 days 6 mL 1    tiZANidine (ZANAFLEX) 2 MG tablet Take 2 tablets (4 mg) by mouth 3 times daily 540 tablet 1    topiramate (TOPAMAX) 100 MG tablet Take 1 tablet (100 mg) by mouth every morning Not actively taking 7-10-24.      triamcinolone (KENALOG) 0.1 % external ointment Apply twice daily on hand for 2-4 weeks. 15 g 1     Current Facility-Administered Medications   Medication Dose Route Frequency Provider Last Rate Last Admin    lidocaine (PF) (XYLOCAINE) 1 % injection 0.5 mL  0.5 mL   Collin Calero, DO   0.5 mL at 03/09/24 0838    lidocaine (PF) (XYLOCAINE) 1 % injection 1 mL  1 mL   Collin Calero DO   1 mL at 03/01/24 0823    triamcinolone (KENALOG-40) injection 40 mg  40 mg   Collin Calero DO   40 mg at  24 0838    triamcinolone (KENALOG-40) injection 40 mg  40 mg   Collin Calero DO   40 mg at 24 0823        Allergies   Allergen Reactions    Lithium Dizziness and Other (See Comments)     Other reaction(s): PSYCHOSIS    Other Reaction(s): Altered Mental Status    Sulfa Antibiotics Swelling     lips    Cold sores per pt    Adhesive Tape Rash    Albuterol Nausea and Vomiting    Clarithromycin Nausea and Vomiting    Erythromycin Nausea and Vomiting    Amoxicillin-Pot Clavulanate Nausea and Vomiting    Venlafaxine Other (See Comments)     Other reaction(s): PSYCHOSIS    Psychosis    Per transferred records     Alert very pleasant female NAD   RRR  Normal bp and pulse    Narrative & Impression   EXAMINATION: US PELVIC TRANSABDOMINAL AND TRANSVAGINAL, 2024 7:25  AM      COMPARISON: None.     HISTORY: aub; Abnormal uterine bleeding (AUB)     TECHNIQUE: The pelvis was scanned in standard fashion with  transabdominal and transvaginal transducer(s) using both grey scale  and limited color Doppler techniques.     FINDINGS:  The uterus measures 5.3 x 3.9 x 2.2 cm. Multiple uterine fibroids, for  example a 1.0 cm intramural fibroid in the uterine fundus and 3.2 x  2.5 x 4.3 cm intramural fibroid in the upper right uterine segment The  endometrium is within normal limits and measures 2 mm. There is trace  free fluid in the pelvis.     The right ovary measures 2.0 x 1.6 x 2.2 cm, and the left ovary  measures 1.6 x 2.3 x 2.2 cm. There is no adnexal mass. Incidental  subcentimeter left ovarian cyst versus follicle. There is normal blood  flow to the ovaries.                                                                      IMPRESSION:   1. Myomatous uterus.  2. Grossly normal appearance of the bilateral ovaries.      I have personally reviewed the examination and initial interpretation  and I agree with the findings.     GISEL REGAN DO      A/P:  Elicia is a 48 yo  here today in Follow-up for AUB and review  US  -satisfied with switch from micronor to slynd- discussed trial of skipping periods to see if improved sx  -next step would be mirena iud- discussed in office placement vs EUA- insertion - if desired contact me to set up and order procedure  -discussed overall normal ultrasound- small fibroids- discussed they can contribute to menstrual difficulties with hmb and abd pain/pressure- discussed definitive surgical mgmt vs monitoring vs operative myomectomy- hgb stable  -otherwise things are going well for Elicia- her back injury is taking current precedence seeing someone at [facility]  -continue slynd- does not want to consider any non-hormonal meds for vms  -can have neuro refferal for ha- supplements to consider, coQ10, riboflavin, magnesium or chasteberry if pms like HA 20-40 mg    1. Abnormal uterine bleeding (AUB)  -full refill given  - drospirenone (SLYND) 4 MG TABS tablet; Take 1 tablet (4 mg) by mouth daily Take 1 tablet daily continuously  Dispense: 84 tablet; Refill: 4    2. Hepatic adenoma  - drospirenone (SLYND) 4 MG TABS tablet; Take 1 tablet (4 mg) by mouth daily Take 1 tablet daily continuously  Dispense: 84 tablet; Refill: 4    Rtc annually or Prn with concerns  CANDACE Schuler CNP

## 2024-08-08 NOTE — TELEPHONE ENCOUNTER
"Pt calling to follow up on [employer] message and ongoing, worsening back pain since 7/18 visit. Saw [facility] on 7/31, note pulled into chart.    Severe degeneration L5-S1 noted.  Tizanidine changed to methocarbamol.  Nabumetone was not helpful, back on ibuprofen.  Pt attended PT but was not able to participate due to worsening pain and inability to \"hold up my leg.\"  Pain is waking her at night.  States pain is going down her L leg.  B&B intact, no weakness or loss of sensation.  Pain is currently 7/10 with Tylenol, ibuprofen, and methocarbamol on board, last oxy ER was 1800 last night, trying to wait until 1400 today for next IR dose as she is concerned for running out and overuse.      Dr. Waggoner: pt is wondering about next best steps.  Defer back to ortho or schedule with you?    MARCOS WardN, PHN, RN  [facility]  [phone]      Reason for Disposition   Pain radiates into the thigh or further down the leg    Additional Information   Negative: Passed out (i.e., fainted, collapsed and was not responding)   Negative: Shock suspected (e.g., cold/pale/clammy skin, too weak to stand, low BP, rapid pulse)   Negative: Sounds like a life-threatening emergency to the triager   Negative: Major injury to the back (e.g., MVA, fall > 10 feet or 3 meters, penetrating injury, etc.)   Negative: Pain in the upper back over the ribs (rib cage) that radiates (travels) into the chest   Negative: Pain in the upper back over the ribs (rib cage) and worsened by coughing (or clearly increases with breathing)   Negative: Back pain during pregnancy   Negative: SEVERE back pain of sudden onset and age > 60 years   Negative: SEVERE abdominal pain (e.g., excruciating)   Negative: Abdominal pain and age > 60 years   Negative: Unable to urinate (or only a few drops) and bladder feels very full   Negative: Loss of bladder or bowel control (urine or bowel incontinence; wetting self, leaking stool) of " new-onset   Negative: Numbness (loss of sensation) in groin or rectal area   Negative: Pain radiates into groin, scrotum   Negative: Blood in urine (red, pink, or tea-colored)   Negative: Vomiting and pain over lower ribs of back (i.e., flank - kidney area)   Negative: Weakness of a leg or foot (e.g., unable to bear weight, dragging foot)   Negative: Patient sounds very sick or weak to the triager   Negative: Fever > 100.4 F (38.0 C) and flank pain   Negative: Pain or burning with passing urine (urination)   Negative: SEVERE back pain (e.g., excruciating, unable to do any normal activities) and not improved after pain medicine and CARE ADVICE   Negative: Numbness in an arm or hand (i.e., loss of sensation) and upper back pain   Negative: Numbness in a leg or foot (i.e., loss of sensation)   Negative: High-risk adult (e.g., history of cancer, history of HIV, or history of IV Drug Use)   Negative: Soft tissue infection (e.g., abscess, cellulitis) or other serious infection (e.g., bacteremia) in last 2 weeks   Negative: Painful rash with multiple small blisters grouped together (i.e., dermatomal distribution or 'band' or 'stripe')   Negative: Pain radiates into the thigh or further down the leg, and in both legs   Negative: Age > 50 and no history of prior similar back pain    Protocols used: Back Pain-A-[place]

## 2024-08-08 NOTE — TELEPHONE ENCOUNTER
If [facility] is managing her back pain, then I would her refer back to them. If unable to get in, should be seen in urgent care. Might need a course of steroids to calm down her pain.     Would continue oxycontin ER every 12 hours, and oxycodone 5mg up to 4x/day while she is in an acute flare. If she runs out early she should just let me know or do an evisit before this happens.    Would have her add on tylenol 1000mg every 8 hours to take along with ibuprofen and robaxin.  Try topicals (voltaren, biofreeze, or lidocaine patch). All are OTC.  Heating pack    She can always go to the ED for pain mgmt if it is severe/worsening.    Dr. Waggoner

## 2024-08-09 ENCOUNTER — TELEPHONE (OUTPATIENT)
Dept: FAMILY MEDICINE | Facility: CLINIC | Age: 49
End: 2024-08-09
Payer: COMMERCIAL

## 2024-08-09 ENCOUNTER — TRANSFERRED RECORDS (OUTPATIENT)
Dept: HEALTH INFORMATION MANAGEMENT | Facility: CLINIC | Age: 49
End: 2024-08-09
Payer: COMMERCIAL

## 2024-08-09 DIAGNOSIS — G89.4 CHRONIC PAIN DISORDER: ICD-10-CM

## 2024-08-09 DIAGNOSIS — M25.571 PAIN IN JOINT INVOLVING ANKLE AND FOOT, RIGHT: ICD-10-CM

## 2024-08-09 DIAGNOSIS — F11.90 CHRONIC, CONTINUOUS USE OF OPIOIDS: ICD-10-CM

## 2024-08-09 RX ORDER — OXYCODONE HYDROCHLORIDE 5 MG/1
5 TABLET ORAL EVERY 6 HOURS PRN
Qty: 110 TABLET | Refills: 0 | Status: SHIPPED | OUTPATIENT
Start: 2024-08-09 | End: 2024-09-13

## 2024-08-09 NOTE — TELEPHONE ENCOUNTER
General Call    Contacts       Contact Date/Time Type Contact Phone/Fax    08/09/2024 12:14 PM CDT Phone (Incoming) Elicia Calero (Self) [phone] (M)          Reason for Call:  Early refill for Oxycodone    What are your questions or concerns:      Patient received [employer] message regarding being able to increase her oxycodone as mentioned by Dr. Waggoner (Nurse Triage encounter 8/09/2024).  Loc Waggoner, DO     AT    8/8/24 12:01 PM  Note  If [facility] is managing her back pain, then I would her refer back to them. If unable to get in, should be seen in urgent care. Might need a course of steroids to calm down her pain.      Would continue oxycontin ER every 12 hours, and oxycodone 5mg up to 4x/day while she is in an acute flare. If she runs out early she should just let me know or do an evisit before this happens.     Would have her add on tylenol 1000mg every 8 hours to take along with ibuprofen and robaxin.  Try topicals (voltaren, biofreeze, or lidocaine patch). All are OTC.  Heating pack     She can always go to the ED for pain mgmt if it is severe/worsening.     Dr. Waggoner           Patient is calling back to ask Dr. Waggoner for early refill for oxycodone, as she will run out by Monday. Patient tried submitting an E-visit, but states clinician has no availability.    Date of last appointment with provider: 7/18/2024    Could we send this information to you in [employer] or would you prefer to receive a phone call?:   Patient would prefer a phone call   Okay to leave a detailed message?: Yes at Cell number on file:    Telephone Information:   Mobile [phone]     Routing message to Dr. Waggoner to review and advise.    MARCOS SterlingN, RN   [facility]

## 2024-08-09 NOTE — TELEPHONE ENCOUNTER
"Last oxycodone IR fill was 7/18/24.  Last oxycontin ER fill was 6/27 (can you ask if she picked up the ER oxycontin in July??)    ____________    Pt has been instructed to increase oxycodone 5mg tablets up to 4x/day as needed during acute back flare. Should resume doing it 3x/day once back pain calms down.  Please advise this is temporary, and I can only provide extra tablets this one time. 110 tablets given. Ok for early fill.  She should follow [facility] with [facility] for [facility].    Also, evisits are always be available. Are you sure she wasn't trying to a \"virtual visit\"??    Dr. Waggoner  "

## 2024-08-12 ENCOUNTER — OFFICE VISIT (OUTPATIENT)
Dept: GASTROENTEROLOGY | Facility: CLINIC | Age: 49
End: 2024-08-12
Attending: INTERNAL MEDICINE
Payer: COMMERCIAL

## 2024-08-12 ENCOUNTER — LAB (OUTPATIENT)
Dept: LAB | Facility: CLINIC | Age: 49
End: 2024-08-12
Attending: INTERNAL MEDICINE
Payer: COMMERCIAL

## 2024-08-12 VITALS
WEIGHT: 192.5 LBS | SYSTOLIC BLOOD PRESSURE: 98 MMHG | OXYGEN SATURATION: 96 % | HEART RATE: 77 BPM | BODY MASS INDEX: 29.97 KG/M2 | DIASTOLIC BLOOD PRESSURE: 68 MMHG

## 2024-08-12 DIAGNOSIS — D13.4 HEPATIC ADENOMA: ICD-10-CM

## 2024-08-12 DIAGNOSIS — R53.83 OTHER FATIGUE: Primary | ICD-10-CM

## 2024-08-12 DIAGNOSIS — K76.89 FOCAL NODULAR HYPERPLASIA OF LIVER: ICD-10-CM

## 2024-08-12 DIAGNOSIS — R53.83 OTHER FATIGUE: ICD-10-CM

## 2024-08-12 DIAGNOSIS — K76.0 NAFLD (NONALCOHOLIC FATTY LIVER DISEASE): ICD-10-CM

## 2024-08-12 LAB
ALBUMIN SERPL BCG-MCNC: 4.5 G/DL (ref 3.5–5.2)
ALP SERPL-CCNC: 86 U/L (ref 40–150)
ALT SERPL W P-5'-P-CCNC: 14 U/L (ref 0–50)
ANION GAP SERPL CALCULATED.3IONS-SCNC: 9 MMOL/L (ref 7–15)
AST SERPL W P-5'-P-CCNC: 14 U/L (ref 0–45)
BILIRUB DIRECT SERPL-MCNC: <0.2 MG/DL (ref 0–0.3)
BILIRUB SERPL-MCNC: 0.4 MG/DL
BUN SERPL-MCNC: 18 MG/DL (ref 6–20)
CALCIUM SERPL-MCNC: 9.4 MG/DL (ref 8.8–10.4)
CHLORIDE SERPL-SCNC: 105 MMOL/L (ref 98–107)
CREAT SERPL-MCNC: 0.9 MG/DL (ref 0.51–0.95)
EGFRCR SERPLBLD CKD-EPI 2021: 78 ML/MIN/1.73M2
ERYTHROCYTE [DISTWIDTH] IN BLOOD BY AUTOMATED COUNT: 16.5 % (ref 10–15)
FERRITIN SERPL-MCNC: 12 NG/ML (ref 6–175)
GLUCOSE SERPL-MCNC: 111 MG/DL (ref 70–99)
HCO3 SERPL-SCNC: 27 MMOL/L (ref 22–29)
HCT VFR BLD AUTO: 44.4 % (ref 35–47)
HGB BLD-MCNC: 14.3 G/DL (ref 11.7–15.7)
INR PPP: 0.97 (ref 0.85–1.15)
IRON BINDING CAPACITY (ROCHE): 327 UG/DL (ref 240–430)
IRON SATN MFR SERPL: 18 % (ref 15–46)
IRON SERPL-MCNC: 59 UG/DL (ref 37–145)
MCH RBC QN AUTO: 26.2 PG (ref 26.5–33)
MCHC RBC AUTO-ENTMCNC: 32.2 G/DL (ref 31.5–36.5)
MCV RBC AUTO: 81 FL (ref 78–100)
PLATELET # BLD AUTO: 353 10E3/UL (ref 150–450)
POTASSIUM SERPL-SCNC: 4.3 MMOL/L (ref 3.4–5.3)
PROT SERPL-MCNC: 6.8 G/DL (ref 6.4–8.3)
RBC # BLD AUTO: 5.46 10E6/UL (ref 3.8–5.2)
SODIUM SERPL-SCNC: 141 MMOL/L (ref 135–145)
WBC # BLD AUTO: 7.4 10E3/UL (ref 4–11)

## 2024-08-12 PROCEDURE — 82248 BILIRUBIN DIRECT: CPT | Performed by: PATHOLOGY

## 2024-08-12 PROCEDURE — 82728 ASSAY OF FERRITIN: CPT | Performed by: PATHOLOGY

## 2024-08-12 PROCEDURE — 85027 COMPLETE CBC AUTOMATED: CPT | Performed by: PATHOLOGY

## 2024-08-12 PROCEDURE — 83540 ASSAY OF IRON: CPT | Performed by: PATHOLOGY

## 2024-08-12 PROCEDURE — 99214 OFFICE O/P EST MOD 30 MIN: CPT | Performed by: PHYSICIAN ASSISTANT

## 2024-08-12 PROCEDURE — 36415 COLL VENOUS BLD VENIPUNCTURE: CPT | Performed by: PATHOLOGY

## 2024-08-12 PROCEDURE — 99213 OFFICE O/P EST LOW 20 MIN: CPT | Performed by: PHYSICIAN ASSISTANT

## 2024-08-12 PROCEDURE — 85610 PROTHROMBIN TIME: CPT | Performed by: PATHOLOGY

## 2024-08-12 PROCEDURE — 80053 COMPREHEN METABOLIC PANEL: CPT | Performed by: PATHOLOGY

## 2024-08-12 PROCEDURE — 83550 IRON BINDING TEST: CPT | Performed by: PATHOLOGY

## 2024-08-12 ASSESSMENT — PAIN SCALES - GENERAL: PAINLEVEL: MODERATE PAIN (4)

## 2024-08-12 NOTE — LETTER
8/12/2024      Elicia Calero  [address]  [place] [address]      Dear Colleague,    Thank you for referring your patient, Elicia Calero, to the [facility] [place]. Please see a copy of my visit note below.    Hepatology Follow-up Clinic note  Elicia Calero   Date of Birth 1975      Reason for follow-up: MAFLD / Adenomas         Assessment/plan:   Elicia Calero is a 49 year old female with elevated ALT/AST and imaging findings of hepatic steatosis and multiple hepatic adenomas. Transaminases have been normal over the past year . Liver function also normal without stigmata of advanced liver disease. She has made several positive lifestyle changes over the past year leading to weight loss.     #Metabolic associated fatty liver disease (MALFD):   # Last FibroScan/elastography measurement 2022 at the [facility] showing stage 0-1, 3.3 kPa with a CAP score of 291.   - Discussed repeating FibroScan, she prefers to do it one year   - Continue gradual weight loss.   - Maintain good control of cholesterol (No contraindication to starting a statin with LFT elevations)   - Optimize blood glucose as needed.  Agree with GLP-1 inhibitor for both insulin resistance and help with weight loss  - Improve nutrition: continue limiting carbohydrates, especially simple carbohydrates, and following Mediterranean eating patten  - Increase physical activity as able, ideally 150 minutes weekly  - Limit alcohol intake to not more than 3 drinks a week (currently rare )     # History of multiple hepatic adenomas, improved:   - Recent MRI showing decrease in number of adenomas, and overall decrease in size  - Continue avoiding estrogen containing birth control  - Continue weight maintenance   - Repeat MRI with and without contrast of liver in 1 year    #History of FNH, stable:   - No routine follow up needed     # Fatigue:   - Check iron panel. If persistent, discuss with PCP     - Follow  "up with Hepatology in one year with Dr. Eliana Damon PA-C  [facility]    -----------------------------------------------------       HPI:   Elicia Calero is a 49 year old female presenting for follow-up.     #. MAFLD  #. Metabolic Syndrome: type II diabetes, obesity, HTN and hyperlipidemia  MR elastography in March 2022 with hepatic steatosis.  Fib-4: 0.44 was not suggestive of advanced fibrosis.   #. Hepatic adenomas  #. Focal Nodular Hyperplasia    Patient was last seen by Dr. Monroy on 8/2/2023.   No recent hospitalizations or ER visits.    Continues to make better food choices.   230 pounds and recently around 196 lbs/188s.   Feeling good.     Physical activiy limited due to fused ankle.   Ramp to hospital once a day.   She did start Mounjaro last October.    Fatigue/feel like could sleep all the time.  Attempted to take ferrous gluconate every other day last year, stopped due to worsening constipation. She does continue to have menstrual cycles, perimenopausal.   Snoring intermittent with very tired. TSH - levo dose has been the same.     BM every 2-3 days.     Patient denies jaundice, lower extremity edema, abdominal distension or confusion.  Patient also denies melena, hematochezia or hematemesis.  Patient denies, fevers, sweats or chills.  Occassional alcohol.     Previous work-up:   Lab Results   Component Value Date    HCVAB Nonreactive 12/01/2022    JANETH 18 03/02/2023    IRONSAT 16 03/02/2023    TTG 0.2 09/20/2023    TTGG <0.6 09/20/2023    IGA 70 (L) 03/02/2023    TSH 1.03 07/18/2024    CHOL 118 04/26/2024    HDL 48 (L) 04/26/2024    LDL 55 04/26/2024    TRIG 73 04/26/2024    A1C 5.9 (H) 07/18/2024      No results found for: \"SPECDES\", \"LDRESULTS\"     Recent Labs   Lab Test 04/26/24  0634 09/20/23  0813 03/02/23  0857 12/01/22  1013   ALKPHOS 84 93 132* 108*   ALT 14 19 15 19   AST 15 20 14 20   BILITOTAL 0.2 0.2 0.2 0.3        Medical hx Surgical hx   Past Medical " History:   Diagnosis Date     Degenerative arthritis of spine      Depressive disorder      Fibroid uterus      Hepatic adenoma      HTN (hypertension)      Hypothyroidism      Irregular menstrual cycle 03/25/2009     Migraine      NAFLD (nonalcoholic fatty liver disease)      Type 2 diabetes, HbA1C goal < 8% (H)     Past Surgical History:   Procedure Laterality Date     CHOLECYSTECTOMY      teen years     COLONOSCOPY N/A 05/18/2023    Procedure: Colonoscopy;  Surgeon: Chance Chan MD;  [place] [facility] [place]GERY       ESOPHAGOSCOPY, GASTROSCOPY, DUODENOSCOPY (EGD), COMBINED N/A 05/18/2023    Procedure: ESOPHAGOGASTRODUODENOSCOPY, WITH BIOPSY;  Surgeon: Chance Chan MD;  [place] [facility] [place]     ORTHOPEDIC SURGERY  2020,21,22,23    Have had ankle surgery jw year due to a breakage     RADIO FREQUENCY ABLATION / DESTRUCTION OF SACROILOAC JOINT LATERAL BRANCHES (S1/S2/S3) Bilateral 06/15/2023    Procedure: Bilateral Lumbar 5 medial branch block and Sacral 1,2,3 lateral branch block;  Surgeon: Layne Weber MD;  [place] [facility] [place]     SOFT TISSUE SURGERY  10/19/23    Ruptured right ankle tendon                 Medications:     Current Outpatient Medications   Medication Sig Dispense Refill     Alcohol Swabs PADS 1 each daily 300 each 3     buPROPion (WELLBUTRIN XL) 150 MG 24 hr tablet Take 1 tablet (150 mg) by mouth every morning 90 tablet 3     buPROPion (WELLBUTRIN XL) 300 MG 24 hr tablet Take 1 tablet (300 mg) by mouth daily 90 tablet 3     Continuous Glucose Sensor (DEXCOM G7 SENSOR) MISC 1 Device every 10 days 9 each 11     diphenhydrAMINE (BENADRYL) 50 MG tablet Take 50 mg by mouth       doxepin (SINEQUAN) 25 MG capsule Take 2 capsules (50 mg) by mouth At Bedtime 30 capsule 3     drospirenone (SLYND) 4 MG TABS tablet Take 1 tablet (4 mg) by mouth daily Take 1 tablet daily continuously 84 tablet 4     empagliflozin (JARDIANCE) 25 MG TABS tablet Take 1 tablet (25 mg) by  mouth daily 90 tablet 3     folic acid (FOLVITE) 1 MG tablet Take 1 tablet (1,000 mcg) by mouth daily 90 tablet 3     gabapentin (NEURONTIN) 600 MG tablet Take 2 tablets (1,200 mg) by mouth 3 times daily 540 tablet 3     hydrOXYzine (VISTARIL) 50 MG capsule Take 1 capsule (50 mg) by mouth daily 90 capsule 3     hydrOXYzine HCl (ATARAX) 25 MG tablet TAKE ONE TABLET BY MOUTH ONCE DAILY ALONG WITH 50 MG TABLET FOR A TOTAL DAILY DOSE OF 75 MG AS NEEDED 90 tablet 3     lamoTRIgine (LAMICTAL) 200 MG tablet Take 2 tablets (400 mg) by mouth At Bedtime for 360 days 180 tablet 3     levothyroxine (SYNTHROID/LEVOTHROID) 125 MCG tablet Take 1 tablet (125 mcg) by mouth daily 90 tablet 3     lidocaine, viscous, (XYLOCAINE) 2 % solution Swish and spit 5 mLs in mouth 5 times daily 100 mL 1     lisdexamfetamine (VYVANSE) 70 MG capsule Take 70 mg by mouth every morning       metFORMIN (GLUCOPHAGE XR) 500 MG 24 hr tablet Take 4 tablets (2,000 mg) by mouth daily 360 tablet 3     methocarbamol (ROBAXIN) 500 MG tablet        naloxone (NARCAN) 4 MG/0.1ML nasal spray Spray 1 spray (4 mg) into one nostril alternating nostrils as needed for opioid reversal every 2-3 minutes until assistance arrives 1 each 2     nystatin (MYCOSTATIN) 563712 UNIT/GM external cream Apply 100,000 g topically 3 times daily       omeprazole (PRILOSEC) 20 MG DR capsule Take 1 capsule (20 mg) by mouth 2 times daily 180 capsule 3     ondansetron (ZOFRAN) 4 MG tablet Take 1 tablet (4 mg) by mouth 3 times daily 90 tablet 1     oxyCODONE (OXYCONTIN) 10 MG 12 hr tablet Take 1 tablet (10 mg) by mouth every 12 hours 60 tablet 0     [START ON 8/21/2024] oxyCODONE (OXYCONTIN) 10 MG 12 hr tablet Take 1 tablet (10 mg) by mouth every 12 hours for 30 days 60 tablet 0     oxyCODONE (ROXICODONE) 5 MG tablet Take 1 tablet (5 mg) by mouth every 6 hours as needed for pain (max of 4 tablets per day) Do not take more than 15mg total per day 110 tablet 0     oxyCODONE (ROXICODONE) 5 MG  tablet Take 1 tablet (5 mg) by mouth every 4 hours as needed for moderate pain Do not take more than 15mg total per day 90 tablet 0     pravastatin (PRAVACHOL) 40 MG tablet Take 1 tablet (40 mg) by mouth daily 90 tablet 3     prochlorperazine (COMPAZINE) 10 MG tablet Take 1 tablet (10 mg) by mouth 4 times daily as needed for nausea (with migraine) 120 tablet 5     REXULTI 2 MG tablet Take 2 mg by mouth daily       rizatriptan (MAXALT) 10 MG tablet Take 1 tablet (10 mg) by mouth at onset of headache for migraine May repeat in 2 hours. Max 3 tablets/24 hours. 18 tablet 3     tirzepatide (MOUNJARO) 10 MG/0.5ML pen Inject 10 mg Subcutaneous every 7 days 6 mL 1     tiZANidine (ZANAFLEX) 2 MG tablet Take 2 tablets (4 mg) by mouth 3 times daily 540 tablet 1     topiramate (TOPAMAX) 100 MG tablet Take 1 tablet (100 mg) by mouth every morning Not actively taking 7-10-24.       triamcinolone (KENALOG) 0.1 % external ointment Apply twice daily on hand for 2-4 weeks. 15 g 1     Current Facility-Administered Medications   Medication Dose Route Frequency Provider Last Rate Last Admin     lidocaine (PF) (XYLOCAINE) 1 % injection 0.5 mL  0.5 mL   Collin Calero, DO   0.5 mL at 03/09/24 0838     lidocaine (PF) (XYLOCAINE) 1 % injection 1 mL  1 mL   Collin Calero, DO   1 mL at 03/01/24 0823     triamcinolone (KENALOG-40) injection 40 mg  40 mg   Collin Calero, DO   40 mg at 03/09/24 0838     triamcinolone (KENALOG-40) injection 40 mg  40 mg   Collin Calero, DO   40 mg at 03/01/24 0823            Allergies:     Allergies   Allergen Reactions     Lithium Dizziness and Other (See Comments)     Other reaction(s): PSYCHOSIS    Other Reaction(s): Altered Mental Status     Sulfa Antibiotics Swelling     lips    Cold sores per pt     Adhesive Tape Rash     Albuterol Nausea and Vomiting     Clarithromycin Nausea and Vomiting     Erythromycin Nausea and Vomiting     Amoxicillin-Pot Clavulanate Nausea and Vomiting     Venlafaxine Other  "(See Comments)     Other reaction(s): PSYCHOSIS    Psychosis    Per transferred records            Review of Systems:   10 points ROS was obtained and highlighted in the HPI, otherwise negative.          Physical Exam:   BP 98/68   Pulse 77   Wt 87.3 kg (192 lb 8 oz)   LMP 06/17/2024   SpO2 96%   BMI 29.97 kg/m      Gen- well, NAD, A+Ox3, normal color  Lym- no palpable LAD  Abd- soft, nontender.   Extr- hands normal, no REJI  Skin- no rash or jaundice  Neuro- no asterixis  Psych- normal mood         Data:   Reviewed in person and significant for:    Lab Results   Component Value Date     04/26/2024      Lab Results   Component Value Date    POTASSIUM 4.6 04/26/2024     Lab Results   Component Value Date    CHLORIDE 102 04/26/2024     Lab Results   Component Value Date    CO2 29 04/26/2024     Lab Results   Component Value Date    BUN 18.6 04/26/2024     Lab Results   Component Value Date    CR 0.81 04/26/2024       Lab Results   Component Value Date    WBC 6.3 04/26/2024     Lab Results   Component Value Date    HGB 13.1 04/26/2024     Lab Results   Component Value Date    HCT 41.9 04/26/2024     Lab Results   Component Value Date    MCV 77 04/26/2024     Lab Results   Component Value Date     04/26/2024       Lab Results   Component Value Date    AST 15 04/26/2024     Lab Results   Component Value Date    ALT 14 04/26/2024     No results found for: \"BILICONJ\"   Lab Results   Component Value Date    BILITOTAL 0.2 04/26/2024       Lab Results   Component Value Date    ALBUMIN 4.5 04/26/2024     Lab Results   Component Value Date    PROTTOTAL 6.8 04/26/2024      Lab Results   Component Value Date    ALKPHOS 84 04/26/2024       Lab Results   Component Value Date    INR 0.95 04/26/2024       Narrative & Impression   MRI ABDOMEN     CLINICAL HISTORY: Hepatic adenoma; Focal nodular hyperplasia of liver;  Follow up hepatic adenomas + FNH     TECHNIQUE:  Images were acquired with and without intravenous " contrast  through the abdomen. The following MR images were acquired: TrueFISP,  multiplanar T2 weighted, axial T1 in/out of phase, axial fat-saturated  T1, diffusion-weighted. Multiplanar T1-weighted images with fat  saturation were before contrast administration and at multiple time  points following the administration of intravenous contrast. Contrast  dose: 8.5 mL Gadavist     FINDINGS:     Comparison study: MRI 6/22/2023, 3/21/2022.     Liver: Noncirrhotic morphology of liver. No evidence of significant  fat or iron deposition. No substantial change in hepatic segment 4  cyst, demonstrating hyperintense T2 and T1 signal, suggesting  proteinaceous content or hemorrhage. No intrahepatic biliary dilation.     Approximately 8 arterially enhancing mildly T2 hyperintense  observations scattered throughout the liver, none of which appear to  persist on delayed phase contrast imaging. The largest measures 1.3 x  1.0 cm in hepatic segment 4 (19/43). Overall there is decreased  enhancement over time dating back to MRI 3/21/2022 of these  observations.     Gallbladder: Surgically absent gallbladder. No extra hepatic biliary  dilation.     Spleen: Within normal limits.     Kidneys: No hydronephrosis or mass.     Adrenal glands: Within normal limits.     Pancreas: Preserved intrinsic T1 hyperintense parenchymal signal. No  ductal dilation or mass.     Bowel: The visualized small and large bowel is within normal limits.     Lymph nodes: No adenopathy.     Blood vessels: Retroaortic left renal vein. The major abdominal  arteries and portal vessels are patent.     Lung bases: Grossly clear.     Bones and soft tissues: No acute or suspicious osseous lesion.     Mesentery and abdominal wall: Within normal limits.     Ascites: None.                                                                      IMPRESSION:  Approximately 8 anteriorly enhancing observations throughout the liver  which were previously characterized as adrenal  adenomas with at least  one FNH, over time some of these lesions decreased in size and  enhancement dating back to MRI 3/21/2022.     I have personally reviewed the examination and initial interpretation  and I agree with the findings.     GARIMA HARRELL MD          Again, thank you for allowing me to participate in the care of your patient.        Sincerely,        Lelia Damon PA-C

## 2024-08-12 NOTE — NURSING NOTE
Chief Complaint   Patient presents with    Follow Up     BP 98/68   Pulse 77   Wt 87.3 kg (192 lb 8 oz)   LMP 06/17/2024   SpO2 96%   BMI 29.97 kg/m    Elvie Hannah MA on 8/12/2024 at 8:05 AM

## 2024-08-12 NOTE — PROGRESS NOTES
Hepatology Follow-up Clinic note  Elicia Calero   Date of Birth 1975      Reason for follow-up: MAFLD / Adenomas         Assessment/plan:   Elicia Calero is a 49 year old female with elevated ALT/AST and imaging findings of hepatic steatosis and multiple hepatic adenomas. Transaminases have been normal over the past year . Liver function also normal without stigmata of advanced liver disease. She has made several positive lifestyle changes over the past year leading to weight loss.     #Metabolic associated fatty liver disease (MALFD):   # Last FibroScan/elastography measurement 2022 at the [facility] showing stage 0-1, 3.3 kPa with a CAP score of 291.   - Discussed repeating FibroScan, she prefers to do it one year   - Continue gradual weight loss.   - Maintain good control of cholesterol (No contraindication to starting a statin with LFT elevations)   - Optimize blood glucose as needed.  Agree with GLP-1 inhibitor for both insulin resistance and help with weight loss  - Improve nutrition: continue limiting carbohydrates, especially simple carbohydrates, and following Mediterranean eating patten  - Increase physical activity as able, ideally 150 minutes weekly  - Limit alcohol intake to not more than 3 drinks a week (currently rare )     # History of multiple hepatic adenomas, improved:   - Recent MRI showing decrease in number of adenomas, and overall decrease in size  - Continue avoiding estrogen containing birth control  - Continue weight maintenance   - Repeat MRI with and without contrast of liver in 1 year    #History of FNH, stable:   - No routine follow up needed     # Fatigue:   - Check iron panel. If persistent, discuss with PCP     - Follow up with Hepatology in one year with Dr. Eliana Damon, DEL[facility]  [facility]    -----------------------------------------------------       HPI:   Elicia Calero is a 49 year old female presenting for follow-up.     #.  "MAFLD  #. Metabolic Syndrome: type II diabetes, obesity, HTN and hyperlipidemia  MR elastography in March 2022 with hepatic steatosis.  Fib-4: 0.44 was not suggestive of advanced fibrosis.   #. Hepatic adenomas  #. Focal Nodular Hyperplasia    Patient was last seen by Dr. Monroy on 8/2/2023.   No recent hospitalizations or ER visits.    Continues to make better food choices.   230 pounds and recently around 196 lbs/188s.   Feeling good.     Physical activiy limited due to fused ankle.   Ramp to hospital once a day.   She did start Mounjaro last October.    Fatigue/feel like could sleep all the time.  Attempted to take ferrous gluconate every other day last year, stopped due to worsening constipation. She does continue to have menstrual cycles, perimenopausal.   Snoring intermittent with very tired. TSH - levo dose has been the same.     BM every 2-3 days.     Patient denies jaundice, lower extremity edema, abdominal distension or confusion.  Patient also denies melena, hematochezia or hematemesis.  Patient denies, fevers, sweats or chills.  Occassional alcohol.     Previous work-up:   Lab Results   Component Value Date    HCVAB Nonreactive 12/01/2022    JANETH 18 03/02/2023    IRONSAT 16 03/02/2023    TTG 0.2 09/20/2023    TTGG <0.6 09/20/2023    IGA 70 (L) 03/02/2023    TSH 1.03 07/18/2024    CHOL 118 04/26/2024    HDL 48 (L) 04/26/2024    LDL 55 04/26/2024    TRIG 73 04/26/2024    A1C 5.9 (H) 07/18/2024      No results found for: \"SPECDES\", \"LDRESULTS\"     Recent Labs   Lab Test 04/26/24  0634 09/20/23  0813 03/02/23  0857 12/01/22  1013   ALKPHOS 84 93 132* 108*   ALT 14 19 15 19   AST 15 20 14 20   BILITOTAL 0.2 0.2 0.2 0.3        Medical hx Surgical hx   Past Medical History:   Diagnosis Date    Degenerative arthritis of spine     Depressive disorder     Fibroid uterus     Hepatic adenoma     HTN (hypertension)     Hypothyroidism     Irregular menstrual cycle 03/25/2009    Migraine     NAFLD (nonalcoholic fatty liver " disease)     Type 2 diabetes, HbA1C goal < 8% (H)     Past Surgical History:   Procedure Laterality Date    CHOLECYSTECTOMY      teen years    COLONOSCOPY N/A 05/18/2023    Procedure: Colonoscopy;  Surgeon: Chance Chan MD;  [place]: [facility] [place]    ENT SURGERY      ESOPHAGOSCOPY, GASTROSCOPY, DUODENOSCOPY (EGD), COMBINED N/A 05/18/2023    Procedure: ESOPHAGOGASTRODUODENOSCOPY, WITH BIOPSY;  Surgeon: Chance Chan MD;  Location: [facility] [place]    ORTHOPEDIC SURGERY  2020,21,22,23    Have had ankle surgery jw year due to a breakage    RADIO FREQUENCY ABLATION / DESTRUCTION OF SACROILOAC JOINT LATERAL BRANCHES (S1/S2/S3) Bilateral 06/15/2023    Procedure: Bilateral Lumbar 5 medial branch block and Sacral 1,2,3 lateral branch block;  Surgeon: Layne Weber MD;  Location: [facility] [place]    SOFT TISSUE SURGERY  10/19/23    Ruptured right ankle tendon                 Medications:     Current Outpatient Medications   Medication Sig Dispense Refill    Alcohol Swabs PADS 1 each daily 300 each 3    buPROPion (WELLBUTRIN XL) 150 MG 24 hr tablet Take 1 tablet (150 mg) by mouth every morning 90 tablet 3    buPROPion (WELLBUTRIN XL) 300 MG 24 hr tablet Take 1 tablet (300 mg) by mouth daily 90 tablet 3    Continuous Glucose Sensor (DEXCOM G7 SENSOR) MISC 1 Device every 10 days 9 each 11    diphenhydrAMINE (BENADRYL) 50 MG tablet Take 50 mg by mouth      doxepin (SINEQUAN) 25 MG capsule Take 2 capsules (50 mg) by mouth At Bedtime 30 capsule 3    drospirenone (SLYND) 4 MG TABS tablet Take 1 tablet (4 mg) by mouth daily Take 1 tablet daily continuously 84 tablet 4    empagliflozin (JARDIANCE) 25 MG TABS tablet Take 1 tablet (25 mg) by mouth daily 90 tablet 3    folic acid (FOLVITE) 1 MG tablet Take 1 tablet (1,000 mcg) by mouth daily 90 tablet 3    gabapentin (NEURONTIN) 600 MG tablet Take 2 tablets (1,200 mg) by mouth 3 times daily 540 tablet 3    hydrOXYzine (VISTARIL) 50 MG capsule Take 1 capsule (50 mg) by  mouth daily 90 capsule 3    hydrOXYzine HCl (ATARAX) 25 MG tablet TAKE ONE TABLET BY MOUTH ONCE DAILY ALONG WITH 50 MG TABLET FOR A TOTAL DAILY DOSE OF 75 MG AS NEEDED 90 tablet 3    lamoTRIgine (LAMICTAL) 200 MG tablet Take 2 tablets (400 mg) by mouth At Bedtime for 360 days 180 tablet 3    levothyroxine (SYNTHROID/LEVOTHROID) 125 MCG tablet Take 1 tablet (125 mcg) by mouth daily 90 tablet 3    lidocaine, viscous, (XYLOCAINE) 2 % solution Swish and spit 5 mLs in mouth 5 times daily 100 mL 1    lisdexamfetamine (VYVANSE) 70 MG capsule Take 70 mg by mouth every morning      metFORMIN (GLUCOPHAGE XR) 500 MG 24 hr tablet Take 4 tablets (2,000 mg) by mouth daily 360 tablet 3    methocarbamol (ROBAXIN) 500 MG tablet       naloxone (NARCAN) 4 MG/0.1ML nasal spray Spray 1 spray (4 mg) into one nostril alternating nostrils as needed for opioid reversal every 2-3 minutes until assistance arrives 1 each 2    nystatin (MYCOSTATIN) 355203 UNIT/GM external cream Apply 100,000 g topically 3 times daily      omeprazole (PRILOSEC) 20 MG DR capsule Take 1 capsule (20 mg) by mouth 2 times daily 180 capsule 3    ondansetron (ZOFRAN) 4 MG tablet Take 1 tablet (4 mg) by mouth 3 times daily 90 tablet 1    oxyCODONE (OXYCONTIN) 10 MG 12 hr tablet Take 1 tablet (10 mg) by mouth every 12 hours 60 tablet 0    [START ON 8/21/2024] oxyCODONE (OXYCONTIN) 10 MG 12 hr tablet Take 1 tablet (10 mg) by mouth every 12 hours for 30 days 60 tablet 0    oxyCODONE (ROXICODONE) 5 MG tablet Take 1 tablet (5 mg) by mouth every 6 hours as needed for pain (max of 4 tablets per day) Do not take more than 15mg total per day 110 tablet 0    oxyCODONE (ROXICODONE) 5 MG tablet Take 1 tablet (5 mg) by mouth every 4 hours as needed for moderate pain Do not take more than 15mg total per day 90 tablet 0    pravastatin (PRAVACHOL) 40 MG tablet Take 1 tablet (40 mg) by mouth daily 90 tablet 3    prochlorperazine (COMPAZINE) 10 MG tablet Take 1 tablet (10 mg) by mouth 4  times daily as needed for nausea (with migraine) 120 tablet 5    REXULTI 2 MG tablet Take 2 mg by mouth daily      rizatriptan (MAXALT) 10 MG tablet Take 1 tablet (10 mg) by mouth at onset of headache for migraine May repeat in 2 hours. Max 3 tablets/24 hours. 18 tablet 3    tirzepatide (MOUNJARO) 10 MG/0.5ML pen Inject 10 mg Subcutaneous every 7 days 6 mL 1    tiZANidine (ZANAFLEX) 2 MG tablet Take 2 tablets (4 mg) by mouth 3 times daily 540 tablet 1    topiramate (TOPAMAX) 100 MG tablet Take 1 tablet (100 mg) by mouth every morning Not actively taking 7-10-24.      triamcinolone (KENALOG) 0.1 % external ointment Apply twice daily on hand for 2-4 weeks. 15 g 1     Current Facility-Administered Medications   Medication Dose Route Frequency Provider Last Rate Last Admin    lidocaine (PF) (XYLOCAINE) 1 % injection 0.5 mL  0.5 mL   Collin Calero, DO   0.5 mL at 03/09/24 0838    lidocaine (PF) (XYLOCAINE) 1 % injection 1 mL  1 mL   Collin Calero, DO   1 mL at 03/01/24 0823    triamcinolone (KENALOG-40) injection 40 mg  40 mg   Collin Calero, DO   40 mg at 03/09/24 0838    triamcinolone (KENALOG-40) injection 40 mg  40 mg   Collin Calero, DO   40 mg at 03/01/24 0823            Allergies:     Allergies   Allergen Reactions    Lithium Dizziness and Other (See Comments)     Other reaction(s): PSYCHOSIS    Other Reaction(s): Altered Mental Status    Sulfa Antibiotics Swelling     lips    Cold sores per pt    Adhesive Tape Rash    Albuterol Nausea and Vomiting    Clarithromycin Nausea and Vomiting    Erythromycin Nausea and Vomiting    Amoxicillin-Pot Clavulanate Nausea and Vomiting    Venlafaxine Other (See Comments)     Other reaction(s): PSYCHOSIS    Psychosis    Per transferred records            Review of Systems:   10 points ROS was obtained and highlighted in the HPI, otherwise negative.          Physical Exam:   BP 98/68   Pulse 77   Wt 87.3 kg (192 lb 8 oz)   LMP 06/17/2024   SpO2 96%   BMI 29.97 kg/m   "    Gen- well, NAD, A+Ox3, normal color  Lym- no palpable LAD  Abd- soft, nontender.   Extr- hands normal, no REJI  Skin- no rash or jaundice  Neuro- no asterixis  Psych- normal mood         Data:   Reviewed in person and significant for:    Lab Results   Component Value Date     04/26/2024      Lab Results   Component Value Date    POTASSIUM 4.6 04/26/2024     Lab Results   Component Value Date    CHLORIDE 102 04/26/2024     Lab Results   Component Value Date    CO2 29 04/26/2024     Lab Results   Component Value Date    BUN 18.6 04/26/2024     Lab Results   Component Value Date    CR 0.81 04/26/2024       Lab Results   Component Value Date    WBC 6.3 04/26/2024     Lab Results   Component Value Date    HGB 13.1 04/26/2024     Lab Results   Component Value Date    HCT 41.9 04/26/2024     Lab Results   Component Value Date    MCV 77 04/26/2024     Lab Results   Component Value Date     04/26/2024       Lab Results   Component Value Date    AST 15 04/26/2024     Lab Results   Component Value Date    ALT 14 04/26/2024     No results found for: \"BILICONJ\"   Lab Results   Component Value Date    BILITOTAL 0.2 04/26/2024       Lab Results   Component Value Date    ALBUMIN 4.5 04/26/2024     Lab Results   Component Value Date    PROTTOTAL 6.8 04/26/2024      Lab Results   Component Value Date    ALKPHOS 84 04/26/2024       Lab Results   Component Value Date    INR 0.95 04/26/2024       Narrative & Impression   MRI ABDOMEN     CLINICAL HISTORY: Hepatic adenoma; Focal nodular hyperplasia of liver;  Follow up hepatic adenomas + FNH     TECHNIQUE:  Images were acquired with and without intravenous contrast  through the abdomen. The following MR images were acquired: TrueFISP,  multiplanar T2 weighted, axial T1 in/out of phase, axial fat-saturated  T1, diffusion-weighted. Multiplanar T1-weighted images with fat  saturation were before contrast administration and at multiple time  points following the administration " of intravenous contrast. Contrast  dose: 8.5 mL Gadavist     FINDINGS:     Comparison study: MRI 6/22/2023, 3/21/2022.     Liver: Noncirrhotic morphology of liver. No evidence of significant  fat or iron deposition. No substantial change in hepatic segment 4  cyst, demonstrating hyperintense T2 and T1 signal, suggesting  proteinaceous content or hemorrhage. No intrahepatic biliary dilation.     Approximately 8 arterially enhancing mildly T2 hyperintense  observations scattered throughout the liver, none of which appear to  persist on delayed phase contrast imaging. The largest measures 1.3 x  1.0 cm in hepatic segment 4 (19/43). Overall there is decreased  enhancement over time dating back to MRI 3/21/2022 of these  observations.     Gallbladder: Surgically absent gallbladder. No extra hepatic biliary  dilation.     Spleen: Within normal limits.     Kidneys: No hydronephrosis or mass.     Adrenal glands: Within normal limits.     Pancreas: Preserved intrinsic T1 hyperintense parenchymal signal. No  ductal dilation or mass.     Bowel: The visualized small and large bowel is within normal limits.     Lymph nodes: No adenopathy.     Blood vessels: Retroaortic left renal vein. The major abdominal  arteries and portal vessels are patent.     Lung bases: Grossly clear.     Bones and soft tissues: No acute or suspicious osseous lesion.     Mesentery and abdominal wall: Within normal limits.     Ascites: None.                                                                      IMPRESSION:  Approximately 8 anteriorly enhancing observations throughout the liver  which were previously characterized as adrenal adenomas with at least  one FNH, over time some of these lesions decreased in size and  enhancement dating back to MRI 3/21/2022.     I have personally reviewed the examination and initial interpretation  and I agree with the findings.     GARIMA HARRELL MD

## 2024-08-13 ENCOUNTER — MYC MEDICAL ADVICE (OUTPATIENT)
Dept: FAMILY MEDICINE | Facility: CLINIC | Age: 49
End: 2024-08-13
Payer: COMMERCIAL

## 2024-08-13 NOTE — TELEPHONE ENCOUNTER
Patient states she did  oxycontin in July  Verbalized understanding of 4x/day use of oxycodone for acute pain and to taper back down to 3x/day use once pain improves. Advised of this one time increased quantity.    MARCOS MedinaN, RN (she/her)  [facility] RN

## 2024-08-13 NOTE — TELEPHONE ENCOUNTER
Writer replied to patient via [employer].  MARCOS MedinaN, RN (she/her)  [facility] RN

## 2024-08-15 ENCOUNTER — HOSPITAL ENCOUNTER (EMERGENCY)
Facility: CLINIC | Age: 49
Discharge: HOME | End: 2024-08-15
Attending: EMERGENCY MEDICINE | Admitting: EMERGENCY MEDICINE
Payer: COMMERCIAL

## 2024-08-15 ENCOUNTER — E-VISIT (OUTPATIENT)
Dept: FAMILY MEDICINE | Facility: CLINIC | Age: 49
End: 2024-08-15
Payer: COMMERCIAL

## 2024-08-15 VITALS
RESPIRATION RATE: 16 BRPM | BODY MASS INDEX: 29.66 KG/M2 | OXYGEN SATURATION: 98 % | WEIGHT: 189 LBS | TEMPERATURE: 98.2 F | HEART RATE: 88 BPM | HEIGHT: 67 IN | SYSTOLIC BLOOD PRESSURE: 122 MMHG | DIASTOLIC BLOOD PRESSURE: 73 MMHG

## 2024-08-15 DIAGNOSIS — G43.819 OTHER MIGRAINE WITHOUT STATUS MIGRAINOSUS, INTRACTABLE: Primary | ICD-10-CM

## 2024-08-15 DIAGNOSIS — G43.809 OTHER MIGRAINE WITHOUT STATUS MIGRAINOSUS, NOT INTRACTABLE: ICD-10-CM

## 2024-08-15 LAB
ALBUMIN SERPL BCG-MCNC: 5.1 G/DL (ref 3.5–5.2)
ALP SERPL-CCNC: 95 U/L (ref 40–150)
ALT SERPL W P-5'-P-CCNC: 15 U/L (ref 0–50)
ANION GAP SERPL CALCULATED.3IONS-SCNC: 12 MMOL/L (ref 7–15)
AST SERPL W P-5'-P-CCNC: 17 U/L (ref 0–45)
ATRIAL RATE - MUSE: 69 BPM
BASOPHILS # BLD AUTO: 0.1 10E3/UL (ref 0–0.2)
BASOPHILS NFR BLD AUTO: 1 %
BILIRUB SERPL-MCNC: 0.3 MG/DL
BUN SERPL-MCNC: 20.8 MG/DL (ref 6–20)
CALCIUM SERPL-MCNC: 10 MG/DL (ref 8.8–10.4)
CHLORIDE SERPL-SCNC: 101 MMOL/L (ref 98–107)
CREAT SERPL-MCNC: 1.09 MG/DL (ref 0.51–0.95)
DIASTOLIC BLOOD PRESSURE - MUSE: NORMAL MMHG
EGFRCR SERPLBLD CKD-EPI 2021: 62 ML/MIN/1.73M2
EOSINOPHIL # BLD AUTO: 0.3 10E3/UL (ref 0–0.7)
EOSINOPHIL NFR BLD AUTO: 4 %
ERYTHROCYTE [DISTWIDTH] IN BLOOD BY AUTOMATED COUNT: 16.6 % (ref 10–15)
GLUCOSE SERPL-MCNC: 109 MG/DL (ref 70–99)
HCO3 SERPL-SCNC: 25 MMOL/L (ref 22–29)
HCT VFR BLD AUTO: 47.6 % (ref 35–47)
HGB BLD-MCNC: 15.3 G/DL (ref 11.7–15.7)
IMM GRANULOCYTES # BLD: 0 10E3/UL
IMM GRANULOCYTES NFR BLD: 0 %
INTERPRETATION ECG - MUSE: NORMAL
LYMPHOCYTES # BLD AUTO: 3.3 10E3/UL (ref 0.8–5.3)
LYMPHOCYTES NFR BLD AUTO: 45 %
MCH RBC QN AUTO: 26.3 PG (ref 26.5–33)
MCHC RBC AUTO-ENTMCNC: 32.1 G/DL (ref 31.5–36.5)
MCV RBC AUTO: 82 FL (ref 78–100)
MONOCYTES # BLD AUTO: 0.5 10E3/UL (ref 0–1.3)
MONOCYTES NFR BLD AUTO: 7 %
NEUTROPHILS # BLD AUTO: 3.1 10E3/UL (ref 1.6–8.3)
NEUTROPHILS NFR BLD AUTO: 43 %
NRBC # BLD AUTO: 0 10E3/UL
NRBC BLD AUTO-RTO: 0 /100
P AXIS - MUSE: 67 DEGREES
PLATELET # BLD AUTO: 418 10E3/UL (ref 150–450)
POTASSIUM SERPL-SCNC: 4.3 MMOL/L (ref 3.4–5.3)
PR INTERVAL - MUSE: 162 MS
PROT SERPL-MCNC: 7.7 G/DL (ref 6.4–8.3)
QRS DURATION - MUSE: 84 MS
QT - MUSE: 406 MS
QTC - MUSE: 435 MS
R AXIS - MUSE: 39 DEGREES
RBC # BLD AUTO: 5.82 10E6/UL (ref 3.8–5.2)
SODIUM SERPL-SCNC: 138 MMOL/L (ref 135–145)
SYSTOLIC BLOOD PRESSURE - MUSE: NORMAL MMHG
T AXIS - MUSE: 52 DEGREES
VENTRICULAR RATE- MUSE: 69 BPM
WBC # BLD AUTO: 7.3 10E3/UL (ref 4–11)

## 2024-08-15 PROCEDURE — 93010 ELECTROCARDIOGRAM REPORT: CPT | Performed by: EMERGENCY MEDICINE

## 2024-08-15 PROCEDURE — 250N000011 HC RX IP 250 OP 636: Performed by: EMERGENCY MEDICINE

## 2024-08-15 PROCEDURE — 82040 ASSAY OF SERUM ALBUMIN: CPT | Performed by: EMERGENCY MEDICINE

## 2024-08-15 PROCEDURE — 258N000003 HC RX IP 258 OP 636: Performed by: EMERGENCY MEDICINE

## 2024-08-15 PROCEDURE — 99284 EMERGENCY DEPT VISIT MOD MDM: CPT | Performed by: EMERGENCY MEDICINE

## 2024-08-15 PROCEDURE — 93005 ELECTROCARDIOGRAM TRACING: CPT | Performed by: EMERGENCY MEDICINE

## 2024-08-15 PROCEDURE — 85025 COMPLETE CBC W/AUTO DIFF WBC: CPT | Performed by: EMERGENCY MEDICINE

## 2024-08-15 PROCEDURE — 96361 HYDRATE IV INFUSION ADD-ON: CPT | Performed by: EMERGENCY MEDICINE

## 2024-08-15 PROCEDURE — 99207 PR NO CHARGE NURSE ONLY: CPT | Performed by: STUDENT IN AN ORGANIZED HEALTH CARE EDUCATION/TRAINING PROGRAM

## 2024-08-15 PROCEDURE — 99284 EMERGENCY DEPT VISIT MOD MDM: CPT | Mod: 25 | Performed by: EMERGENCY MEDICINE

## 2024-08-15 PROCEDURE — 96375 TX/PRO/DX INJ NEW DRUG ADDON: CPT | Performed by: EMERGENCY MEDICINE

## 2024-08-15 PROCEDURE — 36415 COLL VENOUS BLD VENIPUNCTURE: CPT | Performed by: EMERGENCY MEDICINE

## 2024-08-15 PROCEDURE — 96374 THER/PROPH/DIAG INJ IV PUSH: CPT | Performed by: EMERGENCY MEDICINE

## 2024-08-15 RX ORDER — DIPHENHYDRAMINE HYDROCHLORIDE 50 MG/ML
25 INJECTION INTRAMUSCULAR; INTRAVENOUS ONCE
Status: COMPLETED | OUTPATIENT
Start: 2024-08-15 | End: 2024-08-15

## 2024-08-15 RX ORDER — DEXAMETHASONE SODIUM PHOSPHATE 10 MG/ML
4 INJECTION, SOLUTION INTRAMUSCULAR; INTRAVENOUS ONCE
Status: COMPLETED | OUTPATIENT
Start: 2024-08-15 | End: 2024-08-15

## 2024-08-15 RX ADMIN — DIPHENHYDRAMINE HYDROCHLORIDE 25 MG: 50 INJECTION, SOLUTION INTRAMUSCULAR; INTRAVENOUS at 20:03

## 2024-08-15 RX ADMIN — DEXAMETHASONE SODIUM PHOSPHATE 4 MG: 10 INJECTION, SOLUTION INTRAMUSCULAR; INTRAVENOUS at 20:04

## 2024-08-15 RX ADMIN — METOCLOPRAMIDE HYDROCHLORIDE 10 MG: 5 INJECTION INTRAMUSCULAR; INTRAVENOUS at 17:50

## 2024-08-15 RX ADMIN — SODIUM CHLORIDE 1000 ML: 9 INJECTION, SOLUTION INTRAVENOUS at 17:50

## 2024-08-15 RX ADMIN — PROCHLORPERAZINE EDISYLATE 10 MG: 5 INJECTION INTRAMUSCULAR; INTRAVENOUS at 20:03

## 2024-08-15 ASSESSMENT — COLUMBIA-SUICIDE SEVERITY RATING SCALE - C-SSRS
6. HAVE YOU EVER DONE ANYTHING, STARTED TO DO ANYTHING, OR PREPARED TO DO ANYTHING TO END YOUR LIFE?: NO
2. HAVE YOU ACTUALLY HAD ANY THOUGHTS OF KILLING YOURSELF IN THE PAST MONTH?: NO
1. IN THE PAST MONTH, HAVE YOU WISHED YOU WERE DEAD OR WISHED YOU COULD GO TO SLEEP AND NOT WAKE UP?: NO

## 2024-08-15 ASSESSMENT — ACTIVITIES OF DAILY LIVING (ADL)
ADLS_ACUITY_SCORE: 35
ADLS_ACUITY_SCORE: 35
ADLS_ACUITY_SCORE: 33
ADLS_ACUITY_SCORE: 35

## 2024-08-15 NOTE — ED TRIAGE NOTES
"Chief Complaint: Patient presents to the  ED for evaluation of migraine since 8/11, accompanied by nausea. Pain 8/10    Onset of Symptoms: 8/11/2024    Home Remedies: Prescribed medications    Triage Vital Signs: /73   Pulse 88   Temp 98.2  F (36.8  C) (Oral)   Resp 16   Ht 1.702 m (5' 7\")   Wt 85.7 kg (189 lb)   LMP 06/17/2024   SpO2 98%   BMI 29.60 kg/m      Fede Dallas RN  8/15/2024     Triage Assessment (Adult)       Row Name 08/15/24 1623          Triage Assessment    Airway WDL WDL        Respiratory WDL    Respiratory WDL WDL        Skin Circulation/Temperature WDL    Skin Circulation/Temperature WDL WDL        Cardiac WDL    Cardiac WDL WDL        Peripheral/Neurovascular WDL    Peripheral Neurovascular WDL WDL        Cognitive/Neuro/Behavioral WDL    Cognitive/Neuro/Behavioral WDL WDL                     "

## 2024-08-15 NOTE — PATIENT INSTRUCTIONS
Thank you for choosing us for your care. Based on the information provided, I believe you need to be seen immediately.  Please go  urgent care or ED  as soon as possible.     You will not be charged for this eVisit.

## 2024-08-15 NOTE — ED PROVIDER NOTES
ED Provider Note  [facility]      History     Chief Complaint   Patient presents with    Headache    Nausea     HPI  Elicia Calero is a 49 year old female with a history of chronic migraines and T2DM who presents to the ED for evaluation of a headache and nausea.     She reports a longstanding history of migraines which have been slightly increased in frequency over the past few months. Most recent headache started 4 days ago and is accompanied by significant nausea, and sound/smell/light sensitivities. She adds that she has slight decrease in her bilateral peripheral vision, which is typical for her migraines. Her migraine is a 7/10 and constant in nature. The pain migrates in location from behind her eyes, to her temples, to the base of her skull. She has tried [place] [place] is mild temporary relief when the pain is at the base of her skull. Otherwise, she has taken max dosing of tylenol, ibuprofen, Maxalt, methocarbamol, and Zofran. Usually with her migraines she can take a Maxalt and symptoms improve, however this is different given it has not resolved with any medication and has persisted for several days. She has struggled to sleep and eat due to symptoms. She has tried to drink fluids when possible, including caffeine. She has seen Neurology in the past but symptoms improved so she has not seen a Neurologist in many years.  No blurry vision, double vision, speech difficulties, sensation changes, lightheadedness, dizziness, weakness, vomiting, chest pain, abdominal pain, constipation/diarrhea, urinary changes or other concerns at this time.     Past Medical History  Past Medical History:   Diagnosis Date    Degenerative arthritis of spine     Depressive disorder     Fibroid uterus     Hepatic adenoma     HTN (hypertension)     Hypothyroidism     Irregular menstrual cycle 03/25/2009    Migraine     NAFLD (nonalcoholic fatty liver disease)     Type 2 diabetes, HbA1C goal < 8% (H)       Past Surgical History:   Procedure Laterality Date    CHOLECYSTECTOMY      teen years    COLONOSCOPY N/A 05/18/2023    Procedure: Colonoscopy;  Surgeon: Chance Chan MD;  Location: [facility] [place] OR    ENT SURGERY      ESOPHAGOSCOPY, GASTROSCOPY, DUODENOSCOPY (EGD), COMBINED N/A 05/18/2023    Procedure: ESOPHAGOGASTRODUODENOSCOPY, WITH BIOPSY;  Surgeon: Chance Chan MD;  Location: [facility] [place] OR    ORTHOPEDIC SURGERY  2020,21,22,23    Have had ankle surgery jw year due to a breakage    RADIO FREQUENCY ABLATION / DESTRUCTION OF SACROILOAC JOINT LATERAL BRANCHES (S1/S2/S3) Bilateral 06/15/2023    Procedure: Bilateral Lumbar 5 medial branch block and Sacral 1,2,3 lateral branch block;  Surgeon: Layne Weber MD;  Location: [facility] [place] OR    SOFT TISSUE SURGERY  10/19/23    Ruptured right ankle tendon     Alcohol Swabs PADS  buPROPion (WELLBUTRIN XL) 150 MG 24 hr tablet  buPROPion (WELLBUTRIN XL) 300 MG 24 hr tablet  Continuous Glucose Sensor (DEXCOM G7 SENSOR) MISC  diphenhydrAMINE (BENADRYL) 50 MG tablet  doxepin (SINEQUAN) 25 MG capsule  drospirenone (SLYND) 4 MG TABS tablet  empagliflozin (JARDIANCE) 25 MG TABS tablet  folic acid (FOLVITE) 1 MG tablet  gabapentin (NEURONTIN) 600 MG tablet  hydrOXYzine (VISTARIL) 50 MG capsule  hydrOXYzine HCl (ATARAX) 25 MG tablet  lamoTRIgine (LAMICTAL) 200 MG tablet  levothyroxine (SYNTHROID/LEVOTHROID) 125 MCG tablet  lidocaine, viscous, (XYLOCAINE) 2 % solution  lisdexamfetamine (VYVANSE) 70 MG capsule  metFORMIN (GLUCOPHAGE XR) 500 MG 24 hr tablet  methocarbamol (ROBAXIN) 500 MG tablet  naloxone (NARCAN) 4 MG/0.1ML nasal spray  nystatin (MYCOSTATIN) 658881 UNIT/GM external cream  omeprazole (PRILOSEC) 20 MG DR capsule  ondansetron (ZOFRAN) 4 MG tablet  oxyCODONE (OXYCONTIN) 10 MG 12 hr tablet  [START ON 8/21/2024] oxyCODONE (OXYCONTIN) 10 MG 12 hr tablet  oxyCODONE (ROXICODONE) 5 MG tablet  oxyCODONE (ROXICODONE) 5 MG tablet  pravastatin (PRAVACHOL) 40 MG  tablet  prochlorperazine (COMPAZINE) 10 MG tablet  REXULTI 2 MG tablet  rizatriptan (MAXALT) 10 MG tablet  tirzepatide (MOUNJARO) 10 MG/0.5ML pen  tiZANidine (ZANAFLEX) 2 MG tablet  topiramate (TOPAMAX) 100 MG tablet  triamcinolone (KENALOG) 0.1 % external ointment      Allergies   Allergen Reactions    Lithium Dizziness and Other (See Comments)     Other reaction(s): PSYCHOSIS    Other Reaction(s): Altered Mental Status    Sulfa Antibiotics Swelling     lips    Cold sores per pt    Adhesive Tape Rash    Albuterol Nausea and Vomiting    Clarithromycin Nausea and Vomiting    Erythromycin Nausea and Vomiting    Amoxicillin-Pot Clavulanate Nausea and Vomiting    Venlafaxine Other (See Comments)     Other reaction(s): PSYCHOSIS    Psychosis    Per transferred records     Family History  Family History   Problem Relation Age of Onset    Hypertension Mother     Diabetes Maternal Grandmother     Hypertension Maternal Grandmother     Heart Disease Paternal Grandmother     Diabetes Paternal Grandmother     Substance Abuse Maternal Grandfather     Mental Illness Other     Thyroid Disease Cousin     Liver Disease No family hx of     Depression No family hx of     Anxiety Disorder No family hx of     Obesity No family hx of      Social History   Social History     Tobacco Use    Smoking status: Former     Current packs/day: 0.00     Average packs/day: 1 pack/day for 20.0 years (20.0 ttl pk-yrs)     Types: Cigarettes     Start date:      Quit date:      Years since quittin.6    Smokeless tobacco: Never   Vaping Use    Vaping status: Never Used   Substance Use Topics    Alcohol use: Yes     Comment: occasional    Drug use: Never      Past medical history, past surgical history, medications, allergies, family history, and social history were reviewed with the patient. No additional pertinent items.     A complete review of systems was performed with pertinent positives and negatives noted in the HPI, and all other  "systems negative.    Physical Exam   BP: 122/73  Pulse: 88  Temp: 98.2  F (36.8  C)  Resp: 16  Height: 170.2 cm (5' 7\")  Weight: 85.7 kg (189 lb)  SpO2: 98 %  Physical Exam  Vitals and nursing note reviewed.   Constitutional:       Appearance: Normal appearance.   HENT:      Head: Normocephalic and atraumatic.      Right Ear: External ear normal.      Left Ear: External ear normal.      Mouth/Throat:      Mouth: Mucous membranes are moist.   Eyes:      Extraocular Movements: Extraocular movements intact.      Conjunctiva/sclera: Conjunctivae normal.      Pupils: Pupils are equal, round, and reactive to light.   Cardiovascular:      Rate and Rhythm: Normal rate and regular rhythm.      Heart sounds: Normal heart sounds.   Pulmonary:      Effort: Pulmonary effort is normal.      Breath sounds: Normal breath sounds.   Abdominal:      Palpations: Abdomen is soft.   Musculoskeletal:      Cervical back: Normal range of motion.   Neurological:      General: No focal deficit present.      Mental Status: She is alert and oriented to person, place, and time.      Cranial Nerves: Cranial nerves 2-12 are intact. No dysarthria.      Sensory: No sensory deficit.      Comments:  strength normal bilaterally   Psychiatric:         Mood and Affect: Mood normal.         Behavior: Behavior normal.           ED Course, Procedures, & Data      Procedures            EKG Interpretation:      Interpreted by Jack Valdez MD  Time reviewed: per Epic  Symptoms at time of EKG: HA   Rhythm: normal sinus   Rate: normal  Axis: normal  Ectopy: none  Conduction: normal  ST Segments/ T Waves: No ST-T wave changes  Q Waves: none  Comparison to prior: Unchanged    Clinical Impression: normal EKG     Results for orders placed or performed during the hospital encounter of 08/15/24   Comprehensive metabolic panel     Status: Abnormal   Result Value Ref Range    Sodium 138 135 - 145 mmol/L    Potassium 4.3 3.4 - 5.3 mmol/L    Carbon Dioxide (CO2) 25 " 22 - 29 mmol/L    Anion Gap 12 7 - 15 mmol/L    Urea Nitrogen 20.8 (H) 6.0 - 20.0 mg/dL    Creatinine 1.09 (H) 0.51 - 0.95 mg/dL    GFR Estimate 62 >60 mL/min/1.73m2    Calcium 10.0 8.8 - 10.4 mg/dL    Chloride 101 98 - 107 mmol/L    Glucose 109 (H) 70 - 99 mg/dL    Alkaline Phosphatase 95 40 - 150 U/L    AST 17 0 - 45 U/L    ALT 15 0 - 50 U/L    Protein Total 7.7 6.4 - 8.3 g/dL    Albumin 5.1 3.5 - 5.2 g/dL    Bilirubin Total 0.3 <=1.2 mg/dL   CBC with platelets and differential     Status: Abnormal   Result Value Ref Range    WBC Count 7.3 4.0 - 11.0 10e3/uL    RBC Count 5.82 (H) 3.80 - 5.20 10e6/uL    Hemoglobin 15.3 11.7 - 15.7 g/dL    Hematocrit 47.6 (H) 35.0 - 47.0 %    MCV 82 78 - 100 fL    MCH 26.3 (L) 26.5 - 33.0 pg    MCHC 32.1 31.5 - 36.5 g/dL    RDW 16.6 (H) 10.0 - 15.0 %    Platelet Count 418 150 - 450 10e3/uL    % Neutrophils 43 %    % Lymphocytes 45 %    % Monocytes 7 %    % Eosinophils 4 %    % Basophils 1 %    % Immature Granulocytes 0 %    NRBCs per 100 WBC 0 <1 /100    Absolute Neutrophils 3.1 1.6 - 8.3 10e3/uL    Absolute Lymphocytes 3.3 0.8 - 5.3 10e3/uL    Absolute Monocytes 0.5 0.0 - 1.3 10e3/uL    Absolute Eosinophils 0.3 0.0 - 0.7 10e3/uL    Absolute Basophils 0.1 0.0 - 0.2 10e3/uL    Absolute Immature Granulocytes 0.0 <=0.4 10e3/uL    Absolute NRBCs 0.0 10e3/uL   EKG 12-lead, tracing only     Status: None (Preliminary result)   Result Value Ref Range    Systolic Blood Pressure  mmHg    Diastolic Blood Pressure  mmHg    Ventricular Rate 69 BPM    Atrial Rate 69 BPM    NC Interval 162 ms    QRS Duration 84 ms     ms    QTc 435 ms    P Axis 67 degrees    R AXIS 39 degrees    T Axis 52 degrees    Interpretation ECG       Sinus rhythm  Cannot rule out Anterior infarct , age undetermined  Abnormal ECG     CBC with platelets differential     Status: Abnormal    Narrative    The following orders were created for panel order CBC with platelets differential.  Procedure                                Abnormality         Status                     ---------                               -----------         ------                     CBC with platelets and d...[[id number]]  Abnormal            Final result                 Please view results for these tests on the individual orders.     Medications   sodium chloride 0.9% BOLUS 1,000 mL (1,000 mLs Intravenous $New Bag 8/15/24 1750)   metoclopramide (REGLAN) 10 mg in sodium chloride 0.9 % 50 mL infusion (10 mg Intravenous $Given 8/15/24 1750)     Labs Ordered and Resulted from Time of ED Arrival to Time of ED Departure   COMPREHENSIVE METABOLIC PANEL - Abnormal       Result Value    Sodium 138      Potassium 4.3      Carbon Dioxide (CO2) 25      Anion Gap 12      Urea Nitrogen 20.8 (*)     Creatinine 1.09 (*)     GFR Estimate 62      Calcium 10.0      Chloride 101      Glucose 109 (*)     Alkaline Phosphatase 95      AST 17      ALT 15      Protein Total 7.7      Albumin 5.1      Bilirubin Total 0.3     CBC WITH PLATELETS AND DIFFERENTIAL - Abnormal    WBC Count 7.3      RBC Count 5.82 (*)     Hemoglobin 15.3      Hematocrit 47.6 (*)     MCV 82      MCH 26.3 (*)     MCHC 32.1      RDW 16.6 (*)     Platelet Count 418      % Neutrophils 43      % Lymphocytes 45      % Monocytes 7      % Eosinophils 4      % Basophils 1      % Immature Granulocytes 0      NRBCs per 100 WBC 0      Absolute Neutrophils 3.1      Absolute Lymphocytes 3.3      Absolute Monocytes 0.5      Absolute Eosinophils 0.3      Absolute Basophils 0.1      Absolute Immature Granulocytes 0.0      Absolute NRBCs 0.0       No orders to display            Assessment & Plan    Elicia Calero is a 49 year old female with a history of chronic migraines, T2DM, NAFLD, chronic pain, and hepatic adenoma who presents to the ED for evaluation of a headache. Reports 4 day history of headache accompanied by nausea, photophobia, and sound/smell sensitivities which is unchanged with her regular medication regimen  (tylenol, ibuprofen, Maxalt, methocarbamol, zofran). Has a history of migraines and this is similar in character to those, but unique in it's duration and lack of response to medications. Vitals WNL on arrival. Exam reassuring without focal neurologic deficit. Symptoms most consistent with a migraine given her history and exam. Metoclopramide and IV NS bolus given for symptomatic management. Given nausea and difficulty eating/drinking recently CBC and CMP obtained and showed evidence of some mild dehydration with a Cr of 1.09. EKG obtained showing NSR. She improved after IV fluids and metoclopramide. Discharged home in stable condition. Reviewed return precautions and answered all questions. Should follow-up with a Neurologist for management of increased frequency in her migraines.     I have reviewed the nursing notes. I have reviewed the findings, diagnosis, plan and need for follow up with the patient.    New Prescriptions    No medications on file       Final diagnoses:   Other migraine without status migrainosus, not intractable     This patient was seen and discussed with Dr. Valdez.     Lisa Obrien, MS4    Jack Valdez                 [facility] EMERGENCY DEPARTMENT  8/15/2024    --    ED Attending Physician Attestation    I Jack Valdez MD, cared for this patient with the Medical Student. I performed, or re-performed, the physical exam and medical decision-making. I have verified the accuracy of all the medical student findings and documentation above, and have edited as necessary.        Jack Valdez MD  Emergency Medicine       Jack Valdez MD  08/17/24 [id number]

## 2024-08-16 ENCOUNTER — E-VISIT (OUTPATIENT)
Dept: FAMILY MEDICINE | Facility: CLINIC | Age: 49
End: 2024-08-16
Payer: COMMERCIAL

## 2024-08-16 ENCOUNTER — TELEPHONE (OUTPATIENT)
Dept: FAMILY MEDICINE | Facility: CLINIC | Age: 49
End: 2024-08-16

## 2024-08-16 DIAGNOSIS — G44.201 ACUTE INTRACTABLE TENSION-TYPE HEADACHE: Primary | ICD-10-CM

## 2024-08-16 DIAGNOSIS — G43.709 CHRONIC MIGRAINE WITHOUT AURA WITHOUT STATUS MIGRAINOSUS, NOT INTRACTABLE: ICD-10-CM

## 2024-08-16 PROCEDURE — 99421 OL DIG E/M SVC 5-10 MIN: CPT | Performed by: STUDENT IN AN ORGANIZED HEALTH CARE EDUCATION/TRAINING PROGRAM

## 2024-08-16 NOTE — TELEPHONE ENCOUNTER
Relayed updated advice in the interim (fluids w/electrolytes, hot pack on neck, cold pack on forehead, caffeine) and e-visit timeline by clinician and pt verbalized agreement with this plan.    MARCOS MedinaN, RN (she/her)  [facility] RN

## 2024-08-16 NOTE — TELEPHONE ENCOUNTER
Patient calling to check on status of e-visit and ask if it could be noted that she feels a course of steroids may be helpful. E-visit not yet seen by clinician, but advised patient this would be addressed at the clinicians earliest convenience.    MARCOS MedinaN, RN (she/her)  [facility] RN

## 2024-08-16 NOTE — ED NOTES
I took signout on the patient from Dr. Valdez.  This is a 49-year-old female with migraine. See initial note for full details. Patient was signed out to me pending reevaluation.  After medications patient felt improved and is requesting discharge.  We will discharge with return precautions.        Anderson Haley,   08/16/24 0031

## 2024-08-21 ENCOUNTER — MYC MEDICAL ADVICE (OUTPATIENT)
Dept: FAMILY MEDICINE | Facility: CLINIC | Age: 49
End: 2024-08-21
Payer: COMMERCIAL

## 2024-08-21 DIAGNOSIS — G44.201 ACUTE INTRACTABLE TENSION-TYPE HEADACHE: ICD-10-CM

## 2024-08-21 DIAGNOSIS — G43.709 CHRONIC MIGRAINE WITHOUT AURA WITHOUT STATUS MIGRAINOSUS, NOT INTRACTABLE: Primary | ICD-10-CM

## 2024-08-22 NOTE — TELEPHONE ENCOUNTER
" -- new referral pended    \" Can you send my referral to [facility] ([phone])? I couldn t get into our clinic until the middle of October and they referred me to [facility]. They will also be requesting my medical records.\"    Kelly Aponte RN  [facility]    "

## 2024-08-26 ENCOUNTER — TRANSFERRED RECORDS (OUTPATIENT)
Dept: HEALTH INFORMATION MANAGEMENT | Facility: CLINIC | Age: 49
End: 2024-08-26
Payer: COMMERCIAL

## 2024-09-03 DIAGNOSIS — G89.4 CHRONIC PAIN DISORDER: Primary | ICD-10-CM

## 2024-09-09 ENCOUNTER — TRANSFERRED RECORDS (OUTPATIENT)
Dept: HEALTH INFORMATION MANAGEMENT | Facility: CLINIC | Age: 49
End: 2024-09-09
Payer: COMMERCIAL

## 2024-09-12 NOTE — PROGRESS NOTES
Elicia is a 49 year old who is being evaluated via a billable video visit.      How would you like to obtain your AVS? MyChart  If the video visit is dropped, the invitation should be resent by: Send to e-mail at: [email or web address]  Will anyone else be joining your video visit? No    Assessment & Plan     Chronic pain disorder  Pain in joint involving ankle and foot, right  Chronic, continuous use of opioids  NSAID long term use  History of chronic foot pain, has been on continuous oxycodone for many years. S/P ankle tendon/arch surgery on 10/19 which went well. She is now weight bearing as tolerated in brace, doing PT. Following with [facility]. Discussed need to wean opioids, due to high MME, risk of tolerance and oversedation. No side effects. Has narcan available at home PRN.     Today pain is ok. She is having worsening back pain that she is seeing [facility] for-awaiting MRI and plan at the end of the month. Also having worsening migraines, for which she is seeing a migraine specialist-considering injections and getting an updated brain MRI. Currently using oxycontin BID and short acting oxycodone 3 tablets per day.     Plan:  -discussed goal is to wean opioids  -goal is to return to the amount of oxycodone she was taking pre-surgery, which was a total of short acting oxycodone 20mg daily (MME 30)   -MME were 30 ~8/2023   -Current MME are slightly less than double what she was taking prior to worsening symptoms ~8/2023      Opioid:   Continue long acting oxycontin 10mg BID (MME 30)  DECREASE short acting oxycodone from 110 to 90 tablets per month (3 tablets/day; MME 22)   PDMP checked, appropriate  Current MME: 52   She does have narcan available    Non-Opioid:  schedule tylenol 1000mg TID  schedule ibuprofen 800mg TID (increase from BID to TID)  Daily NSAIDs not ideal, will monitor renal function  On daily PPI  continue robaxin (tizanidine caused side effects)  continue gabapentin 1200mg  "TID  elevate, ice      Follow up in 2 months  Goal-decrease short acting oxycodone tablets to 75 per month      The longitudinal plan of care for the diagnosis(es)/condition(s) as documented were addressed during this visit. Due to the added complexity in care, I will continue to support Elicia in the subsequent management and with ongoing continuity of care.    Emily Rubi is a 49 year old, presenting for the following health issues:  Refill Request        2/29/2024     7:27 AM   Additional Questions   Roomed by Amada Samano   Accompanied by Self     History of Present Illness       Reason for visit:  Pain medication review    She eats 2-3 servings of fruits and vegetables daily.She consumes 0 sweetened beverage(s) daily.She exercises with enough effort to increase her heart rate 9 or less minutes per day.  She exercises with enough effort to increase her heart rate 3 or less days per week. She is missing 2 dose(s) of medications per week.  She is not taking prescribed medications regularly due to remembering to take.    Pain:  Last filled 110 tablets of oxycodone 5mg on 8/9/24 (need to go back down to 90 tablets)  Last filled 60 tablets of long acting oxycodone 10mg on 8/30/24    Today  Going ok  3 tablets oxycodone per day  Worsening back pain  Had MRI  Seeing provider at the end of the month, sam    Migraines  Seeing neurologist  Trying injection waiting on insurance  Will get MRI and blood work        Objective    Vitals - Patient Reported  Weight (Patient Reported): 84.4 kg (186 lb)  Height (Patient Reported): 171.5 cm (5' 7.5\")  BMI (Based on Pt Reported Ht/Wt): 28.7  Pain Score: Moderate Pain (4)  Pain Loc: Ankle    Vitals:  No vitals were obtained today due to virtual visit.    Physical Exam   GENERAL: alert and no distress  EYES: Eyes grossly normal to inspection.  No discharge or erythema, or obvious scleral/conjunctival abnormalities.  RESP: No audible wheeze, cough, or visible cyanosis.    SKIN: " Visible skin clear. No significant rash, abnormal pigmentation or lesions.  NEURO: Cranial nerves grossly intact.  Mentation and speech appropriate for age.  PSYCH: Appropriate affect, tone, and pace of words      Video-Visit Details    Type of service:  Video Visit   Video Start Time: 7:19am  Video End Time: 7:30am    Originating Location (pt. Location): Home  Distant Location (provider location):  On-site  Platform used for Video Visit: [facility]  Signed Electronically by: Loc Waggoner DO

## 2024-09-13 ENCOUNTER — VIRTUAL VISIT (OUTPATIENT)
Dept: FAMILY MEDICINE | Facility: CLINIC | Age: 49
End: 2024-09-13
Payer: COMMERCIAL

## 2024-09-13 ENCOUNTER — TELEPHONE (OUTPATIENT)
Dept: FAMILY MEDICINE | Facility: CLINIC | Age: 49
End: 2024-09-13

## 2024-09-13 DIAGNOSIS — G89.4 CHRONIC PAIN DISORDER: ICD-10-CM

## 2024-09-13 DIAGNOSIS — K58.9 IRRITABLE BOWEL SYNDROME, UNSPECIFIED TYPE: ICD-10-CM

## 2024-09-13 DIAGNOSIS — Z79.1 NSAID LONG-TERM USE: ICD-10-CM

## 2024-09-13 DIAGNOSIS — K21.00 GASTROESOPHAGEAL REFLUX DISEASE WITH ESOPHAGITIS WITHOUT HEMORRHAGE: ICD-10-CM

## 2024-09-13 DIAGNOSIS — M25.571 PAIN IN JOINT INVOLVING ANKLE AND FOOT, RIGHT: ICD-10-CM

## 2024-09-13 DIAGNOSIS — F11.90 CHRONIC, CONTINUOUS USE OF OPIOIDS: ICD-10-CM

## 2024-09-13 DIAGNOSIS — R11.0 NAUSEA: ICD-10-CM

## 2024-09-13 DIAGNOSIS — G89.4 CHRONIC PAIN DISORDER: Primary | ICD-10-CM

## 2024-09-13 PROCEDURE — G2211 COMPLEX E/M VISIT ADD ON: HCPCS | Mod: 95 | Performed by: STUDENT IN AN ORGANIZED HEALTH CARE EDUCATION/TRAINING PROGRAM

## 2024-09-13 PROCEDURE — 99214 OFFICE O/P EST MOD 30 MIN: CPT | Mod: 95 | Performed by: STUDENT IN AN ORGANIZED HEALTH CARE EDUCATION/TRAINING PROGRAM

## 2024-09-13 RX ORDER — OXYCODONE HYDROCHLORIDE 5 MG/1
5 TABLET ORAL EVERY 6 HOURS PRN
Qty: 90 TABLET | Refills: 0 | Status: SHIPPED | OUTPATIENT
Start: 2024-09-13

## 2024-09-13 RX ORDER — ONDANSETRON 4 MG/1
4 TABLET, FILM COATED ORAL 3 TIMES DAILY PRN
Qty: 90 TABLET | Refills: 0 | Status: SHIPPED | OUTPATIENT
Start: 2024-09-13

## 2024-09-13 RX ORDER — OXYCODONE HYDROCHLORIDE 5 MG/1
5 TABLET ORAL EVERY 6 HOURS PRN
Qty: 90 TABLET | Refills: 0 | Status: SHIPPED | OUTPATIENT
Start: 2024-09-13 | End: 2024-09-13

## 2024-09-13 RX ORDER — OXYCODONE HYDROCHLORIDE 5 MG/1
5 TABLET ORAL EVERY 4 HOURS PRN
Qty: 90 TABLET | Refills: 0 | Status: SHIPPED | OUTPATIENT
Start: 2024-10-11 | End: 2024-09-13

## 2024-09-13 RX ORDER — OXYCODONE HCL 10 MG/1
10 TABLET, FILM COATED, EXTENDED RELEASE ORAL EVERY 12 HOURS
Qty: 60 TABLET | Refills: 0 | Status: SHIPPED | OUTPATIENT
Start: 2024-09-27

## 2024-09-13 RX ORDER — OXYCODONE HYDROCHLORIDE 5 MG/1
5 TABLET ORAL EVERY 6 HOURS PRN
Qty: 90 TABLET | Refills: 0 | Status: SHIPPED | OUTPATIENT
Start: 2024-10-11

## 2024-09-13 RX ORDER — OXYCODONE HCL 10 MG/1
10 TABLET, FILM COATED, EXTENDED RELEASE ORAL EVERY 12 HOURS
Qty: 60 TABLET | Refills: 0 | Status: SHIPPED | OUTPATIENT
Start: 2024-10-25

## 2024-09-13 NOTE — TELEPHONE ENCOUNTER
I updated and resent both short acting oxycodone prescriptions. Please update the pharmacist.    Dr. Waggoner

## 2024-09-13 NOTE — TELEPHONE ENCOUNTER
Pharmacy calling  Rx for oxycodone 5 mg tablet says two different things      Take 1 tablet (5 mg) by mouth every 4 hours as needed for moderate pain. Do not take more than 15mg total per day       Can you clarify the dosing?    Linda Hernandez RN, BSN  [facility]

## 2024-09-13 NOTE — TELEPHONE ENCOUNTER
Pharmacy will see new rx and call us if questions    Linda Hernandez, RN, BSN  [facility]

## 2024-09-15 NOTE — PROGRESS NOTES
Medication Therapy Management (MTM) Encounter    ASSESSMENT:                            Medication Adherence/Access: No issues identified    Type 2 Diabetes/Obesity:    Patient is meeting A1c goal of < 7%(5.9%). Self monitoring of blood glucose is closer to goal of fasting  mg/dL and post prandial < 180 mg/dL. Patient is  meeting average glucose goal of <150mg/dL. Patient is now meeting goal of > 70% (97%)time in target with continuous glucose monitoring.  Patient would benefit from : Increase weekly dose now to 12.5mg. Mounjaro .  GLP-1 drugs have worked very well for her --she has lost >50 lbs . And feels great !     Continue daily metformin and jardiance .    . Due for annual foot exam. Due for annual eye exam.         Hyperlipidemia:   Stable.  Patient is on moderate intensity statin which is indicated based on 2019 ACC/AHA guidelines for lipid management.        PLAN:                            1.  A1c is great at 5.9% --but your getting hungry towards end of week--so lets trial 12.5mg. Mounjaro /week now --see how it goes.      Follow-up: Return in about 12 weeks (around 12/11/2024), or @ 8 AM via telephone, for Medication Therapy Management Visit, Weight loss, Blood sugar meter review.      SUBJECTIVE/OBJECTIVE:                          Elicia Calero is a 49 year old female called for a follow-up (7-10-24) visit. She was referred to me from Loc Waggoner DO. (PCP).  .        Reason for visit:   Mounjaro f/up.  Loves dexcom G7 sensor.      .    Allergies/ADRs: Reviewed in chart  Past Medical History: Per patient diagnosed 10-15 years --did lose wt. To 150lbs years ago and reversed her DM . Ate 1200 cals/day , low carb   Tobacco: She reports that she quit smoking about 6 years ago. Her smoking use included cigarettes. She started smoking about 26 years ago. She has a 20 pack-year smoking history. She has never used smokeless tobacco.  Alcohol: Less than 1 beverage / month  Other Substance Use: none    E-cigarette Use: none   Caffeine:  1 coffee or crystal light every morning.   Social: off work due to tendon issue (ruptured).  She is a pharmacy tech at [employer].   Personal Healthcare Goals: fix dm soon   Activity: ruptured tendon repaired --back to walking again.    Medication Adherence/Access: no issues reported    Type 2 Diabetes/Obesity:    Failed bydureon in the past , fears needles--refuses all injectables before last visit but doing ok with Mounjaro as needle is hidden.   Back on Mounjaro 10.0mg. --doing great--but gets hungry last 2 days ...  Home weight is 186 9-18-24.  Metformin [id number] ER tabs daily  Losing weight (186bs. At home , 189lbs in clinic)  and controlling food intake and bs's --she loves it!  Jardiance 25mg./day   She stopped otc glucoredi tabs --$$$.  Not taking aspirin due to age.  Patient is not experiencing side effects.  SMBG: mtm changed her last month to Dexcom G7--she loves it !                Current diabetes symptoms:none now -Mounjaro has corrected her polyphagia.  Diet/Exercise: off walking boot from tendon surgery --recovered well.  Mounjaro curbing appetite --2 meals/day bfast and dinner , shake at lunch .    Previously:   Bfast /lunch --low carb , eat dinner --after dinner keeps eating after feeling full!  Has low carb slimfast or atkins shakes to use.   Sleep iffy due to intense hunger. Awakes 2-3 am --snacking and can't get back to sleep. She has mental health issues --struggles with depression and unable to move much with ruptured tendon --discouraged.       Eye exam: due  Foot exam: due  Urine Albumin:   Lab Results   Component Value Date    ALCR  04/26/2024      Comment:      Unable to calculate, urine albumin and/or urine creatinine is outside detectable limits.  Microalbuminuria is defined as an albumin:creatinine ratio of 17 to 299 for males and 25 to 299 for females. A ratio of albumin:creatinine of 300 or higher is indicative of overt proteinuria.  Due to  biologic variability, positive results should be confirmed by a second, first-morning random or 24-hour timed urine specimen. If there is discrepancy, a third specimen is recommended. When 2 out of 3 results are in the microalbuminuria range, this is evidence for incipient nephropathy and warrants increased efforts at glucose control, blood pressure control, and institution of therapy with an angiotensin-converting-enzyme (ACE) inhibitor (if the patient can tolerate it).        Lab Results   Component Value Date    A1C 5.9 07/18/2024    A1C 6.3 04/26/2024    A1C 6.0 12/14/2023    A1C 7.5 09/20/2023    A1C 10.8 06/28/2023               Hyperlipidemia:   Pravastatin 40mg daily  Patient reports no significant myalgias or other side effects.  The ASCVD Risk score (Carmita CORREIA, et al., 2019) failed to calculate for the following reasons:    The valid total cholesterol range is 130 to 320 mg/dL  Recent Labs   Lab Test 04/26/24  0634 06/28/23  0936   CHOL 118 133   HDL 48* 52   LDL 55 60   TRIG 73 105         BP Readings from Last 3 Encounters:   08/15/24 122/73   08/12/24 98/68   08/08/24 101/65     TSH   Date Value Ref Range Status   07/18/2024 1.03 0.30 - 4.20 uIU/mL Final         ---------------------------------------------------------------------------------------------------------------      I spent 15 minutes with this patient today. All changes were made via collaborative practice agreement with Loc Waggoner DO. A copy of the visit note was provided to the patient's provider(s).    A summary of these recommendations was sent via [employer].    Dacia Myers Rph.  Medication Therapy Management Provider  [phone]      Telemedicine Visit Details  Type of service:  Telephone visit  Start Time: 8:30 AM  End Time: 8:45 AM     Medication Therapy Recommendations  Type 2 diabetes mellitus without complication, without long-term current use of insulin (H)    Current Medication: tirzepatide (MOUNJARO) 10 MG/0.5ML pen  (Discontinued)   Rationale: Dose too low - Dosage too low - Effectiveness   Recommendation: Increase Dose - Mounjaro 12.5 MG/0.5ML Sopn   Status: Accepted per CPA

## 2024-09-18 ENCOUNTER — VIRTUAL VISIT (OUTPATIENT)
Dept: PHARMACY | Facility: CLINIC | Age: 49
End: 2024-09-18
Payer: COMMERCIAL

## 2024-09-18 DIAGNOSIS — E66.01 CLASS 2 SEVERE OBESITY DUE TO EXCESS CALORIES WITH SERIOUS COMORBIDITY AND BODY MASS INDEX (BMI) OF 37.0 TO 37.9 IN ADULT (H): ICD-10-CM

## 2024-09-18 DIAGNOSIS — E66.812 CLASS 2 SEVERE OBESITY DUE TO EXCESS CALORIES WITH SERIOUS COMORBIDITY AND BODY MASS INDEX (BMI) OF 37.0 TO 37.9 IN ADULT (H): ICD-10-CM

## 2024-09-18 DIAGNOSIS — E11.9 TYPE 2 DIABETES MELLITUS WITHOUT COMPLICATION, WITHOUT LONG-TERM CURRENT USE OF INSULIN (H): Primary | ICD-10-CM

## 2024-09-18 DIAGNOSIS — E78.5 HYPERLIPIDEMIA LDL GOAL <100: ICD-10-CM

## 2024-09-18 PROCEDURE — 99606 MTMS BY PHARM EST 15 MIN: CPT | Mod: 93 | Performed by: PHARMACIST

## 2024-09-18 PROCEDURE — 99607 MTMS BY PHARM ADDL 15 MIN: CPT | Performed by: PHARMACIST

## 2024-09-18 RX ORDER — TIRZEPATIDE 12.5 MG/.5ML
12.5 INJECTION, SOLUTION SUBCUTANEOUS
Qty: 2 ML | Refills: 5 | Status: SHIPPED | OUTPATIENT
Start: 2024-09-18

## 2024-09-18 NOTE — Clinical Note
Loc--all is good with susan but does get hungry last few days befoe next mounjaro inj.--so we will trial the next highest dose 12.5mg/week--see how she does.  B[place]ly

## 2024-09-18 NOTE — PATIENT INSTRUCTIONS
"Recommendations from today's MTM visit:                                                         1.  A1c is great at 5.9% --but your getting hungry towards end of week--so lets trial 12.5mg. Mounjaro /week now --see how it goes.      Follow-up: Return in about 12 weeks (around 12/11/2024), or @ 8 AM via telephone, for Medication Therapy Management Visit, Weight loss, Blood sugar meter review.    It was great speaking with you today.  I value your experience and would be very thankful for your time in providing feedback in our clinic survey. In the next few days, you may receive an email or text message from [email or web address] with a link to a survey related to your  clinical pharmacist.\"     To schedule another MTM appointment, please call the clinic directly or you may call the MTM scheduling line at [phone] or toll-free at [phone].     My Clinical Pharmacist's contact information:                                                      Please feel free to contact me with any questions or concerns you have.      Dacia Myers Rp.  Medication Therapy Management Provider  [phone]    "

## 2024-09-20 ENCOUNTER — ANCILLARY PROCEDURE (OUTPATIENT)
Dept: MAMMOGRAPHY | Facility: CLINIC | Age: 49
End: 2024-09-20
Attending: STUDENT IN AN ORGANIZED HEALTH CARE EDUCATION/TRAINING PROGRAM
Payer: COMMERCIAL

## 2024-09-20 DIAGNOSIS — R92.8 ABNORMAL MAMMOGRAM OF LEFT BREAST: ICD-10-CM

## 2024-09-20 DIAGNOSIS — R92.8 CATEGORY 3 MAMMOGRAPHY RESULT WITH SHORT FOLLOW-UP INTERVAL SUGGESTED FOR PROBABLY BENIGN FINDING: ICD-10-CM

## 2024-09-20 PROCEDURE — 76642 ULTRASOUND BREAST LIMITED: CPT | Mod: LT | Performed by: RADIOLOGY

## 2024-09-20 PROCEDURE — 77066 DX MAMMO INCL CAD BI: CPT | Performed by: RADIOLOGY

## 2024-09-20 PROCEDURE — G0279 TOMOSYNTHESIS, MAMMO: HCPCS | Performed by: RADIOLOGY

## 2024-09-23 ENCOUNTER — TRANSFERRED RECORDS (OUTPATIENT)
Dept: HEALTH INFORMATION MANAGEMENT | Facility: CLINIC | Age: 49
End: 2024-09-23
Payer: COMMERCIAL

## 2024-10-01 ENCOUNTER — TRANSFERRED RECORDS (OUTPATIENT)
Dept: HEALTH INFORMATION MANAGEMENT | Facility: CLINIC | Age: 49
End: 2024-10-01
Payer: COMMERCIAL

## 2024-10-04 NOTE — PROGRESS NOTES
Problem: Discharge Planning  Goal: Discharge to home or other facility with appropriate resources  Outcome: Progressing     Problem: Pain  Goal: Verbalizes/displays adequate comfort level or baseline comfort level  Outcome: Progressing     Problem: Safety - Adult  Goal: Free from fall injury  Outcome: Progressing     Problem: Gastrointestinal - Adult  Goal: Minimal or absence of nausea and vomiting  Outcome: Progressing  Goal: Maintains adequate nutritional intake  Outcome: Progressing     Problem: Skin/Tissue Integrity - Adult  Goal: Skin integrity remains intact  Outcome: Progressing  Goal: Incisions, wounds, or drain sites healing without S/S of infection  Outcome: Progressing     Problem: ABCDS Injury Assessment  Goal: Absence of physical injury  Outcome: Progressing      Medication Therapy Management (MTM) Encounter    ASSESSMENT:                            Medication Adherence/Access: No issues identified    Type 2 Diabetes/Obesity:    Patient is not meeting A1c goal of < 7%. Self monitoring of blood glucose is closer to goal of fasting  mg/dL and post prandial < 180 mg/dL. Patient is not meeting average glucose goal of <150mg/dL. Patient is now meeting goal of > 70% (74%)time in target with continuous glucose monitoring--mtm educated patient to add G6 dexcom abiodun to her cell phone --mtm now has remote blood sugar access.   Patient would benefit from new gameplan: Increase weekly mounjaro dose to 10mg./week now --have A1c lab recheck with endo/rd 9-20-23.   Continue daily metformin and jardiance and otc pill glucoredi as well.   Also--patient will work hard on eliminating all late night snacking --ok to drink slim-fast or atkins protein shake to fill her up if needed.   . Due for annual foot exam. Due for annual eye exam.         Hyperlipidemia:   Stable.  Patient is on moderate intensity statin which is indicated based on 2019 ACC/AHA guidelines for lipid management.        PLAN:                                1. Blood sugars much improved last 60 days --lets increase your weekly Mounjaro dose now to 10mg./week . (Use anti-nausea pill if needed).  Stay on Jardiance and Metformin + Glucoredi tabs as is , keep good low carb diet + intermittent fasting.     Please see Geovanna Morgan 9-20-23 --have her do A1c lab recheck --if <8% --should be ok'd for tendon surgery .     Remember -if your ok'd for surgery you must STOP your weekly Molunjaro injection at least 7 days prior to surgery .       Follow-up: Return in about 24 days (around 9/21/2023), or @10:30 AM via telephone, for Blood sugar meter review, Medication Therapy Management Visit, A1c lab.      SUBJECTIVE/OBJECTIVE:                          Elicia Calero is a 48 year old female called for a follow-up (8-14-23) visit. She was  referred to me from Loc Waggoner DO. (PCP).  .      Reason for visit:   2 weeks bs review.   uncontrolled diabetes  Seeing metabolic team- 9-20-23 endo MARIETTA Morales and MARIETTA Hernández RD.     Needs A1c <8% to have asap tendon surgery --she wants a new game plan today .   .    Allergies/ADRs: Reviewed in chart  Past Medical History: Per patient diagnosed 10-15 years --did lose wt. To 150lbs years ago and reversed her DM . Ate 1200 cals/day , low carb   Tobacco: She reports that she quit smoking about 5 years ago. Her smoking use included cigarettes. She has a 20.00 pack-year smoking history. She has never used smokeless tobacco.  Alcohol: Less than 1 beverage / month  Other Substance Use: none   E-cigarette Use: none   Caffeine:  1 coffee or crystal light every morning.   Social: off work due to tendon issue (ruptured).  She is a pharmacy tech at [employer].   Personal Healthcare Goals: fix dm soon   Activity: not much due to ruptured tendon.     Medication Adherence/Access: no issues reported    Type 2 Diabetes/Obesity:    Failed bydureon in the past , fears needles--refuses all injectables before elast viait but doing ok with Mounjaro as needle is hidden.   Rybelsus 14mg./day --off this now 8-14-23 and on 5mg. Mounjaro /week injection on Wednesdays.   Metformin 5o639mo ER trabs daily  Glipizide Er 10mg tabs --2 tabs daily --stopped 8-14-23 --on Mounjaro now.   Jardiance 25mg./day   Also taking 2 capsules /day otc -Glucoredi herbal with gymnemna and berberi.   Not taking aspirin due to age.  Patient is not experiencing side effects.  Blood sugar monitoring: Continuous Glucose Monitor Average glucose : mtm added remote bs access via dexcom G6 abiodun on her phone :  Am bs's 210--why?  When bedtime 120';s.(Mtm explained jose phenomenon at last visit to her ).                             Current diabetes symptoms: polyphagia  Diet/Exercise: needs tendon surgery , can use foot pedals only.   Mounjaro curbing  appetite --2 meals/day bfast and dinner , shake at lunch .    Previously:   Bfast /lunch --low carb , eat dinner --after dinner keeps eating after feeling full!  Has low carb slimfast or atkins shakes to use.   Sleep iffy due to intense hunger. Awakes 2-3 am --snacking and can't get back to sleep. She has mental health issues --struggles with depression and unable to move much with ruptured tendon --discouraged.       Eye exam: due  Foot exam: due  Urine Albumin:   Lab Results   Component Value Date    UMALCR  12/01/2022      Comment:      Unable to calculate, urine albumin and/or urine creatinine is outside detectable limits.  Microalbuminuria is defined as an albumin:creatinine ratio of 17 to 299 for males and 25 to 299 for females. A ratio of albumin:creatinine of 300 or higher is indicative of overt proteinuria.  Due to biologic variability, positive results should be confirmed by a second, first-morning random or 24-hour timed urine specimen. If there is discrepancy, a third specimen is recommended. When 2 out of 3 results are in the microalbuminuria range, this is evidence for incipient nephropathy and warrants increased efforts at glucose control, blood pressure control, and institution of therapy with an angiotensin-converting-enzyme (ACE) inhibitor (if the patient can tolerate it).        Lab Results   Component Value Date    A1C 10.8 (H) 06/28/2023     Hyperlipidemia:   Pravastatin 40mg daily  Patient reports no significant myalgias or other side effects.  The 10-year ASCVD risk score (Carmita CORREIA, et al., 2019) is: 0.9%    Values used to calculate the score:      Age: 48 years      Sex: Female      Is Non- : No      Diabetic: Yes      Tobacco smoker: No      Systolic Blood Pressure: 110 mmHg      Is BP treated: No      HDL Cholesterol: 52 mg/dL      Total Cholesterol: 133 mg/dL  Recent Labs   Lab Test 06/28/23  0936 12/01/22  1013   CHOL 133 129   HDL 52 46*   LDL 60 61   TRIG 105  109         ---------------------------------------------------------------------------------------------------------------------------------      I spent 30 minutes with this patient today. All changes were made via collaborative practice agreement with Loc Waggoner DO. A copy of the visit note was provided to the patient's provider(s).    A summary of these recommendations was sent via [email or web address].    Dacia Myers Rph.  Medication Therapy Management Provider  [phone]      Telemedicine Visit Details  Type of service:  Telephone visit  Start Time: 9:30 AM  End Time: 10:00 AM     Medication Therapy Recommendations  Type 2 diabetes mellitus without complication, without long-term current use of insulin (H)    Current Medication: tirzepatide (MOUNJARO) 5 MG/0.5ML pen (Discontinued)   Rationale: Dose too low - Dosage too low - Effectiveness   Recommendation: Increase Dose - Mounjaro 10 MG/0.5ML Sopn   Status: Accepted per CPA

## 2024-10-24 ENCOUNTER — TRANSFERRED RECORDS (OUTPATIENT)
Dept: HEALTH INFORMATION MANAGEMENT | Facility: CLINIC | Age: 49
End: 2024-10-24
Payer: COMMERCIAL

## 2024-11-08 ENCOUNTER — TRANSFERRED RECORDS (OUTPATIENT)
Dept: HEALTH INFORMATION MANAGEMENT | Facility: CLINIC | Age: 49
End: 2024-11-08
Payer: COMMERCIAL

## 2024-11-08 ENCOUNTER — MYC REFILL (OUTPATIENT)
Dept: FAMILY MEDICINE | Facility: CLINIC | Age: 49
End: 2024-11-08
Payer: COMMERCIAL

## 2024-11-08 DIAGNOSIS — G89.4 CHRONIC PAIN DISORDER: ICD-10-CM

## 2024-11-08 DIAGNOSIS — F11.90 CHRONIC, CONTINUOUS USE OF OPIOIDS: ICD-10-CM

## 2024-11-08 DIAGNOSIS — M25.571 PAIN IN JOINT INVOLVING ANKLE AND FOOT, RIGHT: ICD-10-CM

## 2024-11-08 RX ORDER — OXYCODONE HYDROCHLORIDE 5 MG/1
5 TABLET ORAL EVERY 6 HOURS PRN
Qty: 90 TABLET | Refills: 0 | Status: SHIPPED | OUTPATIENT
Start: 2024-11-08

## 2024-11-08 NOTE — TELEPHONE ENCOUNTER
Pt on chronic oxycodone for chronic pain.  PDMP appropriate   -last filled oxycontin 10mg tabs 11/4/24   -last filled oxycodone 5mg IR tabs 10/12/24    Dr. Waggoner

## 2024-11-11 ENCOUNTER — TRANSFERRED RECORDS (OUTPATIENT)
Dept: HEALTH INFORMATION MANAGEMENT | Facility: CLINIC | Age: 49
End: 2024-11-11
Payer: COMMERCIAL

## 2024-11-11 DIAGNOSIS — E11.9 TYPE 2 DIABETES MELLITUS WITHOUT COMPLICATION, WITHOUT LONG-TERM CURRENT USE OF INSULIN (H): ICD-10-CM

## 2024-11-13 RX ORDER — UBIQUINOL 100 MG
CAPSULE ORAL
Qty: 100 EACH | Refills: 2 | Status: SHIPPED | OUTPATIENT
Start: 2024-11-13

## 2024-11-24 ASSESSMENT — PATIENT HEALTH QUESTIONNAIRE - PHQ9
SUM OF ALL RESPONSES TO PHQ QUESTIONS 1-9: 4
10. IF YOU CHECKED OFF ANY PROBLEMS, HOW DIFFICULT HAVE THESE PROBLEMS MADE IT FOR YOU TO DO YOUR WORK, TAKE CARE OF THINGS AT HOME, OR GET ALONG WITH OTHER PEOPLE: NOT DIFFICULT AT ALL
SUM OF ALL RESPONSES TO PHQ QUESTIONS 1-9: 4

## 2024-11-25 ENCOUNTER — VIRTUAL VISIT (OUTPATIENT)
Dept: FAMILY MEDICINE | Facility: CLINIC | Age: 49
End: 2024-11-25
Payer: COMMERCIAL

## 2024-11-25 DIAGNOSIS — G89.4 CHRONIC PAIN DISORDER: Primary | ICD-10-CM

## 2024-11-25 DIAGNOSIS — F31.60 BIPOLAR AFFECTIVE DISORDER, CURRENT EPISODE MIXED, CURRENT EPISODE SEVERITY UNSPECIFIED (H): ICD-10-CM

## 2024-11-25 DIAGNOSIS — G47.00 PERSISTENT INSOMNIA: ICD-10-CM

## 2024-11-25 DIAGNOSIS — M25.571 PAIN IN JOINT INVOLVING ANKLE AND FOOT, RIGHT: ICD-10-CM

## 2024-11-25 DIAGNOSIS — F11.90 CHRONIC, CONTINUOUS USE OF OPIOIDS: ICD-10-CM

## 2024-11-25 PROCEDURE — G2211 COMPLEX E/M VISIT ADD ON: HCPCS | Mod: 95 | Performed by: STUDENT IN AN ORGANIZED HEALTH CARE EDUCATION/TRAINING PROGRAM

## 2024-11-25 PROCEDURE — 99214 OFFICE O/P EST MOD 30 MIN: CPT | Mod: 95 | Performed by: STUDENT IN AN ORGANIZED HEALTH CARE EDUCATION/TRAINING PROGRAM

## 2024-11-25 RX ORDER — HYDROXYZINE PAMOATE 50 MG/1
50 CAPSULE ORAL DAILY
Qty: 90 CAPSULE | Refills: 3 | OUTPATIENT
Start: 2024-11-25

## 2024-11-25 RX ORDER — OXYCODONE HCL 10 MG/1
10 TABLET, FILM COATED, EXTENDED RELEASE ORAL EVERY 12 HOURS
Qty: 60 TABLET | Refills: 0 | Status: SHIPPED | OUTPATIENT
Start: 2024-12-02

## 2024-11-25 RX ORDER — OXYCODONE HYDROCHLORIDE 5 MG/1
5 TABLET ORAL EVERY 6 HOURS PRN
Qty: 80 TABLET | Refills: 0 | Status: SHIPPED | OUTPATIENT
Start: 2024-12-11

## 2024-11-25 RX ORDER — OXYCODONE HCL 10 MG/1
10 TABLET, FILM COATED, EXTENDED RELEASE ORAL EVERY 12 HOURS
Qty: 60 TABLET | Refills: 0 | Status: SHIPPED | OUTPATIENT
Start: 2024-12-30 | End: 2025-01-29

## 2024-11-25 RX ORDER — OXYCODONE HYDROCHLORIDE 5 MG/1
5 TABLET ORAL EVERY 6 HOURS PRN
Qty: 80 TABLET | Refills: 0 | Status: SHIPPED | OUTPATIENT
Start: 2025-01-08

## 2024-11-25 ASSESSMENT — ANXIETY QUESTIONNAIRES
3. WORRYING TOO MUCH ABOUT DIFFERENT THINGS: NOT AT ALL
GAD7 TOTAL SCORE: 0
4. TROUBLE RELAXING: NOT AT ALL
7. FEELING AFRAID AS IF SOMETHING AWFUL MIGHT HAPPEN: NOT AT ALL
8. IF YOU CHECKED OFF ANY PROBLEMS, HOW DIFFICULT HAVE THESE MADE IT FOR YOU TO DO YOUR WORK, TAKE CARE OF THINGS AT HOME, OR GET ALONG WITH OTHER PEOPLE?: NOT DIFFICULT AT ALL
IF YOU CHECKED OFF ANY PROBLEMS ON THIS QUESTIONNAIRE, HOW DIFFICULT HAVE THESE PROBLEMS MADE IT FOR YOU TO DO YOUR WORK, TAKE CARE OF THINGS AT HOME, OR GET ALONG WITH OTHER PEOPLE: NOT DIFFICULT AT ALL
6. BECOMING EASILY ANNOYED OR IRRITABLE: NOT AT ALL
GAD7 TOTAL SCORE: 0
7. FEELING AFRAID AS IF SOMETHING AWFUL MIGHT HAPPEN: NOT AT ALL
GAD7 TOTAL SCORE: 0
1. FEELING NERVOUS, ANXIOUS, OR ON EDGE: NOT AT ALL
5. BEING SO RESTLESS THAT IT IS HARD TO SIT STILL: NOT AT ALL
2. NOT BEING ABLE TO STOP OR CONTROL WORRYING: NOT AT ALL

## 2024-11-25 NOTE — PROGRESS NOTES
Elicia is a 49 year old who is being evaluated via a billable video visit.      How would you like to obtain your AVS? MyChart  If the video visit is dropped, the invitation should be resent by: Send to e-mail at: [email or web address]  Will anyone else be joining your video visit? No    Assessment & Plan     Chronic pain disorder  Pain in joint involving ankle and foot, right  Chronic, continuous use of opioids  NSAID long term use  History of chronic foot pain, has been on continuous oxycodone for many years. S/P ankle tendon/arch surgery on 10/19/23 which went well. She is now weight bearing, graduated from PT. Following with [facility]. Discussed need to wean opioids, due to high MME & medication side effects (also on stimulants), risk of tolerance and oversedation. No side effects. Has narcan available at home PRN.     Today pain in ankle/foot is stable. Back pain is the biggest issue-seeing [facility] today for medial nerve block, considering a nerve ablation. Again, discussed need to wean opioids. Agreeable to decreasing from 90 tabs --> 80 tabs per month.    Plan:  -discussed goal is to wean opioids  -goal is to return to the amount of oxycodone she was taking pre-surgery, which was a total of short acting oxycodone 20mg daily (MME 30)   -MME were 30 ~8/2023   -Current MME are slightly less than double what she was taking prior to worsening symptoms ~8/2023      Opioid:   Continue long acting oxycontin 10mg BID (MME 30)  Continue short acting oxycodone, DECREASED from 90 tablets to 80 tabs per month (max 3 tablets/day; MME 15-22)   PDMP checked, appropriate  Current MME:45- 52   She does have narcan available  UDS due 5/2025  CSA due 7/2025    Non-Opioid:  schedule tylenol 1000mg TID  schedule ibuprofen 800mg TID   Daily NSAIDs not ideal, will monitor renal function  On daily PPI  continue robaxin (tizanidine caused side effects)  continue gabapentin 1200mg TID  elevate, ice      Follow up in 2  "months  Goal-decrease short acting oxycodone tablets to 70-75 per month at next visit      The longitudinal plan of care for the diagnosis(es)/condition(s) as documented were addressed during this visit. Due to the added complexity in care, I will continue to support Eliica in the subsequent management and with ongoing continuity of care.    Subjective   Elicia is a 49 year old, presenting for the following health issues:  RECHECK (Pain)        2/29/2024     7:27 AM   Additional Questions   Roomed by Amada Samano   Accompanied by Self     History of Present Illness       Reason for visit:  Pain medication review    She eats 2-3 servings of fruits and vegetables daily.She consumes 0 sweetened beverage(s) daily.She exercises with enough effort to increase her heart rate 9 or less minutes per day.  She exercises with enough effort to increase her heart rate 3 or less days per week. She is missing 2 dose(s) of medications per week.  She is not taking prescribed medications regularly due to remembering to take.    Pain:  Last filled 90 tablets of oxycodone 5mg on 11/13/24  Last filled 60 tablets of long acting oxycodone 10mg on 11/4/24    Today:  Seeing [facility] for [facility] pain-getting medial nerve block today to see if ablation is possible  Ankle/foot pain is stable  Work is going well  Pain mgmt-->oxycodone 3x/day (rarely 2x/day)  Taking every 6 hours  Standing for 1/2 shift at work, worse at end of day  No side effects        Objective    Vitals - Patient Reported  Weight (Patient Reported): 77.1 kg (170 lb)  Height (Patient Reported): 171.5 cm (5' 7.5\")  BMI (Based on Pt Reported Ht/Wt): 26.23  Pain Score: Severe Pain (6)    Vitals:  No vitals were obtained today due to virtual visit.    Physical Exam   GENERAL: alert and no distress  EYES: Eyes grossly normal to inspection.  No discharge or erythema, or obvious scleral/conjunctival abnormalities.  RESP: No audible wheeze, cough, or visible cyanosis.    SKIN: Visible skin " clear. No significant rash, abnormal pigmentation or lesions.  NEURO: Cranial nerves grossly intact.  Mentation and speech appropriate for age.  PSYCH: Appropriate affect, tone, and pace of words      Video-Visit Details    Type of service:  Video Visit   Video Start Time:7:58 am  Video End Time: 8:05 am    Originating Location (pt. Location): Home  Distant Location (provider location):  On-site  Platform used for Video Visit: Diana  Signed Electronically by: Loc Waggoner DO      The longitudinal plan of care for the diagnosis(es)/condition(s) as documented were addressed during this visit. Due to the added complexity in care, I will continue to support Elicia in the subsequent management and with ongoing continuity of care.

## 2024-11-25 NOTE — PATIENT INSTRUCTIONS
To limit side effects, medication interactions, and risk of addition and tolerance, we need to keep weaning your opioids unfortunately. Try to limit short acting oxycodone to 2 tablets per day. This may be hard, but you can do it!!      Dr. Waggoner

## 2024-11-26 ENCOUNTER — TRANSFERRED RECORDS (OUTPATIENT)
Dept: HEALTH INFORMATION MANAGEMENT | Facility: CLINIC | Age: 49
End: 2024-11-26
Payer: COMMERCIAL

## 2024-11-26 LAB — RETINOPATHY: NEGATIVE

## 2024-12-05 ENCOUNTER — TRANSFERRED RECORDS (OUTPATIENT)
Dept: HEALTH INFORMATION MANAGEMENT | Facility: CLINIC | Age: 49
End: 2024-12-05
Payer: COMMERCIAL

## 2024-12-07 DIAGNOSIS — G43.709 CHRONIC MIGRAINE WITHOUT AURA WITHOUT STATUS MIGRAINOSUS, NOT INTRACTABLE: ICD-10-CM

## 2024-12-07 RX ORDER — RIZATRIPTAN BENZOATE 10 MG/1
TABLET ORAL
Qty: 18 TABLET | Refills: 0 | Status: SHIPPED | OUTPATIENT
Start: 2024-12-07

## 2024-12-15 ENCOUNTER — HEALTH MAINTENANCE LETTER (OUTPATIENT)
Age: 49
End: 2024-12-15

## 2024-12-22 DIAGNOSIS — R11.0 NAUSEA: ICD-10-CM

## 2024-12-22 DIAGNOSIS — K21.00 GASTROESOPHAGEAL REFLUX DISEASE WITH ESOPHAGITIS WITHOUT HEMORRHAGE: ICD-10-CM

## 2024-12-22 DIAGNOSIS — K58.9 IRRITABLE BOWEL SYNDROME, UNSPECIFIED TYPE: ICD-10-CM

## 2024-12-22 RX ORDER — ONDANSETRON 4 MG/1
4 TABLET, FILM COATED ORAL 3 TIMES DAILY PRN
Qty: 90 TABLET | Refills: 9 | Status: SHIPPED | OUTPATIENT
Start: 2024-12-22

## 2024-12-23 ENCOUNTER — TRANSFERRED RECORDS (OUTPATIENT)
Dept: HEALTH INFORMATION MANAGEMENT | Facility: CLINIC | Age: 49
End: 2024-12-23
Payer: COMMERCIAL

## 2025-01-11 NOTE — PROGRESS NOTES
Medication Therapy Management (MTM) Encounter    ASSESSMENT:                            Medication Adherence/Access: No issues identified    Type 2 Diabetes/Obesity:    Patient is meeting A1c goal of < 7%(5.9%). Self monitoring of blood glucose is closer to goal of fasting  mg/dL and post prandial < 180 mg/dL. Patient is  meeting average glucose goal of <150mg/dL. Patient is now meeting goal of > 70% (98%)time in target with continuous glucose monitoring.  Patient would benefit from : stay on weekly dose at 12.5mg. Mounjaro .  GLP-1 drugs have worked very well for her --she has lost >50 lbs . And feels great !     Continue daily  jardiance .    Stop all metformin today 1-14-24.     . Due for annual foot exam. Due for annual eye exam.         Hyperlipidemia:   Stable.  Patient is on moderate intensity statin which is indicated based on 2019 ACC/AHA guidelines for lipid management.        PLAN:                              1. Have fasting labs soon --stop the metformin today , stay on other meds for now and watch [facility] for results/plan.    2. Home weight today 165lbs --lost another 20lbs --fantastic !    Follow-up: With Dr. Waggoner tomorrow via video , follow up with me via [employer].    SUBJECTIVE/OBJECTIVE:                          Elicia Calero is a 49 year old female called for a follow-up (9-18-24) visit. She was referred to me from Loc Waggoner DO. (PCP).  .        Reason for visit:   Mounjaro f/up.  Loves dexcom G7 sensor.      .    Allergies/ADRs: Reviewed in chart  Past Medical History: Per patient diagnosed 10-15 years --did lose wt. To 150lbs years ago and reversed her DM . Ate 1200 cals/day , low carb   Tobacco: She reports that she quit smoking about 7 years ago. Her smoking use included cigarettes. She started smoking about 27 years ago. She has a 20 pack-year smoking history. She has never used smokeless tobacco.  Alcohol: Less than 1 beverage / month  Other Substance Use: none   E-cigarette  Use: none   Caffeine:  1 coffee or crystal light every morning.   Social: off work due to tendon issue (ruptured).  She is a pharmacy tech at [employer].   Personal Healthcare Goals: fix dm soon   Activity: ruptured tendon repaired --back to walking again.    Medication Adherence/Access: no issues reported    Type 2 Diabetes/Obesity:    Failed bydureon in the past , fears needles--refuses all injectables before last visit but doing ok with Mounjaro as needle is hidden.   Back on Mounjaro 12.5mg. --doing great!  Home weight = 186 9-18-24. 165lbs 1-14-25.   Metformin [id number] ER tabs daily  Losing weight and controlling food intake and bs's --she loves it!  Jardiance 25mg./day   She stopped otc glucoredi tabs --$$$.  Not taking aspirin due to age.  Patient is not experiencing side effects.  SMBG: mtm changed her last month to Dexcom G7--she loves it !                Current diabetes symptoms:none now -Mounjaro has corrected her polyphagia.  Diet/Exercise: off walking boot from tendon surgery --recovered well.  Mounjaro curbing appetite --2 meals/day bfast and dinner , shake at lunch .    Previously:   Bfast /lunch --low carb , eat dinner --after dinner keeps eating after feeling full!  Has low carb slimfast or atkins shakes to use.   Sleep iffy due to intense hunger. Awakes 2-3 am --snacking and can't get back to sleep. She has mental health issues --struggles with depression and unable to move much with ruptured tendon --discouraged.       Eye exam: due  Foot exam: due  Urine Albumin:   Lab Results   Component Value Date    UMALCR  04/26/2024      Comment:      Unable to calculate, urine albumin and/or urine creatinine is outside detectable limits.  Microalbuminuria is defined as an albumin:creatinine ratio of 17 to 299 for males and 25 to 299 for females. A ratio of albumin:creatinine of 300 or higher is indicative of overt proteinuria.  Due to biologic variability, positive results should be confirmed by a second,  first-morning random or 24-hour timed urine specimen. If there is discrepancy, a third specimen is recommended. When 2 out of 3 results are in the microalbuminuria range, this is evidence for incipient nephropathy and warrants increased efforts at glucose control, blood pressure control, and institution of therapy with an angiotensin-converting-enzyme (ACE) inhibitor (if the patient can tolerate it).        Lab Results   Component Value Date    A1C 5.9 07/18/2024    A1C 6.3 04/26/2024    A1C 6.0 12/14/2023    A1C 7.5 09/20/2023    A1C 10.8 06/28/2023       Hyperlipidemia:   Pravastatin 40mg daily  Patient reports no significant myalgias or other side effects.  The ASCVD Risk score (Carmita DK, et al., 2019) failed to calculate for the following reasons:    The valid total cholesterol range is 130 to 320 mg/dL  Recent Labs   Lab Test 04/26/24  0634 06/28/23  0936   CHOL 118 133   HDL 48* 52   LDL 55 60   TRIG 73 105           BP Readings from Last 3 Encounters:   08/15/24 122/73   08/12/24 98/68   08/08/24 101/65     TSH   Date Value Ref Range Status   07/18/2024 1.03 0.30 - 4.20 uIU/mL Final         ---------------------------------------------------------------------------------------------------------------      I spent 15 minutes with this patient today. All changes were made via collaborative practice agreement with Loc Waggoner DO. A copy of the visit note was provided to the patient's provider(s).    A summary of these recommendations was sent via [employer].    Dacia Myers Rph.  Medication Therapy Management Provider  [phone]      Telemedicine Visit Details  Type of service:  Telephone visit  Start Time: 9:30 AM  End Time: 9:45 AM     Medication Therapy Recommendations  No medication therapy recommendations to display

## 2025-01-13 NOTE — PROGRESS NOTES
Elicia is a 49 year old who is being evaluated via a billable video visit.      How would you like to obtain your AVS? MyChart  If the video visit is dropped, the invitation should be resent by: Send to e-mail at: [email or web address]  Will anyone else be joining your video visit? No    Assessment & Plan     Chronic pain disorder  Pain in joint involving ankle and foot, right  Chronic, continuous use of opioids  NSAID long term use  History of chronic foot pain, has been on continuous oxycodone for many years. S/P ankle tendon/arch surgery on 10/19/23 which went well. She is now weight bearing, graduated from PT. Following with [facility]. Discussed need to wean opioids, due to high MME & medication side effects (also on stimulants), risk of tolerance and oversedation. No side effects. Has narcan available at home PRN.     Today pain in ankle/foot is stable. Back pain is the biggest issue-had a medial branch nerve block which didn't help. They are trying to get a bone ablation approved-prior auth can take up to 4 months though. Using opioids appropriately. Elected to not wean further today-will aim to reduce to 70-75 tabs oxycodone/month at next visit (hoping back pain has improved). No negative side effects today.    Plan:  -discussed goal is to wean opioids  -goal is to return to the amount of oxycodone she was taking pre-surgery, which was a total of short acting oxycodone 20mg daily (MME 30)   -MME were 30 ~8/2023   -Current MME are slightly less than double what she was taking prior to worsening symptoms ~8/2023      Opioid:   Continue long acting oxycontin 10mg BID (MME 30)  Continue short acting oxycodone 80 tabs per month (max 3 tablets/day; MME 15-22)   PDMP checked, appropriate  Current MME: 45- 52   She does have narcan available  UDS due 5/2025  CSA due 7/2025    Non-Opioid:  schedule tylenol 1000mg TID  schedule ibuprofen 800mg TID   Daily NSAIDs not ideal, will monitor renal function  On daily  "PPI  continue gabapentin 1200mg TID  elevate, ice    3 month supply of opioids ordered  Follow up in 3 months, virtual visit ok  July visit should be in person  Goal-decrease short acting oxycodone tablets to 70-75 per month at next visit      # bipolar  Managed by psychiatry. Stable.    # DM2  Managed by MTM. Stable, well controlled. Getting labs tomorrow.      The longitudinal plan of care for the diagnosis(es)/condition(s) as documented were addressed during this visit. Due to the added complexity in care, I will continue to support Elicia in the subsequent management and with ongoing continuity of care.      Emily Rubi is a 49 year old, presenting for the following health issues:  Recheck Medication        2/29/2024     7:27 AM   Additional Questions   Roomed by Amada Samano   Accompanied by Self     History of Present Illness       Reason for visit:  Pain medication review    She eats 2-3 servings of fruits and vegetables daily.She consumes 0 sweetened beverage(s) daily.She exercises with enough effort to increase her heart rate 9 or less minutes per day.  She exercises with enough effort to increase her heart rate 3 or less days per week. She is missing 2 dose(s) of medications per week.  She is not taking prescribed medications regularly due to remembering to take.    Pain:  Last filled 80 tablets of oxycodone 5mg on 1/8//25  Last filled 60 tablets of long acting oxycodone 10mg on 1/7/25    Today:  Seeing [facility] for back pain  Looking at doing a bone ablation-needs prior auth which can take 4 months  S/P medial nerve block didn't help  Ankle/foot pain   Stable  Little better  Pain mgmt-->oxycodone 2-3x/day  Taking every 6 hours  Gets 80 tabs/month  Work is going ok  No side effects        Objective    Vitals - Patient Reported  Weight (Patient Reported): 74.4 kg (164 lb)  Height (Patient Reported): 170.2 cm (5' 7\")  BMI (Based on Pt Reported Ht/Wt): 25.69  Pain Score: Severe Pain (7)  Pain Loc: Other - see " comment (headache and back)    Vitals:  No vitals were obtained today due to virtual visit.    Physical Exam   GENERAL: alert and no distress  EYES: Eyes grossly normal to inspection.  No discharge or erythema, or obvious scleral/conjunctival abnormalities.  RESP: No audible wheeze, cough, or visible cyanosis.    SKIN: Visible skin clear. No significant rash, abnormal pigmentation or lesions.  NEURO: Cranial nerves grossly intact.  Mentation and speech appropriate for age.  PSYCH: Appropriate affect, tone, and pace of words      Video-Visit Details    Type of service:  Video Visit   Video Start Time: 7:21am  Video End Time: 7:31 am    Originating Location (pt. Location): Home  Distant Location (provider location):  Off-site  Platform used for Video Visit: Diana  Signed Electronically by: Loc Waggoner,       The longitudinal plan of care for the diagnosis(es)/condition(s) as documented were addressed during this visit. Due to the added complexity in care, I will continue to support Elicia in the subsequent management and with ongoing continuity of care.

## 2025-01-14 ENCOUNTER — VIRTUAL VISIT (OUTPATIENT)
Dept: PHARMACY | Facility: CLINIC | Age: 50
End: 2025-01-14
Payer: COMMERCIAL

## 2025-01-14 DIAGNOSIS — E78.5 HYPERLIPIDEMIA LDL GOAL <100: ICD-10-CM

## 2025-01-14 DIAGNOSIS — E11.9 TYPE 2 DIABETES MELLITUS WITHOUT COMPLICATION, WITHOUT LONG-TERM CURRENT USE OF INSULIN (H): Primary | ICD-10-CM

## 2025-01-14 DIAGNOSIS — E56.9 VITAMIN DEFICIENCY: ICD-10-CM

## 2025-01-14 PROCEDURE — 99605 MTMS BY PHARM NP 15 MIN: CPT | Mod: 93 | Performed by: PHARMACIST

## 2025-01-14 NOTE — Clinical Note
Loc--harriet to f/up with you --I added 7 labs in epic --she will have them drawn in next 2 weeks --otherwise -has lost a ton of weight and feels great !  -Dacia

## 2025-01-14 NOTE — PATIENT INSTRUCTIONS
"Recommendations from today's MTM visit:                                                         1. Have fasting labs soon --stop the metformin today , stay on other meds and watch [facility] for results/plan.        Follow-up: With Dr. Waggoner tomorrow via video , follow up with me via [employer].    It was great speaking with you today.  I value your experience and would be very thankful for your time in providing feedback in our clinic survey. In the next few days, you may receive an email or text message from [employer] with a link to a survey related to your  clinical pharmacist.\"     To schedule another MTM appointment, please call the clinic directly or you may call the MTM scheduling line at [phone] or toll-free at [phone].     My Clinical Pharmacist's contact information:                                                      Please feel free to contact me with any questions or concerns you have.      Dacia Myers Rph.  Medication Therapy Management Provider  [phone]    "

## 2025-01-15 ENCOUNTER — VIRTUAL VISIT (OUTPATIENT)
Dept: FAMILY MEDICINE | Facility: CLINIC | Age: 50
End: 2025-01-15
Payer: COMMERCIAL

## 2025-01-15 DIAGNOSIS — K21.00 GASTROESOPHAGEAL REFLUX DISEASE WITH ESOPHAGITIS WITHOUT HEMORRHAGE: ICD-10-CM

## 2025-01-15 DIAGNOSIS — F11.90 CHRONIC, CONTINUOUS USE OF OPIOIDS: ICD-10-CM

## 2025-01-15 DIAGNOSIS — M25.571 PAIN IN JOINT INVOLVING ANKLE AND FOOT, RIGHT: ICD-10-CM

## 2025-01-15 DIAGNOSIS — F31.60 BIPOLAR AFFECTIVE DISORDER, CURRENT EPISODE MIXED, CURRENT EPISODE SEVERITY UNSPECIFIED (H): ICD-10-CM

## 2025-01-15 DIAGNOSIS — E11.9 TYPE 2 DIABETES MELLITUS WITHOUT COMPLICATION, WITHOUT LONG-TERM CURRENT USE OF INSULIN (H): ICD-10-CM

## 2025-01-15 DIAGNOSIS — G89.4 CHRONIC PAIN DISORDER: ICD-10-CM

## 2025-01-15 PROBLEM — E66.812 CLASS 2 SEVERE OBESITY DUE TO EXCESS CALORIES WITH SERIOUS COMORBIDITY IN ADULT (H): Status: RESOLVED | Noted: 2023-08-02 | Resolved: 2025-01-15

## 2025-01-15 PROBLEM — E66.01 CLASS 2 SEVERE OBESITY DUE TO EXCESS CALORIES WITH SERIOUS COMORBIDITY IN ADULT (H): Status: RESOLVED | Noted: 2023-08-02 | Resolved: 2025-01-15

## 2025-01-15 PROCEDURE — 98006 SYNCH AUDIO-VIDEO EST MOD 30: CPT | Performed by: STUDENT IN AN ORGANIZED HEALTH CARE EDUCATION/TRAINING PROGRAM

## 2025-01-15 RX ORDER — OXYCODONE HYDROCHLORIDE 5 MG/1
5 TABLET ORAL EVERY 6 HOURS PRN
Qty: 80 TABLET | Refills: 0 | Status: SHIPPED | OUTPATIENT
Start: 2025-04-01

## 2025-01-15 RX ORDER — OXYCODONE HCL 10 MG/1
10 TABLET, FILM COATED, EXTENDED RELEASE ORAL EVERY 12 HOURS
Qty: 60 TABLET | Refills: 0 | Status: SHIPPED | OUTPATIENT
Start: 2025-03-04

## 2025-01-15 RX ORDER — OXYCODONE HCL 10 MG/1
10 TABLET, FILM COATED, EXTENDED RELEASE ORAL EVERY 12 HOURS
Qty: 60 TABLET | Refills: 0 | Status: SHIPPED | OUTPATIENT
Start: 2025-04-01

## 2025-01-15 RX ORDER — OXYCODONE HCL 10 MG/1
10 TABLET, FILM COATED, EXTENDED RELEASE ORAL EVERY 12 HOURS
Qty: 60 TABLET | Refills: 0 | Status: SHIPPED | OUTPATIENT
Start: 2025-02-04

## 2025-01-15 RX ORDER — OXYCODONE HYDROCHLORIDE 5 MG/1
5 TABLET ORAL EVERY 6 HOURS PRN
Qty: 80 TABLET | Refills: 0 | Status: SHIPPED | OUTPATIENT
Start: 2025-03-04

## 2025-01-15 RX ORDER — OXYCODONE HYDROCHLORIDE 5 MG/1
5 TABLET ORAL EVERY 6 HOURS PRN
Qty: 80 TABLET | Refills: 0 | Status: SHIPPED | OUTPATIENT
Start: 2025-02-04

## 2025-01-16 ENCOUNTER — LAB (OUTPATIENT)
Dept: LAB | Facility: CLINIC | Age: 50
End: 2025-01-16
Payer: COMMERCIAL

## 2025-01-16 DIAGNOSIS — E11.9 TYPE 2 DIABETES MELLITUS WITHOUT COMPLICATION, WITHOUT LONG-TERM CURRENT USE OF INSULIN (H): ICD-10-CM

## 2025-01-16 DIAGNOSIS — E56.9 VITAMIN DEFICIENCY: ICD-10-CM

## 2025-01-16 DIAGNOSIS — E78.5 HYPERLIPIDEMIA LDL GOAL <100: ICD-10-CM

## 2025-01-16 LAB
ALBUMIN SERPL BCG-MCNC: 4.6 G/DL (ref 3.5–5.2)
ALP SERPL-CCNC: 79 U/L (ref 40–150)
ALT SERPL W P-5'-P-CCNC: 17 U/L (ref 0–50)
ANION GAP SERPL CALCULATED.3IONS-SCNC: 11 MMOL/L (ref 7–15)
AST SERPL W P-5'-P-CCNC: 14 U/L (ref 0–45)
BILIRUB SERPL-MCNC: 0.2 MG/DL
BUN SERPL-MCNC: 21.5 MG/DL (ref 6–20)
CALCIUM SERPL-MCNC: 9.4 MG/DL (ref 8.8–10.4)
CHLORIDE SERPL-SCNC: 105 MMOL/L (ref 98–107)
CHOLEST SERPL-MCNC: 146 MG/DL
CREAT SERPL-MCNC: 0.85 MG/DL (ref 0.51–0.95)
CREAT UR-MCNC: 184 MG/DL
EGFRCR SERPLBLD CKD-EPI 2021: 83 ML/MIN/1.73M2
EST. AVERAGE GLUCOSE BLD GHB EST-MCNC: 105 MG/DL
FASTING STATUS PATIENT QL REPORTED: ABNORMAL
FASTING STATUS PATIENT QL REPORTED: ABNORMAL
GLUCOSE SERPL-MCNC: 120 MG/DL (ref 70–99)
HBA1C MFR BLD: 5.3 %
HCO3 SERPL-SCNC: 25 MMOL/L (ref 22–29)
HDLC SERPL-MCNC: 49 MG/DL
LDLC SERPL CALC-MCNC: 80 MG/DL
MICROALBUMIN UR-MCNC: <12 MG/L
MICROALBUMIN/CREAT UR: NORMAL MG/G{CREAT}
NONHDLC SERPL-MCNC: 97 MG/DL
POTASSIUM SERPL-SCNC: 4.3 MMOL/L (ref 3.4–5.3)
PROT SERPL-MCNC: 6.7 G/DL (ref 6.4–8.3)
SODIUM SERPL-SCNC: 141 MMOL/L (ref 135–145)
TRIGL SERPL-MCNC: 87 MG/DL
TSH SERPL DL<=0.005 MIU/L-ACNC: 1.63 UIU/ML (ref 0.3–4.2)
VIT B12 SERPL-MCNC: 896 PG/ML (ref 232–1245)
VIT D+METAB SERPL-MCNC: 32 NG/ML (ref 20–50)

## 2025-01-16 PROCEDURE — 99000 SPECIMEN HANDLING OFFICE-LAB: CPT | Performed by: PATHOLOGY

## 2025-01-16 PROCEDURE — 84443 ASSAY THYROID STIM HORMONE: CPT | Performed by: PATHOLOGY

## 2025-01-16 PROCEDURE — 82306 VITAMIN D 25 HYDROXY: CPT | Performed by: STUDENT IN AN ORGANIZED HEALTH CARE EDUCATION/TRAINING PROGRAM

## 2025-01-16 PROCEDURE — 82607 VITAMIN B-12: CPT | Performed by: STUDENT IN AN ORGANIZED HEALTH CARE EDUCATION/TRAINING PROGRAM

## 2025-01-16 PROCEDURE — 36415 COLL VENOUS BLD VENIPUNCTURE: CPT | Performed by: PATHOLOGY

## 2025-01-16 PROCEDURE — 82570 ASSAY OF URINE CREATININE: CPT | Performed by: STUDENT IN AN ORGANIZED HEALTH CARE EDUCATION/TRAINING PROGRAM

## 2025-01-16 PROCEDURE — 80061 LIPID PANEL: CPT | Performed by: PATHOLOGY

## 2025-01-16 PROCEDURE — 80053 COMPREHEN METABOLIC PANEL: CPT | Performed by: PATHOLOGY

## 2025-01-16 PROCEDURE — 83036 HEMOGLOBIN GLYCOSYLATED A1C: CPT | Performed by: STUDENT IN AN ORGANIZED HEALTH CARE EDUCATION/TRAINING PROGRAM

## 2025-01-16 PROCEDURE — 82043 UR ALBUMIN QUANTITATIVE: CPT | Performed by: STUDENT IN AN ORGANIZED HEALTH CARE EDUCATION/TRAINING PROGRAM

## 2025-01-20 DIAGNOSIS — G47.00 PERSISTENT INSOMNIA: ICD-10-CM

## 2025-01-20 DIAGNOSIS — F31.60 BIPOLAR AFFECTIVE DISORDER, CURRENT EPISODE MIXED, CURRENT EPISODE SEVERITY UNSPECIFIED (H): ICD-10-CM

## 2025-01-21 RX ORDER — HYDROXYZINE PAMOATE 50 MG/1
50 CAPSULE ORAL DAILY
Qty: 90 CAPSULE | Refills: 3 | OUTPATIENT
Start: 2025-01-21

## 2025-02-11 ENCOUNTER — TELEPHONE (OUTPATIENT)
Dept: GASTROENTEROLOGY | Facility: CLINIC | Age: 50
End: 2025-02-11
Payer: COMMERCIAL

## 2025-02-11 NOTE — TELEPHONE ENCOUNTER
Left Voicemail (1st Attempt) and Sent Mychart (1st Attempt) for the patient to call back and schedule the following:    Appointment Type: Return Liver  Provider: Dr. Karina Monroy  Appt date: Approx. 8/14/25  Specialty phone number: [phone]  Additional appointment(s) needed: Lab & Fibrosis Scan  Additional Notes:

## 2025-02-13 ENCOUNTER — TELEPHONE (OUTPATIENT)
Dept: GASTROENTEROLOGY | Facility: CLINIC | Age: 50
End: 2025-02-13
Payer: COMMERCIAL

## 2025-02-13 NOTE — TELEPHONE ENCOUNTER
Patient confirmed scheduled appointment:     Date: 8/19/25  Time: 8:00 am  Appointment Type: Return Liver  Provider: Dr. Karina Monroy  Location: [place]  Testing/imaging: Lab & Fibro  Additional Notes:

## 2025-02-25 DIAGNOSIS — E11.9 TYPE 2 DIABETES MELLITUS WITHOUT COMPLICATION, WITHOUT LONG-TERM CURRENT USE OF INSULIN (H): ICD-10-CM

## 2025-02-26 RX ORDER — TIRZEPATIDE 12.5 MG/.5ML
INJECTION, SOLUTION SUBCUTANEOUS
Qty: 2 ML | Refills: 5 | Status: SHIPPED | OUTPATIENT
Start: 2025-02-26

## 2025-03-12 ENCOUNTER — MYC MEDICAL ADVICE (OUTPATIENT)
Dept: FAMILY MEDICINE | Facility: CLINIC | Age: 50
End: 2025-03-12
Payer: COMMERCIAL

## 2025-03-13 ENCOUNTER — OFFICE VISIT (OUTPATIENT)
Dept: FAMILY MEDICINE | Facility: CLINIC | Age: 50
End: 2025-03-13
Payer: COMMERCIAL

## 2025-03-13 VITALS
HEART RATE: 76 BPM | DIASTOLIC BLOOD PRESSURE: 75 MMHG | OXYGEN SATURATION: 99 % | SYSTOLIC BLOOD PRESSURE: 117 MMHG | HEIGHT: 68 IN | TEMPERATURE: 97.3 F | WEIGHT: 163 LBS | RESPIRATION RATE: 16 BRPM | BODY MASS INDEX: 24.71 KG/M2

## 2025-03-13 DIAGNOSIS — L60.0 INGROWN TOENAIL OF LEFT FOOT WITH INFECTION: Primary | ICD-10-CM

## 2025-03-13 RX ORDER — CEPHALEXIN 500 MG/1
500 CAPSULE ORAL 2 TIMES DAILY
Qty: 20 CAPSULE | Refills: 1 | Status: SHIPPED | OUTPATIENT
Start: 2025-03-13

## 2025-03-13 ASSESSMENT — PAIN SCALES - GENERAL: PAINLEVEL_OUTOF10: SEVERE PAIN (7)

## 2025-03-13 NOTE — PROGRESS NOTES
"  Assessment & Plan     (L60.0) Ingrown toenail of left foot with infection  (primary encounter diagnosis)  Comment:   Plan: cephALEXin (KEFLEX) 500 MG capsule          Treatment options (start with ABX course, if not improving, return to podiatry for repeat removal based on onychomycosis), medication side effects and expected course of recovery reviewed. If any worsening of symptoms, please contact the clinical team sooner, otherwise follow up as discussed    BMI  Estimated body mass index is 25.15 kg/m  as calculated from the following:    Height as of this encounter: 1.715 m (5' 7.5\").    Weight as of this encounter: 73.9 kg (163 lb).             Emily Rubi is a 50 year old, presenting for the following health issues:  Toenail (Infection)      3/13/2025    11:44 AM   Additional Questions   Roomed by Birgit Bassett    History of Present Illness       Reason for visit:  Toenail infection  Symptom onset:  1-3 days ago  Symptoms include:  Pain,discharge on one toe and when i press on my big toenail fluid comes out and it is loose/not attached completely  Symptom intensity:  Moderate  Symptom progression:  Staying the same  Had these symptoms before:  Yes  Has tried/received treatment for these symptoms:  Yes  Previous treatment was successful:  Yes  Prior treatment description:  Antibiotic for the ingrown toenail and i eventually had to have the big toenail removed~same big toe  What makes it worse:  Tight shoes/socks  What makes it better:  Getting rid of the infection   She is taking medications regularly.                      Objective    /75   Pulse 76   Temp 97.3  F (36.3  C) (Temporal)   Resp 16   Ht 1.715 m (5' 7.5\")   Wt 73.9 kg (163 lb)   LMP 03/02/2025 (Approximate)   SpO2 99%   BMI 25.15 kg/m    Body mass index is 25.15 kg/m .  Physical Exam   GENERAL: healthy, alert and no distress  EYES: Eyes grossly normal to inspection, EOM intact and conjunctivae normal  RESP: breathing " comfortably on room air  PSYCH: mentation appears normal, affect normal/bright  SKIN: left great and 3rd toes with swelling and erythema surrounding nail beds, no fluctuance present on exam, normal cap refill            Signed Electronically by: Anderson Oliveira PA-C

## 2025-03-18 DIAGNOSIS — F31.60 BIPOLAR AFFECTIVE DISORDER, CURRENT EPISODE MIXED, CURRENT EPISODE SEVERITY UNSPECIFIED (H): ICD-10-CM

## 2025-03-18 DIAGNOSIS — G47.00 PERSISTENT INSOMNIA: ICD-10-CM

## 2025-03-19 RX ORDER — HYDROXYZINE PAMOATE 50 MG/1
50 CAPSULE ORAL DAILY
Qty: 90 CAPSULE | Refills: 3 | Status: SHIPPED | OUTPATIENT
Start: 2025-03-19

## 2025-04-09 ENCOUNTER — VIRTUAL VISIT (OUTPATIENT)
Dept: FAMILY MEDICINE | Facility: CLINIC | Age: 50
End: 2025-04-09
Payer: COMMERCIAL

## 2025-04-09 DIAGNOSIS — G89.4 CHRONIC PAIN DISORDER: ICD-10-CM

## 2025-04-09 DIAGNOSIS — M25.571 PAIN IN JOINT INVOLVING ANKLE AND FOOT, RIGHT: ICD-10-CM

## 2025-04-09 DIAGNOSIS — G89.29 CHRONIC LOW BACK PAIN WITHOUT SCIATICA, UNSPECIFIED BACK PAIN LATERALITY: ICD-10-CM

## 2025-04-09 DIAGNOSIS — Z79.1 NSAID LONG-TERM USE: Primary | ICD-10-CM

## 2025-04-09 DIAGNOSIS — M54.50 CHRONIC LOW BACK PAIN WITHOUT SCIATICA, UNSPECIFIED BACK PAIN LATERALITY: ICD-10-CM

## 2025-04-09 DIAGNOSIS — G47.00 PERSISTENT INSOMNIA: ICD-10-CM

## 2025-04-09 DIAGNOSIS — F11.90 CHRONIC, CONTINUOUS USE OF OPIOIDS: ICD-10-CM

## 2025-04-09 PROCEDURE — 98006 SYNCH AUDIO-VIDEO EST MOD 30: CPT | Performed by: STUDENT IN AN ORGANIZED HEALTH CARE EDUCATION/TRAINING PROGRAM

## 2025-04-09 PROCEDURE — 1125F AMNT PAIN NOTED PAIN PRSNT: CPT | Performed by: STUDENT IN AN ORGANIZED HEALTH CARE EDUCATION/TRAINING PROGRAM

## 2025-04-09 RX ORDER — OXYCODONE HYDROCHLORIDE 5 MG/1
5 TABLET ORAL EVERY 6 HOURS PRN
Qty: 75 TABLET | Refills: 0 | Status: SHIPPED | OUTPATIENT
Start: 2025-06-04

## 2025-04-09 RX ORDER — HYDROXYZINE HYDROCHLORIDE 25 MG/1
TABLET, FILM COATED ORAL
Qty: 90 TABLET | Refills: 3 | Status: SHIPPED | OUTPATIENT
Start: 2025-04-09

## 2025-04-09 RX ORDER — OXYCODONE HCL 10 MG/1
10 TABLET, FILM COATED, EXTENDED RELEASE ORAL EVERY 12 HOURS
Qty: 60 TABLET | Refills: 0 | Status: SHIPPED | OUTPATIENT
Start: 2025-04-09

## 2025-04-09 RX ORDER — OXYCODONE HCL 10 MG/1
10 TABLET, FILM COATED, EXTENDED RELEASE ORAL EVERY 12 HOURS
Qty: 60 TABLET | Refills: 0 | Status: SHIPPED | OUTPATIENT
Start: 2025-05-07

## 2025-04-09 RX ORDER — OXYCODONE HYDROCHLORIDE 5 MG/1
5 TABLET ORAL EVERY 6 HOURS PRN
Qty: 80 TABLET | Refills: 0 | Status: SHIPPED | OUTPATIENT
Start: 2025-04-09

## 2025-04-09 RX ORDER — OXYCODONE HYDROCHLORIDE 5 MG/1
5 TABLET ORAL EVERY 6 HOURS PRN
Qty: 75 TABLET | Refills: 0 | Status: SHIPPED | OUTPATIENT
Start: 2025-05-07

## 2025-04-09 RX ORDER — OXYCODONE HCL 10 MG/1
10 TABLET, FILM COATED, EXTENDED RELEASE ORAL EVERY 12 HOURS
Qty: 60 TABLET | Refills: 0 | Status: SHIPPED | OUTPATIENT
Start: 2025-06-04

## 2025-04-09 NOTE — PROGRESS NOTES
Elicia is a 49 year old who is being evaluated via a billable video visit.      How would you like to obtain your AVS? MyChart  If the video visit is dropped, the invitation should be resent by: Send to e-mail at: [email or web address]  Will anyone else be joining your video visit? No    Assessment & Plan     Chronic pain disorder  Pain in joint involving ankle and foot, right  Chronic, continuous use of opioids  NSAID long term use  Chronic low back pain w/o sciatica  History of chronic foot pain, has been on continuous oxycodone and daily NSAIDs for many years. S/P ankle tendon/arch surgery on 10/19/23 which went well. She is now weight bearing, graduated from PT. Following with [facility]. Discussed need to wean opioids, due to high MME & medication side effects (also on stimulants), risk of tolerance and oversedation. No side effects. Has narcan available at home PRN.     Today pain in ankle/foot is stable/improved. Back pain is the biggest issue-had a medial branch nerve block which didn't help. Bone ablation is not covered by insurance. Waiting to meet with [facility] to discuss next steps. Using opioids appropriately. Elected to continue short acting oxycodone 80 tabs/month-pt was agreeable to reducing to 75 tabs/month in May and June. No negative side effects today. Denies s/s of GI bleed or stomach ulcer from daily NSAID use.    Plan:  -goal is to wean opioids  -goal is to return to the amount of oxycodone she was taking pre-surgery, which was a total of short acting oxycodone 20mg daily (MME 30)  -MME were 30 ~8/2023       Opioid:   Continue long acting oxycontin 10mg BID (MME 30)  Continue short acting oxycodone 80 tabs per month (max 3 tablets/day; MME 15-22 per day), reduce to 75 tabs per month in May and June  PDMP checked, appropriate  Current MME: 45- 52   She does have narcan available  UDS due 5/2025  CSA due 7/2025    Non-Opioid:  schedule tylenol 1000mg TID  schedule ibuprofen 800mg TID    Daily NSAIDs not ideal, will monitor renal function and for signs of GI bleed/ulcer  Continue daily PPI  continue gabapentin 1200mg TID  elevate, ice    3 month supply of opioids ordered  Follow up in 3 months for annual in person/preventative visit; will do UDS/CSA then    The longitudinal plan of care for the diagnosis(es)/condition(s) as documented were addressed during this visit. Due to the added complexity in care, I will continue to support Elicia in the subsequent management and with ongoing continuity of care.      Emily Rubi is a 49 year old, presenting for the following health issues:  Follow Up (Chronic pain)        2/29/2024     7:27 AM   Additional Questions   Roomed by Amada Samano   Accompanied by Self     History of Present Illness       Reason for visit:  Pain medication review    She eats 2-3 servings of fruits and vegetables daily.She consumes 0 sweetened beverage(s) daily.She exercises with enough effort to increase her heart rate 9 or less minutes per day.  She exercises with enough effort to increase her heart rate 3 or less days per week. She is missing 2 dose(s) of medications per week.  She is not taking prescribed medications regularly due to remembering to take.    Pain:  Last filled 80 tablets of oxycodone 5mg on 3/11/25  Last filled 60 tablets of long acting oxycodone 10mg on 3/11/25    Today:  Seeing [facility] for back pain  Was looking at doing a bone ablation-not going to happen due to insurance  Needs to follow up with [facility] for next steps  S/P medial nerve block didn't help  Ankle/foot pain   Better  Stable   Pain mgmt-->  Oxycodone 2-3x/day  Taking every 6 hours  Gets 80 tabs/month  Work is going ok  No side effects  Chronic NSAIDS for a long time, denies signs of GI bleed or stomach ulcer        Objective    Vitals - Patient Reported  Pain Score: Moderate Pain (4)  Pain Loc: Low Back    Vitals:  No vitals were obtained today due to virtual visit.    Physical Exam   GENERAL:  alert and no distress  EYES: Eyes grossly normal to inspection.  No discharge or erythema, or obvious scleral/conjunctival abnormalities.  RESP: No audible wheeze, cough, or visible cyanosis.    SKIN: Visible skin clear. No significant rash, abnormal pigmentation or lesions.  NEURO: Cranial nerves grossly intact.  Mentation and speech appropriate for age.  PSYCH: Appropriate affect, tone, and pace of words      Video-Visit Details    Type of service:  Video Visit   Video Start Time: 7:21am  Video End Time: 7:30am    Originating Location (pt. Location): Home  Distant Location (provider location):  Off-site  Platform used for Video Visit: Diana  Signed Electronically by: Loc Waggoner DO

## 2025-04-14 ENCOUNTER — TRANSFERRED RECORDS (OUTPATIENT)
Dept: HEALTH INFORMATION MANAGEMENT | Facility: CLINIC | Age: 50
End: 2025-04-14
Payer: COMMERCIAL

## 2025-04-27 ENCOUNTER — HEALTH MAINTENANCE LETTER (OUTPATIENT)
Age: 50
End: 2025-04-27

## 2025-04-30 ENCOUNTER — NURSE TRIAGE (OUTPATIENT)
Dept: FAMILY MEDICINE | Facility: CLINIC | Age: 50
End: 2025-04-30

## 2025-04-30 ENCOUNTER — VIRTUAL VISIT (OUTPATIENT)
Dept: FAMILY MEDICINE | Facility: CLINIC | Age: 50
End: 2025-04-30
Payer: COMMERCIAL

## 2025-04-30 DIAGNOSIS — L08.9 INFECTION OF INDEX FINGER: Primary | ICD-10-CM

## 2025-04-30 PROCEDURE — 98005 SYNCH AUDIO-VIDEO EST LOW 20: CPT | Performed by: PHYSICIAN ASSISTANT

## 2025-04-30 RX ORDER — CEPHALEXIN 500 MG/1
500 CAPSULE ORAL 2 TIMES DAILY
Qty: 14 CAPSULE | Refills: 2 | Status: SHIPPED | OUTPATIENT
Start: 2025-04-30

## 2025-04-30 NOTE — PROGRESS NOTES
"Elicia is a 50 year old who is being evaluated via a billable video visit.    How would you like to obtain your AVS? MyChart  If the video visit is dropped, the invitation should be resent by: Text to cell phone: [phone]  Will anyone else be joining your video visit? No  {If patient encounters technical issues they should call [phone] :736719}    {PROVIDER CHARTING PREFERENCE:594402}    Subjective   Elicia is a 50 year old, presenting for the following health issues:  No chief complaint on file.  {(!) Visit Details have not yet been documented.  Please enter Visit Details and then use this list to pull in documentation. (Optional):591623}  HPI      {SUPERLIST (Optional):059966}  {additonal problems for provider to add (Optional):800695}    {ROS Picklists (Optional):002293}      Objective           Vitals:  No vitals were obtained today due to virtual visit.    Physical Exam   {video visit exam brief selected:602089}    {Diagnostic Test Results (Optional):188487}      Video-Visit Details    Type of service:  Video Visit   Originating Location (pt. Location): {video visit patient location:752831::\"Home\"}  {PROVIDER LOCATION On-site should be selected for visits conducted from your clinic location or adjoining [facility] hospital, academic office, or other nearby [facility] building. Off-site should be selected for all other provider locations, including home:281948}  Distant Location (provider location):  {virtual location provider:348700}  Platform used for Video Visit: {Virtual Visit Platforms:424258::\"[email or web address]\"}  Signed Electronically by: Anderson Oliveira PA-C  {Email feedback regarding this note to [email or web address]   :415261}  "

## 2025-04-30 NOTE — PROGRESS NOTES
"Elicia is a 50 year old who is being evaluated via a billable video visit.    How would you like to obtain your AVS? MyChart  If the video visit is dropped, the invitation should be resent by: Text to cell phone: [phone]  Will anyone else be joining your video visit? No      Assessment & Plan     (L08.9) Infection of index finger  (primary encounter diagnosis)  Comment:   Plan: cephALEXin (KEFLEX) 500 MG capsule          Etiology discussed today. Treatment options, medication side effects and expected course of recovery reviewed. If any worsening of symptoms, please contact the clinical team sooner, otherwise follow up as discussed    BMI  Estimated body mass index is 25.15 kg/m  as calculated from the following:    Height as of 3/13/25: 1.715 m (5' 7.5\").    Weight as of 3/13/25: 73.9 kg (163 lb).             Subjective   Elicia is a 50 year old, presenting for the following health issues:  Infection    History of Present Illness       Reason for visit:  Infection in finger  Symptom onset:  3-4 weeks ago  Symptom intensity:  Moderate  Symptom progression:  Staying the same  Had these symptoms before:  Yes  Has tried/received treatment for these symptoms:  No  What makes it worse:  Bumping it  What makes it better:  No She is missing 1 dose(s) of medications per week.  She is not taking prescribed medications regularly due to remembering to take.      Patient was superficial laceration to her left index finger at PIP joint, noting continued purulent drainage and surrounding erythema since the initial injury. she denies any fevers or chills, no proximal movement of erythema, has been keeping covered with a Band-Aid as well as topical antibiotics without significant relief of symptoms              Objective           Vitals:  No vitals were obtained today due to virtual visit.    Physical Exam   GENERAL: alert and no distress  EYES: Eyes grossly normal to inspection.  No discharge or erythema, or obvious " scleral/conjunctival abnormalities.  RESP: No audible wheeze, cough, or visible cyanosis.    SKIN: Visible skin clear. No significant rash, abnormal pigmentation or lesions; left index finger PIP with erythema and swelling present, unable to ascertain any drainage.  NEURO: Cranial nerves grossly intact.  Mentation and speech appropriate for age.  PSYCH: Appropriate affect, tone, and pace of words          Video-Visit Details    Type of service:  Video Visit   Originating Location (pt. Location): Other work    Distant Location (provider location):  Off-site  Platform used for Video Visit: [facility]  Signed Electronically by: Anderson Oliveira PA-C

## 2025-04-30 NOTE — TELEPHONE ENCOUNTER
Nurse Triage SBAR    Is this a 2nd Level Triage? NO    Situation: Infected wound on left index finger knuckle    Background: Pt reports she may have sustained a cut on her left index finger knuckle approximately 4 wks ago. Does not recall what object. Pt also reports that historically, she is prone to wound infections due to her diabetes and Dr Waggoner would just called in antibiotic.     Assessment: Cut is approx. 1/8 inch in depth, site is swollen and painful when bumped.     Protocol Recommended Disposition:   See in Office Today  Pt declined in office visits, states she is at work and works til 7pm every day so is unsure how she can come in. Virtual visit scheduled for 12:30pm today.     Does the patient meet one of the following criteria for ADS visit consideration? No     Antonietta VILLATORO RN  [facility]      Reason for Disposition   Pain or swelling present > 5 days    Additional Information   Negative: Shock suspected (e.g., cold/pale/clammy skin, too weak to stand, low BP, rapid pulse)   Negative: Puncture wound of head, neck, chest, back, or abdomen that sounds life-threatening to triager   Negative: Sounds like a life-threatening emergency to the triager   Negative: Caused by an animal bite   Negative: Skin is cut or scraped, not punctured   Negative: Puncture wound of eye or eyelid   Negative: Foreign body is still in the skin (e.g., splinter, sliver, fishhook)   Negative: Puncture wound of head, neck, chest, abdomen, or overlying a joint and it could be deep   Negative: Needlestick from used or discarded injection needle  (Exception: Clean, unused needle.)   Negative: High pressure injection injury (e.g., from grease gun or paint gun, usually work-related)   Negative: Dirt in the wound and not removed with 15 minutes of scrubbing   Negative: Sounds like a serious injury to the triager   Negative: SEVERE pain (e.g., excruciating)   Negative: Puncture wound of foot and hurts too much to walk on  "(i.e., unable to bear weight, severe limp)   Negative: Puncture wound of bare foot (no shoes) and setting was dirty   Negative: Puncture wound of foot and puncture went through shoe (e.g., tennis shoe)   Negative: Puncture wound of finger and entire finger swollen   Negative: Puncture wound from sharp object that was very dirty (e.g., barnyard)   Negative: Tip of the object is broken off and missing   Negative: Sensation of something still in the wound   Negative: Looks infected (e.g., spreading redness, red streak, pus)    Answer Assessment - Initial Assessment Questions  1. LOCATION: \"Where is the puncture located?\"       Left index finger on the knuckle  2. OBJECT: \"What was the object that punctured the skin?\"       Unsure,   3. DEPTH: \"How deep do you think the puncture goes?\"       1/8 inch  4. ONSET: \"When did the injury occur?\" (Minutes or hours)      Unsure of exact date, but may have been 4 wks ago  5. PAIN: \"Is it painful?\" If Yes, ask: \"How bad is the pain?\"  (Scale 1-10; or mild, moderate, severe)      Yes, 3 but if I bump it then it is an 8  6. TETANUS: \"When was the last tetanus booster?\"      Unsure  7. PREGNANCY: \"Is there any chance you are pregnant?\" \"When was your last menstrual period?\"      No    Protocols used: Puncture Wound-A-OH    "

## 2025-05-12 DIAGNOSIS — G43.709 CHRONIC MIGRAINE WITHOUT AURA WITHOUT STATUS MIGRAINOSUS, NOT INTRACTABLE: ICD-10-CM

## 2025-05-13 RX ORDER — PROCHLORPERAZINE MALEATE 10 MG
TABLET ORAL
Qty: 120 TABLET | Refills: 4 | Status: SHIPPED | OUTPATIENT
Start: 2025-05-13

## 2025-05-28 DIAGNOSIS — F11.90 CHRONIC, CONTINUOUS USE OF OPIOIDS: ICD-10-CM

## 2025-05-28 DIAGNOSIS — G89.4 CHRONIC PAIN DISORDER: ICD-10-CM

## 2025-05-28 DIAGNOSIS — M25.571 PAIN IN JOINT INVOLVING ANKLE AND FOOT, RIGHT: ICD-10-CM

## 2025-05-28 RX ORDER — OXYCODONE HYDROCHLORIDE 5 MG/1
5 TABLET ORAL EVERY 6 HOURS PRN
Qty: 75 TABLET | Refills: 0 | Status: SHIPPED | OUTPATIENT
Start: 2025-06-09

## 2025-05-28 RX ORDER — OXYCODONE HCL 10 MG/1
10 TABLET, FILM COATED, EXTENDED RELEASE ORAL EVERY 12 HOURS
Qty: 60 TABLET | Refills: 0 | Status: SHIPPED | OUTPATIENT
Start: 2025-06-09

## 2025-05-28 NOTE — TELEPHONE ENCOUNTER
Transferred oxycodone & oxycontin to [place] [facility]. Can pickup on 6/9. Please update patient.    Dr. Waggoner      PDMP checked  -last filled oxycontin ER 10mg (60 tabs) on 5/13/25  -last filled oxycodone 5mg (75 tabs) on 5/12/25  -has visit with me in July 2025

## 2025-05-28 NOTE — TELEPHONE ENCOUNTER
Patient would like her oxy Codone prescriptions resent to the [place] pharmacy down stairs please. Please check last fill date.   non-verbal indicators absent (Rating = 0)

## 2025-06-09 NOTE — TELEPHONE ENCOUNTER
"Dr. Waggoner-Please review and sign if agree.    \"Hello,          I was wondering if you could send a prescription for alcohol swabs to the pharmacy.  From what I understand they are covered by my insurance.  Thank you for everything.               Elicia\"      Thank you!  MARCOS KateN, RN-[facility]      " unknown

## 2025-07-08 ENCOUNTER — TELEPHONE (OUTPATIENT)
Dept: FAMILY MEDICINE | Facility: CLINIC | Age: 50
End: 2025-07-08
Payer: COMMERCIAL

## 2025-07-08 NOTE — TELEPHONE ENCOUNTER
[facility]  Fax:[phone]  Spec: [phone]  MO: [phone]

## 2025-07-10 NOTE — TELEPHONE ENCOUNTER
Retail Pharmacy Prior Authorization Team   Phone: [phone]    Prior Authorization Not Needed per Insurance    Medication: MOUNJARO 12.5 MG/0.5ML SC SOAJ  Insurance Company: [employer] [place] - Phone [phone] Fax [phone]  Expected CoPay: $    Pharmacy Filling the Rx: [facility] - [place], [place] - [facility] [address]   Pharmacy Notified: YES  Patient Notified: YES    Called [employer] and spoke with Tamiko - states that a PA was already submitted on July 8th - approval is good from 7/8/25-7/8/26

## 2025-07-16 DIAGNOSIS — E11.9 TYPE 2 DIABETES MELLITUS WITHOUT COMPLICATION, WITHOUT LONG-TERM CURRENT USE OF INSULIN (H): ICD-10-CM

## 2025-07-16 RX ORDER — ACYCLOVIR 400 MG/1
TABLET ORAL
Qty: 9 EACH | Refills: 1 | Status: SHIPPED | OUTPATIENT
Start: 2025-07-16 | End: 2025-07-17

## 2025-07-17 ENCOUNTER — OFFICE VISIT (OUTPATIENT)
Dept: FAMILY MEDICINE | Facility: CLINIC | Age: 50
End: 2025-07-17
Payer: COMMERCIAL

## 2025-07-17 VITALS
TEMPERATURE: 97.1 F | HEIGHT: 67 IN | DIASTOLIC BLOOD PRESSURE: 78 MMHG | OXYGEN SATURATION: 98 % | SYSTOLIC BLOOD PRESSURE: 120 MMHG | HEART RATE: 72 BPM | BODY MASS INDEX: 24.42 KG/M2 | WEIGHT: 155.6 LBS | RESPIRATION RATE: 16 BRPM

## 2025-07-17 DIAGNOSIS — M25.511 ACUTE PAIN OF RIGHT SHOULDER: ICD-10-CM

## 2025-07-17 DIAGNOSIS — Z00.00 ROUTINE GENERAL MEDICAL EXAMINATION AT A HEALTH CARE FACILITY: Primary | ICD-10-CM

## 2025-07-17 DIAGNOSIS — Z12.4 CERVICAL CANCER SCREENING: ICD-10-CM

## 2025-07-17 DIAGNOSIS — K76.0 NAFLD (NONALCOHOLIC FATTY LIVER DISEASE): Primary | ICD-10-CM

## 2025-07-17 DIAGNOSIS — M25.571 PAIN IN JOINT INVOLVING ANKLE AND FOOT, RIGHT: ICD-10-CM

## 2025-07-17 DIAGNOSIS — F11.90 CHRONIC, CONTINUOUS USE OF OPIOIDS: ICD-10-CM

## 2025-07-17 DIAGNOSIS — E11.9 TYPE 2 DIABETES MELLITUS WITHOUT COMPLICATION, WITHOUT LONG-TERM CURRENT USE OF INSULIN (H): ICD-10-CM

## 2025-07-17 DIAGNOSIS — G89.4 CHRONIC PAIN DISORDER: ICD-10-CM

## 2025-07-17 DIAGNOSIS — G43.709 CHRONIC MIGRAINE WITHOUT AURA WITHOUT STATUS MIGRAINOSUS, NOT INTRACTABLE: ICD-10-CM

## 2025-07-17 DIAGNOSIS — E03.9 HYPOTHYROIDISM, UNSPECIFIED TYPE: ICD-10-CM

## 2025-07-17 DIAGNOSIS — Z86.39 HISTORY OF OBESITY: ICD-10-CM

## 2025-07-17 DIAGNOSIS — G47.00 PERSISTENT INSOMNIA: ICD-10-CM

## 2025-07-17 LAB
ALBUMIN SERPL BCG-MCNC: 4.5 G/DL (ref 3.5–5.2)
ALP SERPL-CCNC: 82 U/L (ref 40–150)
ALT SERPL W P-5'-P-CCNC: 14 U/L (ref 0–50)
AMPHETAMINES UR QL SCN: ABNORMAL
ANION GAP SERPL CALCULATED.3IONS-SCNC: 8 MMOL/L (ref 7–15)
AST SERPL W P-5'-P-CCNC: 20 U/L (ref 0–45)
BARBITURATES UR QL SCN: ABNORMAL
BENZODIAZ UR QL SCN: ABNORMAL
BILIRUB SERPL-MCNC: 0.3 MG/DL
BUN SERPL-MCNC: 14.3 MG/DL (ref 6–20)
BZE UR QL SCN: ABNORMAL
CALCIUM SERPL-MCNC: 9.4 MG/DL (ref 8.8–10.4)
CANNABINOIDS UR QL SCN: ABNORMAL
CHLORIDE SERPL-SCNC: 104 MMOL/L (ref 98–107)
CREAT SERPL-MCNC: 0.82 MG/DL (ref 0.51–0.95)
EGFRCR SERPLBLD CKD-EPI 2021: 87 ML/MIN/1.73M2
EST. AVERAGE GLUCOSE BLD GHB EST-MCNC: 97 MG/DL
FENTANYL UR QL: ABNORMAL
GLUCOSE SERPL-MCNC: 74 MG/DL (ref 70–99)
HBA1C MFR BLD: 5 % (ref 0–5.6)
HCO3 SERPL-SCNC: 29 MMOL/L (ref 22–29)
OPIATES UR QL SCN: ABNORMAL
PCP QUAL URINE (ROCHE): ABNORMAL
POTASSIUM SERPL-SCNC: 4.4 MMOL/L (ref 3.4–5.3)
PROT SERPL-MCNC: 6.7 G/DL (ref 6.4–8.3)
SODIUM SERPL-SCNC: 141 MMOL/L (ref 135–145)

## 2025-07-17 RX ORDER — ACYCLOVIR 400 MG/1
TABLET ORAL
Qty: 9 EACH | Refills: 4 | Status: SHIPPED | OUTPATIENT
Start: 2025-07-17

## 2025-07-17 RX ORDER — OXYCODONE HYDROCHLORIDE 5 MG/1
5 TABLET ORAL EVERY 6 HOURS PRN
Qty: 120 TABLET | Refills: 0 | Status: SHIPPED | OUTPATIENT
Start: 2025-08-14

## 2025-07-17 RX ORDER — OXYCODONE HYDROCHLORIDE 5 MG/1
5 TABLET ORAL EVERY 6 HOURS PRN
Qty: 120 TABLET | Refills: 0 | Status: SHIPPED | OUTPATIENT
Start: 2025-07-17

## 2025-07-17 RX ORDER — HYDROXYZINE HYDROCHLORIDE 25 MG/1
TABLET, FILM COATED ORAL
Qty: 270 TABLET | Refills: 3 | Status: SHIPPED | OUTPATIENT
Start: 2025-07-17

## 2025-07-17 RX ORDER — RIZATRIPTAN BENZOATE 10 MG/1
10 TABLET ORAL
Qty: 18 TABLET | Refills: 5 | Status: SHIPPED | OUTPATIENT
Start: 2025-07-17

## 2025-07-17 RX ORDER — LEVOTHYROXINE SODIUM 125 UG/1
125 TABLET ORAL DAILY
Qty: 90 TABLET | Refills: 3 | Status: SHIPPED | OUTPATIENT
Start: 2025-07-17

## 2025-07-17 RX ORDER — GABAPENTIN 600 MG/1
1200 TABLET ORAL 3 TIMES DAILY
Qty: 540 TABLET | Refills: 3 | Status: SHIPPED | OUTPATIENT
Start: 2025-07-17

## 2025-07-17 RX ORDER — PRAVASTATIN SODIUM 40 MG
40 TABLET ORAL DAILY
Qty: 90 TABLET | Refills: 3 | Status: SHIPPED | OUTPATIENT
Start: 2025-07-17

## 2025-07-17 RX ORDER — FREMANEZUMAB-VFRM 225 MG/1.5ML
INJECTION SUBCUTANEOUS
COMMUNITY
Start: 2024-09-09

## 2025-07-17 SDOH — HEALTH STABILITY: PHYSICAL HEALTH
ON AVERAGE, HOW MANY DAYS PER WEEK DO YOU ENGAGE IN MODERATE TO STRENUOUS EXERCISE (LIKE A BRISK WALK)?: PATIENT DECLINED

## 2025-07-17 SDOH — HEALTH STABILITY: PHYSICAL HEALTH: ON AVERAGE, HOW MANY MINUTES DO YOU ENGAGE IN EXERCISE AT THIS LEVEL?: PATIENT DECLINED

## 2025-07-17 ASSESSMENT — PAIN SCALES - GENERAL: PAINLEVEL_OUTOF10: SEVERE PAIN (7)

## 2025-07-17 ASSESSMENT — PATIENT HEALTH QUESTIONNAIRE - PHQ9
SUM OF ALL RESPONSES TO PHQ QUESTIONS 1-9: 7
SUM OF ALL RESPONSES TO PHQ QUESTIONS 1-9: 7
10. IF YOU CHECKED OFF ANY PROBLEMS, HOW DIFFICULT HAVE THESE PROBLEMS MADE IT FOR YOU TO DO YOUR WORK, TAKE CARE OF THINGS AT HOME, OR GET ALONG WITH OTHER PEOPLE: SOMEWHAT DIFFICULT

## 2025-07-17 ASSESSMENT — SOCIAL DETERMINANTS OF HEALTH (SDOH): HOW OFTEN DO YOU GET TOGETHER WITH FRIENDS OR RELATIVES?: MORE THAN THREE TIMES A WEEK

## 2025-07-17 NOTE — LETTER

## 2025-07-17 NOTE — PROGRESS NOTES
Preventive Care Visit  [facility]  Loc Waggoner DO, Family Medicine  Jul 17, 2025      Assessment & Plan     Routine general medical examination at a health care facility  Cervical cancer screening  Vitals: WNL  Exercise: fair, hard with chronic foot/ankle pain  Diet: good  Labs: UDS, cmp, A1c  Immunizations: return for shingles, covid      Pap: DUE, done today  Mammogram: UTD continue yearly screening    Colon cancer screen:   UTD due 2033  Mental health: medications managed by psychiatry    - HPV and Gynecologic Cytology Panel - Recommended Age 30 - 65 Years    Type 2 diabetes mellitus without complication, without long-term current use of insulin (H)  Managed by MTM. Doing well. Has CGM.    Plan:  Medications:  Jardiance 25mg (see will ask MTM about going off this med)  Mounjaro 12.5mg weekly  Statin YES- pravastatin 40mg  Eye exam UTD  Foot exam DUE, done today  High protein/low carb diet reviewed  Encouraged routine exercise  Goal fasting glucose <120  Goal postprandial glucose <180    - Hemoglobin A1c  - Comprehensive metabolic panel (BMP + Alb, Alk Phos, ALT, AST, Total. Bili, TP)  - Continuous Glucose Sensor (DEXCOM G7 SENSOR) MISC  Dispense: 9 each; Refill: 4  - empagliflozin (JARDIANCE) 25 MG TABS tablet  Dispense: 90 tablet; Refill: 3  - pravastatin (PRAVACHOL) 40 MG tablet  Dispense: 90 tablet; Refill: 3  - Hemoglobin A1c  - Comprehensive metabolic panel (BMP + Alb, Alk Phos, ALT, AST, Total. Bili, TP)    Chronic pain disorder  Chronic, continuous use of opioids  Pain in joint involving ankle and foot, right  History of chronic foot pain, has been on continuous oxycodone and daily NSAIDs for many years. S/P ankle tendon/arch surgery on 10/19/23 which went well. She is now weight bearing, graduated from PT. Following with [facility]. Discussed need to wean opioids, due to high MME & medication side effects (also on stimulants), risk of tolerance and  oversedation. No side effects. Has narcan available at home PRN.     Today, pain is stable and less since she is not working. Using opioids appropriately. Unfortunately, new insurance denied long acting oxycontin. A letter of appeal was sent in-waiting to hear their response. Will adjust short acting oxycodone in the meantime.    Plan:  -goal is to wean opioids  -goal is to return to the amount of oxycodone she was taking pre-surgery, which was a total of short acting oxycodone 20mg daily (MME 30)  -MME were 30 ~8/2023      Opioid:   HOLD long acting oxycontin 10mg BID (MME 30) due to insurance issues  ADJUST short acting oxycodone  10mg AM, 5mg afternoon, and 5mg PM (MME 30)  120 tabs per month  PDMP checked, appropriate  Current MME: 30  She does have narcan available  Yearly UDS done (due 7/2026)  Yearly CSA done (due 7/2026)       Non-Opioid:  schedule tylenol 1000mg TID  ibuprofen 800mg TID prn  Daily NSAIDs not ideal, will monitor renal function and for signs of GI bleed/ulcer  Continue daily PPI  continue gabapentin 1200mg TID (working to wean off)  elevate, ice       - Urine Drug Screen  - oxyCODONE (ROXICODONE) 5 MG tablet  Dispense: 120 tablet; Refill: 0  - oxyCODONE (ROXICODONE) 5 MG tablet  Dispense: 120 tablet; Refill: 0  - Urine Drug Screen    Chronic migraine without aura without status migrainosus, not intractable  Working to taper off gabapentin.   - rizatriptan (MAXALT) 10 MG tablet  Dispense: 18 tablet; Refill: 5  - gabapentin (NEURONTIN) 600 MG tablet  Dispense: 540 tablet; Refill: 3    History of obesity  Doing well-has lost 70+lbs on mounjaro. BMI is now in the normal range.    Wt Readings from Last 4 Encounters:   07/17/25 70.6 kg (155 lb 9.6 oz)   03/13/25 73.9 kg (163 lb)   08/15/24 85.7 kg (189 lb)   08/12/24 87.3 kg (192 lb 8 oz)       Hypothyroidism, unspecified type  TSH done 1/2025 and normal.  - levothyroxine (SYNTHROID/LEVOTHROID) 125 MCG tablet  Dispense: 90 tablet; Refill:  3    Persistent insomnia  - hydrOXYzine HCl (ATARAX) 25 MG tablet  Dispense: 270 tablet; Refill: 3    Acute pain of right shoulder  Having pain over deltoid and triceps muscle on R shoulder. Having restricted movement and pain with extension and internal rotation. Concern for tendinitis. Will refer to orthopedics.  - Orthopedic  Referral      Counseling  Appropriate preventive services were addressed with this patient via screening, questionnaire, or discussion as appropriate for fall prevention, nutrition, physical activity, Tobacco-use cessation, social engagement, weight loss and cognition.  Checklist reviewing preventive services available has been given to the patient.  Reviewed patient's diet, addressing concerns and/or questions.   She is at risk for psychosocial distress and has been provided with information to reduce risk.   Reviewed preventive health counseling, as reflected in patient instructions        Emily Rubi is a 50 year old, presenting for the following:  Physical (And pap)        7/17/2025     1:44 PM   Additional Questions   Roomed by Michelle adams   Accompanied by self          HPI    Pap/hpv-due 10/2025  Mammo-UTD  Colon cancer screening-UTD due 2033    DM2  -sees MTM  -CGM    Medications:  Jardiance 25mg  Mounjaro 12.5mg weekly  Statin YES pravastatin 40mg  Eye exam UTD  Foot exam DUE  High protein/low carb diet reviewed  Encouraged routine exercise  Goal fasting glucose <120  Goal postprandial glucose <180    Chronic pain  -UDS and CSA are due  -oxycocdone 5mg (max 3 per day); 75 per month  -oxycontin 10mg BID (sent in appeal letter)  -chris 1200 mg TID    -1 week off oxycontin  -taking oxycodone 5mg    GERD  -PPI 20mg BID    Hypothyroidism  -synthriod 125mcg    Hepatic adenoma  NAFLD    Migraines  -compazine  -maxalt prn     Weight mgmt  -topiramate 50mg (managed by psychiatry)  -mounjaro    Mental health  -wellbutrin 450mg  -lamictal 400mg  -vyvanse 70mg  -hydroxyzine 50mg daily  and 25mg prn  -sees psychiatry    Insomnia  -doxepin      Advance Care Planning  Discussed advance care planning with patient; however, patient declined at this time.        7/17/2025   General Health   How would you rate your overall physical health? (!) FAIR   Feel stress (tense, anxious, or unable to sleep) To some extent   (!) STRESS CONCERN      7/17/2025   Nutrition   Three or more servings of calcium each day? Yes   Diet: Regular (no restrictions)    Diabetic   How many servings of fruit and vegetables per day? (!) 2-3   How many sweetened beverages each day? 0-1       Multiple values from one day are sorted in reverse-chronological order         7/17/2025   Exercise   Days per week of moderate/strenous exercise Patient declined   Average minutes spent exercising at this level Patient declined         7/17/2025   Social Factors   Frequency of gathering with friends or relatives More than three times a week   Worry food won't last until get money to buy more No   Food not last or not have enough money for food? Patient declined   Do you have housing? (Housing is defined as stable permanent housing and does not include staying outside in a car, in a tent, in an abandoned building, in an overnight shelter, or couch-surfing.) Yes   Are you worried about losing your housing? No   Lack of transportation? No   Unable to get utilities (heat,electricity)? No         7/17/2025   Fall Risk   Fallen 2 or more times in the past year? No   Trouble with walking or balance? No          7/17/2025   Dental   Dentist two times every year? Yes       Today's PHQ-9 Score:       7/17/2025    11:45 AM   PHQ-9 SCORE   PHQ-9 Total Score MyChart 7 (Mild depression)   PHQ-9 Total Score 7        Patient-reported         7/17/2025   Substance Use   Alcohol more than 3/day or more than 7/wk No   Do you use any other substances recreationally? No     Social History     Tobacco Use    Smoking status: Former     Current packs/day: 0.00      "Average packs/day: 1 pack/day for 20.0 years (20.0 ttl pk-yrs)     Types: Cigarettes     Start date:      Quit date: 2018     Years since quittin.5    Smokeless tobacco: Never   Vaping Use    Vaping status: Never Used   Substance Use Topics    Alcohol use: Yes     Comment: occasional    Drug use: Never           2024   LAST FHS-7 RESULTS   1st degree relative breast or ovarian cancer No   Any relative bilateral breast cancer No   Any male have breast cancer No   Any ONE woman have BOTH breast AND ovarian cancer No   Any woman with breast cancer before 50yrs No   2 or more relatives with breast AND/OR ovarian cancer No   2 or more relatives with breast AND/OR bowel cancer No         2025   STI Screening   New sexual partner(s) since last STI/HIV test? No     History of abnormal Pap smear: No - age 30- 64 PAP with HPV every 5 years recommended       ASCVD Risk   The 10-year ASCVD risk score (Carmita CORREIA, et al., 2019) is: 1.6%    Values used to calculate the score:      Age: 50 years      Sex: Female      Is Non- : No      Diabetic: Yes      Tobacco smoker: No      Systolic Blood Pressure: 120 mmHg      Is BP treated: No      HDL Cholesterol: 49 mg/dL      Total Cholesterol: 146 mg/dL            2025   Contraception/Family Planning   Questions about contraception or family planning No   What are your periods like? (!) INFREQUENT (LESS OFTEN THAN EVERY 38 DAYS)       Reviewed and updated as needed this visit by Provider   Tobacco   Meds   Med Hx  Surg Hx  Fam Hx  Soc Hx Sexual Activity               Objective    Exam  /78 (BP Location: Right arm, Patient Position: Sitting, Cuff Size: Adult Regular)   Pulse 72   Temp 97.1  F (36.2  C) (Temporal)   Resp 16   Ht 1.702 m (5' 7\")   Wt 70.6 kg (155 lb 9.6 oz)   SpO2 98%   BMI 24.37 kg/m     Estimated body mass index is 24.37 kg/m  as calculated from the following:    Height as of this encounter: 1.702 m " "(5' 7\").    Weight as of this encounter: 70.6 kg (155 lb 9.6 oz).    Physical Exam  Constitutional:       General: She is not in acute distress.     Appearance: She is well-developed and normal weight.   HENT:      Head: Normocephalic and atraumatic.      Right Ear: Tympanic membrane, ear canal and external ear normal.      Left Ear: Tympanic membrane, ear canal and external ear normal.      Nose: Nose normal.      Mouth/Throat:      Mouth: Mucous membranes are moist.      Pharynx: Oropharynx is clear. No oropharyngeal exudate.   Eyes:      Conjunctiva/sclera: Conjunctivae normal.      Pupils: Pupils are equal, round, and reactive to light.   Neck:      Thyroid: No thyroid mass, thyromegaly or thyroid tenderness.   Cardiovascular:      Rate and Rhythm: Normal rate and regular rhythm.      Heart sounds: Normal heart sounds. No murmur heard.  Pulmonary:      Effort: Pulmonary effort is normal.      Breath sounds: No wheezing or rales.   Chest:   Breasts:     Right: Normal.      Left: Normal.   Abdominal:      General: Abdomen is flat.   Genitourinary:     General: Normal vulva.      Vagina: Normal.      Cervix: Normal.   Musculoskeletal:      Cervical back: Normal range of motion and neck supple. No rigidity or tenderness.   Lymphadenopathy:      Cervical: No cervical adenopathy.      Upper Body:      Right upper body: No supraclavicular, axillary or pectoral adenopathy.      Left upper body: No supraclavicular, axillary or pectoral adenopathy.   Skin:     General: Skin is warm and dry.      Findings: No rash.   Neurological:      General: No focal deficit present.      Mental Status: She is alert and oriented to person, place, and time. Mental status is at baseline.   Psychiatric:         Mood and Affect: Mood normal.         Behavior: Behavior normal.         Thought Content: Thought content normal.       Diabetic foot exam: normal DP and PT pulses, no trophic changes or ulcerative lesions, normal sensory exam, and " normal monofilament exam except for reduced sensation in R heel.      Signed Electronically by: Loc Waggoner, DO    Answers submitted by the patient for this visit:  Patient Health Questionnaire (Submitted on 7/17/2025)  If you checked off any problems, how difficult have these problems made it for you to do your work, take care of things at home, or get along with other people?: Somewhat difficult  PHQ9 TOTAL SCORE: 7     in an elevator access apartment/alone

## 2025-07-17 NOTE — PATIENT INSTRUCTIONS
Patient Education       Thank you for coming to see me today! Here are a couple of pieces of information about my schedule and communication practices.     I am not in the clinic on Tuesdays. Non-urgent calls and messages received on Tuesdays will be addressed as soon as I am able when I am back in the office on the next business day. Urgent calls will be addressed by a covering clinician.       If lab work was done today as part of your evaluation you will generally be contacted via [employer], mail, or phone with the results within 7-10 days.  If there is an alarming/concerning result we will contact you by phone. Lab results come back at varying times, I generally wait until all labs are resulted before making comments on results. Please note, labs are automatically released to [employer] once available, but it may take a couple of days for my interpretation note to appear.      I try very hard to respond to medical messages with 2 business days of receiving them. Occasionally it takes me longer if I am trying to figure out the best way to respond and need to seek guidance, do some research or dig deeper into your medical history to come up with a helpful response.      If you need refills please contact your pharmacist. They will send a refill request to me to review. Please allow 3-5 business days for us to respond to all refill requests.      Please call or send a medical message with any questions or concerns. Thank you for trusting me to be part of your healthcare team!        Dr. Loc Waggoner    Preventive Care Advice   This is general advice given by our system to help you stay healthy. However, your care team may have specific advice just for you. Please talk to your care team about your preventive care needs.  Nutrition  Eat 5 or more servings of fruits and vegetables each day.  Try wheat bread, brown rice and whole grain pasta (instead of white bread, rice, and pasta).  Get enough calcium and vitamin D. Check  the label on foods and aim for 100% of the RDA (recommended daily allowance).  Lifestyle  Exercise at least 150 minutes each week  (30 minutes a day, 5 days a week).  Do muscle strengthening activities 2 days a week. These help control your weight and prevent disease.  No smoking.  Wear sunscreen to prevent skin cancer.  Have a dental exam and cleaning every 6 months.  Yearly exams  See your health care team every year to talk about:  Any changes in your health.  Any medicines your care team has prescribed.  Preventive care, family planning, and ways to prevent chronic diseases.  Shots (vaccines)   HPV shots (up to age 26), if you've never had them before.  Hepatitis B shots (up to age 59), if you've never had them before.  COVID-19 shot: Get this shot when it's due.  Flu shot: Get a flu shot every year.  Tetanus shot: Get a tetanus shot every 10 years.  Pneumococcal, hepatitis A, and RSV shots: Ask your care team if you need these based on your risk.  Shingles shot (for age 50 and up)  General health tests  Diabetes screening:  Starting at age 35, Get screened for diabetes at least every 3 years.  If you are younger than age 35, ask your care team if you should be screened for diabetes.  Cholesterol test: At age 39, start having a cholesterol test every 5 years, or more often if advised.  Bone density scan (DEXA): At age 50, ask your care team if you should have this scan for osteoporosis (brittle bones).  Hepatitis C: Get tested at least once in your life.  STIs (sexually transmitted infections)  Before age 24: Ask your care team if you should be screened for STIs.  After age 24: Get screened for STIs if you're at risk. You are at risk for STIs (including HIV) if:  You are sexually active with more than one person.  You don't use condoms every time.  You or a partner was diagnosed with a sexually transmitted infection.  If you are at risk for HIV, ask about PrEP medicine to prevent HIV.  Get tested for HIV at  least once in your life, whether you are at risk for HIV or not.  Cancer screening tests  Cervical cancer screening: If you have a cervix, begin getting regular cervical cancer screening tests starting at age 21.  Breast cancer scan (mammogram): If you've ever had breasts, begin having regular mammograms starting at age 40. This is a scan to check for breast cancer.  Colon cancer screening: It is important to start screening for colon cancer at age 45.  Have a colonoscopy test every 10 years (or more often if you're at risk) Or, ask your provider about stool tests like a FIT test every year or Cologuard test every 3 years.  To learn more about your testing options, visit:   .  For help making a decision, visit:   https://bit.ly/dm18730.  Prostate cancer screening test: If you have a prostate, ask your care team if a prostate cancer screening test (PSA) at age 55 is right for you.  Lung cancer screening: If you are a current or former smoker ages 50 to 80, ask your care team if ongoing lung cancer screenings are right for you.  For informational purposes only. Not to replace the advice of your health care provider. Copyright   2023 [facility] [employer]. All rights reserved. Clinically reviewed by the [facility]. [employer] 167254 - REV 01/24.  Learning About Stress  What is stress?     Stress is your body's response to a hard situation. Your body can have a physical, emotional, or mental response. Stress is a fact of life for most people, and it affects everyone differently. What causes stress for you may not be stressful for someone else.  A lot of things can cause stress. You may feel stress when you go on a job interview, take a test, or run a race. This kind of short-term stress is normal and even useful. It can help you if you need to work hard or react quickly. For example, stress can help you finish an important job on time.  Long-term stress is caused by ongoing stressful  situations or events. Examples of long-term stress include long-term health problems, ongoing problems at work, or conflicts in your family. Long-term stress can harm your health.  How does stress affect your health?  When you are stressed, your body responds as though you are in danger. It makes hormones that speed up your heart, make you breathe faster, and give you a burst of energy. This is called the fight-or-flight stress response. If the stress is over quickly, your body goes back to normal and no harm is done.  But if stress happens too often or lasts too long, it can have bad effects. Long-term stress can make you more likely to get sick, and it can make symptoms of some diseases worse. If you tense up when you are stressed, you may develop neck, shoulder, or low back pain. Stress is linked to high blood pressure and heart disease.  Stress also harms your emotional health. It can make you antonio, tense, or depressed. Your relationships may suffer, and you may not do well at work or school.  What can you do to manage stress?  You can try these things to help manage stress:   Do something active. Exercise or activity can help reduce stress. Walking is a great way to get started. Even everyday activities such as housecleaning or yard work can help.  Try yoga or abdirizak chi. These techniques combine exercise and meditation. You may need some training at first to learn them.  Do something you enjoy. For example, listen to music or go to a movie. Practice your hobby or do volunteer work.  Meditate. This can help you relax, because you are not worrying about what happened before or what may happen in the future.  Do guided imagery. Imagine yourself in any setting that helps you feel calm. You can use online videos, books, or a teacher to guide you.  Do breathing exercises. For example:  From a standing position, bend forward from the waist with your knees slightly bent. Let your arms dangle close to the floor.  Breathe  "in slowly and deeply as you return to a standing position. Roll up slowly and lift your head last.  Hold your breath for just a few seconds in the standing position.  Breathe out slowly and bend forward from the waist.  Let your feelings out. Talk, laugh, cry, and express anger when you need to. Talking with supportive friends or family, a counselor, or a keiko leader about your feelings is a healthy way to relieve stress. Avoid discussing your feelings with people who make you feel worse.  Write. It may help to write about things that are bothering you. This helps you find out how much stress you feel and what is causing it. When you know this, you can find better ways to cope.  What can you do to prevent stress?  You might try some of these things to help prevent stress:  Manage your time. This helps you find time to do the things you want and need to do.  Get enough sleep. Your body recovers from the stresses of the day while you are sleeping.  Get support. Your family, friends, and community can make a difference in how you experience stress.  Limit your news feed. Avoid or limit time on social media or news that may make you feel stressed.  Do something active. Exercise or activity can help reduce stress. Walking is a great way to get started.  Where can you learn more?  Go to https://www.transOMIC.net/patiented  Enter N032 in the search box to learn more about \"Learning About Stress.\"  Current as of: October 24, 2024  Content Version: 14.5 2024-2025 [employer].   Care instructions adapted under license by your healthcare professional. If you have questions about a medical condition or this instruction, always ask your healthcare professional. [employer] disclaims any warranty or liability for your use of this information.    Recovering From Depression: Care Instructions  Overview    Sticking to your treatment plan is important as you recover from depression. It may take time for " your symptoms to get better after you start treatment. Try not to give up if you don't feel better right away. Make sure you keep going to counseling and taking any prescribed medicine if they are part of your treatment plan.  Focus on things that can help you feel better, such as being with friends and family. Try to eat healthy foods, be active, and get enough sleep. Take things slowly as you begin to recover.  Follow-up care is a key part of your treatment and safety. Be sure to make and go to all appointments, and call your doctor if you are having problems. It's also a good idea to know your test results and keep a list of the medicines you take.  How can you care for yourself at home?  Be realistic  If you have a large task to do, break it up into smaller steps you can handle, and just do what you can.  You may want to put off important decisions until your depression has lifted. If you have plans that will have a major impact on your life, such as marriage, divorce, or a job change, try to wait a bit. Talk it over with friends and loved ones who can help you look at the overall picture first.  Reaching out to people for help is important. Do not isolate yourself. Let your family and friends help you. Find someone you can trust and confide in, and talk to that person.  Be patient, and be kind to yourself. Remember that depression is not your fault and is not something you can overcome with willpower alone. Treatment is important for depression, just like for any other illness. Feeling better takes time, and your mood will improve little by little.  Stay active  Stay busy and get outside. Take a walk, or try some other light exercise.  Talk with your doctor about an exercise program. Exercise can help with mild depression.  Go to a movie or concert. Take part in a Jehovah's witness activity or other social gathering. Go to a ball game.  Ask a friend to have dinner with you.  Take care of yourself  Eat healthy foods such  as fresh fruits and vegetables, whole grains, and lean protein. If you have lost your appetite, eat small snacks rather than large meals.  Avoid using marijuana and other drugs and drinking alcohol. Do not take medicines that have not been prescribed for you. They may interfere with medicines you may be taking for depression, or they may make your depression worse.  Take your medicines exactly as they are prescribed. You may start to feel better within 1 to 3 weeks of taking antidepressant medicine. But it can take as many as 6 to 8 weeks to see more improvement. If you have questions or concerns about your medicines, or if you do not notice any improvement by 3 weeks, talk to your doctor.  Continue to take your medicine after your symptoms improve. Taking your medicine for at least 6 months after you feel better can help keep you from getting depressed again. If this isn't the first time you have been depressed, your doctor may recommend you to take medicine even longer.  If you have any side effects from your medicine, tell your doctor. Many side effects are mild and will go away on their own after you have been taking the medicine for a few weeks. Some may last longer. Talk to your doctor if side effects are bothering you too much. You might be able to try a different medicine.  Continue counseling. It may help prevent depression from returning, especially if you've had multiple episodes of depression. Talk with your counselor if you are having a hard time attending your sessions or you think the sessions aren't working. Don't just stop going.  Get enough sleep. Talk to your doctor if you are having problems sleeping.  Avoid sleeping pills unless they are prescribed by the doctor treating your depression. Sleeping pills may make you groggy during the day, and they may interact with other medicine you are taking.  If you have any other illnesses, such as diabetes, heart disease, or high blood pressure, make sure  "to continue with your treatment. Tell your doctor about all of the medicines you take, including those with or without a prescription.  Where to get help 24 hours a day, 7 days a week  If you or someone you know talks about suicide, self-harm, a mental health crisis, a substance use crisis, or any other kind of emotional distress, get help right away. You can:  Call the Suicide and Crisis Lifeline at 988.  Text HOME to 900667 to access the Crisis Text Line.  Consider saving these numbers in your phone.  Go to [employer] for more information or to chat online.  Call 911 anytime you think you may need emergency care. For example, call if:  You feel like hurting yourself or someone else.  Someone you know has depression and is about to attempt or is attempting suicide.  Where to get help 24 hours a day, 7 days a week  If you or someone you know talks about suicide, self-harm, a mental health crisis, a substance use crisis, or any other kind of emotional distress, get help right away. You can:  Call the Suicide and Crisis Lifeline at ZANY OX8.  Text HOME to 932254 to access the Crisis Text Line.  Consider saving these numbers in your phone.  Go to [employer] for more information or to chat online.  Call your doctor now or seek immediate medical care if:  You hear voices.  Someone you know has depression and:  Starts to give away possessions.  Uses illegal drugs or drinks alcohol heavily.  Talks or writes about death, including writing suicide notes or talking about guns, knives, or pills.  Starts to spend a lot of time alone.  Acts very aggressively or suddenly appears calm.  Watch closely for changes in your health, and be sure to contact your doctor if:  You do not get better as expected.  Where can you learn more?  Go to https://www.healthwise.net/patiented  Enter N529 in the search box to learn more about \"Recovering From Depression: Care Instructions.\"  Current as of: July 31, 2024  Content Version: 14.5    " 4593-4111 [employer].   Care instructions adapted under license by your healthcare professional. If you have questions about a medical condition or this instruction, always ask your healthcare professional. [employer] disclaims any warranty or liability for your use of this information.

## 2025-07-21 ENCOUNTER — PATIENT OUTREACH (OUTPATIENT)
Dept: CARE COORDINATION | Facility: CLINIC | Age: 50
End: 2025-07-21
Payer: COMMERCIAL

## 2025-07-22 LAB
BKR AP ASSOCIATED HPV REPORT: NORMAL
BKR LAB AP GYN ADEQUACY: NORMAL
BKR LAB AP GYN INTERPRETATION: NORMAL
BKR LAB AP PREVIOUS ABNORMAL: NORMAL
PATH REPORT.COMMENTS IMP SPEC: NORMAL
PATH REPORT.COMMENTS IMP SPEC: NORMAL
PATH REPORT.RELEVANT HX SPEC: NORMAL

## 2025-07-27 LAB — CREAT UR-MCNC: 101 MG/DL

## 2025-07-29 LAB
AMPHET UR CFM-MCNC: 2120 NG/ML
AMPHET/CREAT UR: 2099 NG/MG {CREAT}
GABAPENTIN UR QL CFM: PRESENT
OXYCODONE UR CFM-MCNC: 99 NG/ML
OXYCODONE/CREAT UR: 98 NG/MG {CREAT}
OXYMORPHONE UR CFM-MCNC: 162 NG/ML
OXYMORPHONE/CREAT UR: 160 NG/MG {CREAT}

## 2025-08-10 DIAGNOSIS — K76.0 METABOLIC DYSFUNCTION-ASSOCIATED STEATOTIC LIVER DISEASE (MASLD): Primary | ICD-10-CM

## 2025-08-10 DIAGNOSIS — D13.4 HEPATIC ADENOMA: ICD-10-CM

## 2025-08-17 ENCOUNTER — MYC MEDICAL ADVICE (OUTPATIENT)
Dept: FAMILY MEDICINE | Facility: CLINIC | Age: 50
End: 2025-08-17
Payer: COMMERCIAL

## 2025-08-17 DIAGNOSIS — F11.90 CHRONIC, CONTINUOUS USE OF OPIOIDS: ICD-10-CM

## 2025-08-17 DIAGNOSIS — M25.571 PAIN IN JOINT INVOLVING ANKLE AND FOOT, RIGHT: ICD-10-CM

## 2025-08-17 DIAGNOSIS — G89.4 CHRONIC PAIN DISORDER: ICD-10-CM

## 2025-08-19 ENCOUNTER — OFFICE VISIT (OUTPATIENT)
Dept: GASTROENTEROLOGY | Facility: CLINIC | Age: 50
End: 2025-08-19
Attending: INTERNAL MEDICINE
Payer: COMMERCIAL

## 2025-08-19 ENCOUNTER — LAB (OUTPATIENT)
Dept: LAB | Facility: CLINIC | Age: 50
End: 2025-08-19
Attending: INTERNAL MEDICINE
Payer: COMMERCIAL

## 2025-08-19 VITALS
DIASTOLIC BLOOD PRESSURE: 55 MMHG | RESPIRATION RATE: 16 BRPM | HEIGHT: 67 IN | OXYGEN SATURATION: 97 % | SYSTOLIC BLOOD PRESSURE: 81 MMHG | BODY MASS INDEX: 25.22 KG/M2 | TEMPERATURE: 98.3 F | WEIGHT: 160.7 LBS | HEART RATE: 73 BPM

## 2025-08-19 DIAGNOSIS — D13.4 HEPATIC ADENOMA: Primary | ICD-10-CM

## 2025-08-19 DIAGNOSIS — K76.0 NAFLD (NONALCOHOLIC FATTY LIVER DISEASE): ICD-10-CM

## 2025-08-19 DIAGNOSIS — K76.0 METABOLIC DYSFUNCTION-ASSOCIATED STEATOTIC LIVER DISEASE (MASLD): ICD-10-CM

## 2025-08-19 LAB
ALBUMIN SERPL BCG-MCNC: 4.4 G/DL (ref 3.5–5.2)
ALP SERPL-CCNC: 74 U/L (ref 40–150)
ALT SERPL W P-5'-P-CCNC: 12 U/L (ref 0–50)
ANION GAP SERPL CALCULATED.3IONS-SCNC: 12 MMOL/L (ref 7–15)
AST SERPL W P-5'-P-CCNC: 15 U/L (ref 0–45)
BILIRUB SERPL-MCNC: 0.3 MG/DL
BILIRUBIN DIRECT (ROCHE PRO & PURE): 0.13 MG/DL (ref 0–0.45)
BUN SERPL-MCNC: 16 MG/DL (ref 6–20)
CALCIUM SERPL-MCNC: 9.3 MG/DL (ref 8.8–10.4)
CHLORIDE SERPL-SCNC: 102 MMOL/L (ref 98–107)
CREAT SERPL-MCNC: 0.76 MG/DL (ref 0.51–0.95)
EGFRCR SERPLBLD CKD-EPI 2021: >90 ML/MIN/1.73M2
ERYTHROCYTE [DISTWIDTH] IN BLOOD BY AUTOMATED COUNT: 12.6 % (ref 10–15)
GLUCOSE SERPL-MCNC: 83 MG/DL (ref 70–99)
HCO3 SERPL-SCNC: 27 MMOL/L (ref 22–29)
HCT VFR BLD AUTO: 41.8 % (ref 35–47)
HGB BLD-MCNC: 13.9 G/DL (ref 11.7–15.7)
INR PPP: 0.94 (ref 0.85–1.15)
MCH RBC QN AUTO: 30.9 PG (ref 26.5–33)
MCHC RBC AUTO-ENTMCNC: 33.3 G/DL (ref 31.5–36.5)
MCV RBC AUTO: 92.9 FL (ref 78–100)
PLATELET # BLD AUTO: 271 10E3/UL (ref 150–450)
POTASSIUM SERPL-SCNC: 4.4 MMOL/L (ref 3.4–5.3)
PROT SERPL-MCNC: 6.4 G/DL (ref 6.4–8.3)
PROTHROMBIN TIME: 12.8 SECONDS (ref 11.8–14.8)
RBC # BLD AUTO: 4.5 10E6/UL (ref 3.8–5.2)
SODIUM SERPL-SCNC: 141 MMOL/L (ref 135–145)
WBC # BLD AUTO: 6.15 10E3/UL (ref 4–11)

## 2025-08-19 PROCEDURE — 82248 BILIRUBIN DIRECT: CPT | Performed by: PATHOLOGY

## 2025-08-19 PROCEDURE — 91200 LIVER ELASTOGRAPHY: CPT

## 2025-08-19 PROCEDURE — 85610 PROTHROMBIN TIME: CPT | Performed by: PATHOLOGY

## 2025-08-19 PROCEDURE — 85027 COMPLETE CBC AUTOMATED: CPT | Performed by: PATHOLOGY

## 2025-08-19 PROCEDURE — 80053 COMPREHEN METABOLIC PANEL: CPT | Performed by: PATHOLOGY

## 2025-08-19 PROCEDURE — 99213 OFFICE O/P EST LOW 20 MIN: CPT | Performed by: INTERNAL MEDICINE

## 2025-08-19 PROCEDURE — 36415 COLL VENOUS BLD VENIPUNCTURE: CPT | Performed by: PATHOLOGY

## 2025-08-19 RX ORDER — METHOCARBAMOL 500 MG/1
500 TABLET, FILM COATED ORAL 3 TIMES DAILY
COMMUNITY
Start: 2025-07-18

## 2025-08-19 ASSESSMENT — PAIN SCALES - GENERAL: PAINLEVEL_OUTOF10: SEVERE PAIN (8)

## 2025-08-21 ENCOUNTER — PATIENT OUTREACH (OUTPATIENT)
Dept: CARE COORDINATION | Facility: CLINIC | Age: 50
End: 2025-08-21
Payer: COMMERCIAL

## 2025-08-25 RX ORDER — OXYCODONE HYDROCHLORIDE 5 MG/1
5 TABLET ORAL EVERY 4 HOURS PRN
Qty: 180 TABLET | Refills: 0 | Status: SHIPPED | OUTPATIENT
Start: 2025-08-25

## (undated) DEVICE — NDL SPINAL 22GA 3.5" QUINCKE 405181

## (undated) DEVICE — SYR 30ML SLIP TIP W/O NDL 302833

## (undated) DEVICE — SUCTION CATH AIRLIFE TRI-FLO W/CONTROL PORT 14FR  T60C

## (undated) DEVICE — TRAY PAIN INJECTION 97A 640

## (undated) DEVICE — SNARE CAPIVATOR ROUND COLD SNR BX10 M00561101

## (undated) DEVICE — GLOVE PROTEXIS POWDER FREE SMT 7.0  2D72PT70X

## (undated) DEVICE — SOL WATER IRRIG 500ML BOTTLE 2F7113

## (undated) DEVICE — KIT ENDO TURNOVER/PROCEDURE CARRY-ON 101822

## (undated) DEVICE — TUBING SUCTION MEDI-VAC 1/4"X20' N620A

## (undated) DEVICE — SPECIMEN CONTAINER 3OZ W/FORMALIN 59901

## (undated) DEVICE — ENDO FORCEP BX CAPTURA PRO SPIKE G50696

## (undated) DEVICE — GOWN IMPERVIOUS 2XL BLUE

## (undated) DEVICE — PREP CHLORAPREP W/ORANGE TINT 10.5ML 260715

## (undated) DEVICE — GLOVE EXAM NITRILE LG PF LATEX FREE 5064

## (undated) DEVICE — ENDO BITE BLOCK ADULT OMNI-BLOC

## (undated) DEVICE — SUCTION MANIFOLD NEPTUNE 2 SYS 1 PORT 702-025-000

## (undated) RX ORDER — TRIAMCINOLONE ACETONIDE 40 MG/ML
INJECTION, SUSPENSION INTRA-ARTICULAR; INTRAMUSCULAR
Status: DISPENSED
Start: 2024-03-09

## (undated) RX ORDER — LIDOCAINE HYDROCHLORIDE 10 MG/ML
INJECTION, SOLUTION EPIDURAL; INFILTRATION; INTRACAUDAL; PERINEURAL
Status: DISPENSED
Start: 2024-03-01

## (undated) RX ORDER — TRIAMCINOLONE ACETONIDE 40 MG/ML
INJECTION, SUSPENSION INTRA-ARTICULAR; INTRAMUSCULAR
Status: DISPENSED
Start: 2024-03-01

## (undated) RX ORDER — LIDOCAINE HYDROCHLORIDE 10 MG/ML
INJECTION, SOLUTION EPIDURAL; INFILTRATION; INTRACAUDAL; PERINEURAL
Status: DISPENSED
Start: 2024-03-09